# Patient Record
Sex: FEMALE | Race: WHITE | NOT HISPANIC OR LATINO
[De-identification: names, ages, dates, MRNs, and addresses within clinical notes are randomized per-mention and may not be internally consistent; named-entity substitution may affect disease eponyms.]

---

## 2017-01-03 ENCOUNTER — RX RENEWAL (OUTPATIENT)
Age: 82
End: 2017-01-03

## 2017-02-27 ENCOUNTER — MEDICATION RENEWAL (OUTPATIENT)
Age: 82
End: 2017-02-27

## 2017-05-03 ENCOUNTER — LABORATORY RESULT (OUTPATIENT)
Age: 82
End: 2017-05-03

## 2017-05-03 ENCOUNTER — APPOINTMENT (OUTPATIENT)
Dept: INTERNAL MEDICINE | Facility: CLINIC | Age: 82
End: 2017-05-03

## 2017-05-03 VITALS — HEART RATE: 97 BPM | HEIGHT: 63 IN | BODY MASS INDEX: 26.93 KG/M2 | WEIGHT: 152 LBS | OXYGEN SATURATION: 96 %

## 2017-05-03 VITALS — SYSTOLIC BLOOD PRESSURE: 130 MMHG | DIASTOLIC BLOOD PRESSURE: 70 MMHG | HEART RATE: 78 BPM | RESPIRATION RATE: 14 BRPM

## 2017-05-03 DIAGNOSIS — I73.9 PERIPHERAL VASCULAR DISEASE, UNSPECIFIED: ICD-10-CM

## 2017-05-04 LAB
ALBUMIN SERPL ELPH-MCNC: 4.1 G/DL
ALP BLD-CCNC: 96 U/L
ALT SERPL-CCNC: 13 U/L
ANION GAP SERPL CALC-SCNC: 16 MMOL/L
AST SERPL-CCNC: 22 U/L
BASOPHILS # BLD AUTO: 0.02 K/UL
BASOPHILS NFR BLD AUTO: 0.3 %
BILIRUB SERPL-MCNC: 0.4 MG/DL
BUN SERPL-MCNC: 17 MG/DL
CALCIUM SERPL-MCNC: 9.7 MG/DL
CHLORIDE SERPL-SCNC: 100 MMOL/L
CO2 SERPL-SCNC: 24 MMOL/L
CREAT SERPL-MCNC: 0.93 MG/DL
EOSINOPHIL # BLD AUTO: 0.21 K/UL
EOSINOPHIL NFR BLD AUTO: 3.1 %
GLUCOSE SERPL-MCNC: 98 MG/DL
HCT VFR BLD CALC: 44 %
HGB BLD-MCNC: 14 G/DL
IMM GRANULOCYTES NFR BLD AUTO: 0.1 %
LYMPHOCYTES # BLD AUTO: 3.27 K/UL
LYMPHOCYTES NFR BLD AUTO: 48.1 %
MAN DIFF?: NORMAL
MCHC RBC-ENTMCNC: 30.7 PG
MCHC RBC-ENTMCNC: 31.8 GM/DL
MCV RBC AUTO: 96.5 FL
MONOCYTES # BLD AUTO: 0.74 K/UL
MONOCYTES NFR BLD AUTO: 10.9 %
NEUTROPHILS # BLD AUTO: 2.55 K/UL
NEUTROPHILS NFR BLD AUTO: 37.5 %
PLATELET # BLD AUTO: 272 K/UL
POTASSIUM SERPL-SCNC: 5.1 MMOL/L
PROT SERPL-MCNC: 7.1 G/DL
RBC # BLD: 4.56 M/UL
RBC # FLD: 13.8 %
SODIUM SERPL-SCNC: 140 MMOL/L
WBC # FLD AUTO: 6.8 K/UL

## 2017-05-06 ENCOUNTER — MEDICATION RENEWAL (OUTPATIENT)
Age: 82
End: 2017-05-06

## 2017-05-24 ENCOUNTER — MEDICATION RENEWAL (OUTPATIENT)
Age: 82
End: 2017-05-24

## 2017-08-09 ENCOUNTER — MEDICATION RENEWAL (OUTPATIENT)
Age: 82
End: 2017-08-09

## 2017-09-03 ENCOUNTER — MOBILE ON CALL (OUTPATIENT)
Age: 82
End: 2017-09-03

## 2017-09-06 ENCOUNTER — MEDICATION RENEWAL (OUTPATIENT)
Age: 82
End: 2017-09-06

## 2017-09-21 ENCOUNTER — RX RENEWAL (OUTPATIENT)
Age: 82
End: 2017-09-21

## 2017-10-04 ENCOUNTER — APPOINTMENT (OUTPATIENT)
Dept: INTERNAL MEDICINE | Facility: CLINIC | Age: 82
End: 2017-10-04
Payer: MEDICARE

## 2017-10-04 ENCOUNTER — NON-APPOINTMENT (OUTPATIENT)
Age: 82
End: 2017-10-04

## 2017-10-04 VITALS
WEIGHT: 149 LBS | HEART RATE: 78 BPM | BODY MASS INDEX: 26.4 KG/M2 | HEIGHT: 63 IN | RESPIRATION RATE: 12 BRPM | SYSTOLIC BLOOD PRESSURE: 122 MMHG | DIASTOLIC BLOOD PRESSURE: 72 MMHG

## 2017-10-04 DIAGNOSIS — Z01.818 ENCOUNTER FOR OTHER PREPROCEDURAL EXAMINATION: ICD-10-CM

## 2017-10-04 DIAGNOSIS — M48.00 SPINAL STENOSIS, SITE UNSPECIFIED: ICD-10-CM

## 2017-10-04 PROCEDURE — 93000 ELECTROCARDIOGRAM COMPLETE: CPT

## 2017-10-04 PROCEDURE — 99214 OFFICE O/P EST MOD 30 MIN: CPT | Mod: 25

## 2017-10-04 RX ORDER — ALBUTEROL SULFATE 90 UG/1
108 (90 BASE) AEROSOL, METERED RESPIRATORY (INHALATION) EVERY 6 HOURS
Qty: 1 | Refills: 5 | Status: DISCONTINUED | COMMUNITY
Start: 2017-05-03 | End: 2017-10-04

## 2017-11-06 ENCOUNTER — MEDICATION RENEWAL (OUTPATIENT)
Age: 82
End: 2017-11-06

## 2018-01-29 ENCOUNTER — RX RENEWAL (OUTPATIENT)
Age: 83
End: 2018-01-29

## 2018-01-30 ENCOUNTER — MED ADMIN CHARGE (OUTPATIENT)
Age: 83
End: 2018-01-30

## 2018-03-20 ENCOUNTER — MEDICATION RENEWAL (OUTPATIENT)
Age: 83
End: 2018-03-20

## 2018-03-27 ENCOUNTER — APPOINTMENT (OUTPATIENT)
Dept: INTERNAL MEDICINE | Facility: CLINIC | Age: 83
End: 2018-03-27
Payer: MEDICARE

## 2018-03-27 VITALS — BODY MASS INDEX: 25.52 KG/M2 | WEIGHT: 144 LBS | HEIGHT: 63 IN

## 2018-03-27 VITALS — DIASTOLIC BLOOD PRESSURE: 72 MMHG | HEART RATE: 70 BPM | RESPIRATION RATE: 14 BRPM | SYSTOLIC BLOOD PRESSURE: 118 MMHG

## 2018-03-27 DIAGNOSIS — J45.909 UNSPECIFIED ASTHMA, UNCOMPLICATED: ICD-10-CM

## 2018-03-27 DIAGNOSIS — Z87.09 PERSONAL HISTORY OF OTHER DISEASES OF THE RESPIRATORY SYSTEM: ICD-10-CM

## 2018-03-27 DIAGNOSIS — M16.10 UNILATERAL PRIMARY OSTEOARTHRITIS, UNSPECIFIED HIP: ICD-10-CM

## 2018-03-27 PROCEDURE — 99214 OFFICE O/P EST MOD 30 MIN: CPT

## 2018-04-23 ENCOUNTER — APPOINTMENT (OUTPATIENT)
Dept: ORTHOPEDIC SURGERY | Facility: CLINIC | Age: 83
End: 2018-04-23
Payer: MEDICARE

## 2018-04-23 VITALS
SYSTOLIC BLOOD PRESSURE: 147 MMHG | BODY MASS INDEX: 26.4 KG/M2 | HEART RATE: 88 BPM | WEIGHT: 149 LBS | HEIGHT: 63 IN | DIASTOLIC BLOOD PRESSURE: 96 MMHG | TEMPERATURE: 98.3 F

## 2018-04-23 PROCEDURE — 73562 X-RAY EXAM OF KNEE 3: CPT | Mod: RT

## 2018-04-23 PROCEDURE — 73502 X-RAY EXAM HIP UNI 2-3 VIEWS: CPT | Mod: RT

## 2018-04-23 PROCEDURE — 99204 OFFICE O/P NEW MOD 45 MIN: CPT

## 2018-05-01 ENCOUNTER — MEDICATION RENEWAL (OUTPATIENT)
Age: 83
End: 2018-05-01

## 2018-07-04 ENCOUNTER — FORM ENCOUNTER (OUTPATIENT)
Age: 83
End: 2018-07-04

## 2018-07-05 ENCOUNTER — OUTPATIENT (OUTPATIENT)
Dept: OUTPATIENT SERVICES | Facility: HOSPITAL | Age: 83
LOS: 1 days | End: 2018-07-05
Payer: MEDICARE

## 2018-07-05 VITALS
TEMPERATURE: 97 F | DIASTOLIC BLOOD PRESSURE: 87 MMHG | SYSTOLIC BLOOD PRESSURE: 141 MMHG | HEIGHT: 61 IN | HEART RATE: 85 BPM | RESPIRATION RATE: 20 BRPM | WEIGHT: 148.37 LBS

## 2018-07-05 DIAGNOSIS — D64.9 ANEMIA, UNSPECIFIED: ICD-10-CM

## 2018-07-05 DIAGNOSIS — M16.11 UNILATERAL PRIMARY OSTEOARTHRITIS, RIGHT HIP: ICD-10-CM

## 2018-07-05 DIAGNOSIS — Z98.890 OTHER SPECIFIED POSTPROCEDURAL STATES: Chronic | ICD-10-CM

## 2018-07-05 DIAGNOSIS — Z29.9 ENCOUNTER FOR PROPHYLACTIC MEASURES, UNSPECIFIED: ICD-10-CM

## 2018-07-05 DIAGNOSIS — Z86.79 PERSONAL HISTORY OF OTHER DISEASES OF THE CIRCULATORY SYSTEM: Chronic | ICD-10-CM

## 2018-07-05 DIAGNOSIS — H26.9 UNSPECIFIED CATARACT: Chronic | ICD-10-CM

## 2018-07-05 DIAGNOSIS — F32.9 MAJOR DEPRESSIVE DISORDER, SINGLE EPISODE, UNSPECIFIED: ICD-10-CM

## 2018-07-05 DIAGNOSIS — Z90.721 ACQUIRED ABSENCE OF OVARIES, UNILATERAL: Chronic | ICD-10-CM

## 2018-07-05 DIAGNOSIS — G61.0 GUILLAIN-BARRE SYNDROME: ICD-10-CM

## 2018-07-05 DIAGNOSIS — Z01.818 ENCOUNTER FOR OTHER PREPROCEDURAL EXAMINATION: ICD-10-CM

## 2018-07-05 DIAGNOSIS — Z90.49 ACQUIRED ABSENCE OF OTHER SPECIFIED PARTS OF DIGESTIVE TRACT: Chronic | ICD-10-CM

## 2018-07-05 LAB
ANION GAP SERPL CALC-SCNC: 13 MMOL/L — SIGNIFICANT CHANGE UP (ref 5–17)
APTT BLD: 28.7 SEC — SIGNIFICANT CHANGE UP (ref 27.5–37.4)
BASOPHILS # BLD AUTO: 0 K/UL — SIGNIFICANT CHANGE UP (ref 0–0.2)
BASOPHILS NFR BLD AUTO: 0.3 % — SIGNIFICANT CHANGE UP (ref 0–2)
BLD GP AB SCN SERPL QL: SIGNIFICANT CHANGE UP
BUN SERPL-MCNC: 30 MG/DL — HIGH (ref 8–20)
CALCIUM SERPL-MCNC: 9.6 MG/DL — SIGNIFICANT CHANGE UP (ref 8.6–10.2)
CHLORIDE SERPL-SCNC: 101 MMOL/L — SIGNIFICANT CHANGE UP (ref 98–107)
CO2 SERPL-SCNC: 24 MMOL/L — SIGNIFICANT CHANGE UP (ref 22–29)
COMMENT - BLOOD BANK: SIGNIFICANT CHANGE UP
CREAT SERPL-MCNC: 0.79 MG/DL — SIGNIFICANT CHANGE UP (ref 0.5–1.3)
EOSINOPHIL # BLD AUTO: 0.6 K/UL — HIGH (ref 0–0.5)
EOSINOPHIL NFR BLD AUTO: 9.2 % — HIGH (ref 0–6)
GLUCOSE SERPL-MCNC: 108 MG/DL — SIGNIFICANT CHANGE UP (ref 70–115)
HCT VFR BLD CALC: 43.6 % — SIGNIFICANT CHANGE UP (ref 37–47)
HGB BLD-MCNC: 14.3 G/DL — SIGNIFICANT CHANGE UP (ref 12–16)
INR BLD: 0.89 RATIO — SIGNIFICANT CHANGE UP (ref 0.88–1.16)
LYMPHOCYTES # BLD AUTO: 2.2 K/UL — SIGNIFICANT CHANGE UP (ref 1–4.8)
LYMPHOCYTES # BLD AUTO: 35.2 % — SIGNIFICANT CHANGE UP (ref 20–55)
MCHC RBC-ENTMCNC: 31 PG — SIGNIFICANT CHANGE UP (ref 27–31)
MCHC RBC-ENTMCNC: 32.8 G/DL — SIGNIFICANT CHANGE UP (ref 32–36)
MCV RBC AUTO: 94.4 FL — SIGNIFICANT CHANGE UP (ref 81–99)
MONOCYTES # BLD AUTO: 0.7 K/UL — SIGNIFICANT CHANGE UP (ref 0–0.8)
MONOCYTES NFR BLD AUTO: 10.8 % — HIGH (ref 3–10)
MRSA PCR RESULT.: SIGNIFICANT CHANGE UP
NEUTROPHILS # BLD AUTO: 2.7 K/UL — SIGNIFICANT CHANGE UP (ref 1.8–8)
NEUTROPHILS NFR BLD AUTO: 44.3 % — SIGNIFICANT CHANGE UP (ref 37–73)
PLATELET # BLD AUTO: 229 K/UL — SIGNIFICANT CHANGE UP (ref 150–400)
POTASSIUM SERPL-MCNC: 4.7 MMOL/L — SIGNIFICANT CHANGE UP (ref 3.5–5.3)
POTASSIUM SERPL-SCNC: 4.7 MMOL/L — SIGNIFICANT CHANGE UP (ref 3.5–5.3)
PROTHROM AB SERPL-ACNC: 9.8 SEC — SIGNIFICANT CHANGE UP (ref 9.8–12.7)
RBC # BLD: 4.62 M/UL — SIGNIFICANT CHANGE UP (ref 4.4–5.2)
RBC # FLD: 13.7 % — SIGNIFICANT CHANGE UP (ref 11–15.6)
S AUREUS DNA NOSE QL NAA+PROBE: SIGNIFICANT CHANGE UP
SODIUM SERPL-SCNC: 138 MMOL/L — SIGNIFICANT CHANGE UP (ref 135–145)
TYPE + AB SCN PNL BLD: SIGNIFICANT CHANGE UP
WBC # BLD: 6.2 K/UL — SIGNIFICANT CHANGE UP (ref 4.8–10.8)
WBC # FLD AUTO: 6.2 K/UL — SIGNIFICANT CHANGE UP (ref 4.8–10.8)

## 2018-07-05 PROCEDURE — 87640 STAPH A DNA AMP PROBE: CPT

## 2018-07-05 PROCEDURE — 71046 X-RAY EXAM CHEST 2 VIEWS: CPT

## 2018-07-05 PROCEDURE — 93005 ELECTROCARDIOGRAM TRACING: CPT

## 2018-07-05 PROCEDURE — 86850 RBC ANTIBODY SCREEN: CPT

## 2018-07-05 PROCEDURE — 71046 X-RAY EXAM CHEST 2 VIEWS: CPT | Mod: 26

## 2018-07-05 PROCEDURE — 93010 ELECTROCARDIOGRAM REPORT: CPT

## 2018-07-05 PROCEDURE — 85730 THROMBOPLASTIN TIME PARTIAL: CPT

## 2018-07-05 PROCEDURE — G0463: CPT

## 2018-07-05 PROCEDURE — 86900 BLOOD TYPING SEROLOGIC ABO: CPT

## 2018-07-05 PROCEDURE — 36415 COLL VENOUS BLD VENIPUNCTURE: CPT

## 2018-07-05 PROCEDURE — 86901 BLOOD TYPING SEROLOGIC RH(D): CPT

## 2018-07-05 PROCEDURE — 85027 COMPLETE CBC AUTOMATED: CPT

## 2018-07-05 PROCEDURE — 87641 MR-STAPH DNA AMP PROBE: CPT

## 2018-07-05 PROCEDURE — 85610 PROTHROMBIN TIME: CPT

## 2018-07-05 PROCEDURE — 80048 BASIC METABOLIC PNL TOTAL CA: CPT

## 2018-07-05 RX ORDER — DULOXETINE HYDROCHLORIDE 30 MG/1
1 CAPSULE, DELAYED RELEASE ORAL
Qty: 0 | Refills: 0 | COMMUNITY

## 2018-07-05 NOTE — H&P PST ADULT - HISTORY OF PRESENT ILLNESS
Pt is a 89 y.o female with OA of her right hip for the past 2 years with current pain management and physical therapy with no relief now scheduled fo right hip total joint replacement.

## 2018-07-05 NOTE — PATIENT PROFILE ADULT. - PMH
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

## 2018-07-05 NOTE — PATIENT PROFILE ADULT. - LEARNING ASSESSMENT (PATIENT) ADDITIONAL COMMENTS
Pre op teaching Surgical scrub pain management instructions given Pre op teaching Surgical scrub, MRSA/MSSA & pain management instructions given

## 2018-07-05 NOTE — H&P PST ADULT - PMH
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

## 2018-07-05 NOTE — PATIENT PROFILE ADULT. - PSH
Bilateral cataracts  surgery  History of appendectomy    History of right oophorectomy    History of tonsillectomy and adenoidectomy    History of varicose veins  laser removal  S/P wrist surgery  left wrist fracture repair

## 2018-07-05 NOTE — PATIENT PROFILE ADULT. - VISION (WITH CORRECTIVE LENSES IF THE PATIENT USUALLY WEARS THEM):
Partially impaired: cannot see medication labels or newsprint, but can see obstacles in path, and the surrounding layout; can count fingers at arm's length/cataract surgery  IOL

## 2018-07-05 NOTE — H&P PST ADULT - ASSESSMENT
Pt is a 89 y.o female with PMH of Guillian-[place] disease and myelodysplastic syndrome undergoing  right hip total joint replacement. Instructed to hold any medications containing ibuprofen and aspirin 1 week prior to surgery. Hold multivitamin 1 week prior to surgery.  CAPRINI SCORE [CLOT]    AGE RELATED RISK FACTORS                                                       MOBILITY RELATED FACTORS  [ ] Age 41-60 years                                            (1 Point)                  [ ] Bed rest                                                        (1 Point)  [ ] Age: 61-74 years                                           (2 Points)                 [ ] Plaster cast                                                   (2 Points)  [x ] Age= 75 years                                              (3 Points)                 [ ] Bed bound for more than 72 hours                 (2 Points)    DISEASE RELATED RISK FACTORS                                               GENDER SPECIFIC FACTORS  [ ] Edema in the lower extremities                       (1 Point)                  [ ] Pregnancy                                                     (1 Point)  [ ] Varicose veins                                               (1 Point)                  [ ] Post-partum < 6 weeks                                   (1 Point)             [x ] BMI > 25 Kg/m2                                            (1 Point)                  [ ] Hormonal therapy  or oral contraception          (1 Point)                 [ ] Sepsis (in the previous month)                        (1 Point)                  [ ] History of pregnancy complications                 (1 point)  [ ] Pneumonia or serious lung disease                                               [ ] Unexplained or recurrent                     (1 Point)           (in the previous month)                               (1 Point)  [ ] Abnormal pulmonary function test                     (1 Point)                 SURGERY RELATED RISK FACTORS  [ ] Acute myocardial infarction                              (1 Point)                 [ ]  Section                                             (1 Point)  [ ] Congestive heart failure (in the previous month)  (1 Point)               [ ] Minor surgery                                                  (1 Point)   [ ] Inflammatory bowel disease                             (1 Point)                 [ ] Arthroscopic surgery                                        (2 Points)  [ ] Central venous access                                      (2 Points)                [ ] General surgery lasting more than 45 minutes   (2 Points)       [ ] Stroke (in the previous month)                          (5 Points)               [x ] Elective arthroplasty                                         (5 Points)                                                                                                                                               HEMATOLOGY RELATED FACTORS                                                 TRAUMA RELATED RISK FACTORS  [ ] Prior episodes of VTE                                     (3 Points)                 [ ] Fracture of the hip, pelvis, or leg                       (5 Points)  [ ] Positive family history for VTE                         (3 Points)                 [ ] Acute spinal cord injury (in the previous month)  (5 Points)  [ ] Prothrombin 35984 A                                     (3 Points)                 [ ] Paralysis  (less than 1 month)                             (5 Points)  [ ] Factor V Leiden                                             (3 Points)                  [ ] Multiple Trauma within 1 month                        (5 Points)  [ ] Lupus anticoagulants                                     (3 Points)                                                           [ ] Anticardiolipin antibodies                               (3 Points)                                                       [ ] High homocysteine in the blood                      (3 Points)                                             [ ] Other congenital or acquired thrombophilia      (3 Points)                                                [ ] Heparin induced thrombocytopenia                  (3 Points)                                          Total Score [ 7         ]

## 2018-07-18 ENCOUNTER — APPOINTMENT (OUTPATIENT)
Dept: INTERNAL MEDICINE | Facility: CLINIC | Age: 83
End: 2018-07-18
Payer: MEDICARE

## 2018-07-18 VITALS
DIASTOLIC BLOOD PRESSURE: 76 MMHG | RESPIRATION RATE: 12 BRPM | HEIGHT: 63 IN | SYSTOLIC BLOOD PRESSURE: 118 MMHG | HEART RATE: 78 BPM | BODY MASS INDEX: 26.05 KG/M2 | WEIGHT: 147 LBS

## 2018-07-18 DIAGNOSIS — Z01.818 ENCOUNTER FOR OTHER PREPROCEDURAL EXAMINATION: ICD-10-CM

## 2018-07-18 DIAGNOSIS — M16.11 UNILATERAL PRIMARY OSTEOARTHRITIS, RIGHT HIP: ICD-10-CM

## 2018-07-18 PROCEDURE — 99214 OFFICE O/P EST MOD 30 MIN: CPT

## 2018-07-18 RX ORDER — LEVOFLOXACIN 500 MG/1
500 TABLET, FILM COATED ORAL DAILY
Qty: 7 | Refills: 0 | Status: DISCONTINUED | COMMUNITY
Start: 2018-03-20 | End: 2018-07-18

## 2018-07-18 RX ORDER — METHYLPREDNISOLONE 4 MG/1
4 TABLET ORAL
Qty: 1 | Refills: 0 | Status: DISCONTINUED | COMMUNITY
Start: 2018-03-27 | End: 2018-07-18

## 2018-07-18 RX ORDER — BROMPHENIRAMINE MALEATE, PSEUDOEPHEDRINE HYDROCHLORIDE AND DEXTROMETHORPHAN HYDROBROMIDE 2; 30; 10 MG/5ML; MG/5ML; MG/5ML
30-2-10 SYRUP ORAL
Qty: 1 | Refills: 0 | Status: DISCONTINUED | COMMUNITY
Start: 2018-03-20 | End: 2018-07-18

## 2018-07-18 NOTE — HISTORY OF PRESENT ILLNESS
[( Patient denies any chest pain, claudication, dyspnea on exertion, orthopnea, palpitations or syncope )] : Patient denies any chest pain, claudication, dyspnea on exertion, orthopnea, palpitations or syncope. [Coronary Artery Disease] : no coronary artery disease [Diabetes] : no diabetes [Sleep Apnea] : no sleep apnea [COPD] : no COPD [Previous Adverse Anesthesia Reaction] : no previous adverse anesthesia reaction [FreeTextEntry1] : RIght hip Replacement [FreeTextEntry2] : 7/24/18 [FreeTextEntry3] : Dr. Arturo Baum [FreeTextEntry4] : Patient with prior medical history of ITP, osteoporosis, chronic back pain.  Patient will undergo righ hip replacement

## 2018-07-18 NOTE — RESULTS/DATA
[] : results reviewed [de-identified] : normal [de-identified] : normal  [de-identified] : normal  [de-identified] : napd [de-identified] : nsr, low voltage, inferior lead changes all old

## 2018-07-18 NOTE — ASSESSMENT
[Patient Optimized for Surgery] : Patient optimized for surgery [No Further Testing Recommended] : no further testing recommended [As per surgery] : as per surgery [FreeTextEntry4] : Patient is medically stable for planned procedure. Patient ekg changes are old and does not feel a need for cardiology reevaluation.  She is high functioning and has no symptoms suggestive of heart disease. Would monitor cbc post op given history of ITP and likely post use of anticoagulants.   [FreeTextEntry7] : no nsaids,  take usual meds [FreeTextEntry2] : lovenox but monitor plateelts given history of itp.

## 2018-07-20 RX ORDER — GABAPENTIN 400 MG/1
600 CAPSULE ORAL ONCE
Qty: 0 | Refills: 0 | Status: COMPLETED | OUTPATIENT
Start: 2018-07-24 | End: 2018-07-24

## 2018-07-20 RX ORDER — OXYCODONE HYDROCHLORIDE 5 MG/1
10 TABLET ORAL ONCE
Qty: 0 | Refills: 0 | Status: DISCONTINUED | OUTPATIENT
Start: 2018-07-24 | End: 2018-07-24

## 2018-07-20 RX ORDER — CELECOXIB 200 MG/1
400 CAPSULE ORAL ONCE
Qty: 0 | Refills: 0 | Status: COMPLETED | OUTPATIENT
Start: 2018-07-24 | End: 2018-07-24

## 2018-07-22 NOTE — PATIENT PROFILE ADULT. - DOES PATIENT HAVE ADVANCE DIRECTIVE
Respiratory and MD at  Bedside, concerns were expressed about patient needing to intubated, per MD BiPap will be started to see if patient improved, will continue to monitor.   No

## 2018-07-23 ENCOUNTER — TRANSCRIPTION ENCOUNTER (OUTPATIENT)
Age: 83
End: 2018-07-23

## 2018-07-24 ENCOUNTER — INPATIENT (INPATIENT)
Facility: HOSPITAL | Age: 83
LOS: 1 days | Discharge: HOME HEALTH | DRG: 470 | End: 2018-07-26
Attending: ORTHOPAEDIC SURGERY | Admitting: ORTHOPAEDIC SURGERY
Payer: MEDICARE

## 2018-07-24 ENCOUNTER — APPOINTMENT (OUTPATIENT)
Dept: ORTHOPEDIC SURGERY | Facility: HOSPITAL | Age: 83
End: 2018-07-24

## 2018-07-24 ENCOUNTER — RESULT REVIEW (OUTPATIENT)
Age: 83
End: 2018-07-24

## 2018-07-24 ENCOUNTER — TRANSCRIPTION ENCOUNTER (OUTPATIENT)
Age: 83
End: 2018-07-24

## 2018-07-24 VITALS
WEIGHT: 148.37 LBS | HEIGHT: 63 IN | OXYGEN SATURATION: 95 % | HEART RATE: 92 BPM | SYSTOLIC BLOOD PRESSURE: 125 MMHG | DIASTOLIC BLOOD PRESSURE: 93 MMHG | RESPIRATION RATE: 16 BRPM | TEMPERATURE: 97 F

## 2018-07-24 DIAGNOSIS — Z90.49 ACQUIRED ABSENCE OF OTHER SPECIFIED PARTS OF DIGESTIVE TRACT: Chronic | ICD-10-CM

## 2018-07-24 DIAGNOSIS — Z86.79 PERSONAL HISTORY OF OTHER DISEASES OF THE CIRCULATORY SYSTEM: Chronic | ICD-10-CM

## 2018-07-24 DIAGNOSIS — Z98.890 OTHER SPECIFIED POSTPROCEDURAL STATES: Chronic | ICD-10-CM

## 2018-07-24 DIAGNOSIS — Z90.721 ACQUIRED ABSENCE OF OVARIES, UNILATERAL: Chronic | ICD-10-CM

## 2018-07-24 DIAGNOSIS — M16.11 UNILATERAL PRIMARY OSTEOARTHRITIS, RIGHT HIP: ICD-10-CM

## 2018-07-24 DIAGNOSIS — H26.9 UNSPECIFIED CATARACT: Chronic | ICD-10-CM

## 2018-07-24 LAB
BLD GP AB SCN SERPL QL: SIGNIFICANT CHANGE UP
GLUCOSE BLDC GLUCOMTR-MCNC: 103 MG/DL — HIGH (ref 70–99)
GLUCOSE BLDC GLUCOMTR-MCNC: 92 MG/DL — SIGNIFICANT CHANGE UP (ref 70–99)
GLUCOSE BLDC GLUCOMTR-MCNC: 96 MG/DL — SIGNIFICANT CHANGE UP (ref 70–99)
TYPE + AB SCN PNL BLD: SIGNIFICANT CHANGE UP

## 2018-07-24 PROCEDURE — 27130 TOTAL HIP ARTHROPLASTY: CPT | Mod: RT

## 2018-07-24 PROCEDURE — 27130 TOTAL HIP ARTHROPLASTY: CPT | Mod: AS,RT

## 2018-07-24 PROCEDURE — 88305 TISSUE EXAM BY PATHOLOGIST: CPT | Mod: 26

## 2018-07-24 PROCEDURE — 99222 1ST HOSP IP/OBS MODERATE 55: CPT

## 2018-07-24 PROCEDURE — 73501 X-RAY EXAM HIP UNI 1 VIEW: CPT | Mod: 26,RT

## 2018-07-24 PROCEDURE — 88311 DECALCIFY TISSUE: CPT | Mod: 26

## 2018-07-24 RX ORDER — SODIUM CHLORIDE 9 MG/ML
3 INJECTION INTRAMUSCULAR; INTRAVENOUS; SUBCUTANEOUS EVERY 8 HOURS
Qty: 0 | Refills: 0 | Status: DISCONTINUED | OUTPATIENT
Start: 2018-07-24 | End: 2018-07-24

## 2018-07-24 RX ORDER — POLYETHYLENE GLYCOL 3350 17 G/17G
17 POWDER, FOR SOLUTION ORAL DAILY
Qty: 0 | Refills: 0 | Status: DISCONTINUED | OUTPATIENT
Start: 2018-07-24 | End: 2018-07-26

## 2018-07-24 RX ORDER — ACETAMINOPHEN 500 MG
975 TABLET ORAL EVERY 8 HOURS
Qty: 0 | Refills: 0 | Status: DISCONTINUED | OUTPATIENT
Start: 2018-07-24 | End: 2018-07-26

## 2018-07-24 RX ORDER — MAGNESIUM HYDROXIDE 400 MG/1
30 TABLET, CHEWABLE ORAL DAILY
Qty: 0 | Refills: 0 | Status: DISCONTINUED | OUTPATIENT
Start: 2018-07-24 | End: 2018-07-26

## 2018-07-24 RX ORDER — OXYCODONE HYDROCHLORIDE 5 MG/1
5 TABLET ORAL EVERY 4 HOURS
Qty: 0 | Refills: 0 | Status: DISCONTINUED | OUTPATIENT
Start: 2018-07-24 | End: 2018-07-26

## 2018-07-24 RX ORDER — ZOLPIDEM TARTRATE 10 MG/1
5 TABLET ORAL AT BEDTIME
Qty: 0 | Refills: 0 | Status: DISCONTINUED | OUTPATIENT
Start: 2018-07-24 | End: 2018-07-26

## 2018-07-24 RX ORDER — CEFAZOLIN SODIUM 1 G
2000 VIAL (EA) INJECTION
Qty: 0 | Refills: 0 | Status: COMPLETED | OUTPATIENT
Start: 2018-07-24 | End: 2018-07-25

## 2018-07-24 RX ORDER — TRANEXAMIC ACID 100 MG/ML
650 INJECTION, SOLUTION INTRAVENOUS ONCE
Qty: 0 | Refills: 0 | Status: DISCONTINUED | OUTPATIENT
Start: 2018-07-24 | End: 2018-07-24

## 2018-07-24 RX ORDER — FERROUS SULFATE 325(65) MG
325 TABLET ORAL DAILY
Qty: 0 | Refills: 0 | Status: DISCONTINUED | OUTPATIENT
Start: 2018-07-24 | End: 2018-07-26

## 2018-07-24 RX ORDER — ACETAMINOPHEN 500 MG
1000 TABLET ORAL ONCE
Qty: 0 | Refills: 0 | Status: DISCONTINUED | OUTPATIENT
Start: 2018-07-24 | End: 2018-07-24

## 2018-07-24 RX ORDER — DOCUSATE SODIUM 100 MG
100 CAPSULE ORAL THREE TIMES A DAY
Qty: 0 | Refills: 0 | Status: DISCONTINUED | OUTPATIENT
Start: 2018-07-24 | End: 2018-07-26

## 2018-07-24 RX ORDER — OXYCODONE HYDROCHLORIDE 5 MG/1
10 TABLET ORAL EVERY 4 HOURS
Qty: 0 | Refills: 0 | Status: DISCONTINUED | OUTPATIENT
Start: 2018-07-24 | End: 2018-07-26

## 2018-07-24 RX ORDER — CEFAZOLIN SODIUM 1 G
2000 VIAL (EA) INJECTION ONCE
Qty: 0 | Refills: 0 | Status: DISCONTINUED | OUTPATIENT
Start: 2018-07-24 | End: 2018-07-24

## 2018-07-24 RX ORDER — VANCOMYCIN HCL 1 G
1000 VIAL (EA) INTRAVENOUS
Qty: 0 | Refills: 0 | Status: COMPLETED | OUTPATIENT
Start: 2018-07-24 | End: 2018-07-24

## 2018-07-24 RX ORDER — OXYCODONE HYDROCHLORIDE 5 MG/1
10 TABLET ORAL EVERY 12 HOURS
Qty: 0 | Refills: 0 | Status: DISCONTINUED | OUTPATIENT
Start: 2018-07-24 | End: 2018-07-26

## 2018-07-24 RX ORDER — KETOROLAC TROMETHAMINE 30 MG/ML
15 SYRINGE (ML) INJECTION EVERY 6 HOURS
Qty: 0 | Refills: 0 | Status: DISCONTINUED | OUTPATIENT
Start: 2018-07-24 | End: 2018-07-25

## 2018-07-24 RX ORDER — FOLIC ACID 0.8 MG
1 TABLET ORAL DAILY
Qty: 0 | Refills: 0 | Status: DISCONTINUED | OUTPATIENT
Start: 2018-07-24 | End: 2018-07-26

## 2018-07-24 RX ORDER — SODIUM CHLORIDE 9 MG/ML
1000 INJECTION, SOLUTION INTRAVENOUS
Qty: 0 | Refills: 0 | Status: DISCONTINUED | OUTPATIENT
Start: 2018-07-24 | End: 2018-07-25

## 2018-07-24 RX ORDER — ONDANSETRON 8 MG/1
4 TABLET, FILM COATED ORAL EVERY 6 HOURS
Qty: 0 | Refills: 0 | Status: DISCONTINUED | OUTPATIENT
Start: 2018-07-24 | End: 2018-07-26

## 2018-07-24 RX ORDER — VANCOMYCIN HCL 1 G
1000 VIAL (EA) INTRAVENOUS ONCE
Qty: 0 | Refills: 0 | Status: COMPLETED | OUTPATIENT
Start: 2018-07-24 | End: 2018-07-24

## 2018-07-24 RX ORDER — HYDROMORPHONE HYDROCHLORIDE 2 MG/ML
0.5 INJECTION INTRAMUSCULAR; INTRAVENOUS; SUBCUTANEOUS EVERY 4 HOURS
Qty: 0 | Refills: 0 | Status: DISCONTINUED | OUTPATIENT
Start: 2018-07-24 | End: 2018-07-26

## 2018-07-24 RX ORDER — DULOXETINE HYDROCHLORIDE 30 MG/1
60 CAPSULE, DELAYED RELEASE ORAL DAILY
Qty: 0 | Refills: 0 | Status: DISCONTINUED | OUTPATIENT
Start: 2018-07-24 | End: 2018-07-26

## 2018-07-24 RX ORDER — ASPIRIN/CALCIUM CARB/MAGNESIUM 324 MG
325 TABLET ORAL
Qty: 0 | Refills: 0 | Status: DISCONTINUED | OUTPATIENT
Start: 2018-07-25 | End: 2018-07-26

## 2018-07-24 RX ORDER — ACETAMINOPHEN 500 MG
650 TABLET ORAL EVERY 6 HOURS
Qty: 0 | Refills: 0 | Status: DISCONTINUED | OUTPATIENT
Start: 2018-07-24 | End: 2018-07-26

## 2018-07-24 RX ORDER — SENNA PLUS 8.6 MG/1
2 TABLET ORAL AT BEDTIME
Qty: 0 | Refills: 0 | Status: DISCONTINUED | OUTPATIENT
Start: 2018-07-24 | End: 2018-07-26

## 2018-07-24 RX ORDER — GABAPENTIN 400 MG/1
300 CAPSULE ORAL THREE TIMES A DAY
Qty: 0 | Refills: 0 | Status: DISCONTINUED | OUTPATIENT
Start: 2018-07-24 | End: 2018-07-26

## 2018-07-24 RX ADMIN — OXYCODONE HYDROCHLORIDE 10 MILLIGRAM(S): 5 TABLET ORAL at 10:12

## 2018-07-24 RX ADMIN — Medication 975 MILLIGRAM(S): at 21:39

## 2018-07-24 RX ADMIN — SENNA PLUS 2 TABLET(S): 8.6 TABLET ORAL at 21:38

## 2018-07-24 RX ADMIN — GABAPENTIN 300 MILLIGRAM(S): 400 CAPSULE ORAL at 21:38

## 2018-07-24 RX ADMIN — ZOLPIDEM TARTRATE 5 MILLIGRAM(S): 10 TABLET ORAL at 21:38

## 2018-07-24 RX ADMIN — CELECOXIB 400 MILLIGRAM(S): 200 CAPSULE ORAL at 10:12

## 2018-07-24 RX ADMIN — Medication 100 MILLIGRAM(S): at 21:38

## 2018-07-24 RX ADMIN — Medication 250 MILLIGRAM(S): at 11:48

## 2018-07-24 RX ADMIN — CELECOXIB 400 MILLIGRAM(S): 200 CAPSULE ORAL at 10:11

## 2018-07-24 RX ADMIN — GABAPENTIN 600 MILLIGRAM(S): 400 CAPSULE ORAL at 10:11

## 2018-07-24 RX ADMIN — Medication 100 MILLIGRAM(S): at 21:39

## 2018-07-24 NOTE — CONSULT NOTE ADULT - SUBJECTIVE AND OBJECTIVE BOX
PMD : Dr Kim  Cardio :     chief complaint of : Rt hip pain    HPI:  81yr old female with PMH of Depression, Anemia, Myelodysplastic disease, chronic Rt Hip Pain 2/2 OA presents today for an elective Rt hip replacement. She is s/p right total knee arthroplasty, POD#0, Seen in PACU.      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  Anemia  Depression  Osteoarthritis: right hip  Insomnia  S/P wrist surgery: left wrist fracture repair  History of varicose veins: laser removal  Bilateral cataracts: surgery  History of appendectomy  History of tonsillectomy and adenoidectomy  History of right oophorectomy      Social History:  Tabacco - quit since 6mths  ETOH - 1-2 drinks daily   Illicit drug abuse - denies    FAMILY HISTORY:  Family history of diabetes mellitus (DM) (Father)      Allergies    No Known Allergies    Intolerances        HOME MEDICATIONS : reviewed   · 	Ambien 10 mg oral tablet: Last Dose Taken:  , 1 tab(s) orally once a day (at bedtime)  · 	gabapentin 300 mg oral tablet: Last Dose Taken:  , 1 tab(s) orally 3 times a day  · 	Percocet 10/325 oral tablet: Last Dose Taken:  , 1 tab(s) orally once a day  · 	Multiple Vitamins oral tablet: Last Dose Taken:  , 1 tab(s) orally once a day  · 	Cymbalta 60 mg oral delayed release capsule: Last Dose Taken:  , 1 cap(s) orally once a day (at bedtime)  · 	MiraLax oral powder for reconstitution: Last Dose Taken:  , 1 cap(s) orally once a day      REVIEW OF SYSTEMS:    CONSTITUTIONAL: No fever, weight loss, or fatigue  EYES: No eye pain, visual disturbances, or discharge  NECK: No pain or stiffness  RESPIRATORY: No cough, wheezing, chills or hemoptysis; No shortness of breath  CARDIOVASCULAR: No chest pain, palpitations, dizziness, or leg swelling  GASTROINTESTINAL: No abdominal or epigastric pain. No nausea, vomiting, or hematemesis; No diarrhea or constipation. No melena or hematochezia.  GENITOURINARY: No dysuria, frequency, hematuria, or incontinence  NEUROLOGICAL: No headaches, memory loss, loss of strength, numbness, or tremors  SKIN: No itching, burning, rashes, or lesions   LYMPH NODES: No enlarged glands  ENDOCRINE: No heat or cold intolerance; No hair loss  MUSCULOSKELETAL:   PSYCHIATRIC: No depression, anxiety, mood swings, or difficulty sleeping  HEME/LYMPH: No easy bruising, or bleeding gums  ALLERGY AND IMMUNOLOGIC: No hives or eczema    MEDICATIONS  (STANDING):    MEDICATIONS  (PRN):      Vital Signs Last 24 Hrs  T(C): 36.1 (24 Jul 2018 09:42), Max: 36.1 (24 Jul 2018 09:42)  T(F): 97 (24 Jul 2018 09:42), Max: 97 (24 Jul 2018 09:42)  HR: 92 (24 Jul 2018 09:42) (92 - 92)  BP: 125/93 (24 Jul 2018 09:42) (125/93 - 125/93)  BP(mean): --  RR: 16 (24 Jul 2018 09:42) (16 - 16)  SpO2: 95% (24 Jul 2018 09:42) (95% - 95%)    PHYSICAL EXAM:    GENERAL: NAD, well-groomed, well-developed  HEAD:  Atraumatic, Normocephalic  EYES: EOMI, PERRLA, conjunctiva and sclera clear  NECK: Supple, No JVD, Normal thyroid  NERVOUS SYSTEM:  Alert & Oriented X3, Good concentration; Motor Strength 5/5 B/L upper and lower extremities; DTRs 2+ intact and symmetric  CHEST/LUNG: CTA  b/l,  no rales, rhonchi, wheezing, or rubs  HEART: Regular rate and rhythm; No murmurs, rubs, or gallops  ABDOMEN: Soft, Nontender, Nondistended; Bowel sounds present  EXTREMITIES:  2+ Peripheral Pulses, No clubbing, cyanosis, or edema ,   LYMPH: No lymphadenopathy noted  SKIN: No rashes or lesions    LABS:        Reviewed from Outpt          RADIOLOGY & ADDITIONAL STUDIES:    EKG - tracing reviewed = NSR  CXR - film reviewed - NAD     Office notes from PMD reviewed PMD : Dr Kim  Cardio :     chief complaint of : Rt hip pain    HPI:  81yr old female with PMH of Depression, Anemia, Myelodysplastic disease with h/o Chemotherapy 5yrs ago, in remission, h/o ITP, chronic Rt Hip Pain 2/2 OA presents today for an elective Rt hip replacement. She is s/p right total knee arthroplasty, POD#0, Seen in PACU.      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  · 	Ambien 10 mg oral tablet: Last Dose Taken:  , 1 tab(s) orally once a day (at bedtime)  · 	gabapentin 300 mg oral tablet: Last Dose Taken:  , 1 tab(s) orally 3 times a day  · 	Percocet 10/325 oral tablet: Last Dose Taken:  , 1 tab(s) orally once a day  · 	Multiple Vitamins oral tablet: Last Dose Taken:  , 1 tab(s) orally once a day  · 	Cymbalta 60 mg oral delayed release capsule: Last Dose Taken:  , 1 cap(s) orally once a day (at bedtime)  · 	MiraLax oral powder for reconstitution: Last Dose Taken:  , 1 cap(s) orally once a day      REVIEW OF SYSTEMS:    CONSTITUTIONAL: No fever, weight loss, or fatigue  EYES: No eye pain, visual disturbances, or discharge  NECK: No pain or stiffness  RESPIRATORY: No cough, wheezing No shortness of breath  CARDIOVASCULAR: No chest pain, palpitations, dizziness, or leg swelling  GASTROINTESTINAL: No abdominal or epigastric pain. No nausea, vomiting,  GENITOURINARY: No dysuria, frequency, hematuria, or incontinence  NEUROLOGICAL: No headaches, memory loss, loss of strength, numbness, or tremors  SKIN: No itching, burning, rashes, or lesions       MEDICATIONS  (STANDING):    MEDICATIONS  (PRN):      Vital Signs Last 24 Hrs  T(C): 36.1 (24 Jul 2018 09:42), Max: 36.1 (24 Jul 2018 09:42)  T(F): 97 (24 Jul 2018 09:42), Max: 97 (24 Jul 2018 09:42)  HR: 92 (24 Jul 2018 09:42) (92 - 92)  BP: 125/93 (24 Jul 2018 09:42) (125/93 - 125/93)  BP(mean): --  RR: 16 (24 Jul 2018 09:42) (16 - 16)  SpO2: 95% (24 Jul 2018 09:42) (95% - 95%)    PHYSICAL EXAM:    GENERAL: NAD, well-groomed, well-developed  HEAD:  Atraumatic, Normocephalic  EYES: EOMI, PERRLA, conjunctiva and sclera clear  NECK: Supple, No JVD  NERVOUS SYSTEM:  Alert & Oriented X3, Good concentration  CHEST/LUNG: CTA  b/l,  no rales, rhonchi  HEART: Regular rate and rhythm; No murmurs  EXTREMITIES:  , No clubbing, cyanosis, or edema ,       LABS:        Reviewed from Outpt          RADIOLOGY & ADDITIONAL STUDIES:    EKG - tracing reviewed = NSR  CXR - film reviewed - NAD     Office notes from PMD reviewed

## 2018-07-24 NOTE — DISCHARGE NOTE ADULT - CARE PLAN
Principal Discharge DX:	Osteoarthritis  Goal:	Improved function and pain control  Assessment and plan of treatment:	The patient will be seen in the office between 2-3 weeks for wound check. PLEASE CONTACT OFFICE TO ARRANGE FOLLOW-UP APPOINTMENT DATE. Sutures/Staples/Tape will be removed at that time. Patient may shower after post-op day #5 (7/29/18). The dressing is to be removed on post op day #9 (8/2/18). IF THE DRESSING BECOMES SOILED BEFORE THE REMOVAL DATE, CHANGE WITH A SIMILAR DRESSING. IF THE DRESSING BECOMES STAINED WITH DISCHARGE, CONTACT THE OFFICE FOR FURTHER DIRECTIONS.  The patient will contact the office if the wound becomes red, has increasing pain, develops bleeding or discharge, an injury occurs, or has other concerns. The patient will continue PT consistent with posterior total hip replacement protocol. The patient will continue to practice posterior total hip precautions for a minimum of 6 week. The patient will continue ASPIRIN 325mg twice daily for 6 weeks for blood clot prevention. The patient will take OXYCODONE AND TYLENOL for pain control and titrate according to prescription and patient needs. The patient will take Senna-S while taking oxycodone to prevent narcotic associated constipation.  Additionally, increase water intake (drink at least 8 glasses of water daily) and try adding fiber to the diet by eating fruits, vegetables and foods that are rich in grains. If constipation is experienced, contact the medical/primary care provider to discuss further treatment options. The patient is FULL weight bearing.

## 2018-07-24 NOTE — DISCHARGE NOTE ADULT - MEDICATION SUMMARY - MEDICATIONS TO TAKE
I will START or STAY ON the medications listed below when I get home from the hospital:    Aspirin Enteric Coated 325 mg oral delayed release tablet  -- 1 tab(s) by mouth 2 times a day MDD:2  -- Swallow whole.  Do not crush.  Take with food or milk.    -- Indication: For DVT ppx    oxyCODONE 5 mg oral tablet  -- 1-2  tab(s) by mouth every 4 to 6 hours MDD:8-  -- Caution federal law prohibits the transfer of this drug to any person other  than the person for whom it was prescribed.  It is very important that you take or use this exactly as directed.  Do not skip doses or discontinue unless directed by your doctor.  May cause drowsiness.  Alcohol may intensify this effect.  Use care when operating dangerous machinery.  This prescription cannot be refilled.  Using more of this medication than prescribed may cause serious breathing problems.    -- Indication: For Pain    gabapentin 300 mg oral tablet  -- 1 tab(s) by mouth 3 times a day  -- Indication: For home med    Cymbalta 60 mg oral delayed release capsule  -- 1 cap(s) by mouth once a day (at bedtime)  -- Indication: For home med    Ambien 10 mg oral tablet  -- 1 tab(s) by mouth once a day (at bedtime)  -- Indication: For home med    Senna S 50 mg-8.6 mg oral tablet  -- 2 tab(s) by mouth once a day (at bedtime) MDD:2  -- Medication should be taken with plenty of water.    -- Indication: For constipation

## 2018-07-24 NOTE — DISCHARGE NOTE ADULT - PATIENT PORTAL LINK FT
You can access the [facility] Patient Portal, offered by [facility], by registering with the following website: [email or web address]

## 2018-07-24 NOTE — CONSULT NOTE ADULT - ASSESSMENT
81yr old female with PMH of Depression, Anemia, Myelodysplastic disease, chronic Rt Hip Pain 2/2 OA presents today for an elective Rt hip replacement. She is s/p right total knee arthroplasty.    #Rt hip OA - S/p Rt hip replacement  Pain control   Bowel regimen   Incentive spirometry  DVT px - per ortho   PT    # Depression - ct Home meds  # Myelodysplastic disease with Anemia - monitor H&H , recent CBC stable.   not on any meds    # DVT px 81yr old female with PMH of Depression, Anemia, Myelodysplastic disease with h/o Chemotherapy 5yrs ago, in remission, h/o ITP, chronic Rt Hip Pain 2/2 OA presents today for an elective Rt hip replacement. She is s/p right total knee arthroplasty,     #Rt hip OA - S/p Rt hip replacement  Pain control   Bowel regimen   Incentive spirometry  DVT px - per ortho   PT    # Depression - ct Home meds  # Myelodysplastic disease with Anemia - monitor H&H , recent CBC stable.   not on any meds    # h/o ITP - monitor CBC    # DVT px

## 2018-07-24 NOTE — DISCHARGE NOTE ADULT - CARE PROVIDER_API CALL
Arturo Baum), Orthopaedic Surgery  [address] [place] [address]  Phone: [phone]  Fax: [phone]

## 2018-07-24 NOTE — DISCHARGE NOTE ADULT - PLAN OF CARE
Improved function and pain control The patient will be seen in the office between 2-3 weeks for wound check. PLEASE CONTACT OFFICE TO ARRANGE FOLLOW-UP APPOINTMENT DATE. Sutures/Staples/Tape will be removed at that time. Patient may shower after post-op day #5 (7/29/18). The dressing is to be removed on post op day #9 (8/2/18). IF THE DRESSING BECOMES SOILED BEFORE THE REMOVAL DATE, CHANGE WITH A SIMILAR DRESSING. IF THE DRESSING BECOMES STAINED WITH DISCHARGE, CONTACT THE OFFICE FOR FURTHER DIRECTIONS.  The patient will contact the office if the wound becomes red, has increasing pain, develops bleeding or discharge, an injury occurs, or has other concerns. The patient will continue PT consistent with posterior total hip replacement protocol. The patient will continue to practice posterior total hip precautions for a minimum of 6 week. The patient will continue ASPIRIN 325mg twice daily for 6 weeks for blood clot prevention. The patient will take OXYCODONE AND TYLENOL for pain control and titrate according to prescription and patient needs. The patient will take Senna-S while taking oxycodone to prevent narcotic associated constipation.  Additionally, increase water intake (drink at least 8 glasses of water daily) and try adding fiber to the diet by eating fruits, vegetables and foods that are rich in grains. If constipation is experienced, contact the medical/primary care provider to discuss further treatment options. The patient is FULL weight bearing.

## 2018-07-24 NOTE — DISCHARGE NOTE ADULT - HOSPITAL COURSE
The patient underwent a RIGHT POSTERIOR TOTAL HIP REPLACEMENT on 7/24/18. The patient received antibiotics consistent with SCIP guidelines. The patient underwent the procedure and had no intra-operative complications. Post-operatively, the patient was seen by medicine and PT. The patient received ASPIRIN for DVTP. The patient received pain medications per orthopedic pain management protocol and the pain was appropriately controlled. Patient was instructed on posterior total hip precautions by PT. The patient did not have any post-operative medical complications. The patient was discharged in stable condition.

## 2018-07-24 NOTE — DISCHARGE NOTE ADULT - MEDICATION SUMMARY - MEDICATIONS TO STOP TAKING
I will STOP taking the medications listed below when I get home from the hospital:    Percocet 10/325 oral tablet  -- 1 tab(s) by mouth once a day

## 2018-07-25 LAB
ANION GAP SERPL CALC-SCNC: 11 MMOL/L — SIGNIFICANT CHANGE UP (ref 5–17)
BUN SERPL-MCNC: 20 MG/DL — SIGNIFICANT CHANGE UP (ref 8–20)
CALCIUM SERPL-MCNC: 9 MG/DL — SIGNIFICANT CHANGE UP (ref 8.6–10.2)
CHLORIDE SERPL-SCNC: 103 MMOL/L — SIGNIFICANT CHANGE UP (ref 98–107)
CO2 SERPL-SCNC: 24 MMOL/L — SIGNIFICANT CHANGE UP (ref 22–29)
CREAT SERPL-MCNC: 0.84 MG/DL — SIGNIFICANT CHANGE UP (ref 0.5–1.3)
GLUCOSE SERPL-MCNC: 136 MG/DL — HIGH (ref 70–115)
HCT VFR BLD CALC: 37.9 % — SIGNIFICANT CHANGE UP (ref 37–47)
HGB BLD-MCNC: 12.3 G/DL — SIGNIFICANT CHANGE UP (ref 12–16)
MCHC RBC-ENTMCNC: 31 PG — SIGNIFICANT CHANGE UP (ref 27–31)
MCHC RBC-ENTMCNC: 32.5 G/DL — SIGNIFICANT CHANGE UP (ref 32–36)
MCV RBC AUTO: 95.5 FL — SIGNIFICANT CHANGE UP (ref 81–99)
PLATELET # BLD AUTO: 207 K/UL — SIGNIFICANT CHANGE UP (ref 150–400)
POTASSIUM SERPL-MCNC: 4.7 MMOL/L — SIGNIFICANT CHANGE UP (ref 3.5–5.3)
POTASSIUM SERPL-SCNC: 4.7 MMOL/L — SIGNIFICANT CHANGE UP (ref 3.5–5.3)
RBC # BLD: 3.97 M/UL — LOW (ref 4.4–5.2)
RBC # FLD: 13.1 % — SIGNIFICANT CHANGE UP (ref 11–15.6)
SODIUM SERPL-SCNC: 138 MMOL/L — SIGNIFICANT CHANGE UP (ref 135–145)
WBC # BLD: 14.3 K/UL — HIGH (ref 4.8–10.8)
WBC # FLD AUTO: 14.3 K/UL — HIGH (ref 4.8–10.8)

## 2018-07-25 PROCEDURE — 99232 SBSQ HOSP IP/OBS MODERATE 35: CPT

## 2018-07-25 RX ORDER — OXYCODONE HYDROCHLORIDE 5 MG/1
1 TABLET ORAL
Qty: 42 | Refills: 0
Start: 2018-07-25 | End: 2018-07-31

## 2018-07-25 RX ORDER — ASPIRIN/CALCIUM CARB/MAGNESIUM 324 MG
1 TABLET ORAL
Qty: 84 | Refills: 0
Start: 2018-07-25 | End: 2018-09-04

## 2018-07-25 RX ORDER — POLYETHYLENE GLYCOL 3350 17 G/17G
1 POWDER, FOR SOLUTION ORAL
Qty: 0 | Refills: 0 | COMMUNITY

## 2018-07-25 RX ORDER — SENNOSIDES/DOCUSATE SODIUM 8.6MG-50MG
2 TABLET ORAL
Qty: 14 | Refills: 0
Start: 2018-07-25 | End: 2018-07-31

## 2018-07-25 RX ADMIN — Medication 100 MILLIGRAM(S): at 13:57

## 2018-07-25 RX ADMIN — Medication 100 MILLIGRAM(S): at 21:34

## 2018-07-25 RX ADMIN — OXYCODONE HYDROCHLORIDE 10 MILLIGRAM(S): 5 TABLET ORAL at 06:32

## 2018-07-25 RX ADMIN — Medication 15 MILLIGRAM(S): at 06:32

## 2018-07-25 RX ADMIN — Medication 1 MILLIGRAM(S): at 11:04

## 2018-07-25 RX ADMIN — DULOXETINE HYDROCHLORIDE 60 MILLIGRAM(S): 30 CAPSULE, DELAYED RELEASE ORAL at 11:04

## 2018-07-25 RX ADMIN — Medication 325 MILLIGRAM(S): at 05:34

## 2018-07-25 RX ADMIN — SENNA PLUS 2 TABLET(S): 8.6 TABLET ORAL at 21:34

## 2018-07-25 RX ADMIN — SODIUM CHLORIDE 100 MILLILITER(S): 9 INJECTION, SOLUTION INTRAVENOUS at 04:48

## 2018-07-25 RX ADMIN — Medication 250 MILLIGRAM(S): at 00:26

## 2018-07-25 RX ADMIN — GABAPENTIN 300 MILLIGRAM(S): 400 CAPSULE ORAL at 21:37

## 2018-07-25 RX ADMIN — Medication 15 MILLIGRAM(S): at 01:00

## 2018-07-25 RX ADMIN — GABAPENTIN 300 MILLIGRAM(S): 400 CAPSULE ORAL at 13:57

## 2018-07-25 RX ADMIN — OXYCODONE HYDROCHLORIDE 5 MILLIGRAM(S): 5 TABLET ORAL at 16:44

## 2018-07-25 RX ADMIN — ZOLPIDEM TARTRATE 5 MILLIGRAM(S): 10 TABLET ORAL at 21:34

## 2018-07-25 RX ADMIN — OXYCODONE HYDROCHLORIDE 10 MILLIGRAM(S): 5 TABLET ORAL at 18:30

## 2018-07-25 RX ADMIN — OXYCODONE HYDROCHLORIDE 5 MILLIGRAM(S): 5 TABLET ORAL at 17:30

## 2018-07-25 RX ADMIN — GABAPENTIN 300 MILLIGRAM(S): 400 CAPSULE ORAL at 05:34

## 2018-07-25 RX ADMIN — OXYCODONE HYDROCHLORIDE 10 MILLIGRAM(S): 5 TABLET ORAL at 17:34

## 2018-07-25 RX ADMIN — Medication 325 MILLIGRAM(S): at 11:04

## 2018-07-25 RX ADMIN — OXYCODONE HYDROCHLORIDE 5 MILLIGRAM(S): 5 TABLET ORAL at 12:00

## 2018-07-25 RX ADMIN — Medication 15 MILLIGRAM(S): at 05:36

## 2018-07-25 RX ADMIN — OXYCODONE HYDROCHLORIDE 10 MILLIGRAM(S): 5 TABLET ORAL at 05:35

## 2018-07-25 RX ADMIN — Medication 1 TABLET(S): at 11:04

## 2018-07-25 RX ADMIN — Medication 975 MILLIGRAM(S): at 05:34

## 2018-07-25 RX ADMIN — Medication 975 MILLIGRAM(S): at 13:57

## 2018-07-25 RX ADMIN — Medication 15 MILLIGRAM(S): at 00:27

## 2018-07-25 RX ADMIN — Medication 975 MILLIGRAM(S): at 21:34

## 2018-07-25 RX ADMIN — OXYCODONE HYDROCHLORIDE 5 MILLIGRAM(S): 5 TABLET ORAL at 05:36

## 2018-07-25 RX ADMIN — Medication 100 MILLIGRAM(S): at 05:33

## 2018-07-25 RX ADMIN — OXYCODONE HYDROCHLORIDE 5 MILLIGRAM(S): 5 TABLET ORAL at 11:05

## 2018-07-25 RX ADMIN — Medication 100 MILLIGRAM(S): at 05:35

## 2018-07-25 RX ADMIN — OXYCODONE HYDROCHLORIDE 5 MILLIGRAM(S): 5 TABLET ORAL at 04:47

## 2018-07-25 RX ADMIN — Medication 325 MILLIGRAM(S): at 17:33

## 2018-07-25 NOTE — OCCUPATIONAL THERAPY INITIAL EVALUATION ADULT - ADDITIONAL COMMENTS
Pt lives in private house with 2 JACQUI and no steps inside; bedroom and bathroom are on main level of home. Bathroom has bathtub with curtains. Pt owns RW, cane, commode, shower chair. Pt is right handed. Pt drives.

## 2018-07-25 NOTE — PROGRESS NOTE ADULT - SUBJECTIVE AND OBJECTIVE BOX
NILSA VILLEDA    [id number]    89y      Female    CC: s/p right total knee arthroplasty, POD#1      INTERVAL HPI/OVERNIGHT EVENTS: no acute events    REVIEW OF SYSTEMS:    CONSTITUTIONAL: No fever, weight loss, or fatigue  RESPIRATORY: No cough, wheezing, hemoptysis; No shortness of breath  CARDIOVASCULAR: No chest pain, palpitations  GASTROINTESTINAL: No abdominal or epigastric pain. No nausea, vomiting        Vital Signs Last 24 Hrs  T(C): 36.6 (25 Jul 2018 09:17), Max: 36.6 (25 Jul 2018 09:17)  T(F): 97.8 (25 Jul 2018 09:17), Max: 97.8 (25 Jul 2018 09:17)  HR: 75 (25 Jul 2018 09:17) (59 - 92)  BP: 110/68 (25 Jul 2018 09:17) (87/55 - 140/80)  BP(mean): --  RR: 18 (25 Jul 2018 09:17) (12 - 21)  SpO2: 91% (25 Jul 2018 09:17) (91% - 100%)    PHYSICAL EXAM:    GENERAL: NAD, well-groomed  HEENT: PERRL, +EOMI  NECK: soft, Supple, No JVD,   CHEST/LUNG: Clear to percussion bilaterally; No wheezing  HEART: S1S2+, Regular rate and rhythm; No murmurs  EXTREMITIES:  No clubbing, cyanosis, or edema  NEURO: AAOX3      07-24 @ 07:01  -  07-25 @ 07:00  --------------------------------------------------------  IN: 1650 mL / OUT: 900 mL / NET: 750 mL        LABS:                        12.3   14.3  )-----------( 207      ( 25 Jul 2018 06:21 )             37.9     07-25    138  |  103  |  20.0  ----------------------------<  136<H>  4.7   |  24.0  |  0.84    Ca    9.0      25 Jul 2018 06:21              MEDICATIONS  (STANDING):  acetaminophen   Tablet 975 milliGRAM(s) Oral every 8 hours  aspirin enteric coated 325 milliGRAM(s) Oral two times a day  docusate sodium 100 milliGRAM(s) Oral three times a day  DULoxetine 60 milliGRAM(s) Oral daily  ferrous    sulfate 325 milliGRAM(s) Oral daily  folic acid 1 milliGRAM(s) Oral daily  gabapentin 300 milliGRAM(s) Oral three times a day  lactated ringers. 1000 milliLiter(s) (100 mL/Hr) IV Continuous <Continuous>  multivitamin 1 Tablet(s) Oral daily  oxyCODONE  ER Tablet 10 milliGRAM(s) Oral every 12 hours  polyethylene glycol 3350 17 Gram(s) Oral daily  senna 2 Tablet(s) Oral at bedtime    MEDICATIONS  (PRN):  acetaminophen   Tablet 650 milliGRAM(s) Oral every 6 hours PRN For Temp over 38.3 C (100.94 F)  aluminum hydroxide/magnesium hydroxide/simethicone Suspension 30 milliLiter(s) Oral four times a day PRN Indigestion  HYDROmorphone  Injectable 0.5 milliGRAM(s) IV Push every 4 hours PRN Severe Pain/breakthrough pain  magnesium hydroxide Suspension 30 milliLiter(s) Oral daily PRN Constipation  ondansetron Injectable 4 milliGRAM(s) IV Push every 6 hours PRN Nausea and/or Vomiting  oxyCODONE    IR 5 milliGRAM(s) Oral every 4 hours PRN Mild Pain  oxyCODONE    IR 10 milliGRAM(s) Oral every 4 hours PRN Moderate Pain  zolpidem 5 milliGRAM(s) Oral at bedtime PRN Insomnia      RADIOLOGY & ADDITIONAL TESTS: NILSA VILLEDA    [id number]    89y      Female    CC: s/p right total knee arthroplasty, POD#1  Doing well, pain is well controlled, ambulating with PT      INTERVAL HPI/OVERNIGHT EVENTS: no acute events    REVIEW OF SYSTEMS:    CONSTITUTIONAL: No fever  RESPIRATORY: No shortness of breath  GASTROINTESTINAL: No nausea, vomiting        Vital Signs Last 24 Hrs  T(C): 36.6 (25 Jul 2018 09:17), Max: 36.6 (25 Jul 2018 09:17)  T(F): 97.8 (25 Jul 2018 09:17), Max: 97.8 (25 Jul 2018 09:17)  HR: 75 (25 Jul 2018 09:17) (59 - 92)  BP: 110/68 (25 Jul 2018 09:17) (87/55 - 140/80)  BP(mean): --  RR: 18 (25 Jul 2018 09:17) (12 - 21)  SpO2: 91% (25 Jul 2018 09:17) (91% - 100%)    PHYSICAL EXAM:    GENERAL: NAD, well-groomed  HEENT: PERRL, +EOMI  NECK: soft, Supple, No JVD,   CHEST/LUNG: Clear to percussion bilaterally; No wheezing  HEART: S1S2+, Regular rate and rhythm; No murmurs  EXTREMITIES:  No clubbing, cyanosis, or edema  NEURO: AAOX3      07-24 @ 07:01  -  07-25 @ 07:00  --------------------------------------------------------  IN: 1650 mL / OUT: 900 mL / NET: 750 mL        LABS:                        12.3   14.3  )-----------( 207      ( 25 Jul 2018 06:21 )             37.9     07-25    138  |  103  |  20.0  ----------------------------<  136<H>  4.7   |  24.0  |  0.84    Ca    9.0      25 Jul 2018 06:21              MEDICATIONS  (STANDING):  acetaminophen   Tablet 975 milliGRAM(s) Oral every 8 hours  aspirin enteric coated 325 milliGRAM(s) Oral two times a day  docusate sodium 100 milliGRAM(s) Oral three times a day  DULoxetine 60 milliGRAM(s) Oral daily  ferrous    sulfate 325 milliGRAM(s) Oral daily  folic acid 1 milliGRAM(s) Oral daily  gabapentin 300 milliGRAM(s) Oral three times a day  lactated ringers. 1000 milliLiter(s) (100 mL/Hr) IV Continuous <Continuous>  multivitamin 1 Tablet(s) Oral daily  oxyCODONE  ER Tablet 10 milliGRAM(s) Oral every 12 hours  polyethylene glycol 3350 17 Gram(s) Oral daily  senna 2 Tablet(s) Oral at bedtime    MEDICATIONS  (PRN):  acetaminophen   Tablet 650 milliGRAM(s) Oral every 6 hours PRN For Temp over 38.3 C (100.94 F)  aluminum hydroxide/magnesium hydroxide/simethicone Suspension 30 milliLiter(s) Oral four times a day PRN Indigestion  HYDROmorphone  Injectable 0.5 milliGRAM(s) IV Push every 4 hours PRN Severe Pain/breakthrough pain  magnesium hydroxide Suspension 30 milliLiter(s) Oral daily PRN Constipation  ondansetron Injectable 4 milliGRAM(s) IV Push every 6 hours PRN Nausea and/or Vomiting  oxyCODONE    IR 5 milliGRAM(s) Oral every 4 hours PRN Mild Pain  oxyCODONE    IR 10 milliGRAM(s) Oral every 4 hours PRN Moderate Pain  zolpidem 5 milliGRAM(s) Oral at bedtime PRN Insomnia      RADIOLOGY & ADDITIONAL TESTS:

## 2018-07-25 NOTE — OCCUPATIONAL THERAPY INITIAL EVALUATION ADULT - BATHING TRAINING, ASSISTIVE DEVICE, OT EVAL
pt educated on long handled sponge use to wash LEs with hip precautions; pt in full agreement and verbalizes understanding to bath with adherence to precautions

## 2018-07-25 NOTE — PHYSICAL THERAPY INITIAL EVALUATION ADULT - ADDITIONAL COMMENTS
pt. resides in the private house 2 steps to enter with rail, owns SAC, RW, (+) driving, family not available to stay with the pt. at home upon D/C, Son will be there on the first day coming home

## 2018-07-25 NOTE — PHYSICAL THERAPY INITIAL EVALUATION ADULT - CRITERIA FOR SKILLED THERAPEUTIC INTERVENTIONS
predicted duration of therapy intervention/anticipated equipment needs at discharge/risk reduction/prevention/anticipated discharge recommendation/impairments found/functional limitations in following categories/rehab potential/therapy frequency

## 2018-07-25 NOTE — OCCUPATIONAL THERAPY INITIAL EVALUATION ADULT - GENERAL OBSERVATIONS, REHAB EVAL
Received pt in semi-grover position in bed, +IV lock, +VCBs. Pt agrees to OT, tolerated well. OT eval completed; see documents for details. Reinforced safety, posterior hip precautions, proper breathing, energy conservation, call bell use for assist with needs. Pt left in bedside chair, +IV lock, NAD, CBWR. RN made aware pt left out of bed. Received pt in semi-grover position in bed, +IV lock, +VCBs; pt agrees to OT.

## 2018-07-25 NOTE — PROGRESS NOTE ADULT - SUBJECTIVE AND OBJECTIVE BOX
NILSA VILLEDA    [id number]    History: Patient is status post right posterior total hip arthroplasty onPOD #1. Patient is doing well. The patient's pain is controlled using the prescribed pain medications. The patient is participating in physical therapy. Denies nausea, vomiting, chest pain, shortness of breath, abdominal pain or fever. No new complaints.      MEDICATIONS  (STANDING):  acetaminophen   Tablet 975 milliGRAM(s) Oral every 8 hours  aspirin enteric coated 325 milliGRAM(s) Oral two times a day  docusate sodium 100 milliGRAM(s) Oral three times a day  DULoxetine 60 milliGRAM(s) Oral daily  ferrous    sulfate 325 milliGRAM(s) Oral daily  folic acid 1 milliGRAM(s) Oral daily  gabapentin 300 milliGRAM(s) Oral three times a day  lactated ringers. 1000 milliLiter(s) (100 mL/Hr) IV Continuous <Continuous>  multivitamin 1 Tablet(s) Oral daily  oxyCODONE  ER Tablet 10 milliGRAM(s) Oral every 12 hours  polyethylene glycol 3350 17 Gram(s) Oral daily  senna 2 Tablet(s) Oral at bedtime    MEDICATIONS  (PRN):  acetaminophen   Tablet 650 milliGRAM(s) Oral every 6 hours PRN For Temp over 38.3 C (100.94 F)  aluminum hydroxide/magnesium hydroxide/simethicone Suspension 30 milliLiter(s) Oral four times a day PRN Indigestion  HYDROmorphone  Injectable 0.5 milliGRAM(s) IV Push every 4 hours PRN Severe Pain/breakthrough pain  magnesium hydroxide Suspension 30 milliLiter(s) Oral daily PRN Constipation  ondansetron Injectable 4 milliGRAM(s) IV Push every 6 hours PRN Nausea and/or Vomiting  oxyCODONE    IR 5 milliGRAM(s) Oral every 4 hours PRN Mild Pain  oxyCODONE    IR 10 milliGRAM(s) Oral every 4 hours PRN Moderate Pain  zolpidem 5 milliGRAM(s) Oral at bedtime PRN Insomnia      Physical exam: The right hip dressing is clean, dry and intact. No drainage or discharge. No erythema is noted. No blistering. No ecchymosis. The calf is supple nontender. Passive range of motion is acceptable to due postoperative pain. Sensation to light touch is grossly intact distally. Motor function distally is 5/5. No foot drop. 2+ dorsalis pedis pulse. Capillary refill is less than 2 seconds. No cyanosis.    Primary Orthopedic Assessment:  • s/p RIGHT POSTERIOR total hip replacement    Secondary  Orthopedic Assessment(s):   •     Secondary  Medical Assessment(s):   •     Plan:   • DVT prophylaxis as prescribed, including use of compression devices and ankle pumps  • Continue physical therapy  • Weightbearing as tolerated of the right lower extremity with assistance of a walker  • Incentive spirometry encouraged  • Pain control as clinically indicated  • Posterior hip precautions reviewed with patient  • Discharge planning – anticipated discharge is Home

## 2018-07-25 NOTE — OCCUPATIONAL THERAPY INITIAL EVALUATION ADULT - SENSORY TESTS
pt denies changes with sensation; pt with + capillary refill in bilateral toes; pt with + bilateral pedal pulses

## 2018-07-25 NOTE — PROGRESS NOTE ADULT - SUBJECTIVE AND OBJECTIVE BOX
Orthopedic PA Postop Note  Patient S/P RIGHT INÉS  Patient in bed comfortable   RIGHT Leg  Dressing C/D/I  DP Pulse intact  Calf Soft NT  Dorsi/Plantar Flexion/EHL/FHL Intact  Abduction Pillow in place     Vital Signs Last 24 Hrs  T(C): 36.4 (24 Jul 2018 23:55), Max: 36.4 (24 Jul 2018 21:00)  T(F): 97.6 (24 Jul 2018 23:55), Max: 97.6 (24 Jul 2018 21:00)  HR: 80 (24 Jul 2018 23:55) (59 - 92)  BP: 109/71 (24 Jul 2018 23:55) (87/55 - 140/80)  BP(mean): --  RR: 20 (24 Jul 2018 23:55) (12 - 21)  SpO2: 94% (24 Jul 2018 23:55) (93% - 100%)      A/P: 89F S/P RIGHT INÉS  1. DVTP - ASA  2. Physical Therapy - Posterior Precautions  3. Pain Control as clinically indicated

## 2018-07-25 NOTE — PHYSICAL THERAPY INITIAL EVALUATION ADULT - ACTIVE RANGE OF MOTION EXAMINATION, REHAB EVAL
Right LE within precautions and WFL's/bilateral upper extremity Active ROM was WFL (within functional limits)/Left LE Active ROM was WFL (within functional limits)

## 2018-07-25 NOTE — PROGRESS NOTE ADULT - ASSESSMENT
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 She is s/p right total knee arthroplasty,     #Rt hip OA - S/p Rt hip replacement  Pain control   Bowel regimen   Incentive spirometry  DVT px - per ortho   PT    # leucocytosis - likely reactive, monitor  # Depression - ct Home meds  # Myelodysplastic disease with Anemia - monitor H&H , recent CBC stable.   not on any meds    # h/o ITP - monitor CBC    # DVT px 81yr old female with PMH of Depression, Anemia, Myelodysplastic disease with h/o Chemotherapy 5yrs ago, in remission, h/o ITP, chronic Rt Hip Pain 2/2 OA presents for an elective Rt hip replacement. She is s/p right total knee arthroplasty,     #Rt hip OA - S/p Rt hip replacement  Pain control   Bowel regimen   Incentive spirometry  DVT px - per ortho   PT  DC ivfluids    # leucocytosis - likely reactive, monitor  # Depression - ct Home meds  # Myelodysplastic disease with Anemia - monitor H&H , recent CBC stable.   not on any meds    # h/o ITP - monitor CBC    # DVT px

## 2018-07-26 VITALS
DIASTOLIC BLOOD PRESSURE: 69 MMHG | OXYGEN SATURATION: 95 % | RESPIRATION RATE: 18 BRPM | TEMPERATURE: 98 F | HEART RATE: 74 BPM | SYSTOLIC BLOOD PRESSURE: 119 MMHG

## 2018-07-26 LAB
ANION GAP SERPL CALC-SCNC: 12 MMOL/L — SIGNIFICANT CHANGE UP (ref 5–17)
BUN SERPL-MCNC: 29 MG/DL — HIGH (ref 8–20)
CALCIUM SERPL-MCNC: 9 MG/DL — SIGNIFICANT CHANGE UP (ref 8.6–10.2)
CHLORIDE SERPL-SCNC: 100 MMOL/L — SIGNIFICANT CHANGE UP (ref 98–107)
CO2 SERPL-SCNC: 26 MMOL/L — SIGNIFICANT CHANGE UP (ref 22–29)
CREAT SERPL-MCNC: 1.06 MG/DL — SIGNIFICANT CHANGE UP (ref 0.5–1.3)
GLUCOSE SERPL-MCNC: 107 MG/DL — SIGNIFICANT CHANGE UP (ref 70–115)
HCT VFR BLD CALC: 35.5 % — LOW (ref 37–47)
HGB BLD-MCNC: 11.3 G/DL — LOW (ref 12–16)
MCHC RBC-ENTMCNC: 30.1 PG — SIGNIFICANT CHANGE UP (ref 27–31)
MCHC RBC-ENTMCNC: 31.8 G/DL — LOW (ref 32–36)
MCV RBC AUTO: 94.7 FL — SIGNIFICANT CHANGE UP (ref 81–99)
PLATELET # BLD AUTO: 188 K/UL — SIGNIFICANT CHANGE UP (ref 150–400)
POTASSIUM SERPL-MCNC: 4.6 MMOL/L — SIGNIFICANT CHANGE UP (ref 3.5–5.3)
POTASSIUM SERPL-SCNC: 4.6 MMOL/L — SIGNIFICANT CHANGE UP (ref 3.5–5.3)
RBC # BLD: 3.75 M/UL — LOW (ref 4.4–5.2)
RBC # FLD: 13.6 % — SIGNIFICANT CHANGE UP (ref 11–15.6)
SODIUM SERPL-SCNC: 138 MMOL/L — SIGNIFICANT CHANGE UP (ref 135–145)
WBC # BLD: 7.9 K/UL — SIGNIFICANT CHANGE UP (ref 4.8–10.8)
WBC # FLD AUTO: 7.9 K/UL — SIGNIFICANT CHANGE UP (ref 4.8–10.8)

## 2018-07-26 PROCEDURE — 97110 THERAPEUTIC EXERCISES: CPT

## 2018-07-26 PROCEDURE — 85027 COMPLETE CBC AUTOMATED: CPT

## 2018-07-26 PROCEDURE — 97163 PT EVAL HIGH COMPLEX 45 MIN: CPT

## 2018-07-26 PROCEDURE — 97116 GAIT TRAINING THERAPY: CPT

## 2018-07-26 PROCEDURE — 86850 RBC ANTIBODY SCREEN: CPT

## 2018-07-26 PROCEDURE — 97167 OT EVAL HIGH COMPLEX 60 MIN: CPT

## 2018-07-26 PROCEDURE — 82962 GLUCOSE BLOOD TEST: CPT

## 2018-07-26 PROCEDURE — 97530 THERAPEUTIC ACTIVITIES: CPT

## 2018-07-26 PROCEDURE — C1713: CPT

## 2018-07-26 PROCEDURE — 88305 TISSUE EXAM BY PATHOLOGIST: CPT

## 2018-07-26 PROCEDURE — 99232 SBSQ HOSP IP/OBS MODERATE 35: CPT

## 2018-07-26 PROCEDURE — 86900 BLOOD TYPING SEROLOGIC ABO: CPT

## 2018-07-26 PROCEDURE — C1776: CPT

## 2018-07-26 PROCEDURE — 86901 BLOOD TYPING SEROLOGIC RH(D): CPT

## 2018-07-26 PROCEDURE — 80048 BASIC METABOLIC PNL TOTAL CA: CPT

## 2018-07-26 PROCEDURE — 36415 COLL VENOUS BLD VENIPUNCTURE: CPT

## 2018-07-26 PROCEDURE — 73501 X-RAY EXAM HIP UNI 1 VIEW: CPT

## 2018-07-26 PROCEDURE — 88311 DECALCIFY TISSUE: CPT

## 2018-07-26 RX ADMIN — Medication 100 MILLIGRAM(S): at 05:38

## 2018-07-26 RX ADMIN — POLYETHYLENE GLYCOL 3350 17 GRAM(S): 17 POWDER, FOR SOLUTION ORAL at 11:06

## 2018-07-26 RX ADMIN — OXYCODONE HYDROCHLORIDE 10 MILLIGRAM(S): 5 TABLET ORAL at 06:21

## 2018-07-26 RX ADMIN — Medication 1 TABLET(S): at 11:06

## 2018-07-26 RX ADMIN — DULOXETINE HYDROCHLORIDE 60 MILLIGRAM(S): 30 CAPSULE, DELAYED RELEASE ORAL at 11:06

## 2018-07-26 RX ADMIN — Medication 325 MILLIGRAM(S): at 05:38

## 2018-07-26 RX ADMIN — GABAPENTIN 300 MILLIGRAM(S): 400 CAPSULE ORAL at 05:38

## 2018-07-26 RX ADMIN — Medication 975 MILLIGRAM(S): at 05:39

## 2018-07-26 RX ADMIN — Medication 325 MILLIGRAM(S): at 11:06

## 2018-07-26 RX ADMIN — Medication 1 MILLIGRAM(S): at 11:06

## 2018-07-26 RX ADMIN — OXYCODONE HYDROCHLORIDE 10 MILLIGRAM(S): 5 TABLET ORAL at 05:39

## 2018-07-26 NOTE — PROGRESS NOTE ADULT - ASSESSMENT
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 She is s/p right total knee arthroplasty,     #Rt hip OA - S/p Rt hip replacement  Pain control   Bowel regimen   Incentive spirometry  DVT px - per ortho   PT      # leucocytosis - likely reactive, resolved  # Depression - ct Home meds  # Myelodysplastic disease with Anemia - CBC stable.   not on any meds  # elevated Creatinine - noted on labs, from baseline. discussed with pt - advised to stay hydrated. and f.u with PMD in 1week. pt understands.   # h/o ITP - stable counts    # DVT px    Medically stable for discharge. No new changes to home medications for chronic medical illnesses recommended.

## 2018-07-26 NOTE — PROGRESS NOTE ADULT - SUBJECTIVE AND OBJECTIVE BOX
NILSA VILLEDA    [id number]    History: Patient is status post right posterior total hip arthroplasty, POD#2. Patient is doing well. The patient's pain is controlled using the prescribed pain medications. The patient is participating in physical therapy. Pt ambulated down haney and did stairs. Denies nausea, vomiting, chest pain, shortness of breath, abdominal pain or fever. No new complaints.                              11.3   7.9   )-----------( 188      ( 26 Jul 2018 05:50 )             35.5     07-26    138  |  100  |  29.0<H>  ----------------------------<  107  4.6   |  26.0  |  1.06    Ca    9.0      26 Jul 2018 05:50        MEDICATIONS  (STANDING):  acetaminophen   Tablet 975 milliGRAM(s) Oral every 8 hours  aspirin enteric coated 325 milliGRAM(s) Oral two times a day  docusate sodium 100 milliGRAM(s) Oral three times a day  DULoxetine 60 milliGRAM(s) Oral daily  ferrous    sulfate 325 milliGRAM(s) Oral daily  folic acid 1 milliGRAM(s) Oral daily  gabapentin 300 milliGRAM(s) Oral three times a day  multivitamin 1 Tablet(s) Oral daily  oxyCODONE  ER Tablet 10 milliGRAM(s) Oral every 12 hours  polyethylene glycol 3350 17 Gram(s) Oral daily  senna 2 Tablet(s) Oral at bedtime    MEDICATIONS  (PRN):  acetaminophen   Tablet 650 milliGRAM(s) Oral every 6 hours PRN For Temp over 38.3 C (100.94 F)  aluminum hydroxide/magnesium hydroxide/simethicone Suspension 30 milliLiter(s) Oral four times a day PRN Indigestion  HYDROmorphone  Injectable 0.5 milliGRAM(s) IV Push every 4 hours PRN Severe Pain/breakthrough pain  magnesium hydroxide Suspension 30 milliLiter(s) Oral daily PRN Constipation  ondansetron Injectable 4 milliGRAM(s) IV Push every 6 hours PRN Nausea and/or Vomiting  oxyCODONE    IR 5 milliGRAM(s) Oral every 4 hours PRN Mild Pain  oxyCODONE    IR 10 milliGRAM(s) Oral every 4 hours PRN Moderate Pain  zolpidem 5 milliGRAM(s) Oral at bedtime PRN Insomnia    Vital Signs Last 24 Hrs  T(C): 36.9 (26 Jul 2018 04:45), Max: 36.9 (26 Jul 2018 04:45)  T(F): 98.5 (26 Jul 2018 04:45), Max: 98.5 (26 Jul 2018 04:45)  HR: 76 (26 Jul 2018 04:45) (68 - 76)  BP: 105/66 (26 Jul 2018 04:45) (105/66 - 120/66)  BP(mean): --  RR: 18 (26 Jul 2018 04:45) (18 - 18)  SpO2: 95% (26 Jul 2018 04:45) (91% - 98%)  NAD  Physical exam: Right lower extremity- The right hip dressing is clean, dry and intact. Prineo dry and intact. No drainage or discharge. No erythema is noted. No blistering. No ecchymosis. The calf is supple. No calf tenderness. Sensation to light touch is grossly intact distally. Motor function distally is 5/5. +ehl/fhl. No foot drop. 2+ dorsalis pedis pulse. Capillary refill is less than 2 seconds. No cyanosis.    Primary Orthopedic Assessment:  • s/p RIGHT POSTERIOR total hip replacement, POD#2      Plan:   -Dsg changed  • DVT prophylaxis as prescribed- asa 325mg bid, including use of compression devices and ankle pumps  • Continue physical therapy  • Weightbearing as tolerated of the right lower extremity with assistance of a walker  • Incentive spirometry encouraged  • Pain control as clinically indicated  • Posterior hip precautions reviewed with patient  • Discharge planning – anticipated discharge is Home after cleared by PT and medicine

## 2018-07-26 NOTE — PROGRESS NOTE ADULT - SUBJECTIVE AND OBJECTIVE BOX
NILSA VILLEDA    [id number]    89y      Female    CC: s/p right total knee arthroplasty, POD#2  Doing well, pain is well controlled, ambulating with PT  Looking forward to going home.       INTERVAL HPI/OVERNIGHT EVENTS: no acute events    REVIEW OF SYSTEMS:    CONSTITUTIONAL: No fever  RESPIRATORY: No shortness of breath  GASTROINTESTINAL: No nausea, vomiting      Vital Signs Last 24 Hrs  T(C): 36.5 (26 Jul 2018 08:45), Max: 36.9 (26 Jul 2018 04:45)  T(F): 97.7 (26 Jul 2018 08:45), Max: 98.5 (26 Jul 2018 04:45)  HR: 72 (26 Jul 2018 08:45) (68 - 76)  BP: 116/69 (26 Jul 2018 08:45) (105/66 - 120/66)  BP(mean): --  RR: 18 (26 Jul 2018 08:45) (18 - 18)  SpO2: 94% (26 Jul 2018 08:45) (91% - 98%)      PHYSICAL EXAM:    GENERAL: NAD, well-groomed  HEENT: PERRL, +EOMI  NECK: soft, Supple, No JVD,   CHEST/LUNG: Clear to percussion bilaterally; No wheezing  HEART: S1S2+, Regular rate and rhythm; No murmurs  EXTREMITIES:  No clubbing, cyanosis, or edema  NEURO: AAOX3          07-25 @ 07:01  -  07-26 @ 07:00  --------------------------------------------------------  IN: 500 mL / OUT: 0 mL / NET: 500 mL        LABS:                        11.3   7.9   )-----------( 188      ( 26 Jul 2018 05:50 )             35.5     07-26    138  |  100  |  29.0<H>  ----------------------------<  107  4.6   |  26.0  |  1.06    Ca    9.0      26 Jul 2018 05:50              MEDICATIONS  (STANDING):  acetaminophen   Tablet 975 milliGRAM(s) Oral every 8 hours  aspirin enteric coated 325 milliGRAM(s) Oral two times a day  docusate sodium 100 milliGRAM(s) Oral three times a day  DULoxetine 60 milliGRAM(s) Oral daily  ferrous    sulfate 325 milliGRAM(s) Oral daily  folic acid 1 milliGRAM(s) Oral daily  gabapentin 300 milliGRAM(s) Oral three times a day  multivitamin 1 Tablet(s) Oral daily  oxyCODONE  ER Tablet 10 milliGRAM(s) Oral every 12 hours  polyethylene glycol 3350 17 Gram(s) Oral daily  senna 2 Tablet(s) Oral at bedtime    MEDICATIONS  (PRN):  acetaminophen   Tablet 650 milliGRAM(s) Oral every 6 hours PRN For Temp over 38.3 C (100.94 F)  aluminum hydroxide/magnesium hydroxide/simethicone Suspension 30 milliLiter(s) Oral four times a day PRN Indigestion  HYDROmorphone  Injectable 0.5 milliGRAM(s) IV Push every 4 hours PRN Severe Pain/breakthrough pain  magnesium hydroxide Suspension 30 milliLiter(s) Oral daily PRN Constipation  ondansetron Injectable 4 milliGRAM(s) IV Push every 6 hours PRN Nausea and/or Vomiting  oxyCODONE    IR 5 milliGRAM(s) Oral every 4 hours PRN Mild Pain  oxyCODONE    IR 10 milliGRAM(s) Oral every 4 hours PRN Moderate Pain  zolpidem 5 milliGRAM(s) Oral at bedtime PRN Insomnia      RADIOLOGY & ADDITIONAL TESTS:

## 2018-07-27 ENCOUNTER — MEDICATION RENEWAL (OUTPATIENT)
Age: 83
End: 2018-07-27

## 2018-07-27 RX ORDER — FLUTICASONE PROPIONATE AND SALMETEROL 50; 250 UG/1; UG/1
250-50 POWDER RESPIRATORY (INHALATION)
Qty: 1 | Refills: 2 | Status: DISCONTINUED | COMMUNITY
Start: 2018-03-27 | End: 2018-07-27

## 2018-07-27 RX ORDER — ASPIRIN 325 MG/1
325 TABLET ORAL TWICE DAILY
Refills: 0 | Status: COMPLETED | COMMUNITY
Start: 1900-01-01 | End: 2018-09-07

## 2018-08-01 ENCOUNTER — OTHER (OUTPATIENT)
Age: 83
End: 2018-08-01

## 2018-08-02 LAB — SURGICAL PATHOLOGY FINAL REPORT - CH: SIGNIFICANT CHANGE UP

## 2018-08-07 ENCOUNTER — OTHER (OUTPATIENT)
Age: 83
End: 2018-08-07

## 2018-08-15 ENCOUNTER — APPOINTMENT (OUTPATIENT)
Dept: ORTHOPEDIC SURGERY | Facility: CLINIC | Age: 83
End: 2018-08-15
Payer: MEDICARE

## 2018-08-15 VITALS
HEIGHT: 63 IN | HEART RATE: 92 BPM | SYSTOLIC BLOOD PRESSURE: 101 MMHG | WEIGHT: 147 LBS | BODY MASS INDEX: 26.05 KG/M2 | DIASTOLIC BLOOD PRESSURE: 62 MMHG

## 2018-08-15 PROCEDURE — 73502 X-RAY EXAM HIP UNI 2-3 VIEWS: CPT | Mod: RT

## 2018-08-15 PROCEDURE — 99024 POSTOP FOLLOW-UP VISIT: CPT

## 2018-08-27 ENCOUNTER — MEDICATION RENEWAL (OUTPATIENT)
Age: 83
End: 2018-08-27

## 2018-09-18 ENCOUNTER — APPOINTMENT (OUTPATIENT)
Dept: ORTHOPEDIC SURGERY | Facility: CLINIC | Age: 83
End: 2018-09-18
Payer: MEDICARE

## 2018-09-18 VITALS
DIASTOLIC BLOOD PRESSURE: 82 MMHG | WEIGHT: 147 LBS | SYSTOLIC BLOOD PRESSURE: 161 MMHG | HEART RATE: 94 BPM | HEIGHT: 63 IN | BODY MASS INDEX: 26.05 KG/M2

## 2018-09-18 DIAGNOSIS — M17.11 UNILATERAL PRIMARY OSTEOARTHRITIS, RIGHT KNEE: ICD-10-CM

## 2018-09-18 PROCEDURE — 73502 X-RAY EXAM HIP UNI 2-3 VIEWS: CPT | Mod: RT

## 2018-09-18 PROCEDURE — 99024 POSTOP FOLLOW-UP VISIT: CPT

## 2018-09-23 ENCOUNTER — RESULT CHARGE (OUTPATIENT)
Age: 83
End: 2018-09-23

## 2018-09-24 ENCOUNTER — RECORD ABSTRACTING (OUTPATIENT)
Age: 83
End: 2018-09-24

## 2018-09-25 ENCOUNTER — MEDICATION RENEWAL (OUTPATIENT)
Age: 83
End: 2018-09-25

## 2018-09-27 LAB — BACTERIA UR CULT: ABNORMAL

## 2018-10-12 ENCOUNTER — APPOINTMENT (OUTPATIENT)
Dept: ORTHOPEDIC SURGERY | Facility: CLINIC | Age: 83
End: 2018-10-12
Payer: MEDICARE

## 2018-10-12 VITALS
WEIGHT: 147 LBS | BODY MASS INDEX: 26.05 KG/M2 | DIASTOLIC BLOOD PRESSURE: 78 MMHG | SYSTOLIC BLOOD PRESSURE: 132 MMHG | HEART RATE: 101 BPM | HEIGHT: 63 IN

## 2018-10-12 PROCEDURE — 73502 X-RAY EXAM HIP UNI 2-3 VIEWS: CPT | Mod: RT

## 2018-10-12 PROCEDURE — 99024 POSTOP FOLLOW-UP VISIT: CPT

## 2018-10-24 ENCOUNTER — MEDICATION RENEWAL (OUTPATIENT)
Age: 83
End: 2018-10-24

## 2018-11-02 ENCOUNTER — MEDICATION RENEWAL (OUTPATIENT)
Age: 83
End: 2018-11-02

## 2018-11-02 DIAGNOSIS — K52.9 NONINFECTIVE GASTROENTERITIS AND COLITIS, UNSPECIFIED: ICD-10-CM

## 2018-11-05 ENCOUNTER — TRANSCRIPTION ENCOUNTER (OUTPATIENT)
Age: 83
End: 2018-11-05

## 2018-11-05 ENCOUNTER — EMERGENCY (EMERGENCY)
Facility: HOSPITAL | Age: 83
LOS: 1 days | Discharge: HOME | End: 2018-11-05
Attending: EMERGENCY MEDICINE
Payer: MEDICARE

## 2018-11-05 VITALS — WEIGHT: 147.05 LBS | HEIGHT: 63 IN

## 2018-11-05 VITALS
RESPIRATION RATE: 20 BRPM | OXYGEN SATURATION: 96 % | SYSTOLIC BLOOD PRESSURE: 154 MMHG | HEART RATE: 88 BPM | TEMPERATURE: 98 F | DIASTOLIC BLOOD PRESSURE: 66 MMHG

## 2018-11-05 DIAGNOSIS — Z90.721 ACQUIRED ABSENCE OF OVARIES, UNILATERAL: Chronic | ICD-10-CM

## 2018-11-05 DIAGNOSIS — H26.9 UNSPECIFIED CATARACT: Chronic | ICD-10-CM

## 2018-11-05 DIAGNOSIS — Z98.890 OTHER SPECIFIED POSTPROCEDURAL STATES: Chronic | ICD-10-CM

## 2018-11-05 DIAGNOSIS — Z86.79 PERSONAL HISTORY OF OTHER DISEASES OF THE CIRCULATORY SYSTEM: Chronic | ICD-10-CM

## 2018-11-05 DIAGNOSIS — Z90.49 ACQUIRED ABSENCE OF OTHER SPECIFIED PARTS OF DIGESTIVE TRACT: Chronic | ICD-10-CM

## 2018-11-05 PROBLEM — F32.9 MAJOR DEPRESSIVE DISORDER, SINGLE EPISODE, UNSPECIFIED: Chronic | Status: ACTIVE | Noted: 2018-07-05

## 2018-11-05 PROBLEM — G47.00 INSOMNIA, UNSPECIFIED: Chronic | Status: ACTIVE | Noted: 2018-07-05

## 2018-11-05 PROBLEM — G57.93 UNSPECIFIED MONONEUROPATHY OF BILATERAL LOWER LIMBS: Chronic | Status: ACTIVE | Noted: 2018-07-05

## 2018-11-05 PROBLEM — D64.9 ANEMIA, UNSPECIFIED: Chronic | Status: ACTIVE | Noted: 2018-07-05

## 2018-11-05 PROBLEM — K59.00 CONSTIPATION, UNSPECIFIED: Chronic | Status: ACTIVE | Noted: 2018-07-05

## 2018-11-05 PROBLEM — M19.90 UNSPECIFIED OSTEOARTHRITIS, UNSPECIFIED SITE: Chronic | Status: ACTIVE | Noted: 2018-07-05

## 2018-11-05 PROBLEM — S62.102A FRACTURE OF UNSPECIFIED CARPAL BONE, LEFT WRIST, INITIAL ENCOUNTER FOR CLOSED FRACTURE: Chronic | Status: ACTIVE | Noted: 2018-07-05

## 2018-11-05 PROBLEM — G61.0 GUILLAIN-BARRE SYNDROME: Chronic | Status: ACTIVE | Noted: 2018-07-05

## 2018-11-05 PROBLEM — C94.6 MYELODYSPLASTIC DISEASE, NOT ELSEWHERE CLASSIFIED: Chronic | Status: ACTIVE | Noted: 2018-07-05

## 2018-11-05 LAB
ALBUMIN SERPL ELPH-MCNC: 3.8 G/DL — SIGNIFICANT CHANGE UP (ref 3.3–5.2)
ALP SERPL-CCNC: 75 U/L — SIGNIFICANT CHANGE UP (ref 40–120)
ALT FLD-CCNC: 12 U/L — SIGNIFICANT CHANGE UP
ANION GAP SERPL CALC-SCNC: 10 MMOL/L — SIGNIFICANT CHANGE UP (ref 5–17)
ANISOCYTOSIS BLD QL: SLIGHT — SIGNIFICANT CHANGE UP
APPEARANCE UR: CLEAR — SIGNIFICANT CHANGE UP
APTT BLD: 26.6 SEC — LOW (ref 27.5–36.3)
AST SERPL-CCNC: 18 U/L — SIGNIFICANT CHANGE UP
BILIRUB SERPL-MCNC: 1 MG/DL — SIGNIFICANT CHANGE UP (ref 0.4–2)
BILIRUB UR-MCNC: NEGATIVE — SIGNIFICANT CHANGE UP
BUN SERPL-MCNC: 19 MG/DL — SIGNIFICANT CHANGE UP (ref 8–20)
CALCIUM SERPL-MCNC: 9.4 MG/DL — SIGNIFICANT CHANGE UP (ref 8.6–10.2)
CHLORIDE SERPL-SCNC: 100 MMOL/L — SIGNIFICANT CHANGE UP (ref 98–107)
CO2 SERPL-SCNC: 24 MMOL/L — SIGNIFICANT CHANGE UP (ref 22–29)
COLOR SPEC: YELLOW — SIGNIFICANT CHANGE UP
CREAT SERPL-MCNC: 0.86 MG/DL — SIGNIFICANT CHANGE UP (ref 0.5–1.3)
DIFF PNL FLD: NEGATIVE — SIGNIFICANT CHANGE UP
EOSINOPHIL NFR BLD AUTO: 10 % — HIGH (ref 0–5)
GLUCOSE SERPL-MCNC: 104 MG/DL — SIGNIFICANT CHANGE UP (ref 70–115)
GLUCOSE UR QL: NEGATIVE MG/DL — SIGNIFICANT CHANGE UP
HCT VFR BLD CALC: 29.2 % — LOW (ref 37–47)
HGB BLD-MCNC: 10.4 G/DL — LOW (ref 12–16)
HYPOCHROMIA BLD QL: SLIGHT — SIGNIFICANT CHANGE UP
INR BLD: 0.94 RATIO — SIGNIFICANT CHANGE UP (ref 0.88–1.16)
KETONES UR-MCNC: NEGATIVE — SIGNIFICANT CHANGE UP
LEUKOCYTE ESTERASE UR-ACNC: NEGATIVE — SIGNIFICANT CHANGE UP
LYMPHOCYTES # BLD AUTO: 38 % — SIGNIFICANT CHANGE UP (ref 20–55)
MACROCYTES BLD QL: SLIGHT — SIGNIFICANT CHANGE UP
MCHC RBC-ENTMCNC: 30.7 PG — SIGNIFICANT CHANGE UP (ref 27–31)
MCHC RBC-ENTMCNC: 35.6 G/DL — SIGNIFICANT CHANGE UP (ref 32–36)
MCV RBC AUTO: 86.1 FL — SIGNIFICANT CHANGE UP (ref 81–99)
MICROCYTES BLD QL: SLIGHT — SIGNIFICANT CHANGE UP
MONOCYTES NFR BLD AUTO: 11 % — HIGH (ref 3–10)
NEUTROPHILS NFR BLD AUTO: 40 % — SIGNIFICANT CHANGE UP (ref 37–73)
NITRITE UR-MCNC: NEGATIVE — SIGNIFICANT CHANGE UP
PH UR: 7 — SIGNIFICANT CHANGE UP (ref 5–8)
PLAT MORPH BLD: NORMAL — SIGNIFICANT CHANGE UP
PLATELET # BLD AUTO: 223 K/UL — SIGNIFICANT CHANGE UP (ref 150–400)
POIKILOCYTOSIS BLD QL AUTO: SLIGHT — SIGNIFICANT CHANGE UP
POTASSIUM SERPL-MCNC: 4.4 MMOL/L — SIGNIFICANT CHANGE UP (ref 3.5–5.3)
POTASSIUM SERPL-SCNC: 4.4 MMOL/L — SIGNIFICANT CHANGE UP (ref 3.5–5.3)
PROT SERPL-MCNC: 6.8 G/DL — SIGNIFICANT CHANGE UP (ref 6.6–8.7)
PROT UR-MCNC: NEGATIVE MG/DL — SIGNIFICANT CHANGE UP
PROTHROM AB SERPL-ACNC: 10.8 SEC — SIGNIFICANT CHANGE UP (ref 10–12.9)
RBC # BLD: 3.39 M/UL — LOW (ref 4.4–5.2)
RBC # FLD: 15.4 % — SIGNIFICANT CHANGE UP (ref 11–15.6)
RBC BLD AUTO: ABNORMAL
SODIUM SERPL-SCNC: 134 MMOL/L — LOW (ref 135–145)
SP GR SPEC: 1 — LOW (ref 1.01–1.02)
TROPONIN T SERPL-MCNC: <0.01 NG/ML — SIGNIFICANT CHANGE UP (ref 0–0.06)
TROPONIN T SERPL-MCNC: <0.01 NG/ML — SIGNIFICANT CHANGE UP (ref 0–0.06)
UROBILINOGEN FLD QL: NEGATIVE MG/DL — SIGNIFICANT CHANGE UP
VARIANT LYMPHS # BLD: 1 % — SIGNIFICANT CHANGE UP (ref 0–6)
WBC # BLD: 10 K/UL — SIGNIFICANT CHANGE UP (ref 4.8–10.8)
WBC # FLD AUTO: 10 K/UL — SIGNIFICANT CHANGE UP (ref 4.8–10.8)

## 2018-11-05 PROCEDURE — 71046 X-RAY EXAM CHEST 2 VIEWS: CPT | Mod: 26

## 2018-11-05 PROCEDURE — 93010 ELECTROCARDIOGRAM REPORT: CPT

## 2018-11-05 PROCEDURE — 71046 X-RAY EXAM CHEST 2 VIEWS: CPT

## 2018-11-05 PROCEDURE — 70450 CT HEAD/BRAIN W/O DYE: CPT | Mod: 26

## 2018-11-05 PROCEDURE — 99218: CPT

## 2018-11-05 PROCEDURE — 70450 CT HEAD/BRAIN W/O DYE: CPT

## 2018-11-05 PROCEDURE — 85027 COMPLETE CBC AUTOMATED: CPT

## 2018-11-05 PROCEDURE — 81003 URINALYSIS AUTO W/O SCOPE: CPT

## 2018-11-05 PROCEDURE — 93306 TTE W/DOPPLER COMPLETE: CPT

## 2018-11-05 PROCEDURE — 36415 COLL VENOUS BLD VENIPUNCTURE: CPT

## 2018-11-05 PROCEDURE — 99284 EMERGENCY DEPT VISIT MOD MDM: CPT | Mod: 25

## 2018-11-05 PROCEDURE — 80053 COMPREHEN METABOLIC PANEL: CPT

## 2018-11-05 PROCEDURE — 85610 PROTHROMBIN TIME: CPT

## 2018-11-05 PROCEDURE — 96361 HYDRATE IV INFUSION ADD-ON: CPT

## 2018-11-05 PROCEDURE — 93005 ELECTROCARDIOGRAM TRACING: CPT

## 2018-11-05 PROCEDURE — G0378: CPT

## 2018-11-05 PROCEDURE — 87086 URINE CULTURE/COLONY COUNT: CPT

## 2018-11-05 PROCEDURE — 99285 EMERGENCY DEPT VISIT HI MDM: CPT

## 2018-11-05 PROCEDURE — 84484 ASSAY OF TROPONIN QUANT: CPT

## 2018-11-05 PROCEDURE — 96374 THER/PROPH/DIAG INJ IV PUSH: CPT

## 2018-11-05 PROCEDURE — 93306 TTE W/DOPPLER COMPLETE: CPT | Mod: 26

## 2018-11-05 PROCEDURE — 85730 THROMBOPLASTIN TIME PARTIAL: CPT

## 2018-11-05 RX ORDER — ONDANSETRON 8 MG/1
4 TABLET, FILM COATED ORAL ONCE
Qty: 0 | Refills: 0 | Status: COMPLETED | OUTPATIENT
Start: 2018-11-05 | End: 2018-11-05

## 2018-11-05 RX ORDER — ONDANSETRON 8 MG/1
1 TABLET, FILM COATED ORAL
Qty: 14 | Refills: 0
Start: 2018-11-05 | End: 2018-11-11

## 2018-11-05 RX ORDER — SODIUM CHLORIDE 9 MG/ML
1000 INJECTION INTRAMUSCULAR; INTRAVENOUS; SUBCUTANEOUS ONCE
Qty: 0 | Refills: 0 | Status: COMPLETED | OUTPATIENT
Start: 2018-11-05 | End: 2018-11-05

## 2018-11-05 RX ORDER — SODIUM CHLORIDE 9 MG/ML
1000 INJECTION INTRAMUSCULAR; INTRAVENOUS; SUBCUTANEOUS
Qty: 0 | Refills: 0 | Status: DISCONTINUED | OUTPATIENT
Start: 2018-11-05 | End: 2018-11-10

## 2018-11-05 RX ADMIN — ONDANSETRON 4 MILLIGRAM(S): 8 TABLET, FILM COATED ORAL at 12:01

## 2018-11-05 RX ADMIN — SODIUM CHLORIDE 1000 MILLILITER(S): 9 INJECTION INTRAMUSCULAR; INTRAVENOUS; SUBCUTANEOUS at 11:56

## 2018-11-05 RX ADMIN — SODIUM CHLORIDE 1000 MILLILITER(S): 9 INJECTION INTRAMUSCULAR; INTRAVENOUS; SUBCUTANEOUS at 12:53

## 2018-11-05 NOTE — ED ADULT TRIAGE NOTE - CHIEF COMPLAINT QUOTE
Patient arrived to ED today with c/o fall two days ago.  Patient states she passed out and fell.  Patient states she struck her head.  Patient has bruising to her right ear.  Patient denies blood thinner use. Patient arrived to ED today with c/o fall two days ago.  Patient states she passed out and fell onto a wood floor.  Patient states she struck the side of her head.  Patient has bruising to her right ear.  Patient denies blood thinner use.

## 2018-11-05 NOTE — ED PROVIDER NOTE - ATTENDING CONTRIBUTION TO CARE
I, Jaquan Morataya, performed the initial face to face bedside interview with this patient regarding history of present illness, review of symptoms and relevant past medical, social and family history.  I completed an independent physical examination.  I was the initial provider who evaluated this patient. I have signed out the follow up of any pending tests (i.e. labs, radiological studies) to the ACP.  I have communicated the patient’s plan of care and disposition with the ACP.     seen with acp: well appearing, pleasant, NAD; II/VI systolic murmur; no pedal edema; abd soft NT ND; +aged abrasion to right temporal region; obs for serial enzymes, echo, cards consult    patient declining obs stay for stress test; Pt understands and recalls risks of leaving against medical advice, including death from heart attack, cardiac arrest, fall, head injury. Pt states that pt will see PMD. Pt advised to return to the ED if pt feels worse.

## 2018-11-05 NOTE — ED CDU PROVIDER INITIAL DAY NOTE - PMH
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

## 2018-11-05 NOTE — ED PROVIDER NOTE - OBJECTIVE STATEMENT
89 yo female with a pmh of insomnia and depression presents for a fall on Saturday morning. Pt states that she does not remember the fall but does remember waking up with pain to her right ear and knee and her kitchen disheveled. She states her symptom 88 yo female with a pmh of insomnia and depression presents for a fall on Saturday morning. Pt states that she does not remember the fall but does not remember waking up with pain to her right ear and knee and her kitchen disheveled. She states she has not been feeling well since Wednesday when she had consistent nausea after food intake and she was getting up to clean vomit the night she believed she fell. Pt states she has had mild pain to her right ear and knee and intermittent headaches since the fall. Her n/v has been present after the fall and she also complains of lightheadedness upon getting up. She denies any other symptoms including fevers, chill, abdominal pain, chest pain, ot SOB.

## 2018-11-05 NOTE — ED PROVIDER NOTE - MEDICAL DECISION MAKING DETAILS
89 yo female presents after a fall. will order labs, ecg, UA, chest xray, and CT head to eval for brain hemorrhage/mass, uti, and cardiac abnormality.

## 2018-11-05 NOTE — ED CDU PROVIDER INITIAL DAY NOTE - ATTENDING CONTRIBUTION TO CARE
I, Jaquan Morataya, performed the initial face to face bedside interview with this patient regarding history of present illness, review of symptoms and relevant past medical, social and family history.  I completed an independent physical examination.  I was the initial provider who evaluated this patient. I have signed out the follow up of any pending tests (i.e. labs, radiological studies) to the ACP.  I have communicated the patient’s plan of care and disposition with the ACP.

## 2018-11-05 NOTE — CONSULT NOTE ADULT - ATTENDING COMMENTS
syncope. Unclear cause.   24 hr telemetry. outpatient 4 weeks MCOT monitor. NPO after midnight. Nuclear stress test

## 2018-11-05 NOTE — ED CDU PROVIDER INITIAL DAY NOTE - MEDICAL DECISION MAKING DETAILS
seen with acp: serial enzymes, echo; originally planned for stress test, however patient does not want to stay. she understands all risks and benefits, and adamantly states she wants to go home, "I'm almost 89 yrs old, whatever happens will happen." "Even if you found something wrong, I wouldn't want to do anything about it." She understands risks of MI, cardiac arrythmia, cardiac arrest, fall, or head injury. Labs and echo reviewed, OK for d/c

## 2018-11-05 NOTE — ED PROVIDER NOTE - PMH
Anemia    Constipation    Depression    Guillain-Bristow syndrome following vaccination    Insomnia    Myelodysplastic disease    Neuropathy of both feet    Osteoarthritis  right hip  Wrist fracture, left

## 2018-11-05 NOTE — ED CDU PROVIDER DISPOSITION NOTE - CLINICAL COURSE
· Medical Decision Making Details: seen with acp: serial enzymes, echo; originally planned for stress test, however patient does not want to stay. she understands all risks and benefits, and adamantly states she wants to go home, "I'm almost 89 yrs old, whatever happens will happen." "Even if you found something wrong, I wouldn't want to do anything about it." She understands risks of MI, cardiac arrythmia, cardiac arrest, fall, or head injury. Labs and echo reviewed, OK for d/c  Pt understands and recalls risks of leaving against medical advice, including death from MI, cardiac arrythmia, cardiac arrest, fall, or head injury. Pt states that pt will see PMD. Pt advised to return to the ED if pt feels worse.

## 2018-11-05 NOTE — ED ADULT NURSE NOTE - CHIEF COMPLAINT QUOTE
Patient arrived to ED today with c/o fall two days ago.  Patient states she passed out and fell onto a wood floor.  Patient states she struck the side of her head.  Patient has bruising to her right ear.  Patient denies blood thinner use.

## 2018-11-05 NOTE — ED STATDOCS - PROGRESS NOTE DETAILS
89 y/o F pt presents to ED c/o headache s/p syncope 3 days ago. Pt hit her head and face. Also had numbness in right arm and lip. Pt states she was walking to kitchen when she passed out. No cardiac hx. Former smoker.   Dr. Kim  Protocol orders have been entered following a focused evaluation in intake. Pt to be placed in the main ED for further evaluation by another provider. 89 y/o F pt presents to ED c/o headache s/p syncope 3 days ago. Pt states she was walking to kitchen when she suddenly passed out: no prodromal symptoms.  Denies preceding chest pain or palpitations.  Denies prior fainting.  Pt hit her head and face. Also had numbness in right arm and lip. No cardiac hx. Former smoker.   Dr. Kim  Protocol orders have been entered following a focused evaluation in intake. Pt to be placed in the main ED for further evaluation by another provider.

## 2018-11-05 NOTE — ED PROVIDER NOTE - PROGRESS NOTE DETAILS
consulted cardiology and will come see pt. will add echo and second troponin to be done in ED pt seen by cardiology who recommend echo and nuclear stress test to be done inpatient. pt reevaluated and now states she does not want to stay in the hospital overnight and will only like the echo done. Pt is informed of the risk of not having a proper workup recommended by cardiology done and fully understands the risk. will reevaluate.

## 2018-11-05 NOTE — CONSULT NOTE ADULT - SUBJECTIVE AND OBJECTIVE BOX
[facility]                                                        Office: [address] [address]                                                       Telephone: [phone]. Fax:[phone]                                                              CARDIOLOGY CONSULTATION NOTE                                                                                             Consult requested by:  Dr. Morataya  Reason for Consultation: syncope  History obtained by: Patient and medical record daughters   obtained: No    Chief complaint:    Patient is a 89y old  Female who presents with a chief complaint of syncope    HPI: 89 year old female PMH anemia, depression, myelodysplastic disorder, OA, neuropathy, hip replacement (2018) presents to ED s/p syncopal episode Friday night. Pt states beginning Wed night has had episodes of nausea/vomiting/diarrhea, on Friday night vomited into trash bin, got up to throw out garbage and "passed out" hit head and knee, ecchymosis to R ear and knee. Also admits to feeling light headed past few days. Denies fever, chills, cough, phlegm production, shortness of breath, dyspnea on exertion, orthopnea, PND, edema, chest pain, pressure, palpitations, irregular, fast heart beat, melena, rectal bleed, hematuria. Pt lives alone, appears younger then stated age, ambulates without assistance, no known cardiac history.     REVIEW OF SYMPTOMS: Cardiovascular:  See HPI. No chest pain,  No dyspnea, No palpitations, No Orthopnea, No Paroxsymal nocturnal dyspnea;  Respiratory:  No Dyspnea, No cough,   Genitourinary:  No dysuria, no hematuria; No abdominal pain. No dark color stool, no melena ; Neurological: No headache, no slurred speech;  Psychiatric: No agitation, no anxiety.  ALL OTHER REVIEW OF SYSTEMS ARE NEGATIVE.    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  Anemia  Depression  Osteoarthritis  Insomnia    PAST SURGICAL HISTORY  S/P wrist surgery  History of varicose veins  Bilateral cataracts  History of appendectomy  History of tonsillectomy and adenoidectomy  History of right oophorectomy    FAMILY HISTORY:  Family history of diabetes mellitus (DM) (Father)    SOCIAL HISTORY:   CIGARETTES: former smoker 1-2 cig/day   ALCOHOL: scotch rocks/night  DRUGS: denies     Vital Signs Last 24 Hrs  T(C): 37 (2018 10:54), Max: 37 (2018 10:54)  T(F): 98.6 (2018 10:54), Max: 98.6 (2018 10:54)  HR: 89 (2018 10:54) (89 - 89)  BP: 157/60 (2018 12:47) (93/62 - 157/60)  RR: 20 (2018 10:54) (20 - 20)  SpO2: 96% (2018 10:54) (96% - 96%)    PHYSICAL EXAM:  Constitutional: Comfortable. No acute distress.   HEENT: Atraumatic and normocephalic , neck is supple . no JVD. No carotid bruit. PEERL   CNS: A&Ox3. No focal deficits. EOMI. Cranial nerves II-IX are intact.   Lymph Nodes: Cervical : Not palpable.  Respiratory: CTAB  Cardiovascular: S1S2 RRR. no murmur rubs or gallop.  Gastrointestinal: Soft non-tender and non distended . +Bowel sounds. negative Cunningham's sign.  Extremities: No edema. R knee minor ecchymosis and swelling  Psychiatric: Calm. no agitation.  Skin: ecchymosis to R ear, No skin rash/ulcers visualized to hands or feet.      LABS:                     10.4   10.0  )-----------( 223      ( 2018 11:51 )             29.2     11-05  134<L>  |  100  |  19.0  ----------------------------<  104  4.4   |  24.0  |  0.86    Ca    9.4      2018 11:51    TPro  6.8  /  Alb  3.8  /  TBili  1.0  /  DBili  x   /  AST  18  /  ALT  12  /  AlkPhos  75  11-05  CARDIAC MARKERS ( 2018 11:51 )  x     / <0.01 ng/mL / x     / x     / x        PT/INR - ( 2018 11:51 )   PT: 10.8 sec;   INR: 0.94 ratio    PTT - ( 2018 11:51 )  PTT:26.6 sec    Urinalysis Basic - ( 2018 12:54 )    Color: Yellow / Appearance: Clear / S.005 / pH: x  Gluc: x / Ketone: Negative  / Bili: Negative / Urobili: Negative mg/dL   Blood: x / Protein: Negative mg/dL / Nitrite: Negative   Leuk Esterase: Negative / RBC: x / WBC x   Sq Epi: x / Non Sq Epi: x / Bacteria: x    INTERPRETATION OF TELEMETRY: SR   ECG: SR- low voltage precordial leads, LAD    RADIOLOGY & ADDITIONAL STUDIES:    X-ray:  reviewed by me.   CT scan:   < from: CT Head No Cont (18 @ 11:46) >    Impression: Diffuse atrophy and microvascular disease consistent with   age. No acute density changes in the brain or intracranial hemorrhage      < end of copied text >

## 2018-11-05 NOTE — CONSULT NOTE ADULT - ASSESSMENT
89 year old female PMH anemia, depression, myelodysplastic disorder, OA, neuropathy, hip replacement (july 2018) presents to ED s/p syncopal episode Friday night following 2 days N/V/D.     Syncope  - CT head- no acute density changes or intracranial hemorrhage.   - ECG: SR- low voltage precordial leads, LAD  - Troponin neg x 1  - admit- telemetry monitoring x 24 hours  - Nuclear stress tomorrow  - NPO after midnight  - Monitor BP as per routine 89 year old female PMH anemia, depression, myelodysplastic disorder, OA, neuropathy, hip replacement (july 2018) presents to ED s/p syncopal episode Friday night following 2 days N/V/D.     Syncope  - CT head- no acute density changes or intracranial hemorrhage.   - ECG: SR- low voltage precordial leads, LAD  - Troponin neg x 1  - admit- telemetry monitoring x 24 hours  - TTE ordered- pending  - Nuclear stress tomorrow  - NPO after midnight  - Monitor BP as per routine

## 2018-11-05 NOTE — ED ADULT NURSE NOTE - NSIMPLEMENTINTERV_GEN_ALL_ED
Implemented All Fall Risk Interventions:  [place] to call system. Call bell, personal items and telephone within reach. Instruct patient to call for assistance. Room bathroom lighting operational. Non-slip footwear when patient is off stretcher. Physically safe environment: no spills, clutter or unnecessary equipment. Stretcher in lowest position, wheels locked, appropriate side rails in place. Provide visual cue, wrist band, yellow gown, etc. Monitor gait and stability. Monitor for mental status changes and reorient to person, place, and time. Review medications for side effects contributing to fall risk. Reinforce activity limits and safety measures with patient and family.

## 2018-11-05 NOTE — ED CDU PROVIDER INITIAL DAY NOTE - OBJECTIVE STATEMENT
88 yo female with a pmh of insomnia and depression presents for a fall on Saturday morning. Pt states that she does not remember the fall but does not remember waking up with pain to her right ear and knee and her kitchen disheveled. She states she has not been feeling well since Wednesday when she had consistent nausea after food intake and she was getting up to clean vomit the night she believed she fell. Pt states she has had mild pain to her right ear and knee and intermittent headaches since the fall. Her n/v has been present after the fall and she also complains of lightheadedness upon getting up. She denies any other symptoms including fevers, chill, abdominal pain, chest pain, ot SOB.

## 2018-11-05 NOTE — ED PROVIDER NOTE - SKIN, MLM
Skin normal color for race, warm, dry and intact. No evidence of rash. bruising to the right ear and right lateral knee with skin intact.

## 2018-11-05 NOTE — ED ADULT NURSE NOTE - PMH
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

## 2018-11-05 NOTE — ED PROVIDER NOTE - CARE PLAN
Principal Discharge DX:	Fall, initial encounter Principal Discharge DX:	Syncope and collapse  Secondary Diagnosis:	Minor head injury

## 2018-11-06 ENCOUNTER — CHART COPY (OUTPATIENT)
Age: 83
End: 2018-11-06

## 2018-11-06 LAB
CULTURE RESULTS: SIGNIFICANT CHANGE UP
SPECIMEN SOURCE: SIGNIFICANT CHANGE UP

## 2018-11-10 ENCOUNTER — CLINICAL ADVICE (OUTPATIENT)
Age: 83
End: 2018-11-10

## 2018-11-16 ENCOUNTER — MEDICATION RENEWAL (OUTPATIENT)
Age: 83
End: 2018-11-16

## 2018-11-19 ENCOUNTER — APPOINTMENT (OUTPATIENT)
Dept: INTERNAL MEDICINE | Facility: CLINIC | Age: 83
End: 2018-11-19
Payer: MEDICARE

## 2018-11-19 VITALS — DIASTOLIC BLOOD PRESSURE: 78 MMHG | RESPIRATION RATE: 16 BRPM | SYSTOLIC BLOOD PRESSURE: 118 MMHG | HEART RATE: 80 BPM

## 2018-11-19 VITALS — HEIGHT: 63 IN | BODY MASS INDEX: 26.05 KG/M2 | WEIGHT: 147 LBS

## 2018-11-19 PROCEDURE — 99214 OFFICE O/P EST MOD 30 MIN: CPT

## 2018-11-19 NOTE — HISTORY OF PRESENT ILLNESS
[FreeTextEntry1] : vertigo [de-identified] : follow up vertigo\par fell at home\par had some dizziness after some nausea\par was in er work up negative but found to be anemic\par feels better now\par no chest pain sob nvd or palpitations\par here with daughter

## 2018-11-19 NOTE — ASSESSMENT
[FreeTextEntry1] : anemia refuses gi work up does not have any symptoms\par iron supplements repeat a level in 6 weeks\par bp ok\par vertgio imprving\par if no improvement with iron may need heme evaluation

## 2018-12-10 ENCOUNTER — APPOINTMENT (OUTPATIENT)
Dept: CARDIOLOGY | Facility: CLINIC | Age: 83
End: 2018-12-10

## 2018-12-21 ENCOUNTER — MEDICATION RENEWAL (OUTPATIENT)
Age: 83
End: 2018-12-21

## 2019-01-02 ENCOUNTER — APPOINTMENT (OUTPATIENT)
Dept: INTERNAL MEDICINE | Facility: CLINIC | Age: 84
End: 2019-01-02
Payer: MEDICARE

## 2019-01-02 VITALS — SYSTOLIC BLOOD PRESSURE: 110 MMHG | RESPIRATION RATE: 12 BRPM | DIASTOLIC BLOOD PRESSURE: 78 MMHG | HEART RATE: 90 BPM

## 2019-01-02 DIAGNOSIS — I73.9 PERIPHERAL VASCULAR DISEASE, UNSPECIFIED: ICD-10-CM

## 2019-01-02 PROCEDURE — 99214 OFFICE O/P EST MOD 30 MIN: CPT | Mod: 25

## 2019-01-02 PROCEDURE — 36415 COLL VENOUS BLD VENIPUNCTURE: CPT

## 2019-01-02 NOTE — ASSESSMENT
[FreeTextEntry1] : flonase\par tessalon\par allegra \par should be fine\par check labs\par appears ok

## 2019-01-02 NOTE — HISTORY OF PRESENT ILLNESS
[FreeTextEntry1] : patient here for cough\par and needs lab test for her vitamin d and plaeteles [de-identified] : dry cough for more than a week\par associated with hoarseness\par no fever no sob no nvd or palpitations

## 2019-01-03 LAB
25(OH)D3 SERPL-MCNC: 20.9 NG/ML
ALBUMIN SERPL ELPH-MCNC: 4.2 G/DL
ALP BLD-CCNC: 120 U/L
ALT SERPL-CCNC: 10 U/L
ANION GAP SERPL CALC-SCNC: 15 MMOL/L
AST SERPL-CCNC: 17 U/L
BASOPHILS # BLD AUTO: 0.03 K/UL
BASOPHILS NFR BLD AUTO: 0.3 %
BILIRUB SERPL-MCNC: 0.2 MG/DL
BUN SERPL-MCNC: 22 MG/DL
CALCIUM SERPL-MCNC: 9.2 MG/DL
CHLORIDE SERPL-SCNC: 100 MMOL/L
CHOLEST SERPL-MCNC: 174 MG/DL
CHOLEST/HDLC SERPL: 2.6 RATIO
CO2 SERPL-SCNC: 27 MMOL/L
CREAT SERPL-MCNC: 0.8 MG/DL
EOSINOPHIL # BLD AUTO: 0.55 K/UL
EOSINOPHIL NFR BLD AUTO: 6 %
GLUCOSE SERPL-MCNC: 98 MG/DL
HBA1C MFR BLD HPLC: 4.6 %
HCT VFR BLD CALC: 42.5 %
HDLC SERPL-MCNC: 68 MG/DL
HGB BLD-MCNC: 13.5 G/DL
IMM GRANULOCYTES NFR BLD AUTO: 0.1 %
LDLC SERPL CALC-MCNC: 42 MG/DL
LYMPHOCYTES # BLD AUTO: 3.13 K/UL
LYMPHOCYTES NFR BLD AUTO: 34.2 %
MAN DIFF?: NORMAL
MCHC RBC-ENTMCNC: 31.1 PG
MCHC RBC-ENTMCNC: 31.8 GM/DL
MCV RBC AUTO: 97.9 FL
MONOCYTES # BLD AUTO: 0.9 K/UL
MONOCYTES NFR BLD AUTO: 9.8 %
NEUTROPHILS # BLD AUTO: 4.52 K/UL
NEUTROPHILS NFR BLD AUTO: 49.6 %
PLATELET # BLD AUTO: 258 K/UL
POTASSIUM SERPL-SCNC: 4.1 MMOL/L
PROT SERPL-MCNC: 7.1 G/DL
RBC # BLD: 4.34 M/UL
RBC # FLD: 14.4 %
SODIUM SERPL-SCNC: 142 MMOL/L
T4 FREE SERPL-MCNC: 1.1 NG/DL
TRIGL SERPL-MCNC: 322 MG/DL
TSH SERPL-ACNC: 2.92 UIU/ML
WBC # FLD AUTO: 9.14 K/UL

## 2019-01-08 ENCOUNTER — OTHER (OUTPATIENT)
Age: 84
End: 2019-01-08

## 2019-01-23 ENCOUNTER — MEDICATION RENEWAL (OUTPATIENT)
Age: 84
End: 2019-01-23

## 2019-01-24 ENCOUNTER — MED ADMIN CHARGE (OUTPATIENT)
Age: 84
End: 2019-01-24

## 2019-02-06 ENCOUNTER — RX RENEWAL (OUTPATIENT)
Age: 84
End: 2019-02-06

## 2019-02-25 ENCOUNTER — APPOINTMENT (OUTPATIENT)
Dept: INTERNAL MEDICINE | Facility: CLINIC | Age: 84
End: 2019-02-25
Payer: MEDICARE

## 2019-02-25 VITALS
SYSTOLIC BLOOD PRESSURE: 116 MMHG | DIASTOLIC BLOOD PRESSURE: 78 MMHG | HEIGHT: 63 IN | HEART RATE: 78 BPM | WEIGHT: 147 LBS | BODY MASS INDEX: 26.05 KG/M2 | RESPIRATION RATE: 12 BRPM

## 2019-02-25 DIAGNOSIS — R06.02 SHORTNESS OF BREATH: ICD-10-CM

## 2019-02-25 DIAGNOSIS — R05 COUGH: ICD-10-CM

## 2019-02-25 PROCEDURE — 99214 OFFICE O/P EST MOD 30 MIN: CPT

## 2019-02-25 NOTE — HISTORY OF PRESENT ILLNESS
[FreeTextEntry1] : cough and sob [de-identified] : cough and sob\par patient coughing \par feels tired\par dried cough\par no fever no sob no nvd or palpitations

## 2019-03-04 ENCOUNTER — MEDICATION RENEWAL (OUTPATIENT)
Age: 84
End: 2019-03-04

## 2019-03-07 ENCOUNTER — CHART COPY (OUTPATIENT)
Age: 84
End: 2019-03-07

## 2019-03-07 ENCOUNTER — MEDICATION RENEWAL (OUTPATIENT)
Age: 84
End: 2019-03-07

## 2019-03-14 ENCOUNTER — NON-APPOINTMENT (OUTPATIENT)
Age: 84
End: 2019-03-14

## 2019-03-14 ENCOUNTER — APPOINTMENT (OUTPATIENT)
Dept: INTERNAL MEDICINE | Facility: CLINIC | Age: 84
End: 2019-03-14
Payer: MEDICARE

## 2019-03-14 VITALS
OXYGEN SATURATION: 96 % | SYSTOLIC BLOOD PRESSURE: 130 MMHG | HEART RATE: 99 BPM | DIASTOLIC BLOOD PRESSURE: 78 MMHG | RESPIRATION RATE: 16 BRPM

## 2019-03-14 VITALS — HEIGHT: 63 IN | WEIGHT: 140.99 LBS | BODY MASS INDEX: 24.98 KG/M2

## 2019-03-14 DIAGNOSIS — R53.83 OTHER FATIGUE: ICD-10-CM

## 2019-03-14 DIAGNOSIS — R11.0 NAUSEA: ICD-10-CM

## 2019-03-14 PROCEDURE — 94010 BREATHING CAPACITY TEST: CPT

## 2019-03-14 PROCEDURE — 99214 OFFICE O/P EST MOD 30 MIN: CPT | Mod: 25

## 2019-03-14 NOTE — ASSESSMENT
[FreeTextEntry1] : exertional sob \par no clear etiology\par will send for ct angio rule out pulm embolism\par no evidence of cardiac disease\par pulmonary evaluation\par symbicort bid

## 2019-03-14 NOTE — HISTORY OF PRESENT ILLNESS
[FreeTextEntry1] : for acute visit\par patient has been feeling sob [de-identified] : feels sob on exertion\par had recent URI took steroids and abx\par cough improved but still feels exertional sob on ambulation\par spirometry looks ok not hypoxic on room air\par was a former smoker\par last labs when sick were normal\par patent here with daughter who states Mother cannot walk without feeling sob\par no chest pain no palpitations

## 2019-03-25 ENCOUNTER — MEDICATION RENEWAL (OUTPATIENT)
Age: 84
End: 2019-03-25

## 2019-03-26 ENCOUNTER — APPOINTMENT (OUTPATIENT)
Dept: PULMONOLOGY | Facility: CLINIC | Age: 84
End: 2019-03-26
Payer: MEDICARE

## 2019-03-26 VITALS
SYSTOLIC BLOOD PRESSURE: 122 MMHG | DIASTOLIC BLOOD PRESSURE: 62 MMHG | BODY MASS INDEX: 28.32 KG/M2 | OXYGEN SATURATION: 97 % | HEART RATE: 96 BPM | HEIGHT: 61 IN | WEIGHT: 150 LBS

## 2019-03-26 DIAGNOSIS — R91.1 SOLITARY PULMONARY NODULE: ICD-10-CM

## 2019-03-26 PROCEDURE — 94010 BREATHING CAPACITY TEST: CPT

## 2019-03-26 PROCEDURE — 99204 OFFICE O/P NEW MOD 45 MIN: CPT | Mod: 25

## 2019-03-26 RX ORDER — OXYCODONE 5 MG/1
5 TABLET ORAL
Qty: 80 | Refills: 0 | Status: DISCONTINUED | COMMUNITY
End: 2019-03-26

## 2019-03-26 RX ORDER — BENZONATATE 200 MG/1
200 CAPSULE ORAL 3 TIMES DAILY
Qty: 28 | Refills: 0 | Status: DISCONTINUED | COMMUNITY
Start: 2019-01-02 | End: 2019-03-26

## 2019-03-26 RX ORDER — FLUTICASONE PROPIONATE 50 UG/1
50 SPRAY, METERED NASAL DAILY
Qty: 1 | Refills: 4 | Status: DISCONTINUED | COMMUNITY
Start: 2019-01-02 | End: 2019-03-26

## 2019-03-26 RX ORDER — ONDANSETRON 4 MG/1
4 TABLET ORAL EVERY 8 HOURS
Qty: 21 | Refills: 1 | Status: DISCONTINUED | COMMUNITY
Start: 2019-03-14 | End: 2019-03-26

## 2019-03-26 RX ORDER — AZITHROMYCIN 250 MG/1
250 TABLET, FILM COATED ORAL
Qty: 6 | Refills: 0 | Status: DISCONTINUED | COMMUNITY
Start: 2019-03-04 | End: 2019-03-26

## 2019-03-26 RX ORDER — METHYLPREDNISOLONE 4 MG/1
4 TABLET ORAL
Qty: 1 | Refills: 1 | Status: DISCONTINUED | COMMUNITY
Start: 2019-03-07 | End: 2019-03-26

## 2019-03-26 NOTE — HISTORY OF PRESENT ILLNESS
[FreeTextEntry1] : Shortness of breath x months\par fatigue\par had cough which slowly resolved\par had Hip replacement in july\par saw PMD, given symbicort\par became nauseas, \par never saw cardiologist\par no fever, chill, chest pain\par no sig sputum

## 2019-03-26 NOTE — PHYSICAL EXAM
[General Appearance - Well Developed] : well developed [General Appearance - Well Nourished] : well nourished [Normal Conjunctiva] : the conjunctiva exhibited no abnormalities [Normal Oropharynx] : normal oropharynx [II] : II [Neck Appearance] : the appearance of the neck was normal [Heart Rate And Rhythm] : heart rate and rhythm were normal [Heart Sounds] : normal S1 and S2 [Murmurs] : no murmurs present [Edema] : no peripheral edema present [Respiration, Rhythm And Depth] : normal respiratory rhythm and effort [Exaggerated Use Of Accessory Muscles For Inspiration] : no accessory muscle use [Auscultation Breath Sounds / Voice Sounds] : lungs were clear to auscultation bilaterally [Lungs Percussion] : the lungs were normal to percussion [Abnormal Walk] : normal gait [Nail Clubbing] : no clubbing of the fingernails [Cyanosis, Localized] : no localized cyanosis [] : no rash [No Focal Deficits] : no focal deficits [Oriented To Time, Place, And Person] : oriented to person, place, and time [Impaired Insight] : insight and judgment were intact [Affect] : the affect was normal [Memory Recent] : recent memory was not impaired [FreeTextEntry1] : no chest wall abn

## 2019-03-26 NOTE — CONSULT LETTER
[Dear  ___] : Dear  [unfilled], [Consult Letter:] : I had the pleasure of evaluating your patient, [unfilled]. [Please see my note below.] : Please see my note below. [Consult Closing:] : Thank you very much for allowing me to participate in the care of this patient.  If you have any questions, please do not hesitate to contact me. [Sincerely,] : Sincerely, [FreeTextEntry3] : Teofilo Bell DO [facility]\par Pulmonary Critical Care\par Director Pulmonary Division\par Medical Director Respiratory Therapy\par [facility]\par \par

## 2019-03-26 NOTE — DISCUSSION/SUMMARY
[FreeTextEntry1] : Dyspnea on exertion\par suspect multifactorial, including fatigue \par no evidence of PE, has very mild asthma which is not rate limiting with normal flows, emphysema not significant, bronchiectasis bases, denies any swallowing issues, discussed nocturnal aspiration precautions\par did not tolerate Symbicort, change to prn Ventolin and technique reviewed\par discussed asthma profile fro allergies, not interested currently\par needs baseline Cardiology evaluation, will set up\par Also discussed CT scan findings, including PET scan and bx, pt not interested, will allow repeat CT scan in 3 months\par will re evaluate post CT scan with spirometry\par overall doing remarkably for age\par she will contact sooner if needed

## 2019-03-26 NOTE — PROCEDURE
[FreeTextEntry1] : CT scan 3/19: no PE, bronchiectasis, R hilar node, LLL nodule\par spirometry: normal flows, very mild obstruction

## 2019-04-01 ENCOUNTER — APPOINTMENT (OUTPATIENT)
Dept: CARDIOLOGY | Facility: CLINIC | Age: 84
End: 2019-04-01
Payer: MEDICARE

## 2019-04-01 ENCOUNTER — NON-APPOINTMENT (OUTPATIENT)
Age: 84
End: 2019-04-01

## 2019-04-01 VITALS
SYSTOLIC BLOOD PRESSURE: 130 MMHG | RESPIRATION RATE: 16 BRPM | DIASTOLIC BLOOD PRESSURE: 87 MMHG | BODY MASS INDEX: 27.94 KG/M2 | HEART RATE: 75 BPM | WEIGHT: 148 LBS | HEIGHT: 61 IN | OXYGEN SATURATION: 100 %

## 2019-04-01 VITALS — SYSTOLIC BLOOD PRESSURE: 144 MMHG | DIASTOLIC BLOOD PRESSURE: 85 MMHG

## 2019-04-01 DIAGNOSIS — Z78.9 OTHER SPECIFIED HEALTH STATUS: ICD-10-CM

## 2019-04-01 DIAGNOSIS — K59.00 CONSTIPATION, UNSPECIFIED: ICD-10-CM

## 2019-04-01 DIAGNOSIS — H04.129 DRY EYE SYNDROME OF UNSPECIFIED LACRIMAL GLAND: ICD-10-CM

## 2019-04-01 DIAGNOSIS — Z63.4 DISAPPEARANCE AND DEATH OF FAMILY MEMBER: ICD-10-CM

## 2019-04-01 PROCEDURE — 99214 OFFICE O/P EST MOD 30 MIN: CPT

## 2019-04-01 PROCEDURE — 93000 ELECTROCARDIOGRAM COMPLETE: CPT

## 2019-04-01 RX ORDER — DOCUSATE SODIUM AND SENNOSIDES 50; 8.6 MG/1; MG/1
8.6-5 TABLET, FILM COATED ORAL
Refills: 0 | Status: DISCONTINUED | COMMUNITY
End: 2019-04-01

## 2019-04-01 RX ORDER — SOFT LENS ADJUNCTIVE SOLUTIONS
DROPS MISCELLANEOUS 4 TIMES DAILY
Refills: 0 | Status: ACTIVE | COMMUNITY
Start: 2019-04-01

## 2019-04-01 RX ORDER — LORATADINE 5 MG
17 TABLET,CHEWABLE ORAL
Refills: 0 | Status: ACTIVE | COMMUNITY
Start: 2019-04-01

## 2019-04-01 RX ORDER — ACETAMINOPHEN 325 MG/1
325 TABLET ORAL EVERY 6 HOURS
Refills: 0 | Status: DISCONTINUED | COMMUNITY
End: 2019-04-01

## 2019-04-01 RX ORDER — MECLIZINE HYDROCHLORIDE 25 MG/1
25 TABLET ORAL 3 TIMES DAILY
Qty: 30 | Refills: 5 | Status: DISCONTINUED | COMMUNITY
Start: 2018-11-10 | End: 2019-04-01

## 2019-04-01 SDOH — SOCIAL STABILITY - SOCIAL INSECURITY: DISSAPEARANCE AND DEATH OF FAMILY MEMBER: Z63.4

## 2019-04-04 ENCOUNTER — APPOINTMENT (OUTPATIENT)
Age: 84
End: 2019-04-04
Payer: MEDICARE

## 2019-04-04 PROCEDURE — 93015 CV STRESS TEST SUPVJ I&R: CPT

## 2019-04-04 PROCEDURE — A9500: CPT

## 2019-04-04 PROCEDURE — 78452 HT MUSCLE IMAGE SPECT MULT: CPT

## 2019-04-10 ENCOUNTER — APPOINTMENT (OUTPATIENT)
Dept: PULMONOLOGY | Facility: CLINIC | Age: 84
End: 2019-04-10

## 2019-04-10 ENCOUNTER — MEDICATION RENEWAL (OUTPATIENT)
Age: 84
End: 2019-04-10

## 2019-04-25 ENCOUNTER — MEDICATION RENEWAL (OUTPATIENT)
Age: 84
End: 2019-04-25

## 2019-04-26 NOTE — PHYSICAL EXAM
[General Appearance - Well Developed] : well developed [Normal Appearance] : normal appearance [Well Groomed] : well groomed [General Appearance - Well Nourished] : well nourished [No Deformities] : no deformities [General Appearance - In No Acute Distress] : no acute distress [Normal Conjunctiva] : the conjunctiva exhibited no abnormalities [Eyelids - No Xanthelasma] : the eyelids demonstrated no xanthelasmas [Normal Oral Mucosa] : normal oral mucosa [No Oral Pallor] : no oral pallor [No Oral Cyanosis] : no oral cyanosis [Normal Jugular Venous V Waves Present] : normal jugular venous V waves present [Heart Rate And Rhythm] : heart rate and rhythm were normal [Heart Sounds] : normal S1 and S2 [Murmurs] : no murmurs present [Arterial Pulses Normal] : the arterial pulses were normal [Respiration, Rhythm And Depth] : normal respiratory rhythm and effort [Exaggerated Use Of Accessory Muscles For Inspiration] : no accessory muscle use [Auscultation Breath Sounds / Voice Sounds] : lungs were clear to auscultation bilaterally [Bowel Sounds] : normal bowel sounds [Abdomen Soft] : soft [Abdomen Tenderness] : non-tender [Nail Clubbing] : no clubbing of the fingernails [Cyanosis, Localized] : no localized cyanosis [Petechial Hemorrhages (___cm)] : no petechial hemorrhages [Skin Color & Pigmentation] : normal skin color and pigmentation [] : no rash [No Venous Stasis] : no venous stasis [Skin Lesions] : no skin lesions [No Skin Ulcers] : no skin ulcer [No Xanthoma] : no  xanthoma was observed [Oriented To Time, Place, And Person] : oriented to person, place, and time [Affect] : the affect was normal [Mood] : the mood was normal [No Anxiety] : not feeling anxious [FreeTextEntry1] : slow gait, walks with cane

## 2019-04-26 NOTE — ASSESSMENT
[FreeTextEntry1] : dyspnea  - of unclear etiology, given advanced age and other risk factors, agree with ischemia workup. Not interested in invasive procedures which is fair given her age, ischemia evaluation for risk stratification, candidacy for medical management if ischemic. \par hypertension - well controlled, no changes in meds\par \par Thank you for allowing me to participate in your patient's care.  Please contact me if there are any questions or concerns.\par

## 2019-04-26 NOTE — HISTORY OF PRESENT ILLNESS
[FreeTextEntry1] : 89 y/o F former smoker, with hyperlipidemia, hypertension, mild to moderate PVD, spinal stenosis, ostearthritis presents with shortness of breath.  Evaluated by pulmonary - no severe pulmonary pathology, being followed for a pulmonary nodule. Referred to cardiology for persistent shortness of breath. Ongoing for months. She had a CTA chest 3/2019 was negative for PE, noted with Mildly dilated ascending aorta (3.8 cm), 13 mm pulm nodule, degenerative changes of the spine, compression fractures T4, T6.   \par Syncopal episode in November 2018 - echo normal biventricular systolic function, mild AI\par Overall now starting to feel better; able to walk more, not dizzy\par ? Bronchitis over the winter\par Not interested in invasive procedures\par \par

## 2019-05-03 ENCOUNTER — RECORD ABSTRACTING (OUTPATIENT)
Age: 84
End: 2019-05-03

## 2019-05-05 ENCOUNTER — FORM ENCOUNTER (OUTPATIENT)
Age: 84
End: 2019-05-05

## 2019-05-06 ENCOUNTER — CHART COPY (OUTPATIENT)
Age: 84
End: 2019-05-06

## 2019-05-06 ENCOUNTER — OUTPATIENT (OUTPATIENT)
Dept: OUTPATIENT SERVICES | Facility: HOSPITAL | Age: 84
LOS: 1 days | End: 2019-05-06
Payer: MEDICARE

## 2019-05-06 ENCOUNTER — APPOINTMENT (OUTPATIENT)
Dept: RADIOLOGY | Facility: CLINIC | Age: 84
End: 2019-05-06

## 2019-05-06 DIAGNOSIS — Z90.721 ACQUIRED ABSENCE OF OVARIES, UNILATERAL: Chronic | ICD-10-CM

## 2019-05-06 DIAGNOSIS — H26.9 UNSPECIFIED CATARACT: Chronic | ICD-10-CM

## 2019-05-06 DIAGNOSIS — Z86.79 PERSONAL HISTORY OF OTHER DISEASES OF THE CIRCULATORY SYSTEM: Chronic | ICD-10-CM

## 2019-05-06 DIAGNOSIS — R06.09 OTHER FORMS OF DYSPNEA: ICD-10-CM

## 2019-05-06 DIAGNOSIS — Z90.49 ACQUIRED ABSENCE OF OTHER SPECIFIED PARTS OF DIGESTIVE TRACT: Chronic | ICD-10-CM

## 2019-05-06 DIAGNOSIS — Z98.890 OTHER SPECIFIED POSTPROCEDURAL STATES: Chronic | ICD-10-CM

## 2019-05-06 PROCEDURE — 71046 X-RAY EXAM CHEST 2 VIEWS: CPT

## 2019-05-06 PROCEDURE — 71046 X-RAY EXAM CHEST 2 VIEWS: CPT | Mod: 26

## 2019-05-16 ENCOUNTER — APPOINTMENT (OUTPATIENT)
Age: 84
End: 2019-05-16
Payer: MEDICARE

## 2019-05-16 VITALS
DIASTOLIC BLOOD PRESSURE: 70 MMHG | HEART RATE: 98 BPM | RESPIRATION RATE: 16 BRPM | SYSTOLIC BLOOD PRESSURE: 136 MMHG | OXYGEN SATURATION: 94 % | WEIGHT: 150 LBS | BODY MASS INDEX: 28.32 KG/M2 | HEIGHT: 61 IN

## 2019-05-16 DIAGNOSIS — Z86.39 PERSONAL HISTORY OF OTHER ENDOCRINE, NUTRITIONAL AND METABOLIC DISEASE: ICD-10-CM

## 2019-05-16 DIAGNOSIS — R06.09 OTHER FORMS OF DYSPNEA: ICD-10-CM

## 2019-05-16 DIAGNOSIS — Z86.79 PERSONAL HISTORY OF OTHER DISEASES OF THE CIRCULATORY SYSTEM: ICD-10-CM

## 2019-05-16 PROCEDURE — 99214 OFFICE O/P EST MOD 30 MIN: CPT

## 2019-05-16 NOTE — HISTORY OF PRESENT ILLNESS
[FreeTextEntry1] : 89 y/o F former smoker, with hyperlipidemia, hypertension, mild to moderate PVD, spinal stenosis, ostearthritis presents with shortness of breath.  Evaluated by pulmonary - no severe pulmonary pathology, being followed for a pulmonary nodule. Referred to cardiology for persistent shortness of breath. Ongoing for months. She had a CTA chest 3/2019 was negative for PE, noted with Mildly dilated ascending aorta (3.8 cm), 13 mm pulm nodule, degenerative changes of the spine, compression fractures T4, T6.   \par Syncopal episode in November 2018 - echo normal biventricular systolic function, mild AI\par Overall now starting to feel better; able to walk more, not dizzy\par ? Bronchitis over the winter\par Not interested in invasive procedures\par \par 5/16/19 Returns for follow up.  In the interim, nuclear stress test was normal. Was found with pneumonia, currently on Abx.\par \par \par

## 2019-05-16 NOTE — ASSESSMENT
[FreeTextEntry1] : dyspnea  - no inducible ischemia on stress testing.  Now with pneumonia.  If persistent despite resolution of pneumonia, will send for echocardiogram.  No evidence for CHF on exam. She will call us in 1 month to let us know how she is doing. \par hypertension - well controlled, no changes in meds\par \par Thank you for allowing me to participate in your patient's care.  Please contact me if there are any questions or concerns.\par \par RTC as needed

## 2019-05-16 NOTE — PHYSICAL EXAM
[General Appearance - Well Developed] : well developed [Normal Appearance] : normal appearance [Well Groomed] : well groomed [General Appearance - In No Acute Distress] : no acute distress [General Appearance - Well Nourished] : well nourished [No Deformities] : no deformities [Eyelids - No Xanthelasma] : the eyelids demonstrated no xanthelasmas [Normal Conjunctiva] : the conjunctiva exhibited no abnormalities [Normal Oral Mucosa] : normal oral mucosa [No Oral Cyanosis] : no oral cyanosis [No Oral Pallor] : no oral pallor [Normal Jugular Venous V Waves Present] : normal jugular venous V waves present [Exaggerated Use Of Accessory Muscles For Inspiration] : no accessory muscle use [Respiration, Rhythm And Depth] : normal respiratory rhythm and effort [Heart Rate And Rhythm] : heart rate and rhythm were normal [Murmurs] : no murmurs present [Heart Sounds] : normal S1 and S2 [Arterial Pulses Normal] : the arterial pulses were normal [Abdomen Soft] : soft [Bowel Sounds] : normal bowel sounds [Abdomen Tenderness] : non-tender [Cyanosis, Localized] : no localized cyanosis [Nail Clubbing] : no clubbing of the fingernails [Petechial Hemorrhages (___cm)] : no petechial hemorrhages [] : no rash [Skin Color & Pigmentation] : normal skin color and pigmentation [No Venous Stasis] : no venous stasis [Skin Lesions] : no skin lesions [No Skin Ulcers] : no skin ulcer [Oriented To Time, Place, And Person] : oriented to person, place, and time [No Xanthoma] : no  xanthoma was observed [Affect] : the affect was normal [No Anxiety] : not feeling anxious [Mood] : the mood was normal [Edema] : no peripheral edema present [FreeTextEntry1] : slow gait, walks with cane

## 2019-05-21 ENCOUNTER — MEDICATION RENEWAL (OUTPATIENT)
Age: 84
End: 2019-05-21

## 2019-05-21 ENCOUNTER — FORM ENCOUNTER (OUTPATIENT)
Age: 84
End: 2019-05-21

## 2019-05-22 ENCOUNTER — APPOINTMENT (OUTPATIENT)
Dept: RADIOLOGY | Facility: CLINIC | Age: 84
End: 2019-05-22
Payer: MEDICARE

## 2019-05-22 ENCOUNTER — OUTPATIENT (OUTPATIENT)
Dept: OUTPATIENT SERVICES | Facility: HOSPITAL | Age: 84
LOS: 1 days | End: 2019-05-22
Payer: MEDICARE

## 2019-05-22 DIAGNOSIS — Z98.890 OTHER SPECIFIED POSTPROCEDURAL STATES: Chronic | ICD-10-CM

## 2019-05-22 DIAGNOSIS — J18.9 PNEUMONIA, UNSPECIFIED ORGANISM: ICD-10-CM

## 2019-05-22 DIAGNOSIS — Z90.721 ACQUIRED ABSENCE OF OVARIES, UNILATERAL: Chronic | ICD-10-CM

## 2019-05-22 DIAGNOSIS — H26.9 UNSPECIFIED CATARACT: Chronic | ICD-10-CM

## 2019-05-22 DIAGNOSIS — Z86.79 PERSONAL HISTORY OF OTHER DISEASES OF THE CIRCULATORY SYSTEM: Chronic | ICD-10-CM

## 2019-05-22 DIAGNOSIS — Z90.49 ACQUIRED ABSENCE OF OTHER SPECIFIED PARTS OF DIGESTIVE TRACT: Chronic | ICD-10-CM

## 2019-05-22 PROCEDURE — 71046 X-RAY EXAM CHEST 2 VIEWS: CPT | Mod: 26

## 2019-05-22 PROCEDURE — 71046 X-RAY EXAM CHEST 2 VIEWS: CPT

## 2019-05-24 ENCOUNTER — MED ADMIN CHARGE (OUTPATIENT)
Age: 84
End: 2019-05-24

## 2019-05-28 ENCOUNTER — CHART COPY (OUTPATIENT)
Age: 84
End: 2019-05-28

## 2019-05-29 ENCOUNTER — CHART COPY (OUTPATIENT)
Age: 84
End: 2019-05-29

## 2019-06-26 ENCOUNTER — MEDICATION RENEWAL (OUTPATIENT)
Age: 84
End: 2019-06-26

## 2019-07-09 ENCOUNTER — FORM ENCOUNTER (OUTPATIENT)
Age: 84
End: 2019-07-09

## 2019-07-10 ENCOUNTER — OUTPATIENT (OUTPATIENT)
Dept: OUTPATIENT SERVICES | Facility: HOSPITAL | Age: 84
LOS: 1 days | End: 2019-07-10
Payer: MEDICARE

## 2019-07-10 ENCOUNTER — APPOINTMENT (OUTPATIENT)
Dept: RADIOLOGY | Facility: CLINIC | Age: 84
End: 2019-07-10
Payer: MEDICARE

## 2019-07-10 DIAGNOSIS — Z86.79 PERSONAL HISTORY OF OTHER DISEASES OF THE CIRCULATORY SYSTEM: Chronic | ICD-10-CM

## 2019-07-10 DIAGNOSIS — Z90.721 ACQUIRED ABSENCE OF OVARIES, UNILATERAL: Chronic | ICD-10-CM

## 2019-07-10 DIAGNOSIS — Z98.890 OTHER SPECIFIED POSTPROCEDURAL STATES: Chronic | ICD-10-CM

## 2019-07-10 DIAGNOSIS — Z90.49 ACQUIRED ABSENCE OF OTHER SPECIFIED PARTS OF DIGESTIVE TRACT: Chronic | ICD-10-CM

## 2019-07-10 DIAGNOSIS — J18.9 PNEUMONIA, UNSPECIFIED ORGANISM: ICD-10-CM

## 2019-07-10 DIAGNOSIS — H26.9 UNSPECIFIED CATARACT: Chronic | ICD-10-CM

## 2019-07-10 PROCEDURE — 71046 X-RAY EXAM CHEST 2 VIEWS: CPT | Mod: 26

## 2019-07-10 PROCEDURE — 71046 X-RAY EXAM CHEST 2 VIEWS: CPT

## 2019-07-24 ENCOUNTER — APPOINTMENT (OUTPATIENT)
Dept: PULMONOLOGY | Facility: CLINIC | Age: 84
End: 2019-07-24

## 2019-08-23 ENCOUNTER — MEDICATION RENEWAL (OUTPATIENT)
Age: 84
End: 2019-08-23

## 2019-08-23 DIAGNOSIS — R21 RASH AND OTHER NONSPECIFIC SKIN ERUPTION: ICD-10-CM

## 2019-09-18 ENCOUNTER — APPOINTMENT (OUTPATIENT)
Dept: ORTHOPEDIC SURGERY | Facility: CLINIC | Age: 84
End: 2019-09-18
Payer: MEDICARE

## 2019-09-18 VITALS
BODY MASS INDEX: 28.32 KG/M2 | HEIGHT: 61 IN | WEIGHT: 150 LBS | DIASTOLIC BLOOD PRESSURE: 80 MMHG | HEART RATE: 80 BPM | SYSTOLIC BLOOD PRESSURE: 147 MMHG

## 2019-09-18 DIAGNOSIS — Z96.641 PRESENCE OF RIGHT ARTIFICIAL HIP JOINT: ICD-10-CM

## 2019-09-18 PROCEDURE — 73502 X-RAY EXAM HIP UNI 2-3 VIEWS: CPT | Mod: RT

## 2019-09-18 PROCEDURE — 99213 OFFICE O/P EST LOW 20 MIN: CPT

## 2019-09-18 NOTE — PHYSICAL EXAM
[ALL] : dorsalis pedis, posterior tibial, femoral, popliteal, and radial 2+ and symmetric bilaterally [LE] : Sensory: Intact in bilateral lower extremities [Normal] : Alert and in no acute distress [Poor Appearance] : well-appearing [Antalgic] : not antalgic [Acute Distress] : not in acute distress [Obese] : not obese [de-identified] : GENERAL APPEARANCE: Well nourished and hydrated, pleasant, alert, and oriented x 3. Appears their stated age. \par HEENT: Normocephalic, extraocular eye motion intact. Nasal septum midline. Oral cavity clear. External auditory canal clear. \par RESPIRATORY: Breath sounds clear and audible in all lobes. No wheezing, No accessory muscle use.\par CARDIOVASCULAR: No apparent abnormalities. No lower leg edema. No varicosities. Pedal pulses are palpable.\par NEUROLOGIC: Sensation is normal, no muscle weakness in the upper or lower extremities.\par DERMATOLOGIC: No apparent skin lesions, moist, warm, no rash.\par SPINE: Cervical spine appears normal and moves freely; thoracic spine appears normal and moves freely; lumbosacral spine appears normal and moves freely, normal, nontender.\par MUSCULOSKELETAL: Hands, wrists, and elbows are normal and move freely, shoulders are normal and move freely.  [de-identified] : Exam of the right hip shows a well healed incision with no signs of infection, FROM, mildly antalgic gait without a cane, equal leg lengths [de-identified] : 3V xray of the right hip and pelvis done in office today and reviewed by Dr. Arturo Baum demonstrates s/p IÉNS with implants in good positioning, no sign of wear, loosening or subsidence.

## 2019-09-18 NOTE — HISTORY OF PRESENT ILLNESS
[Stable] : stable [0] : a current pain level of 0/10 [Recumbency] : relieved by recumbency [Rest] : relieved by rest [Intermit.] : ~He/She~ states the symptoms seem to be intermittent [Direct Pressure] : worsened by direct pressure [de-identified] : 89 year old female here for evaluation s/p right INÉS DOS 7/24/2018.  She states no pain or discomfort in the right hip. Does does c/o an indentation surrounding the incision. Overall doing well.

## 2019-09-18 NOTE — DISCUSSION/SUMMARY
[de-identified] : 89 year old female s/p right INÉS DOS 7/24/2018. She has progressed well about 1 year from surgery. She will continue to remain active. I discussed the importance of antibiotic use with any dental work. F/U annually for radiographic surveillance of right INÉS components. \par \par Total hip arthroplasty: A hip replacement means a resurfacing of both surfaces of the hip, with metal, ceramic and/or plastic parts. The prosthetic parts are usually press fit into bone, and only rarely cemented into position. Patients are allowed to weight bear as tolerated in most cases. The postoperative motion is determined by multiple factors the most important of which is preoperative motion. In general, the better the motion pre operatively, the better the motion post operatively. The operation, pending medical clearance, generally requires a hospitalization of 3-4 days. In general, we prefer to perform the procedure under epidural or general anesthesia. Preoperatively we institute a nomogram for blood management which may include the administration of procrit. The operative procedure takes approximately 1-2 hours. The operation requires an oblique incision anywhere from 4-6 inches over the posterolateral hip region. Post operatively the patient is walking the day of or the day after surgery. The first couple of days are very painful and the pain medication will alleviate but not eliminate the pain. Thus the patient must really push hard to get their mobility back. Our goal for having the person go home is that they can walk with a walker or a cane. The walking aide is to be dispensed with once the patient is secure enough. In general, there is no cast or braces required with a routine hip replacement. In the long term, we do not encourage our patients to run for the sake of running; although, pending their pre operative status, we often allow patients to play doubles tennis or comparable activities. We also allow them to do gentle intermediate downhill skiing if they are truly an expert skier. Biking is encouraged as well as swimming. The follow up periods are usually at 3 weeks, 6 weeks, 3 months, and yearly intervals. Potential complications with total hip replacements include anaesthetic complications and death, infection (less than 1%), nerve damage, and in the case of a sciatic nerve injury, a permanent foot drop, (a flapping foot with ambulation that requires bracing). There are always areas of skin numbness but this is not an untoward effect nor do we consider it a complication. Other potential complications include dislocation of the hip component (less than 5%). In cases of recurrent dislocation revision surgery may be required. The loosening of either the acetabular or femoral component is much more infrequent. The components may wear, creating particulate debris, loosening and systemic complications. Specific concerns exist with all bearing surfaces such as metal sensitivity with metal on metal components, and the risk of fracture and squeaking with ceramic components. Major blood vessel damage is also extremely rare. Theoretically because of the anatomic proximity of the femoral artery, it could be lacerated with subsequent repairs required. Albeit unlikely, a disruption of the femoral artery could theoretically result in an amputation. Similarly, infection could theoretically result in an amputation if one were to grow out an organism that cannot be controlled with antibiotics. Most infections require removal of the prosthesis, placement of an antibiotic spacer, IV antibiotics for eradication, prior to reimplantation. General medical complications include phlebitis for which we prophylactically anticoagulate but it could still occur and fatal pulmonary embolus has been reported. Cardiovascular problems, such as a heart attack or ischemia are always a concern with such hemodynamic changes in the blood vascular system. There is a risk of leg length discrepancy and the need for a shoe lift. Other general complications are very rare but anything in medicine could theoretically happen. I think the patient understands the risk benefit ratio of total hip replacement and will think about whether they would like to pursue an operative or non-operative option.  I reviewed the plan of care as well as a model of a total hip implant equivalent to the one that will be used for their total hip joint replacement. The patient agreed to the plan of care as well as the use of implants for their hip total joint replacement.\par

## 2019-09-18 NOTE — ADDENDUM
[FreeTextEntry1] : I, Bernardo Martinez, acted solely as a scribe for Dr. Arturo Baum on this date 09/18/2019.

## 2019-09-20 ENCOUNTER — MEDICATION RENEWAL (OUTPATIENT)
Age: 84
End: 2019-09-20

## 2019-10-03 ENCOUNTER — OTHER (OUTPATIENT)
Age: 84
End: 2019-10-03

## 2019-11-20 ENCOUNTER — TRANSCRIPTION ENCOUNTER (OUTPATIENT)
Age: 84
End: 2019-11-20

## 2019-11-20 ENCOUNTER — INPATIENT (INPATIENT)
Facility: HOSPITAL | Age: 84
LOS: 5 days | Discharge: NURSING, REHAB OR LONG-TERM CARE | DRG: 480 | End: 2019-11-26
Attending: INTERNAL MEDICINE | Admitting: HOSPITALIST
Payer: MEDICARE

## 2019-11-20 VITALS
HEART RATE: 78 BPM | RESPIRATION RATE: 18 BRPM | TEMPERATURE: 98 F | OXYGEN SATURATION: 94 % | SYSTOLIC BLOOD PRESSURE: 142 MMHG | DIASTOLIC BLOOD PRESSURE: 100 MMHG

## 2019-11-20 DIAGNOSIS — Z98.890 OTHER SPECIFIED POSTPROCEDURAL STATES: Chronic | ICD-10-CM

## 2019-11-20 DIAGNOSIS — H26.9 UNSPECIFIED CATARACT: Chronic | ICD-10-CM

## 2019-11-20 DIAGNOSIS — Z86.79 PERSONAL HISTORY OF OTHER DISEASES OF THE CIRCULATORY SYSTEM: Chronic | ICD-10-CM

## 2019-11-20 DIAGNOSIS — Z90.721 ACQUIRED ABSENCE OF OVARIES, UNILATERAL: Chronic | ICD-10-CM

## 2019-11-20 DIAGNOSIS — S72.002A FRACTURE OF UNSPECIFIED PART OF NECK OF LEFT FEMUR, INITIAL ENCOUNTER FOR CLOSED FRACTURE: ICD-10-CM

## 2019-11-20 DIAGNOSIS — Z90.49 ACQUIRED ABSENCE OF OTHER SPECIFIED PARTS OF DIGESTIVE TRACT: Chronic | ICD-10-CM

## 2019-11-20 LAB
ALBUMIN SERPL ELPH-MCNC: 4 G/DL — SIGNIFICANT CHANGE UP (ref 3.3–5.2)
ALP SERPL-CCNC: 130 U/L — HIGH (ref 40–120)
ALT FLD-CCNC: 11 U/L — SIGNIFICANT CHANGE UP
ANION GAP SERPL CALC-SCNC: 17 MMOL/L — SIGNIFICANT CHANGE UP (ref 5–17)
APTT BLD: 29.4 SEC — SIGNIFICANT CHANGE UP (ref 27.5–36.3)
AST SERPL-CCNC: 21 U/L — SIGNIFICANT CHANGE UP
BILIRUB SERPL-MCNC: 0.2 MG/DL — LOW (ref 0.4–2)
BLD GP AB SCN SERPL QL: SIGNIFICANT CHANGE UP
BUN SERPL-MCNC: 21 MG/DL — HIGH (ref 8–20)
CALCIUM SERPL-MCNC: 9.4 MG/DL — SIGNIFICANT CHANGE UP (ref 8.6–10.2)
CHLORIDE SERPL-SCNC: 97 MMOL/L — LOW (ref 98–107)
CO2 SERPL-SCNC: 24 MMOL/L — SIGNIFICANT CHANGE UP (ref 22–29)
CREAT SERPL-MCNC: 0.91 MG/DL — SIGNIFICANT CHANGE UP (ref 0.5–1.3)
GLUCOSE SERPL-MCNC: 105 MG/DL — SIGNIFICANT CHANGE UP (ref 70–115)
HCT VFR BLD CALC: 42.4 % — SIGNIFICANT CHANGE UP (ref 34.5–45)
HGB BLD-MCNC: 13.7 G/DL — SIGNIFICANT CHANGE UP (ref 11.5–15.5)
INR BLD: 0.93 RATIO — SIGNIFICANT CHANGE UP (ref 0.88–1.16)
MCHC RBC-ENTMCNC: 30.4 PG — SIGNIFICANT CHANGE UP (ref 27–34)
MCHC RBC-ENTMCNC: 32.3 GM/DL — SIGNIFICANT CHANGE UP (ref 32–36)
MCV RBC AUTO: 94 FL — SIGNIFICANT CHANGE UP (ref 80–100)
PLATELET # BLD AUTO: 272 K/UL — SIGNIFICANT CHANGE UP (ref 150–400)
POTASSIUM SERPL-MCNC: 3.4 MMOL/L — LOW (ref 3.5–5.3)
POTASSIUM SERPL-SCNC: 3.4 MMOL/L — LOW (ref 3.5–5.3)
PROT SERPL-MCNC: 7.8 G/DL — SIGNIFICANT CHANGE UP (ref 6.6–8.7)
PROTHROM AB SERPL-ACNC: 10.7 SEC — SIGNIFICANT CHANGE UP (ref 10–12.9)
RBC # BLD: 4.51 M/UL — SIGNIFICANT CHANGE UP (ref 3.8–5.2)
RBC # FLD: 13.2 % — SIGNIFICANT CHANGE UP (ref 10.3–14.5)
SODIUM SERPL-SCNC: 138 MMOL/L — SIGNIFICANT CHANGE UP (ref 135–145)
WBC # BLD: 11.11 K/UL — HIGH (ref 3.8–10.5)
WBC # FLD AUTO: 11.11 K/UL — HIGH (ref 3.8–10.5)

## 2019-11-20 PROCEDURE — 72192 CT PELVIS W/O DYE: CPT | Mod: 26

## 2019-11-20 PROCEDURE — 93010 ELECTROCARDIOGRAM REPORT: CPT | Mod: 76

## 2019-11-20 PROCEDURE — 99285 EMERGENCY DEPT VISIT HI MDM: CPT

## 2019-11-20 PROCEDURE — 72125 CT NECK SPINE W/O DYE: CPT | Mod: 26

## 2019-11-20 PROCEDURE — 73502 X-RAY EXAM HIP UNI 2-3 VIEWS: CPT | Mod: 26,LT

## 2019-11-20 PROCEDURE — 70450 CT HEAD/BRAIN W/O DYE: CPT | Mod: 26

## 2019-11-20 PROCEDURE — 99221 1ST HOSP IP/OBS SF/LOW 40: CPT | Mod: 57

## 2019-11-20 PROCEDURE — 72170 X-RAY EXAM OF PELVIS: CPT | Mod: 26,59

## 2019-11-20 PROCEDURE — 71045 X-RAY EXAM CHEST 1 VIEW: CPT | Mod: 26

## 2019-11-20 RX ORDER — TRANEXAMIC ACID 100 MG/ML
1000 INJECTION, SOLUTION INTRAVENOUS ONCE
Refills: 0 | Status: COMPLETED | OUTPATIENT
Start: 2019-11-20 | End: 2019-11-20

## 2019-11-20 RX ORDER — ENOXAPARIN SODIUM 100 MG/ML
40 INJECTION SUBCUTANEOUS ONCE
Refills: 0 | Status: COMPLETED | OUTPATIENT
Start: 2019-11-20 | End: 2019-11-20

## 2019-11-20 RX ORDER — ZOLPIDEM TARTRATE 10 MG/1
5 TABLET ORAL AT BEDTIME
Refills: 0 | Status: DISCONTINUED | OUTPATIENT
Start: 2019-11-20 | End: 2019-11-21

## 2019-11-20 RX ORDER — HYDROMORPHONE HYDROCHLORIDE 2 MG/ML
0.5 INJECTION INTRAMUSCULAR; INTRAVENOUS; SUBCUTANEOUS EVERY 4 HOURS
Refills: 0 | Status: DISCONTINUED | OUTPATIENT
Start: 2019-11-20 | End: 2019-11-21

## 2019-11-20 RX ORDER — MORPHINE SULFATE 50 MG/1
2 CAPSULE, EXTENDED RELEASE ORAL ONCE
Refills: 0 | Status: DISCONTINUED | OUTPATIENT
Start: 2019-11-20 | End: 2019-11-20

## 2019-11-20 RX ORDER — CEFAZOLIN SODIUM 1 G
2000 VIAL (EA) INJECTION ONCE
Refills: 0 | Status: DISCONTINUED | OUTPATIENT
Start: 2019-11-21 | End: 2019-11-26

## 2019-11-20 RX ORDER — ONDANSETRON 8 MG/1
4 TABLET, FILM COATED ORAL ONCE
Refills: 0 | Status: COMPLETED | OUTPATIENT
Start: 2019-11-20 | End: 2019-11-20

## 2019-11-20 RX ORDER — POTASSIUM CHLORIDE 20 MEQ
40 PACKET (EA) ORAL ONCE
Refills: 0 | Status: COMPLETED | OUTPATIENT
Start: 2019-11-20 | End: 2019-11-20

## 2019-11-20 RX ORDER — MORPHINE SULFATE 50 MG/1
4 CAPSULE, EXTENDED RELEASE ORAL ONCE
Refills: 0 | Status: DISCONTINUED | OUTPATIENT
Start: 2019-11-20 | End: 2019-11-20

## 2019-11-20 RX ADMIN — HYDROMORPHONE HYDROCHLORIDE 0.5 MILLIGRAM(S): 2 INJECTION INTRAMUSCULAR; INTRAVENOUS; SUBCUTANEOUS at 22:49

## 2019-11-20 RX ADMIN — MORPHINE SULFATE 4 MILLIGRAM(S): 50 CAPSULE, EXTENDED RELEASE ORAL at 18:56

## 2019-11-20 RX ADMIN — TRANEXAMIC ACID 220 MILLIGRAM(S): 100 INJECTION, SOLUTION INTRAVENOUS at 22:28

## 2019-11-20 RX ADMIN — MORPHINE SULFATE 2 MILLIGRAM(S): 50 CAPSULE, EXTENDED RELEASE ORAL at 20:15

## 2019-11-20 RX ADMIN — ENOXAPARIN SODIUM 40 MILLIGRAM(S): 100 INJECTION SUBCUTANEOUS at 22:29

## 2019-11-20 RX ADMIN — ONDANSETRON 4 MILLIGRAM(S): 8 TABLET, FILM COATED ORAL at 18:55

## 2019-11-20 RX ADMIN — MORPHINE SULFATE 4 MILLIGRAM(S): 50 CAPSULE, EXTENDED RELEASE ORAL at 19:56

## 2019-11-20 RX ADMIN — MORPHINE SULFATE 2 MILLIGRAM(S): 50 CAPSULE, EXTENDED RELEASE ORAL at 21:15

## 2019-11-20 RX ADMIN — HYDROMORPHONE HYDROCHLORIDE 0.5 MILLIGRAM(S): 2 INJECTION INTRAMUSCULAR; INTRAVENOUS; SUBCUTANEOUS at 21:49

## 2019-11-20 RX ADMIN — Medication 40 MILLIEQUIVALENT(S): at 22:59

## 2019-11-20 NOTE — CONSULT NOTE ADULT - ATTENDING COMMENTS
Ortho Trauma Attending:  Agree with above PA note.  Note edited where necessary.  Orthopedic Surgery is ready to proceed with surgery pending medical optimization and adequate Operating Room availability. Risks of surgical delay discussed with other providers and staff. Please call with any questions or concerns.     Arturo Deleon MD  Orthopaedic Trauma Surgery

## 2019-11-20 NOTE — ED ADULT NURSE REASSESSMENT NOTE - ANCILLARY STATUS
Admitting MD Wang at bedside for admission evaluation and assessment at this time./physician at bedside

## 2019-11-20 NOTE — ED ADULT NURSE NOTE - NSIMPLEMENTINTERV_GEN_ALL_ED
Implemented All Fall with Harm Risk Interventions:  [place] to call system. Call bell, personal items and telephone within reach. Instruct patient to call for assistance. Room bathroom lighting operational. Non-slip footwear when patient is off stretcher. Physically safe environment: no spills, clutter or unnecessary equipment. Stretcher in lowest position, wheels locked, appropriate side rails in place. Provide visual cue, wrist band, yellow gown, etc. Monitor gait and stability. Monitor for mental status changes and reorient to person, place, and time. Review medications for side effects contributing to fall risk. Reinforce activity limits and safety measures with patient and family. Provide visual clues: red socks.

## 2019-11-20 NOTE — CONSULT NOTE ADULT - ASSESSMENT
89 year old female PMH anemia, depression, myelodysplastic disorder, OA, neuropathy, hip replacement (july 2018) presents to ED with mechanical fall. Found to have left hip fracture, cardiology consulted for pre-op evaluation.     Problem List:  1) L Hip fracture  2) pre-op evaluation   3) hypokalemia     -Plan for OR in AM for Left Hip fracture fixation  - Patient is an intermediate risk for intermediate risk procedure, the procedure is necessary  - The patient does not need further cardiac evaluation prior to procedure  - ECHO 11/2018 - normal biventricular function, mild AI  - Nuc stress test 4/2019 - normal perfusion  - EKG done today NSR, left axis deviation, similar to previous Office EKG  - Replace hypokalemia with 40mEq PO  - Pain control   - Restart home medications    Plan discussed with Attending Dr. Schultz

## 2019-11-20 NOTE — ED STATDOCS - NS_ ATTENDINGSCRIBEDETAILS _ED_A_ED_FT
I, Renzo Boswell, performed the initial face to face bedside interview with this patient regarding history of present illness, review of symptoms and relevant past medical, social and family history.  I completed an independent physical examination.  I was the initial provider who evaluated this patient. I have signed out the follow up of any pending tests (i.e. labs, radiological studies) to the ACP.  I have communicated the patient’s plan of care and disposition with the ACP.  The history, relevant review of systems, past medical and surgical history, medical decision making, and physical examination was documented by the scribe in my presence and I attest to the accuracy of the documentation. I, Renzo Boswell, performed the initial face to face bedside interview with this patient regarding history of present illness, review of symptoms and relevant past medical, social and family history.  I completed an independent physical examination.   The history, relevant review of systems, past medical and surgical history, medical decision making, and physical examination was documented by the scribe in my presence and I attest to the accuracy of the documentation.

## 2019-11-20 NOTE — ED ADULT NURSE REASSESSMENT NOTE - NS ED NURSE REASSESS COMMENT FT1
Two attempts made to call 5 tower to give report in order for pt to transition out of ED to assigned clean bed. No answer on unit both times. Escalated to charge nurse at this time.

## 2019-11-20 NOTE — ED STATDOCS - NS ED MD DISPO ISOLATION TYPES
1030:  To PACU from OR via [facility]. Respirations spontaneous and non-labored.  Icepack applied over c/d/i left wrist surgical dressings.  Patient has no c/o of pain or nausea.  Patient able to wiggle left hand fingers and has no c/o of numbness or tingling.  Bilateral extremities cool with cap refill <3 seconds.    1045: No change.    1055: No change, patient meets criteria to transfer to stage 2.  Report to LYDIA Mandel.   None

## 2019-11-20 NOTE — ED ADULT NURSE NOTE - NSFALLRSKPASTHIST_ED_ALL_ED
Afib    CKD (chronic kidney disease)    HLD (hyperlipidemia)    HTN (hypertension)    Hypothyroid    Osteoarthritis    Pacemaker    PUD (peptic ulcer disease)    Thoracic aortic aneurysm without rupture    Urinary incontinence yes

## 2019-11-20 NOTE — ED ADULT NURSE REASSESSMENT NOTE - NSIMPLEMENTINTERV_GEN_ALL_ED
Implemented All Fall with Harm Risk Interventions:  [place] to call system. Call bell, personal items and telephone within reach. Instruct patient to call for assistance. Room bathroom lighting operational. Non-slip footwear when patient is off stretcher. Physically safe environment: no spills, clutter or unnecessary equipment. Stretcher in lowest position, wheels locked, appropriate side rails in place. Provide visual cue, wrist band, yellow gown, etc. Monitor gait and stability. Monitor for mental status changes and reorient to person, place, and time. Review medications for side effects contributing to fall risk. Reinforce activity limits and safety measures with patient and family. Provide visual clues: red socks.

## 2019-11-20 NOTE — ED STATDOCS - PMH
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

## 2019-11-20 NOTE — ED ADULT NURSE REASSESSMENT NOTE - NS ED NURSE REASSESS COMMENT FT1
Pt repositioned in stretcher, pillows and additional sheet blanket provided for pt comfort. Pt verbalizes comfort at this time, improvement in pain/comfort s/p medication and repositioning. Pt safety maintained, side rails up, stretcher in lowest position and wheels locked, call bell within reach and use reinforced by RN. Pt aware of admission to hospital awaiting hospitalist to see pt at this time.

## 2019-11-20 NOTE — CONSULT NOTE ADULT - ATTENDING COMMENTS
Patient seen and examined earlier this morning. Patient seen and examined earlier this morning. Reviewed prior records. Awaiting ORIF of left hip, s/p mechanical fall.  No active cardiac conditions which would preclude surgery.  May proceed with surgery without any additional cardiac diagnostic interventions.    Thank you for allowing me to participate in care of your patient.   Please call as needed.

## 2019-11-20 NOTE — ED ADULT NURSE NOTE - OBJECTIVE STATEMENT
Patient found laying in stretcher, awake alert, and oriented times 3, breathing unlabored.  Patient states slipped and fell in bathroom.  No LOC.  patient denies hitting head.  Patient complaining of left hip pain.  Leg shortened and externally rotated.

## 2019-11-20 NOTE — ED STATDOCS - CLINICAL SUMMARY MEDICAL DECISION MAKING FREE TEXT BOX
Patient with left hip pain s/p fall. Likely fracture vs. dislocation. Will give pain meds, labs, and obtain X-Ray of pelvis and L hip.

## 2019-11-20 NOTE — CONSULT NOTE ADULT - SUBJECTIVE AND OBJECTIVE BOX
Patient is a 89y old  Female who presents with a chief complaint of fall, hip fracture      HPI: 89 year old female PMH anemia, depression, myelodysplastic disorder, OA, neuropathy, hip replacement (july 2018) presents to ED with mechanical fall. Found to have left Hip fracture. Patient states she was trying to get to the bathroom (in a hurry), when she tripped and fell. She did not lose coconsciousness. She denies current chest pain, chest pain prior to the fall, and no recent episodes of chest pain/SOB. No palpitations. Denies fever, chills, nausea, vomiting. Former smoker 30 pack year history, drinks one glass of scotch per night, no drug use. Complains of hip pain.     Recent Nuc stress test 4/2019 in office that showed normal perfusion. Echo 11/2018 with normal biventricular function and mild AI.     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  Anemia  Depression  Osteoarthritis: right hip  Insomnia  S/P wrist surgery: left wrist fracture repair  History of varicose veins: laser removal  Bilateral cataracts: surgery  History of appendectomy  History of tonsillectomy and adenoidectomy  History of right oophorectomy      Review of Systems:  CONSTITUTIONAL: No fever, chills, or fatigue  EYES: No eye pain, visual disturbances, or discharge  ENMT:  No difficulty hearing, tinnitus, vertigo; No sinus or throat pain  NECK: No pain or stiffness  RESPIRATORY: No cough, wheezing, chills or hemoptysis; No shortness of breath  CARDIOVASCULAR: No chest pain, palpitations, dizziness, or leg swelling  GASTROINTESTINAL: No abdominal or epigastric pain. No nausea, vomiting, or hematemesis; No diarrhea or constipation. No melena or hematochezia.  GENITOURINARY: No dysuria, frequency, hematuria, or incontinence  NEUROLOGICAL: No headaches, memory loss, loss of strength, numbness, or tremors  SKIN: No itching, burning, rashes, or lesions   MUSCULOSKELETAL: No joint pain or swelling; No muscle, back, or extremity pain  PSYCHIATRIC: No depression, anxiety, mood swings, or difficulty sleeping      Medications:    HYDROmorphone  Injectable 0.5 milliGRAM(s) IV Push every 4 hours PRN  zolpidem 5 milliGRAM(s) Oral at bedtime PRN  zolpidem 5 milliGRAM(s) Oral at bedtime PRN      ICU Vital Signs   T(C): 37   T(F): 98.6   HR: 86  BP: 118/76  RR: 18   SpO2: 95%      I&O's Detail        LABS:                        13.7   11.11 )-----------( 272      ( 20 Nov 2019 18:58 )             42.4     11-20    138  |  97<L>  |  21.0<H>  ----------------------------<  105  3.4<L>   |  24.0  |  0.91    Ca    9.4      20 Nov 2019 18:58    TPro  7.8  /  Alb  4.0  /  TBili  0.2<L>  /  DBili  x   /  AST  21  /  ALT  11  /  AlkPhos  130<H>  11-20        CAPILLARY BLOOD GLUCOSE        PT/INR - ( 20 Nov 2019 18:58 )   PT: 10.7 sec;   INR: 0.93 ratio         PTT - ( 20 Nov 2019 18:58 )  PTT:29.4 sec    CULTURES:      Physical Examination:    General: No acute distress.  Alert, oriented, interactive, nonfocal    HEENT: Pupils equal, reactive to light.  Symmetric.    PULM: Clear to auscultation bilaterally, no significant sputum production    CVS: Regular rate and rhythm, no murmurs, rubs, or gallops    ABD: Soft, nondistended, nontender, normoactive bowel sounds, no masses    EXT: No edema, nontender. Left hip externally rotated, painful to palpation     SKIN: Warm and well perfused, no rashes noted.    NEURO: A&Ox3, strength 5/5 all extremities, cranial nerves grossly intact, no focal deficits        EKG: NSR, left axis deviation       RADIOLOGY: EXAM:  CT BRAIN                          PROCEDURE DATE:  11/20/2019          INTERPRETATION:  Head CT without contrast   COMPARISON: 11/5/2018 brain CT.  CLINICAL INFORMATION: Head injury following fall. Pain. Assess for   intracranial hemorrhage..  TECHNIQUE: Contiguous axial 2.5 mm slice thickness images of the head   were obtained without the use of intravenous contrast media.  This scan was performed using automatic exposure control (radiation dose   reduction software) to obtain a diagnostic image quality scan with   patient dose as low as reasonably achievable.     FINDINGS:  Vascular calcification seen within carotid siphon vessels.     Mild cerebral volume loss is present with secondary proportional   prominence of the sulci and ventricles.      The posterior fossa structures and basal cisterns appear normal.    There is no intracranial hemorrhage.     There is no intracranial mass or midline shift.    There is no sulcal effacement to suggest acute stroke.        The basal ganglia and thalami appear normal in morphology and attenuation.    The visualized portions of the optic globes and paranasal sinuses are   normal.    There are no skull fractures.    IMPRESSION:    No acute intracranial findings.      If acute stroke isof clinical concern, MRI with diffusion-weighted   images would be helpful for further characterization.      ANATOLY CASTILLO M.D., ATTENDING RADIOLOGIST  This document has been electronically signed. Nov 20 2019  8:59PM    EXAM:  CT PELVIS ONLY                          PROCEDURE DATE:  11/20/2019          INTERPRETATION:      CT    CLINICAL INFORMATION:  Pain.    TECHNIQUE:  Contiguous axial 0.63 mm sections were obtained using a   single helical acquisition.  This data set was reconstructed  as axial   2.5 mm sections.  Imaging post processing software was employed to   generate sagittal and coronal and 3D reformatted images. These additional   reformatted images were used to evaluate osseous alignment and the   spatial relationships of the osseous structure, soft tissues and   vasculature.   This scan was performed using automatic exposure control   (radiation dose reduction software) to obtain a diagnostic image quality   scan with patient dose as low as reasonably achievable.    FINDINGS:   X-rays performed earlier available for review.    There is an impacted comminuted LEFT intertrochanteric fracture. The   humeral head is located. The LEFT acetabulum is intact. The RIGHT hip   prosthesis is intact. The pelvic girdle is intact. The sacrum is intact.   Degenerative changes involve the lower lumbar spine.    Visualized bowel. The bladder is obscured from streak artifact.    IMPRESSION:  impacted comminuted LEFT intertrochanteric fracture.       CHANCE WEAVER M.D., ATTENDING RADIOLOGIST  This document has been electronically signed. Nov 20 2019  9:11PM          TIME SPENT: 45 min   Evaluating/treating patient, reviewing data/labs/imaging, discussing case with multidisciplinary team, discussing plan/goals of care with patient/family. Non-inclusive of procedure time.

## 2019-11-20 NOTE — CONSULT NOTE ADULT - SUBJECTIVE AND OBJECTIVE BOX
Pt Name: NILSA VILLEDA    MRN: [id number]      Patient is a 89y Female presenting to the emergency department with a chief complaint of [facility]. Pt was going to bathroom, slipped and fell landing on her left side, reports hitting her head, denies LOC - BIBA to [facility] ED. Family at bedside. c/o pain in left groin. Denies right hip pain. Denies loss of motor function distally. Denies numbness or tingling distally. No other orthopedic complaints at this time.       REVIEW OF SYSTEMS    General: Alert, responsive, in NAD    Skin/Breast: No rashes, no pruritis   	  Ophthalmologic: No visual changes. No redness.   	  ENMT:	No discharge. No swelling.    Respiratory and Thorax: No difficulty breathing. No cough.  	   Cardiovascular:	No chest pain. No palpitations.    Gastrointestinal:	 No abdominal pain. No diarrhea.     Genitourinary: No dysuria. No bleeding.    Musculoskeletal: SEE HPI.    Neurological: No sensory or motor changes.     Psychiatric: No anxiety or depression.    Hematology/Lymphatics: No swelling.    Endocrine: No Hx of diabetes.    ROS is otherwise negative.    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  Anemia  Depression  Osteoarthritis: right hip  Insomnia  S/P wrist surgery: left wrist fracture repair  History of varicose veins: laser removal  Bilateral cataracts: surgery  History of appendectomy  History of tonsillectomy and adenoidectomy  History of right oophorectomy      Allergies: No Known Allergies      Medications:     FAMILY HISTORY:  Family history of diabetes mellitus (DM) (Father)  : non-contributory    Social History:                13.7   11.11 )-----------( 272      ( 20 Nov 2019 18:58 )             42.4       11-20    138  |  97<L>  |  21.0<H>  ----------------------------<  105  3.4<L>   |  24.0  |  0.91    Ca    9.4      20 Nov 2019 18:58    TPro  7.8  /  Alb  4.0  /  TBili  0.2<L>  /  DBili  x   /  AST  21  /  ALT  11  /  AlkPhos  130<H>  11-20      Vital Signs Last 24 Hrs  T(C): 36.7 (20 Nov 2019 18:10), Max: 36.7 (20 Nov 2019 18:10)  T(F): 98 (20 Nov 2019 18:10), Max: 98 (20 Nov 2019 18:10)  HR: 92 (20 Nov 2019 20:15) (78 - 92)  BP: 138/82 (20 Nov 2019 18:53) (138/82 - 142/100)  BP(mean): --  RR: 18 (20 Nov 2019 20:15) (18 - 18)  SpO2: 94% (20 Nov 2019 20:15) (92% - 94%)    Daily     Daily       PHYSICAL EXAM:      Appearance: Alert, responsive, in no acute distress.  Neurological: Sensation is grossly intact to light touch  Skin: no rash on visible skin. Skin is clean, dry and intact. No bleeding. No abrasions. No ulcerations.  Vascular: 2+ distal pulses. Cap refill < 2 sec. No signs of venous insufficiency or stasis. No extremity ulcerations. No cyanosis.  Left Lower Extremity: short and ext rotated, tender to palpation in groin, EHL/FHL/AT/GC+ SILT DP2+ compartments soft calf NT  Right Lower Extremity: no obvious deformity, no bony tenderness, EHL/FHL/AT/GC+ SLR+ SILT DP2+ compartments soft calf NT    Imaging Studies:    XR left hip - left IT fx noted    A/P:  Pt is a  89y Female with found to have left hip IT fx    PLAN:   * Case discussed w Dr. Lara  * Admit to ACS/Medicine  * Pain Control  * Plan for operative fixation of left hip 11/21/19

## 2019-11-20 NOTE — ED ADULT NURSE NOTE - PMH
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

## 2019-11-20 NOTE — ED STATDOCS - ATTENDING CONTRIBUTION TO CARE
I, Renzo Boswell, performed the initial face to face bedside interview with this patient regarding history of present illness, review of symptoms and relevant past medical, social and family history.  I completed an independent physical examination.    The history, relevant review of systems, past medical and surgical history, medical decision making, and physical examination was documented by the scribe in my presence and I attest to the accuracy of the documentation.

## 2019-11-20 NOTE — ED STATDOCS - OBJECTIVE STATEMENT
89 y/o F pt with significant PMHx of Osteoarthritis in right hip, presents to the ED BIBEMS c/o left hip pain s/p fall, onset PTA. She reports that she had a mechanical fall in her bathroom, falling onto the tub. Negative head trauma, negative LOC. She is currently taking Ambien and duloxetine. Denies fever, back pain, neck pain, chest pain, abdominal pain, head trauma and any anticoagulant use. No further acute complaints at this time.    SHx: right hip replacement.  Ortho: Dr. Kim

## 2019-11-21 ENCOUNTER — TRANSCRIPTION ENCOUNTER (OUTPATIENT)
Age: 84
End: 2019-11-21

## 2019-11-21 DIAGNOSIS — Z29.9 ENCOUNTER FOR PROPHYLACTIC MEASURES, UNSPECIFIED: ICD-10-CM

## 2019-11-21 DIAGNOSIS — G47.00 INSOMNIA, UNSPECIFIED: ICD-10-CM

## 2019-11-21 DIAGNOSIS — F32.9 MAJOR DEPRESSIVE DISORDER, SINGLE EPISODE, UNSPECIFIED: ICD-10-CM

## 2019-11-21 DIAGNOSIS — S72.002A FRACTURE OF UNSPECIFIED PART OF NECK OF LEFT FEMUR, INITIAL ENCOUNTER FOR CLOSED FRACTURE: ICD-10-CM

## 2019-11-21 LAB
ANION GAP SERPL CALC-SCNC: 13 MMOL/L — SIGNIFICANT CHANGE UP (ref 5–17)
BUN SERPL-MCNC: 27 MG/DL — HIGH (ref 8–20)
CALCIUM SERPL-MCNC: 9.6 MG/DL — SIGNIFICANT CHANGE UP (ref 8.6–10.2)
CHLORIDE SERPL-SCNC: 97 MMOL/L — LOW (ref 98–107)
CO2 SERPL-SCNC: 24 MMOL/L — SIGNIFICANT CHANGE UP (ref 22–29)
CREAT SERPL-MCNC: 0.96 MG/DL — SIGNIFICANT CHANGE UP (ref 0.5–1.3)
GLUCOSE SERPL-MCNC: 138 MG/DL — HIGH (ref 70–115)
HCT VFR BLD CALC: 38.1 % — SIGNIFICANT CHANGE UP (ref 34.5–45)
HGB BLD-MCNC: 12.3 G/DL — SIGNIFICANT CHANGE UP (ref 11.5–15.5)
MCHC RBC-ENTMCNC: 31 PG — SIGNIFICANT CHANGE UP (ref 27–34)
MCHC RBC-ENTMCNC: 32.3 GM/DL — SIGNIFICANT CHANGE UP (ref 32–36)
MCV RBC AUTO: 96 FL — SIGNIFICANT CHANGE UP (ref 80–100)
PLATELET # BLD AUTO: 291 K/UL — SIGNIFICANT CHANGE UP (ref 150–400)
POTASSIUM SERPL-MCNC: 5 MMOL/L — SIGNIFICANT CHANGE UP (ref 3.5–5.3)
POTASSIUM SERPL-SCNC: 5 MMOL/L — SIGNIFICANT CHANGE UP (ref 3.5–5.3)
RBC # BLD: 3.97 M/UL — SIGNIFICANT CHANGE UP (ref 3.8–5.2)
RBC # FLD: 13.4 % — SIGNIFICANT CHANGE UP (ref 10.3–14.5)
SODIUM SERPL-SCNC: 134 MMOL/L — LOW (ref 135–145)
WBC # BLD: 11.09 K/UL — HIGH (ref 3.8–10.5)
WBC # FLD AUTO: 11.09 K/UL — HIGH (ref 3.8–10.5)

## 2019-11-21 PROCEDURE — 27095 INJECTION FOR HIP X-RAY: CPT | Mod: LT

## 2019-11-21 PROCEDURE — 73502 X-RAY EXAM HIP UNI 2-3 VIEWS: CPT | Mod: 26,LT

## 2019-11-21 PROCEDURE — 99223 1ST HOSP IP/OBS HIGH 75: CPT

## 2019-11-21 PROCEDURE — 27245 TREAT THIGH FRACTURE: CPT | Mod: LT

## 2019-11-21 RX ORDER — DULOXETINE HYDROCHLORIDE 30 MG/1
60 CAPSULE, DELAYED RELEASE ORAL DAILY
Refills: 0 | Status: DISCONTINUED | OUTPATIENT
Start: 2019-11-21 | End: 2019-11-22

## 2019-11-21 RX ORDER — GABAPENTIN 400 MG/1
1 CAPSULE ORAL
Qty: 0 | Refills: 0 | DISCHARGE

## 2019-11-21 RX ORDER — SENNA PLUS 8.6 MG/1
2 TABLET ORAL AT BEDTIME
Refills: 0 | Status: DISCONTINUED | OUTPATIENT
Start: 2019-11-21 | End: 2019-11-26

## 2019-11-21 RX ORDER — ACETAMINOPHEN 500 MG
650 TABLET ORAL ONCE
Refills: 0 | Status: COMPLETED | OUTPATIENT
Start: 2019-11-21 | End: 2019-11-21

## 2019-11-21 RX ORDER — KETOROLAC TROMETHAMINE 30 MG/ML
15 SYRINGE (ML) INJECTION EVERY 6 HOURS
Refills: 0 | Status: DISCONTINUED | OUTPATIENT
Start: 2019-11-21 | End: 2019-11-21

## 2019-11-21 RX ORDER — OXYCODONE HYDROCHLORIDE 5 MG/1
2.5 TABLET ORAL
Refills: 0 | Status: DISCONTINUED | OUTPATIENT
Start: 2019-11-21 | End: 2019-11-26

## 2019-11-21 RX ORDER — DULOXETINE HYDROCHLORIDE 30 MG/1
60 CAPSULE, DELAYED RELEASE ORAL DAILY
Refills: 0 | Status: DISCONTINUED | OUTPATIENT
Start: 2019-11-21 | End: 2019-11-21

## 2019-11-21 RX ORDER — HYDROMORPHONE HYDROCHLORIDE 2 MG/ML
0.5 INJECTION INTRAMUSCULAR; INTRAVENOUS; SUBCUTANEOUS
Refills: 0 | Status: DISCONTINUED | OUTPATIENT
Start: 2019-11-21 | End: 2019-11-21

## 2019-11-21 RX ORDER — ZOLPIDEM TARTRATE 10 MG/1
5 TABLET ORAL AT BEDTIME
Refills: 0 | Status: DISCONTINUED | OUTPATIENT
Start: 2019-11-21 | End: 2019-11-26

## 2019-11-21 RX ORDER — ACETAMINOPHEN 500 MG
975 TABLET ORAL EVERY 8 HOURS
Refills: 0 | Status: DISCONTINUED | OUTPATIENT
Start: 2019-11-21 | End: 2019-11-26

## 2019-11-21 RX ORDER — SODIUM CHLORIDE 9 MG/ML
1000 INJECTION INTRAMUSCULAR; INTRAVENOUS; SUBCUTANEOUS
Refills: 0 | Status: DISCONTINUED | OUTPATIENT
Start: 2019-11-21 | End: 2019-11-22

## 2019-11-21 RX ORDER — ONDANSETRON 8 MG/1
4 TABLET, FILM COATED ORAL ONCE
Refills: 0 | Status: DISCONTINUED | OUTPATIENT
Start: 2019-11-21 | End: 2019-11-21

## 2019-11-21 RX ORDER — DULOXETINE HYDROCHLORIDE 30 MG/1
60 CAPSULE, DELAYED RELEASE ORAL DAILY
Refills: 0 | Status: DISCONTINUED | OUTPATIENT
Start: 2019-11-21 | End: 2019-11-26

## 2019-11-21 RX ORDER — GABAPENTIN 400 MG/1
300 CAPSULE ORAL THREE TIMES A DAY
Refills: 0 | Status: DISCONTINUED | OUTPATIENT
Start: 2019-11-21 | End: 2019-11-26

## 2019-11-21 RX ORDER — ENOXAPARIN SODIUM 100 MG/ML
40 INJECTION SUBCUTANEOUS EVERY 24 HOURS
Refills: 0 | Status: DISCONTINUED | OUTPATIENT
Start: 2019-11-22 | End: 2019-11-26

## 2019-11-21 RX ORDER — SODIUM CHLORIDE 9 MG/ML
1000 INJECTION, SOLUTION INTRAVENOUS
Refills: 0 | Status: DISCONTINUED | OUTPATIENT
Start: 2019-11-21 | End: 2019-11-21

## 2019-11-21 RX ORDER — OXYCODONE AND ACETAMINOPHEN 5; 325 MG/1; MG/1
1 TABLET ORAL EVERY 4 HOURS
Refills: 0 | Status: DISCONTINUED | OUTPATIENT
Start: 2019-11-21 | End: 2019-11-21

## 2019-11-21 RX ORDER — OXYCODONE HYDROCHLORIDE 5 MG/1
5 TABLET ORAL EVERY 4 HOURS
Refills: 0 | Status: DISCONTINUED | OUTPATIENT
Start: 2019-11-21 | End: 2019-11-26

## 2019-11-21 RX ORDER — SODIUM CHLORIDE 9 MG/ML
1000 INJECTION, SOLUTION INTRAVENOUS
Refills: 0 | Status: DISCONTINUED | OUTPATIENT
Start: 2019-11-21 | End: 2019-11-22

## 2019-11-21 RX ORDER — CEFAZOLIN SODIUM 1 G
2000 VIAL (EA) INJECTION
Refills: 0 | Status: COMPLETED | OUTPATIENT
Start: 2019-11-21 | End: 2019-11-22

## 2019-11-21 RX ADMIN — HYDROMORPHONE HYDROCHLORIDE 0.5 MILLIGRAM(S): 2 INJECTION INTRAMUSCULAR; INTRAVENOUS; SUBCUTANEOUS at 01:54

## 2019-11-21 RX ADMIN — ZOLPIDEM TARTRATE 5 MILLIGRAM(S): 10 TABLET ORAL at 00:27

## 2019-11-21 RX ADMIN — HYDROMORPHONE HYDROCHLORIDE 0.5 MILLIGRAM(S): 2 INJECTION INTRAMUSCULAR; INTRAVENOUS; SUBCUTANEOUS at 08:40

## 2019-11-21 RX ADMIN — OXYCODONE HYDROCHLORIDE 5 MILLIGRAM(S): 5 TABLET ORAL at 21:30

## 2019-11-21 RX ADMIN — Medication 650 MILLIGRAM(S): at 10:28

## 2019-11-21 RX ADMIN — HYDROMORPHONE HYDROCHLORIDE 0.5 MILLIGRAM(S): 2 INJECTION INTRAMUSCULAR; INTRAVENOUS; SUBCUTANEOUS at 08:17

## 2019-11-21 RX ADMIN — Medication 975 MILLIGRAM(S): at 22:26

## 2019-11-21 RX ADMIN — Medication 975 MILLIGRAM(S): at 21:56

## 2019-11-21 RX ADMIN — HYDROMORPHONE HYDROCHLORIDE 0.5 MILLIGRAM(S): 2 INJECTION INTRAMUSCULAR; INTRAVENOUS; SUBCUTANEOUS at 05:25

## 2019-11-21 RX ADMIN — HYDROMORPHONE HYDROCHLORIDE 0.5 MILLIGRAM(S): 2 INJECTION INTRAMUSCULAR; INTRAVENOUS; SUBCUTANEOUS at 06:10

## 2019-11-21 RX ADMIN — Medication 15 MILLIGRAM(S): at 19:54

## 2019-11-21 RX ADMIN — SODIUM CHLORIDE 100 MILLILITER(S): 9 INJECTION, SOLUTION INTRAVENOUS at 19:00

## 2019-11-21 RX ADMIN — ZOLPIDEM TARTRATE 5 MILLIGRAM(S): 10 TABLET ORAL at 21:55

## 2019-11-21 RX ADMIN — Medication 100 MILLIGRAM(S): at 21:56

## 2019-11-21 RX ADMIN — Medication 15 MILLIGRAM(S): at 20:07

## 2019-11-21 RX ADMIN — GABAPENTIN 300 MILLIGRAM(S): 400 CAPSULE ORAL at 21:55

## 2019-11-21 RX ADMIN — Medication 650 MILLIGRAM(S): at 11:20

## 2019-11-21 RX ADMIN — DULOXETINE HYDROCHLORIDE 60 MILLIGRAM(S): 30 CAPSULE, DELAYED RELEASE ORAL at 10:30

## 2019-11-21 RX ADMIN — HYDROMORPHONE HYDROCHLORIDE 0.5 MILLIGRAM(S): 2 INJECTION INTRAMUSCULAR; INTRAVENOUS; SUBCUTANEOUS at 01:14

## 2019-11-21 RX ADMIN — OXYCODONE HYDROCHLORIDE 5 MILLIGRAM(S): 5 TABLET ORAL at 20:45

## 2019-11-21 NOTE — H&P ADULT - HISTORY OF PRESENT ILLNESS
89 year old female PMH anemia, depression, myelodysplastic disorder, OA, neuropathy, hip replacement (july 2018) presents to ED with mechanical fall. Found to have left Hip fracture. Patient states she was trying to get to the bathroom (in a hurry), when she tripped and fell. She did not lose coconsciousness. She denies current chest pain, chest pain prior to the fall, and no recent episodes of chest pain/SOB. No palpitations. Denies fever, chills, nausea, vomiting. Former smoker 30 pack year history, drinks one glass of scotch per night, no drug use. Complains of hip pain. 89 year old female PMH anemia, depression, myelodysplastic disorder, OA, neuropathy, hip replacement (july 2018) presents to ED with mechanical fall. Found to have left Hip fracture. Patient states she was trying to get to the bathroom (in a hurry), when she tripped and fell. She did not lose coconsciousness. She denies current chest pain, chest pain prior to the fall, and no recent episodes of chest pain/SOB. No palpitations. Denies fever, chills, nausea, vomiting. Former smoker 30 pack year history, drinks one glass of scotch per night, no drug use. Complains of hip pain. In the ED found to have  impacted comminuted LEFT intertrochanteric fracture for OR in the morning.

## 2019-11-21 NOTE — H&P ADULT - NSICDXPASTMEDICALHX_GEN_ALL_CORE_FT
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

## 2019-11-21 NOTE — H&P ADULT - NSICDXPASTSURGICALHX_GEN_ALL_CORE_FT
PAST SURGICAL HISTORY:  Bilateral cataracts surgery    History of appendectomy     History of right oophorectomy     History of tonsillectomy and adenoidectomy     History of varicose veins laser removal    S/P wrist surgery left wrist fracture repair

## 2019-11-21 NOTE — DISCHARGE NOTE PROVIDER - CARE PROVIDER_API CALL
Arturo Deleon)  Orthopaedic Surgery  [address] [place] [address]  Phone: [phone]  Fax: [phone]  Follow Up Time:

## 2019-11-21 NOTE — PROGRESS NOTE ADULT - ASSESSMENT
89 year old female PMH anemia, depression, myelodysplastic disorder, OA, neuropathy, hip replacement (july 2018) presents to ED with mechanical fall. Found to have left Hip fracture. Patient states she was trying to get to the bathroom (in a hurry), when she tripped and fell. She did not lose coconsciousness. She denies current chest pain, chest pain prior to the fall, and no recent episodes of chest pain/SOB. No palpitations. Denies fever, chills, nausea, vomiting. Former smoker 30 pack year history, drinks one glass of scotch per night, no drug use. Complains of hip pain. In the ED found to have  impacted comminuted LEFT intertrochanteric fracture for OR in the morning.         Problem/Plan - 1:  ·  Problem: Closed fracture of left hip, initial encounter.  Plan: s/p L hip intramedullary nail  Cont with pain control  Advance Diet as tolerated.      Problem/Plan - 2:  ·  Problem: Depression.  Plan: cont with Cymbalta.      Problem/Plan - 3:  ·  Problem: Insomnia.  Plan: cont with zolpidem.      Problem/Plan - 4:  ·  Problem: Need for prophylactic measure.  Plan: scd to right LE, AC as pr ortho after procedure.

## 2019-11-21 NOTE — PROGRESS NOTE ADULT - SUBJECTIVE AND OBJECTIVE BOX
NILSA VILLEDA    [id number]    89y      Female    INTERVAL HPI/OVERNIGHT EVENTS:  Pt is 89 year old female PMH anemia, depression, myelodysplastic disorder, OA, neuropathy, hip replacement (july 2018) presents to ED with mechanical fall. Found to have left Hip fracture.  Post-op s/p L hip intramedullary nail.  Pt doing well. A & O x 3. NAD .  Denies SOB, CP, abd pain.    REVIEW OF SYSTEMS:    CONSTITUTIONAL: No fever, weight loss, or fatigue  RESPIRATORY: No cough, wheezing, hemoptysis; No shortness of breath  CARDIOVASCULAR: No chest pain, palpitations  GASTROINTESTINAL: No abdominal or epigastric pain. No nausea, vomiting  NEUROLOGICAL: No headaches, memory loss, loss of strength.  MISCELLANEOUS:      Vital Signs Last 24 Hrs  T(C): 36.6 (21 Nov 2019 15:42), Max: 37 (20 Nov 2019 21:05)  T(F): 97.9 (21 Nov 2019 15:42), Max: 98.6 (20 Nov 2019 21:05)  HR: 82 (21 Nov 2019 17:15) (76 - 92)  BP: 133/64 (21 Nov 2019 17:15) (95/57 - 155/77)  BP(mean): --  RR: 15 (21 Nov 2019 17:15) (12 - 20)  SpO2: 100% (21 Nov 2019 17:15) (92% - 100%)    PHYSICAL EXAM:    GENERAL: NAD, well-groomed, elderly, pleasant  HEENT: PERRL, +EOMI  NECK: soft, Supple, No JVD,   CHEST/LUNG: Clear to ascultation bilaterally; No wheezing  HEART: S1S2+, Regular rate and rhythm; No murmurs, rubs, or gallops  ABDOMEN: Soft, Nontender, Nondistended; Bowel sounds present  EXTREMITIES:  Lt hip with dressing C/D/I, + swelling of Lt. LE, NVI- b/l   SKIN: No rashes or lesions  NEURO: AAOX3, no focal deficits, no motor r sensory loss  PSYCH: normal mood      LABS:                        12.3   11.09 )-----------( 291      ( 21 Nov 2019 06:47 )             38.1     11-21    134<L>  |  97<L>  |  27.0<H>  ----------------------------<  138<H>  5.0   |  24.0  |  0.96    Ca    9.6      21 Nov 2019 06:47    TPro  7.8  /  Alb  4.0  /  TBili  0.2<L>  /  DBili  x   /  AST  21  /  ALT  11  /  AlkPhos  130<H>  11-20    PT/INR - ( 20 Nov 2019 18:58 )   PT: 10.7 sec;   INR: 0.93 ratio         PTT - ( 20 Nov 2019 18:58 )  PTT:29.4 sec        MEDICATIONS  (STANDING):  lactated ringers. 1000 milliLiter(s) (100 mL/Hr) IV Continuous <Continuous>    MEDICATIONS  (PRN):  HYDROmorphone  Injectable 0.5 milliGRAM(s) IV Push every 10 minutes PRN Moderate Pain (4 - 6)  ondansetron Injectable 4 milliGRAM(s) IV Push once PRN Nausea and/or Vomiting      RADIOLOGY & ADDITIONAL TESTS:    < from: CT Pelvis No Cont (11.20.19 @ 20:55) >  IMPRESSION:  impacted comminuted LEFT intertrochanteric fracture.

## 2019-11-21 NOTE — H&P ADULT - NSHPPHYSICALEXAM_GEN_ALL_CORE
T(C): 36.6 (11-21-19 @ 01:06), Max: 37 (11-20-19 @ 21:05)  HR: 76 (11-21-19 @ 01:06) (76 - 92)  BP: 155/77 (11-21-19 @ 01:06) (110/64 - 155/77)  RR: 18 (11-21-19 @ 01:06) (18 - 18)  SpO2: 97% (11-21-19 @ 01:06) (92% - 97%)    GENERAL: patient appears well, no acute distress, appropriate, pleasant  EYES: sclera clear, no exudates  ENMT: oropharynx clear without erythema, no exudates, moist mucous membranes  NECK: supple, soft, no thyromegaly noted  LUNGS: good air entry bilaterally, clear to auscultation, symmetric breath sounds, no wheezing or rhonchi appreciated  HEART: soft S1/S2, regular rate and rhythm, no murmurs noted, no lower extremity edema  GASTROINTESTINAL: abdomen is soft, nontender, nondistended, normoactive bowel sounds, no palpable masses  INTEGUMENT: good skin turgor, warm skin, appears well perfused  MUSCULOSKELETAL: externally rotated left LE   NEUROLOGIC: awake, alert, oriented x3, good muscle tone in 4 extremities, no obvious sensory deficits  PSYCHIATRIC: mood is good, affect is congruent, linear and logical thought process  HEME/LYMPH: no palpable supraclavicular nodules, no obvious ecchymosis or petechiae

## 2019-11-21 NOTE — PROGRESS NOTE ADULT - SUBJECTIVE AND OBJECTIVE BOX
Ortho Post Op Check    Name: NILSA VILLEDA    MR #: [id number]    Procedure: left hip IMN  Surgeon: Adrienne    Pt comfortable without complaints, pain controlled  Denies CP, SOB, N/V, numbness/tingling     General Exam:  Vital Signs Last 24 Hrs  T(C): 36.6 (11-21-19 @ 21:58), Max: 36.6 (11-21-19 @ 21:58)  T(F): 97.8 (11-21-19 @ 21:58), Max: 97.8 (11-21-19 @ 21:58)  HR: 81 (11-21-19 @ 21:58) (78 - 85)  BP: 123/76 (11-21-19 @ 21:58) (102/53 - 152/81)  BP(mean): --  RR: 18 (11-21-19 @ 21:58) (12 - 22)  SpO2: 95% (11-21-19 @ 21:58) (94% - 100%)    General: Pt Alert and oriented, NAD, controlled pain.  Dressings C/D/I. No bleeding.  Pulses: 2+ dorsalis pedis pulse. Cap refill < 2 sec.  Sensation: Grossly intact to light touch without deficit.  Motor: + EHL/FHL/TA/GS                          12.3   11.09 )-----------( 291      ( 21 Nov 2019 06:47 )             38.1   21 Nov 2019 06:47    134    |  97     |  27.0   ----------------------------<  138    5.0     |  24.0   |  0.96       TPro  7.8    /  Alb  4.0    /  TBili  0.2    /  DBili  x      /  AST  21     /  ALT  11     /  AlkPhos  130    20 Nov 2019 18:58      Post-op X-Ray:    Left Hip IMN noted - pt is WBAT    A/P: 89yFemale POD#0 s/p left hip IMN  - Stable  - Pain Control  - DVT ppx: lovenox  - Post op abx: ancef  - Weight bearing status: wbat

## 2019-11-21 NOTE — H&P ADULT - PROBLEM SELECTOR PLAN 1
Plan for OR in AM for Left Hip fracture fixation  Cont with pain control  Hold AC, NPO after midnight   Patient is an intermediate risk RCRI of class I, pt is optimized from medicine standpoint, no acute contraindication to surgery and may proceed with planned procedure with routine hemodynamic monitoring.

## 2019-11-21 NOTE — DISCHARGE NOTE PROVIDER - CARE PROVIDERS DIRECT ADDRESSES
[email or web address]

## 2019-11-21 NOTE — DISCHARGE NOTE PROVIDER - NSDCCPCAREPLAN_GEN_ALL_CORE_FT
PRINCIPAL DISCHARGE DIAGNOSIS  Diagnosis: Closed fracture of left hip, initial encounter  Assessment and Plan of Treatment: PRINCIPAL DISCHARGE DIAGNOSIS  Diagnosis: Closed fracture of left hip, initial encounter  Assessment and Plan of Treatment:       SECONDARY DISCHARGE DIAGNOSES  Diagnosis: Anemia  Assessment and Plan of Treatment: PRINCIPAL DISCHARGE DIAGNOSIS  Diagnosis: Closed fracture of left hip, initial encounter  Assessment and Plan of Treatment: Pain control  Physical therapy      SECONDARY DISCHARGE DIAGNOSES  Diagnosis: Anemia  Assessment and Plan of Treatment: Monitor hemoglobin  David post operative anemia

## 2019-11-21 NOTE — DISCHARGE NOTE PROVIDER - NSDCFUADDINST_GEN_ALL_CORE_FT
ORTHOPEDICS: Patient is weight bearing as tolerated of the left lower extremity. Patient will take lovenox for 4 weeks duration for DVT prophylaxis. Pt will follow up in office in 2-3 weeks - call office for appointment. Pt will take pain medication as prescribed and titrate according to prescription. Pt may continue dry dressing changes as needed, may removed dressing POD#7 (Patient may shower POD#3). ORTHOPEDICS: Patient is weight bearing as tolerated of the left lower extremity. Patient will take lovenox for 4 weeks duration for DVT prophylaxis. Pt will follow up in office in 2-3 weeks - call office for appointment. Pt will take pain medication as prescribed and titrate according to prescription. Pt may remove dressing on 11/30/19. Patient may shower after 11/24/19.

## 2019-11-21 NOTE — DISCHARGE NOTE PROVIDER - HOSPITAL COURSE
The patient is an  89 year old female with a past medical history of  anemia, depression, myelodysplastic disorder, OA, neuropathy, hip replacement (july 2018) who presented to ED with mechanical fall. Found to have left Hip fracture.  In the ED found to have  impacted comminuted left intertrochanteric fracture s/p IMN. The patient is an  89 year old female with a past medical history of  anemia, depression, myelodysplastic disorder, OA, neuropathy, hip replacement (july 2018) who presented to ED with mechanical fall. Found to have left Hip fracture.  In the ED found to have  impacted comminuted left intertrochanteric fracture s/p IMN. Evaluated by PT, recommended Acute rehab. Noted to have post operative anemia with no acute signs or symptoms of active bleeding. To continue to monitor.         46 mins spent

## 2019-11-21 NOTE — DISCHARGE NOTE PROVIDER - NSDCMRMEDTOKEN_GEN_ALL_CORE_FT
Ambien 10 mg oral tablet: 1 tab(s) orally once a day (at bedtime)  Cymbalta 60 mg oral delayed release capsule: 1 cap(s) orally once a day (at bedtime) Ambien 10 mg oral tablet: 1 tab(s) orally once a day (at bedtime)  Cymbalta 60 mg oral delayed release capsule: 1 cap(s) orally once a day (at bedtime)  enoxaparin: 40 unit(s) subcutaneous once a day  gabapentin 300 mg oral capsule: 1 cap(s) orally 3 times a day  oxyCODONE 5 mg oral tablet: 1 tab(s) orally every 4 hours, As needed, Severe Pain (7 - 10)  polyethylene glycol 3350 oral powder for reconstitution: 17 gram(s) orally once a day  senna oral tablet: 2 tab(s) orally once a day (at bedtime), As needed, Constipation

## 2019-11-22 LAB
ANION GAP SERPL CALC-SCNC: 14 MMOL/L — SIGNIFICANT CHANGE UP (ref 5–17)
ANISOCYTOSIS BLD QL: SLIGHT — SIGNIFICANT CHANGE UP
BASOPHILS # BLD AUTO: 0 K/UL — SIGNIFICANT CHANGE UP (ref 0–0.2)
BASOPHILS NFR BLD AUTO: 0 % — SIGNIFICANT CHANGE UP (ref 0–2)
BUN SERPL-MCNC: 31 MG/DL — HIGH (ref 8–20)
CALCIUM SERPL-MCNC: 8.7 MG/DL — SIGNIFICANT CHANGE UP (ref 8.6–10.2)
CHLORIDE SERPL-SCNC: 100 MMOL/L — SIGNIFICANT CHANGE UP (ref 98–107)
CO2 SERPL-SCNC: 21 MMOL/L — LOW (ref 22–29)
CREAT SERPL-MCNC: 1.15 MG/DL — SIGNIFICANT CHANGE UP (ref 0.5–1.3)
ELLIPTOCYTES BLD QL SMEAR: SLIGHT — SIGNIFICANT CHANGE UP
EOSINOPHIL # BLD AUTO: 0 K/UL — SIGNIFICANT CHANGE UP (ref 0–0.5)
EOSINOPHIL NFR BLD AUTO: 0 % — SIGNIFICANT CHANGE UP (ref 0–6)
GIANT PLATELETS BLD QL SMEAR: PRESENT — SIGNIFICANT CHANGE UP
GLUCOSE SERPL-MCNC: 131 MG/DL — HIGH (ref 70–115)
HCT VFR BLD CALC: 29.9 % — LOW (ref 34.5–45)
HGB BLD-MCNC: 9.5 G/DL — LOW (ref 11.5–15.5)
HYPOCHROMIA BLD QL: SLIGHT — SIGNIFICANT CHANGE UP
LYMPHOCYTES # BLD AUTO: 1.14 K/UL — SIGNIFICANT CHANGE UP (ref 1–3.3)
LYMPHOCYTES # BLD AUTO: 12.3 % — LOW (ref 13–44)
MACROCYTES BLD QL: SLIGHT — SIGNIFICANT CHANGE UP
MANUAL SMEAR VERIFICATION: SIGNIFICANT CHANGE UP
MCHC RBC-ENTMCNC: 30.8 PG — SIGNIFICANT CHANGE UP (ref 27–34)
MCHC RBC-ENTMCNC: 31.8 GM/DL — LOW (ref 32–36)
MCV RBC AUTO: 97.1 FL — SIGNIFICANT CHANGE UP (ref 80–100)
METAMYELOCYTES # FLD: 0.9 % — HIGH (ref 0–0)
MICROCYTES BLD QL: SLIGHT — SIGNIFICANT CHANGE UP
MONOCYTES # BLD AUTO: 0.16 K/UL — SIGNIFICANT CHANGE UP (ref 0–0.9)
MONOCYTES NFR BLD AUTO: 1.7 % — LOW (ref 2–14)
NEUTROPHILS # BLD AUTO: 7.88 K/UL — HIGH (ref 1.8–7.4)
NEUTROPHILS NFR BLD AUTO: 85.1 % — HIGH (ref 43–77)
OVALOCYTES BLD QL SMEAR: SLIGHT — SIGNIFICANT CHANGE UP
PLAT MORPH BLD: NORMAL — SIGNIFICANT CHANGE UP
PLATELET # BLD AUTO: 201 K/UL — SIGNIFICANT CHANGE UP (ref 150–400)
PLATELET COUNT - ESTIMATE: NORMAL — SIGNIFICANT CHANGE UP
POIKILOCYTOSIS BLD QL AUTO: SIGNIFICANT CHANGE UP
POTASSIUM SERPL-MCNC: 4.8 MMOL/L — SIGNIFICANT CHANGE UP (ref 3.5–5.3)
POTASSIUM SERPL-SCNC: 4.8 MMOL/L — SIGNIFICANT CHANGE UP (ref 3.5–5.3)
RBC # BLD: 3.08 M/UL — LOW (ref 3.8–5.2)
RBC # FLD: 13.5 % — SIGNIFICANT CHANGE UP (ref 10.3–14.5)
RBC BLD AUTO: NORMAL — SIGNIFICANT CHANGE UP
SODIUM SERPL-SCNC: 135 MMOL/L — SIGNIFICANT CHANGE UP (ref 135–145)
WBC # BLD: 9.26 K/UL — SIGNIFICANT CHANGE UP (ref 3.8–10.5)
WBC # FLD AUTO: 9.26 K/UL — SIGNIFICANT CHANGE UP (ref 3.8–10.5)

## 2019-11-22 PROCEDURE — 99223 1ST HOSP IP/OBS HIGH 75: CPT | Mod: GC

## 2019-11-22 PROCEDURE — 99233 SBSQ HOSP IP/OBS HIGH 50: CPT

## 2019-11-22 RX ORDER — POLYETHYLENE GLYCOL 3350 17 G/17G
17 POWDER, FOR SOLUTION ORAL DAILY
Refills: 0 | Status: DISCONTINUED | OUTPATIENT
Start: 2019-11-22 | End: 2019-11-26

## 2019-11-22 RX ADMIN — OXYCODONE HYDROCHLORIDE 5 MILLIGRAM(S): 5 TABLET ORAL at 11:29

## 2019-11-22 RX ADMIN — Medication 975 MILLIGRAM(S): at 06:30

## 2019-11-22 RX ADMIN — Medication 100 MILLIGRAM(S): at 05:59

## 2019-11-22 RX ADMIN — OXYCODONE HYDROCHLORIDE 5 MILLIGRAM(S): 5 TABLET ORAL at 17:00

## 2019-11-22 RX ADMIN — Medication 975 MILLIGRAM(S): at 22:24

## 2019-11-22 RX ADMIN — GABAPENTIN 300 MILLIGRAM(S): 400 CAPSULE ORAL at 21:20

## 2019-11-22 RX ADMIN — ZOLPIDEM TARTRATE 5 MILLIGRAM(S): 10 TABLET ORAL at 21:23

## 2019-11-22 RX ADMIN — POLYETHYLENE GLYCOL 3350 17 GRAM(S): 17 POWDER, FOR SOLUTION ORAL at 15:02

## 2019-11-22 RX ADMIN — Medication 975 MILLIGRAM(S): at 13:07

## 2019-11-22 RX ADMIN — DULOXETINE HYDROCHLORIDE 60 MILLIGRAM(S): 30 CAPSULE, DELAYED RELEASE ORAL at 14:57

## 2019-11-22 RX ADMIN — Medication 975 MILLIGRAM(S): at 14:07

## 2019-11-22 RX ADMIN — OXYCODONE HYDROCHLORIDE 5 MILLIGRAM(S): 5 TABLET ORAL at 12:29

## 2019-11-22 RX ADMIN — OXYCODONE HYDROCHLORIDE 5 MILLIGRAM(S): 5 TABLET ORAL at 00:30

## 2019-11-22 RX ADMIN — Medication 975 MILLIGRAM(S): at 21:20

## 2019-11-22 RX ADMIN — ENOXAPARIN SODIUM 40 MILLIGRAM(S): 100 INJECTION SUBCUTANEOUS at 05:59

## 2019-11-22 RX ADMIN — GABAPENTIN 300 MILLIGRAM(S): 400 CAPSULE ORAL at 13:06

## 2019-11-22 RX ADMIN — OXYCODONE HYDROCHLORIDE 5 MILLIGRAM(S): 5 TABLET ORAL at 16:00

## 2019-11-22 RX ADMIN — OXYCODONE HYDROCHLORIDE 5 MILLIGRAM(S): 5 TABLET ORAL at 00:59

## 2019-11-22 RX ADMIN — Medication 975 MILLIGRAM(S): at 05:59

## 2019-11-22 RX ADMIN — GABAPENTIN 300 MILLIGRAM(S): 400 CAPSULE ORAL at 05:59

## 2019-11-22 NOTE — PHYSICAL THERAPY INITIAL EVALUATION ADULT - CRITERIA FOR SKILLED THERAPEUTIC INTERVENTIONS
Anesthetic History   No history of anesthetic complications            Review of Systems / Medical History  Patient summary reviewed, nursing notes reviewed and pertinent labs reviewed    Pulmonary  Within defined limits                 Neuro/Psych   Within defined limits           Cardiovascular  Within defined limits                Exercise tolerance: >4 METS     GI/Hepatic/Renal  Within defined limits              Endo/Other        Arthritis  Pertinent negatives: No diabetes, hypothyroidism, hyperthyroidism, obesity and blood dyscrasia   Other Findings              Physical Exam    Airway  Mallampati: III  TM Distance: 4 - 6 cm  Neck ROM: normal range of motion   Mouth opening: Normal     Cardiovascular  Regular rate and rhythm,  S1 and S2 normal,  no murmur, click, rub, or gallop             Dental  No notable dental hx       Pulmonary  Breath sounds clear to auscultation               Abdominal  GI exam deferred       Other Findings            Anesthetic Plan    ASA: 1  Anesthesia type: general          Induction: Intravenous  Anesthetic plan and risks discussed with: Patient      GA/LMA anticipated equipment needs at discharge/anticipated discharge recommendation/risk reduction/prevention/rehab potential/predicted duration of therapy intervention/functional limitations in following categories/impairments found/therapy frequency

## 2019-11-22 NOTE — OCCUPATIONAL THERAPY INITIAL EVALUATION ADULT - PHYSICAL ASSIST/NONPHYSICAL ASSIST: TOILET, REHAB EVAL
1 person assist/verbal cues/Verbal cues for safety for proper body position and for sequencing of movement.

## 2019-11-22 NOTE — OCCUPATIONAL THERAPY INITIAL EVALUATION ADULT - PHYSICAL ASSIST/NONPHYSICAL ASSIST: STAND/SIT, REHAB EVAL
1 person assist/verbal cues/Verbal cues for safety for proper hand placement prior to and during transfer reminders to reach back for bed and slowly lower self down.

## 2019-11-22 NOTE — PROGRESS NOTE ADULT - SUBJECTIVE AND OBJECTIVE BOX
NILSA VILLEDA    [id number]    History:  The patient is status post Left HIP IMN for traumatic hip fracture now post op day 1. Patient is doing well. The patient's pain is controlled using the prescribed pain medications. The patient is participating in physical therapy. Denies nausea, vomiting, chest pain, shortness of breath, abdominal pain or fever. No new complaints. No acute motor or sensory changes are reported.    Vital Signs Last 24 Hrs  T(C): 36.6 (22 Nov 2019 05:09), Max: 36.7 (21 Nov 2019 23:32)  T(F): 97.8 (22 Nov 2019 05:09), Max: 98.1 (21 Nov 2019 23:32)  HR: 88 (22 Nov 2019 05:09) (78 - 88)  BP: 116/74 (22 Nov 2019 05:09) (95/57 - 155/80)  BP(mean): --  RR: 18 (22 Nov 2019 05:09) (12 - 22)  SpO2: 95% (22 Nov 2019 05:09) (94% - 100%)  I&O's Summary    21 Nov 2019 07:01  -  22 Nov 2019 07:00  --------------------------------------------------------  IN: 540 mL / OUT: 350 mL / NET: 190 mL                              12.3   11.09 )-----------( 291      ( 21 Nov 2019 06:47 )             38.1     11-21    134<L>  |  97<L>  |  27.0<H>  ----------------------------<  138<H>  5.0   |  24.0  |  0.96    Ca    9.6      21 Nov 2019 06:47    TPro  7.8  /  Alb  4.0  /  TBili  0.2<L>  /  DBili  x   /  AST  21  /  ALT  11  /  AlkPhos  130<H>  11-20      MEDICATIONS  (STANDING):  acetaminophen   Tablet .. 975 milliGRAM(s) Oral every 8 hours  ceFAZolin   IVPB 2000 milliGRAM(s) IV Intermittent once  DULoxetine 60 milliGRAM(s) Oral daily  DULoxetine 60 milliGRAM(s) Oral daily  enoxaparin Injectable 40 milliGRAM(s) SubCutaneous every 24 hours  gabapentin 300 milliGRAM(s) Oral three times a day  lactated ringers. 1000 milliLiter(s) (100 mL/Hr) IV Continuous <Continuous>  sodium chloride 0.9%. 1000 milliLiter(s) (125 mL/Hr) IV Continuous <Continuous>    MEDICATIONS  (PRN):  ketorolac   Injectable 15 milliGRAM(s) IV Push every 6 hours PRN Moderate Pain (4 - 6)  oxyCODONE    IR 2.5 milliGRAM(s) Oral every 3 hours PRN Moderate Pain (4 - 6)  oxyCODONE    IR 5 milliGRAM(s) Oral every 4 hours PRN Severe Pain (7 - 10)  senna 2 Tablet(s) Oral at bedtime PRN Constipation  zolpidem 5 milliGRAM(s) Oral at bedtime PRN Insomnia      Physical exam:    No distress.  Alert and oriented.  Non labored breathing  Left HIP, The dressing is clean, dry and intact. No periwound erythema, discharge, drainage is noted. Sensation to light touch is grossly intact without focal deficit and is symmetric bilaterally. No calf tenderness. Sensation to light touch is grossly intact distally. Motor function distally is 5/5. No foot drop. 2+ dorsalis pedis pulse. Capillary refill is less than 2 seconds. No cyanosis.    Primary Orthopedic Assessment:  POST OP Day 1  Orthopedic Stable s/p left HIP IMN  •   Plan:   • DVT prophylaxis as prescribed, including use of compression devices and ankle pumps  • Continue physical therapy  • WBAT  • Pain control as clinically indicated  • Incentive spirometry encouraged  • Discharge planning – anticipated discharge is Home vs acute rehabilitation

## 2019-11-22 NOTE — OCCUPATIONAL THERAPY INITIAL EVALUATION ADULT - PHYSICAL ASSIST/NONPHYSICAL ASSIST: SIT/STAND, REHAB EVAL
Verbal cues for safety for proper hand placement prior to and during transfer reminders to push off of bed./1 person assist/verbal cues

## 2019-11-22 NOTE — OCCUPATIONAL THERAPY INITIAL EVALUATION ADULT - LIVES WITH, PROFILE
alone/Pt lives alone in a private house. 2-[address] with hand rail, no steps inside. Pt reports has to do steps to do laundry but her daughter will take care of. Pt family available to assist as needed.

## 2019-11-22 NOTE — OCCUPATIONAL THERAPY INITIAL EVALUATION ADULT - IMPAIRMENTS CONTRIBUTING IMPAIRED BED MOBILITY, REHAB EVAL
decreased flexibility/Pt functional activity tolerance is decreased; deconditioned/impaired balance/decreased strength/pain/decreased ROM

## 2019-11-22 NOTE — CONSULT NOTE ADULT - ATTENDING COMMENTS
Patient seen and examined and cased discussed with resident. Agree with recommendations. Patient would benefit from acute rehabilitation, she needs OT eval, and recommend ice and pain meds prior to bedside therapy/Lidoderm patch to left hip.

## 2019-11-22 NOTE — CONSULT NOTE ADULT - SUBJECTIVE AND OBJECTIVE BOX
CC: Evaluation of Rehabilitation Needs  HPI: 89 year old female PMH anemia, depression, myelodysplastic disorder, OA, neuropathy, hip replacement (july 2018) presents to ED with mechanical fall. Found to have left Hip fracture. Patient states she was trying to get to the bathroom (in a hurry), when she tripped and fell. She did not lose coconsciousness. Former smoker 30 pack year history, drinks one glass of scotch per night, no drug use. Complains of hip pain In the ED and found to have impacted comminuted LEFT intertrochanteric fracture for OR in the morning. (21 Nov 2019 00:04) Pt underwent  left HIP IMN on 11/21. PM&R was consulted to evaluate post-acute disposition.    REVIEW OF SYSTEMS  Constitutional - No fever, No fatigue  HEENT - No visual disturbances, No difficulty hearing,  No neck pain  Respiratory - No cough, No wheezing, No shortness of breath  Cardiovascular - No chest pain, No palpitations  Gastrointestinal - No abdominal pain  Neurological - No headaches, No loss of strength, No numbness  Skin - +hip dressing  Musculoskeletal - +left hip pain  Psychiatric - No depression, No anxiety    PAST MEDICAL & SURGICAL HISTORY  Fracture of unspecified part of neck of left femur, initial encounter for closed fracture  Myelodysplastic disease  Wrist fracture, left  Neuropathy of both feet  Constipation  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  Anemia  Depression  Osteoarthritis  Insomnia  Bilateral cataracts  History of appendectomy  History of tonsillectomy and adenoidectomy  History of right oophorectomy    SOCIAL HISTORY  Smoking -  Former smoker 30 pack year history  EtOH - drinks one glass of scotch per night  Drugs - Denied    FUNCTIONAL HISTORY  _______ hand dominant  Lives with ______ in a ______ with ___ JACQUI + HR and ___ STI + HR  Independent in ambulation, ADL's, transfers prior to hospitalization    CURRENT FUNCTIONAL STATUS  Bed mobility - TBA  Transfers - TBA  Gait - TBA  ADL's -TBA    FAMILY HISTORY   Reviewed and non-contributory    ALLERGIES  No Known Allergies    VITALS  T(C): 36.5 (11-22-19 @ 07:13)  T(F): 97.7 (11-22-19 @ 07:13), Max: 98.1 (11-21-19 @ 23:32)  HR: 81 (11-22-19 @ 07:13) (78 - 88)  BP: 105/60 (11-22-19 @ 07:13) (95/57 - 155/80)  RR:  (12 - 22)  SpO2:  (94% - 100%)  Wt(kg): --  Constitutional - NAD, Comfortable  HEENT - NCAT  Neck - Supple, No limited ROM  Chest - good chest expansion, good respiratory effort  Cardio - warm and well perfused  Abdomen -  Soft, NTND  Extremities - mild left LE swelling  Neurologic Exam -                    Cognitive - Awake, Alert, AAOx3     Communication - Fluent, No dysarthria     Cranial Nerves - CN 2-12 grossly  intact     Motor -                     LEFT    UE - ShAB 5/5, EF 5/5, EE 5/5,   WNL                    RIGHT UE - ShAB 5/5, EF 5/5, EE 5/5,,  WNL                    LEFT    LE - HF NT, KE 5/5, DF 5/5, PF 5/5                    RIGHT LE - HF 5/5, KE 5/5, DF 5/5, PF 5/5        Sensory - Intact to LT     Reflexes - DTR Intact, No primitive reflexive  Psychiatric - Mood stable, Affect WNL    RECENT LABS/IMAGING                        9.5    9.26  )-----------( 201      ( 22 Nov 2019 07:53 )             29.9     11-22    135  |  100  |  31.0<H>  ----------------------------<  131<H>  4.8   |  21.0<L>  |  1.15    Ca    8.7      22 Nov 2019 07:53    TPro  7.8  /  Alb  4.0  /  TBili  0.2<L>  /  DBili  x   /  AST  21  /  ALT  11  /  AlkPhos  130<H>  11-20    PT/INR - ( 20 Nov 2019 18:58 )   PT: 10.7 sec;   INR: 0.93 ratio         PTT - ( 20 Nov 2019 18:58 )  PTT:29.4 sec  PROCEDURE DATE:  11/20/2019    INTERPRETATION:  Head CT without contrast   COMPARISON: 11/5/2018 brain CT.  CLINICAL INFORMATION: Head injury following fall. Pain. Assess for   intracranial hemorrhage..  TECHNIQUE: Contiguous axial 2.5 mm slice thickness images of the head   were obtained without the use of intravenous contrast media.  This scan was performed using automatic exposure control (radiation dose   reduction software) to obtain a diagnostic image quality scan with   patient dose as low as reasonably achievable.     FINDINGS:  Vascular calcification seen within carotid siphon vessels.     Mild cerebral volume loss is present with secondary proportional   prominence of the sulci and ventricles.  The posterior fossa structures and basal cisterns appear normal.  There is no intracranial hemorrhage.   There is no intracranial mass or midline shift.  There is no sulcal effacement to suggest acute stroke.    The basal ganglia and thalami appear normal in morphology and attenuation.    The visualized portions of the optic globes and paranasal sinuses are   normal.    There are no skull fractures.    IMPRESSION:  No acute intracranial findings.  If acute stroke isof clinical concern, MRI with diffusion-weighted   images would be helpful for further characterization.  ANATOLY CASTILLO M.D., ATTENDING RADIOLOGIST  This document has been electronically signed. Nov 20 2019  8:59PM  EXAM:  CT PELVIS ONLY                        PROCEDURE DATE:  11/20/2019    INTERPRETATION:  CT  CLINICAL INFORMATION:  Pain.  TECHNIQUE:  Contiguous axial 0.63 mm sections were obtained using a single helical acquisition.  This data set was reconstructed  as axial   2.5 mm sections.  Imaging post processing software was employed to generate sagittal and coronal and 3D reformatted images. These additional   reformatted images were used to evaluate osseous alignment and the spatial relationships of the osseous structure, soft tissues and   vasculature.   This scan was performed using automatic exposure control (radiation dose reduction software) to obtain a diagnostic image quality   scan with patient dose as low as reasonably achievable.  FINDINGS:   X-rays performed earlier available for review.  There is an impacted comminuted LEFT intertrochanteric fracture. The humeral head is located. The LEFT acetabulum is intact. The RIGHT hip prosthesis is intact. The pelvic girdle is intact. The sacrum is intact.   Degenerative changes involve the lower lumbar spine.  Visualized bowel. The bladder is obscured from streak artifact.  IMPRESSION:  impacted comminuted LEFT intertrochanteric fracture.   CHANCE WEAVER M.D., ATTENDING RADIOLOGIST  This document has been electronically signed. Nov 20 2019  9:11PM    MEDICATIONS   MEDICATIONS  (STANDING):  acetaminophen   Tablet .. 975 milliGRAM(s) Oral every 8 hours  ceFAZolin   IVPB 2000 milliGRAM(s) IV Intermittent once  DULoxetine 60 milliGRAM(s) Oral daily  enoxaparin Injectable 40 milliGRAM(s) SubCutaneous every 24 hours  gabapentin 300 milliGRAM(s) Oral three times a day  lactated ringers. 1000 milliLiter(s) (100 mL/Hr) IV Continuous <Continuous>  sodium chloride 0.9%. 1000 milliLiter(s) (125 mL/Hr) IV Continuous <Continuous>    MEDICATIONS  (PRN):  ketorolac   Injectable 15 milliGRAM(s) IV Push every 6 hours PRN Moderate Pain (4 - 6)  oxyCODONE    IR 2.5 milliGRAM(s) Oral every 3 hours PRN Moderate Pain (4 - 6)  oxyCODONE    IR 5 milliGRAM(s) Oral every 4 hours PRN Severe Pain (7 - 10)  senna 2 Tablet(s) Oral at bedtime PRN Constipation  zolpidem 5 milliGRAM(s) Oral at bedtime PRN Insomnia      ASSESSMENT/PLAN  89 y/o F with comminuted LEFT hip intertrochanteric fracture 2/2 mechanical fall s/p left HIP IMN, with functional, gait, and ADL impairments.  *Disposition incomplete pending attending rounds*    Disposition -  PT - ROM, Bed Mob, Transfers, Amb with AD   OT - ADLs, ROM  Precautions - Falls, Cardiac  DVT Prophylaxis - Lovenox as per primary team  Weight bearing status -WBAT  Skin - Turn Q2hrs  Diet - Regular   Thank you for allowing us to participate in this patient's care. CC: Evaluation of Rehabilitation Needs  HPI: 89 year old female PMH anemia, depression, myelodysplastic disorder, OA, neuropathy, hip replacement (july 2018) presents to ED with mechanical fall. Found to have left Hip fracture. Patient states she was trying to get to the bathroom (in a hurry), when she tripped and fell. She did not lose coconsciousness. Former smoker 30 pack year history, drinks one glass of scotch per night, no drug use. Complains of hip pain In the ED and found to have impacted comminuted LEFT intertrochanteric fracture for OR in the morning. (21 Nov 2019 00:04) Pt underwent  left HIP IMN on 11/21. PM&R was consulted to evaluate post-acute disposition.    REVIEW OF SYSTEMS  Constitutional - No fever, No fatigue  HEENT - No visual disturbances, No difficulty hearing,  No neck pain, +dizziness  Respiratory - No cough, No wheezing, No shortness of breath  Cardiovascular - No chest pain, No palpitations  Gastrointestinal - No abdominal pain  Neurological - No headaches, No loss of strength, No numbness  Skin - +hip dressing  Musculoskeletal - +left hip pain  Psychiatric - No depression, No anxiety    PAST MEDICAL & SURGICAL HISTORY  Fracture of unspecified part of neck of left femur, initial encounter for closed fracture  Myelodysplastic disease  Wrist fracture, left  Neuropathy of both feet  Constipation  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  Anemia  Depression  Osteoarthritis  Insomnia  Bilateral cataracts  History of appendectomy  History of tonsillectomy and adenoidectomy  History of right oophorectomy    SOCIAL HISTORY  Smoking -  Former smoker 30 pack year history  EtOH - drinks one glass of scotch per night  Drugs - Denied    FUNCTIONAL HISTORY  Lives alone in a  with 4 JACQUI and 0 STI, laundry room in the basement  Independent in ambulation, ADL's, transfers prior to hospitalization, +driving    CURRENT FUNCTIONAL STATUS (11/22)  Bed mobility - mod assist  Transfers - min-mod assist  Gait - min-mod assist with RW  ADL's -TBA    FAMILY HISTORY   Reviewed and non-contributory    ALLERGIES  No Known Allergies    VITALS  T(C): 36.5 (11-22-19 @ 07:13)  T(F): 97.7 (11-22-19 @ 07:13), Max: 98.1 (11-21-19 @ 23:32)  HR: 81 (11-22-19 @ 07:13) (78 - 88)  BP: 105/60 (11-22-19 @ 07:13) (95/57 - 155/80)  RR:  (12 - 22)  SpO2:  (94% - 100%)  Wt(kg): --  Constitutional - NAD, Comfortable  HEENT - NCAT  Neck - Supple, No limited ROM  Chest - good chest expansion, good respiratory effort  Cardio - warm and well perfused  Abdomen -  Soft, NTND  Extremities - trace left LE swelling, left hip surgical dressings x3 clean and intact  Neurologic Exam -                    Cognitive - Awake, Alert, AAOx3     Communication - Fluent, No dysarthria     Cranial Nerves - CN 2-12 grossly intact     Motor -   left proximal LE exam limited 2/2 pain                    LEFT    UE - ShAB 5/5, EF 5/5, EE 5/5,   WNL                    RIGHT UE - ShAB 5/5, EF 5/5, EE 5/5,,  WNL                    LEFT    LE - HF NT, KE >2/5, DF 5/5, PF 5/5                    RIGHT LE - HF 5/5, KE 5/5, DF 5/5, PF 5/5        Sensory - Intact to LT x 4 extremities      Reflexes - DTR Intact, No primitive reflexive  Psychiatric - Mood stable, Affect WNL    RECENT LABS/IMAGING                        9.5    9.26  )-----------( 201      ( 22 Nov 2019 07:53 )             29.9     11-22    135  |  100  |  31.0<H>  ----------------------------<  131<H>  4.8   |  21.0<L>  |  1.15    Ca    8.7      22 Nov 2019 07:53    TPro  7.8  /  Alb  4.0  /  TBili  0.2<L>  /  DBili  x   /  AST  21  /  ALT  11  /  AlkPhos  130<H>  11-20    PT/INR - ( 20 Nov 2019 18:58 )   PT: 10.7 sec;   INR: 0.93 ratio         PTT - ( 20 Nov 2019 18:58 )  PTT:29.4 sec  PROCEDURE DATE:  11/20/2019    INTERPRETATION:  Head CT without contrast   COMPARISON: 11/5/2018 brain CT.  CLINICAL INFORMATION: Head injury following fall. Pain. Assess for   intracranial hemorrhage..  TECHNIQUE: Contiguous axial 2.5 mm slice thickness images of the head   were obtained without the use of intravenous contrast media.  This scan was performed using automatic exposure control (radiation dose   reduction software) to obtain a diagnostic image quality scan with   patient dose as low as reasonably achievable.     FINDINGS:  Vascular calcification seen within carotid siphon vessels.     Mild cerebral volume loss is present with secondary proportional   prominence of the sulci and ventricles.  The posterior fossa structures and basal cisterns appear normal.  There is no intracranial hemorrhage.   There is no intracranial mass or midline shift.  There is no sulcal effacement to suggest acute stroke.    The basal ganglia and thalami appear normal in morphology and attenuation.    The visualized portions of the optic globes and paranasal sinuses are   normal.    There are no skull fractures.    IMPRESSION:  No acute intracranial findings.  If acute stroke isof clinical concern, MRI with diffusion-weighted   images would be helpful for further characterization.  ANATOLY CASTILLO M.D., ATTENDING RADIOLOGIST  This document has been electronically signed. Nov 20 2019  8:59PM  EXAM:  CT PELVIS ONLY                        PROCEDURE DATE:  11/20/2019    INTERPRETATION:  CT  CLINICAL INFORMATION:  Pain.  TECHNIQUE:  Contiguous axial 0.63 mm sections were obtained using a single helical acquisition.  This data set was reconstructed  as axial   2.5 mm sections.  Imaging post processing software was employed to generate sagittal and coronal and 3D reformatted images. These additional   reformatted images were used to evaluate osseous alignment and the spatial relationships of the osseous structure, soft tissues and   vasculature.   This scan was performed using automatic exposure control (radiation dose reduction software) to obtain a diagnostic image quality   scan with patient dose as low as reasonably achievable.  FINDINGS:   X-rays performed earlier available for review.  There is an impacted comminuted LEFT intertrochanteric fracture. The humeral head is located. The LEFT acetabulum is intact. The RIGHT hip prosthesis is intact. The pelvic girdle is intact. The sacrum is intact.   Degenerative changes involve the lower lumbar spine.  Visualized bowel. The bladder is obscured from streak artifact.  IMPRESSION:  impacted comminuted LEFT intertrochanteric fracture.   CHANCE WEAVER M.D., ATTENDING RADIOLOGIST  This document has been electronically signed. Nov 20 2019  9:11PM    MEDICATIONS   MEDICATIONS  (STANDING):  acetaminophen   Tablet .. 975 milliGRAM(s) Oral every 8 hours  ceFAZolin   IVPB 2000 milliGRAM(s) IV Intermittent once  DULoxetine 60 milliGRAM(s) Oral daily  enoxaparin Injectable 40 milliGRAM(s) SubCutaneous every 24 hours  gabapentin 300 milliGRAM(s) Oral three times a day  lactated ringers. 1000 milliLiter(s) (100 mL/Hr) IV Continuous <Continuous>  sodium chloride 0.9%. 1000 milliLiter(s) (125 mL/Hr) IV Continuous <Continuous>    MEDICATIONS  (PRN):  ketorolac   Injectable 15 milliGRAM(s) IV Push every 6 hours PRN Moderate Pain (4 - 6)  oxyCODONE    IR 2.5 milliGRAM(s) Oral every 3 hours PRN Moderate Pain (4 - 6)  oxyCODONE    IR 5 milliGRAM(s) Oral every 4 hours PRN Severe Pain (7 - 10)  senna 2 Tablet(s) Oral at bedtime PRN Constipation  zolpidem 5 milliGRAM(s) Oral at bedtime PRN Insomnia      ASSESSMENT/PLAN  89 y/o F with comminuted LEFT hip intertrochanteric fracture 2/2 mechanical fall s/p left HIP IMN, with functional, gait, and ADL impairments.    Disposition - Pt has significant functional impairments versus baseline, which is independent in the community and driving.  Needs acute inpatient rehabilitation for intensive therapies (PT, OT) to restore her function towards independence, while being closely monitored for her ongoing medical issues, which can quickly destabilize if not closely monitored and managed.  Expected to tolerate and benefit from 3 hrs/day, >=5 days/wk of multidisciplinary therapies. Recommend acute inpatient rehabilitation once deemed medically and surgically stable as per the primary treating team(s).  PT - ROM, Bed Mob, Transfers, Amb with AD   OT - ADLs, ROM  Pain - continue with oxycodone and tylenol prn before therapies, consider adding Lidoderm patch for left hip pain  Precautions - Falls, Cardiac  DVT Prophylaxis - Lovenox as per primary team  Weight bearing status -WBAT  Skin - Turn Q2hrs  Diet - Regular   Thank you for allowing us to participate in this patient's care. CC: Evaluation of Rehabilitation Needs  HPI: 89 year old female PMH anemia, depression, myelodysplastic disorder, OA, neuropathy, hip replacement (july 2018) presents to ED with mechanical fall. Found to have left Hip fracture. Patient states she was trying to get to the bathroom (in a hurry), when she tripped and fell. She did not lose coconsciousness. Former smoker 30 pack year history, drinks one glass of scotch per night, no drug use. Complains of hip pain In the ED and found to have impacted comminuted LEFT intertrochanteric fracture for OR in the morning. (21 Nov 2019 00:04) Pt underwent  left HIP IMN on 11/21. PM&R was consulted to evaluate post-acute disposition.    REVIEW OF SYSTEMS  Constitutional - No fever, No fatigue  HEENT - No visual disturbances, No difficulty hearing,  No neck pain, +dizziness  Respiratory - No cough, No wheezing, No shortness of breath  Cardiovascular - No chest pain, No palpitations  Gastrointestinal - No abdominal pain  Neurological - No headaches, No loss of strength, No numbness  Skin - +hip dressing  Musculoskeletal - +left hip pain  Psychiatric - No depression, No anxiety    PAST MEDICAL & SURGICAL HISTORY  Fracture of unspecified part of neck of left femur, initial encounter for closed fracture  Myelodysplastic disease  Wrist fracture, left  Neuropathy of both feet  Constipation  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  Anemia  Depression  Osteoarthritis  Insomnia  Bilateral cataracts  History of appendectomy  History of tonsillectomy and adenoidectomy  History of right oophorectomy    SOCIAL HISTORY  Smoking -  Former smoker 30 pack year history  EtOH - drinks one glass of scotch per night  Drugs - Denied    FUNCTIONAL HISTORY  Lives alone in a  with 4 JACQUI and 0 STI, laundry room in the basement  Independent in ambulation, ADL's, transfers prior to hospitalization, +driving    CURRENT FUNCTIONAL STATUS (11/22)  Bed mobility - mod assist  Transfers - min-mod assist  Gait - min-mod assist with RW  ADL's -TBA    FAMILY HISTORY   Reviewed and non-contributory    ALLERGIES  No Known Allergies    VITALS  T(C): 36.5 (11-22-19 @ 07:13)  T(F): 97.7 (11-22-19 @ 07:13), Max: 98.1 (11-21-19 @ 23:32)  HR: 81 (11-22-19 @ 07:13) (78 - 88)  BP: 105/60 (11-22-19 @ 07:13) (95/57 - 155/80)  RR:  (12 - 22)  SpO2:  (94% - 100%)  Wt(kg): --    Constitutional - NAD, Comfortable  HEENT - NCAT  Neck - Supple, No limited ROM  Chest - good chest expansion, good respiratory effort  Cardio - warm and well perfused  Abdomen -  Soft, NTND  Extremities - trace left LE swelling, left hip surgical dressings x3 clean and intact  Neurologic Exam -                    Cognitive - Awake, Alert, AAOx3     Communication - Fluent, No dysarthria     Cranial Nerves - CN 2-12 grossly intact     Motor -   left proximal LE exam limited 2/2 pain                    LEFT    UE - ShAB 5/5, EF 5/5, EE 5/5,   WNL                    RIGHT UE - ShAB 5/5, EF 5/5, EE 5/5,,  WNL                    LEFT    LE - HF NT, KE >2/5, DF 5/5, PF 5/5                    RIGHT LE - HF 5/5, KE 5/5, DF 5/5, PF 5/5        Sensory - Intact to LT x 4 extremities      Reflexes - DTR Intact, No primitive reflexive  Psychiatric - Mood stable, Affect WNL    RECENT LABS/IMAGING                        9.5    9.26  )-----------( 201      ( 22 Nov 2019 07:53 )             29.9     11-22    135  |  100  |  31.0<H>  ----------------------------<  131<H>  4.8   |  21.0<L>  |  1.15    Ca    8.7      22 Nov 2019 07:53    TPro  7.8  /  Alb  4.0  /  TBili  0.2<L>  /  DBili  x   /  AST  21  /  ALT  11  /  AlkPhos  130<H>  11-20    PT/INR - ( 20 Nov 2019 18:58 )   PT: 10.7 sec;   INR: 0.93 ratio         PTT - ( 20 Nov 2019 18:58 )  PTT:29.4 sec  PROCEDURE DATE:  11/20/2019    INTERPRETATION:  Head CT without contrast   COMPARISON: 11/5/2018 brain CT.  CLINICAL INFORMATION: Head injury following fall. Pain. Assess for   intracranial hemorrhage..  TECHNIQUE: Contiguous axial 2.5 mm slice thickness images of the head   were obtained without the use of intravenous contrast media.  This scan was performed using automatic exposure control (radiation dose   reduction software) to obtain a diagnostic image quality scan with   patient dose as low as reasonably achievable.     FINDINGS:  Vascular calcification seen within carotid siphon vessels.     Mild cerebral volume loss is present with secondary proportional   prominence of the sulci and ventricles.  The posterior fossa structures and basal cisterns appear normal.  There is no intracranial hemorrhage.   There is no intracranial mass or midline shift.  There is no sulcal effacement to suggest acute stroke.    The basal ganglia and thalami appear normal in morphology and attenuation.    The visualized portions of the optic globes and paranasal sinuses are   normal.    There are no skull fractures.    IMPRESSION:  No acute intracranial findings.  If acute stroke isof clinical concern, MRI with diffusion-weighted   images would be helpful for further characterization.  ANATOLY CASTILLO M.D., ATTENDING RADIOLOGIST  This document has been electronically signed. Nov 20 2019  8:59PM  EXAM:  CT PELVIS ONLY                        PROCEDURE DATE:  11/20/2019    INTERPRETATION:  CT  CLINICAL INFORMATION:  Pain.  TECHNIQUE:  Contiguous axial 0.63 mm sections were obtained using a single helical acquisition.  This data set was reconstructed  as axial   2.5 mm sections.  Imaging post processing software was employed to generate sagittal and coronal and 3D reformatted images. These additional   reformatted images were used to evaluate osseous alignment and the spatial relationships of the osseous structure, soft tissues and   vasculature.   This scan was performed using automatic exposure control (radiation dose reduction software) to obtain a diagnostic image quality   scan with patient dose as low as reasonably achievable.  FINDINGS:   X-rays performed earlier available for review.  There is an impacted comminuted LEFT intertrochanteric fracture. The humeral head is located. The LEFT acetabulum is intact. The RIGHT hip prosthesis is intact. The pelvic girdle is intact. The sacrum is intact.   Degenerative changes involve the lower lumbar spine.  Visualized bowel. The bladder is obscured from streak artifact.  IMPRESSION:  impacted comminuted LEFT intertrochanteric fracture.   CHANCE WEAVER M.D., ATTENDING RADIOLOGIST  This document has been electronically signed. Nov 20 2019  9:11PM    MEDICATIONS   MEDICATIONS  (STANDING):  acetaminophen   Tablet .. 975 milliGRAM(s) Oral every 8 hours  ceFAZolin   IVPB 2000 milliGRAM(s) IV Intermittent once  DULoxetine 60 milliGRAM(s) Oral daily  enoxaparin Injectable 40 milliGRAM(s) SubCutaneous every 24 hours  gabapentin 300 milliGRAM(s) Oral three times a day  lactated ringers. 1000 milliLiter(s) (100 mL/Hr) IV Continuous <Continuous>  sodium chloride 0.9%. 1000 milliLiter(s) (125 mL/Hr) IV Continuous <Continuous>    MEDICATIONS  (PRN):  ketorolac   Injectable 15 milliGRAM(s) IV Push every 6 hours PRN Moderate Pain (4 - 6)  oxyCODONE    IR 2.5 milliGRAM(s) Oral every 3 hours PRN Moderate Pain (4 - 6)  oxyCODONE    IR 5 milliGRAM(s) Oral every 4 hours PRN Severe Pain (7 - 10)  senna 2 Tablet(s) Oral at bedtime PRN Constipation  zolpidem 5 milliGRAM(s) Oral at bedtime PRN Insomnia      ASSESSMENT/PLAN  89 y/o F with comminuted LEFT hip intertrochanteric fracture 2/2 mechanical fall s/p left HIP IMN, with functional, gait, and ADL impairments.    Disposition - Pt has significant functional impairments versus baseline, which is independent in the community and driving.  Needs acute inpatient rehabilitation for intensive therapies (PT, OT) to restore her function towards independence, while being closely monitored for her ongoing medical issues, which can quickly destabilize if not closely monitored and managed.  Expected to tolerate and benefit from 3 hrs/day, >=5 days/wk of multidisciplinary therapies. Recommend acute inpatient rehabilitation once deemed medically and surgically stable as per the primary treating team(s).  PT - ROM, Bed Mob, Transfers, Amb with AD   OT - ADLs, ROM  Pain - continue with oxycodone and tylenol prn before therapies, consider adding Lidoderm patch for left hip pain  Precautions - Falls, Cardiac  DVT Prophylaxis - Lovenox as per primary team  Weight bearing status -WBAT  Skin - Turn Q2hrs  Diet - Regular   Thank you for allowing us to participate in this patient's care.

## 2019-11-22 NOTE — OCCUPATIONAL THERAPY INITIAL EVALUATION ADULT - PLANNED THERAPY INTERVENTIONS, OT EVAL
ADL retraining/balance training/bed mobility training/motor coordination training/ROM/IADL retraining/strengthening/transfer training/neuromuscular re-education

## 2019-11-22 NOTE — PHYSICAL THERAPY INITIAL EVALUATION ADULT - IMPAIRMENTS CONTRIBUTING TO GAIT DEVIATIONS, PT EVAL
excessive external rotation on the left LE/impaired balance/decreased flexibility/impaired postural control/decreased strength/decreased ROM

## 2019-11-22 NOTE — PHYSICAL THERAPY INITIAL EVALUATION ADULT - GAIT DEVIATIONS NOTED, PT EVAL
decreased weight-shifting ability/left LE, decrased standing time on  the left LE, antalgic gait/decreased velocity of limb motion

## 2019-11-22 NOTE — OCCUPATIONAL THERAPY INITIAL EVALUATION ADULT - PHYSICAL ASSIST/NONPHYSICAL ASSIST: SUPINE/SIT, REHAB EVAL
1 person assist/Physical assist needed to maneuver bilateral LE and achieve upright trunk posture. Patient able to self support on edge of bed. Verbal cues for safety and for proper use of bed rails/sequencing of movement/verbal cues

## 2019-11-22 NOTE — OCCUPATIONAL THERAPY INITIAL EVALUATION ADULT - ADDITIONAL COMMENTS
Pt has a tub with curtains and no grab bars  Pt owns a cane, RW, shower chair, wheelchair and raised toilet seat  Pt is right handed and still drives

## 2019-11-22 NOTE — OCCUPATIONAL THERAPY INITIAL EVALUATION ADULT - LEVEL OF INDEPENDENCE: EATING, OT EVAL
Level of assist to asses; pt reports can do independently does not require set up; pt currently on aspiration precautions/supervision

## 2019-11-22 NOTE — PHYSICAL THERAPY INITIAL EVALUATION ADULT - ACTIVE RANGE OF MOTION EXAMINATION, REHAB EVAL
bilateral upper extremity Active ROM was WFL (within functional limits)/Left LE decreased active ROM noted 2-/5, passive not assessed due to increased pain/Right LE Active ROM was WFL (within functional limits)

## 2019-11-22 NOTE — OCCUPATIONAL THERAPY INITIAL EVALUATION ADULT - LEVEL OF INDEPENDENCE: DRESS LOWER BODY, OT EVAL
to don/doff socks secondary to fatigue and lightheadedness/dependent (less than 25% patients effort)

## 2019-11-22 NOTE — OCCUPATIONAL THERAPY INITIAL EVALUATION ADULT - SENSORY TESTS
Patient reports numbness and tingling in bilateral feet-old symptom; does not offer any complaints or changes in sensation

## 2019-11-22 NOTE — PROGRESS NOTE ADULT - ASSESSMENT
Patient is a 89 year old female PMH anemia, depression, myelodysplastic disorder, OA, neuropathy, hip replacement (july 2018) presents to ED with mechanical fall. Found to have left Hip fracture. Patient states she was trying to get to the bathroom (in a hurry), when she tripped and fell. She did not lose coconsciousness. She denies current chest pain, chest pain prior to the fall, and no recent episodes of chest pain/SOB. No palpitations. Denies fever, chills, nausea, vomiting. Former smoker 30 pack year history, drinks one glass of scotch per night, no drug use. Complains of hip pain. In the ED found to have  impacted comminuted LEFT intertrochanteric fracture s/p IMN.     Problem/Plan - 1:  ·  Problem: Closed fracture of left hip, initial encounter.  Plan: s/p L hip intramedullary nail  PT/OT/PMR recommending acute rehab  Analgesia PRN  Ortho recommendations appreciated     Problem/Plan - 2:  ·  Problem: Depression.  Plan: cont with Cymbalta.      Problem/Plan - 3:  ·  Problem: Insomnia.  Plan: cont with zolpidem.      Problem/Plan - 4:  ·  Problem: Anemia. Plan: likely due to blood loss post op. Left hip ecchymosis noted. No tense collection. Check iron studies. Repeat H/H in AM.     Problem/Plan - 5:  ·  Problem: Constipation. Plan: bowel regimen    DVT ppx - Lovenox

## 2019-11-22 NOTE — PHYSICAL THERAPY INITIAL EVALUATION ADULT - ADDITIONAL COMMENTS
as per pt.: lives in the private house with 2 steps to enter, (+) rail, (-) DME, (+) driving, family available to assist as needed upon D/C home

## 2019-11-22 NOTE — PROGRESS NOTE ADULT - SUBJECTIVE AND OBJECTIVE BOX
CHIEF COMPLAINT/INTERVAL HISTORY:    Patient is a 89y old  Female who presents with a chief complaint of hip fracture (22 Nov 2019 12:29)      HPI:  89 year old female PMH anemia, depression, myelodysplastic disorder, OA, neuropathy, hip replacement (july 2018) presents to ED with mechanical fall. Found to have left Hip fracture. Patient states she was trying to get to the bathroom (in a hurry), when she tripped and fell. She did not lose coconsciousness. She denies current chest pain, chest pain prior to the fall, and no recent episodes of chest pain/SOB. No palpitations. Denies fever, chills, nausea, vomiting. Former smoker 30 pack year history, drinks one glass of scotch per night, no drug use. Complains of hip pain. In the ED found to have  impacted comminuted LEFT intertrochanteric fracture for OR in the morning. (21 Nov 2019 00:04)      SUBJECTIVE & OBJECTIVE: Pt seen and examined at bedside.     ICU Vital Signs Last 24 Hrs  T(C): 36.5 (22 Nov 2019 15:18), Max: 36.7 (21 Nov 2019 23:32)  T(F): 97.7 (22 Nov 2019 15:18), Max: 98.1 (21 Nov 2019 23:32)  HR: 88 (22 Nov 2019 15:18) (79 - 88)  BP: 131/73 (22 Nov 2019 15:18) (101/65 - 155/80)  BP(mean): --  ABP: --  ABP(mean): --  RR: 18 (22 Nov 2019 15:18) (15 - 22)  SpO2: 94% (22 Nov 2019 15:18) (94% - 99%)        MEDICATIONS  (STANDING):  acetaminophen   Tablet .. 975 milliGRAM(s) Oral every 8 hours  ceFAZolin   IVPB 2000 milliGRAM(s) IV Intermittent once  DULoxetine 60 milliGRAM(s) Oral daily  enoxaparin Injectable 40 milliGRAM(s) SubCutaneous every 24 hours  gabapentin 300 milliGRAM(s) Oral three times a day  lactated ringers. 1000 milliLiter(s) (100 mL/Hr) IV Continuous <Continuous>  polyethylene glycol 3350 17 Gram(s) Oral daily  sodium chloride 0.9%. 1000 milliLiter(s) (125 mL/Hr) IV Continuous <Continuous>    MEDICATIONS  (PRN):  ketorolac   Injectable 15 milliGRAM(s) IV Push every 6 hours PRN Moderate Pain (4 - 6)  oxyCODONE    IR 2.5 milliGRAM(s) Oral every 3 hours PRN Moderate Pain (4 - 6)  oxyCODONE    IR 5 milliGRAM(s) Oral every 4 hours PRN Severe Pain (7 - 10)  senna 2 Tablet(s) Oral at bedtime PRN Constipation  zolpidem 5 milliGRAM(s) Oral at bedtime PRN Insomnia      LABS:                        9.5    9.26  )-----------( 201      ( 22 Nov 2019 07:53 )             29.9     11-22    135  |  100  |  31.0<H>  ----------------------------<  131<H>  4.8   |  21.0<L>  |  1.15    Ca    8.7      22 Nov 2019 07:53    TPro  7.8  /  Alb  4.0  /  TBili  0.2<L>  /  DBili  x   /  AST  21  /  ALT  11  /  AlkPhos  130<H>  11-20    PT/INR - ( 20 Nov 2019 18:58 )   PT: 10.7 sec;   INR: 0.93 ratio         PTT - ( 20 Nov 2019 18:58 )  PTT:29.4 sec    PHYSICAL EXAM:    GENERAL: elderly female, laying in bed, NAD  HEAD:  Atraumatic, Normocephalic  EYES: EOMI, PERRLA, conjunctiva and sclera clear  ENMT: Moist mucous membranes  NECK: Supple   NERVOUS SYSTEM:  Alert & Oriented X3   CHEST/LUNG: bilateral air entry, coarse breath sounds  HEART: Regular rate and rhythm; + S1/S2  ABDOMEN: Soft, Nontender, Nondistended; Bowel sounds present  EXTREMITIES:  left hip ecchymosis and tenderness

## 2019-11-22 NOTE — OCCUPATIONAL THERAPY INITIAL EVALUATION ADULT - PHYSICAL ASSIST/NONPHYSICAL ASSIST: SIT/SUPINE, REHAB EVAL
1 person assist/Physical assist required to maneuver bilateral LE and to straighten body out in bed. Verbal cues for proper sequence of movement and for safety/verbal cues

## 2019-11-23 LAB
ANION GAP SERPL CALC-SCNC: 10 MMOL/L — SIGNIFICANT CHANGE UP (ref 5–17)
BASOPHILS # BLD AUTO: 0.03 K/UL — SIGNIFICANT CHANGE UP (ref 0–0.2)
BASOPHILS NFR BLD AUTO: 0.4 % — SIGNIFICANT CHANGE UP (ref 0–2)
BUN SERPL-MCNC: 35 MG/DL — HIGH (ref 8–20)
CALCIUM SERPL-MCNC: 8.9 MG/DL — SIGNIFICANT CHANGE UP (ref 8.6–10.2)
CHLORIDE SERPL-SCNC: 102 MMOL/L — SIGNIFICANT CHANGE UP (ref 98–107)
CO2 SERPL-SCNC: 23 MMOL/L — SIGNIFICANT CHANGE UP (ref 22–29)
CREAT SERPL-MCNC: 1.06 MG/DL — SIGNIFICANT CHANGE UP (ref 0.5–1.3)
EOSINOPHIL # BLD AUTO: 0.3 K/UL — SIGNIFICANT CHANGE UP (ref 0–0.5)
EOSINOPHIL NFR BLD AUTO: 3.6 % — SIGNIFICANT CHANGE UP (ref 0–6)
GLUCOSE SERPL-MCNC: 108 MG/DL — SIGNIFICANT CHANGE UP (ref 70–115)
HCT VFR BLD CALC: 26.9 % — LOW (ref 34.5–45)
HGB BLD-MCNC: 8.6 G/DL — LOW (ref 11.5–15.5)
IMM GRANULOCYTES NFR BLD AUTO: 0.5 % — SIGNIFICANT CHANGE UP (ref 0–1.5)
LYMPHOCYTES # BLD AUTO: 1.87 K/UL — SIGNIFICANT CHANGE UP (ref 1–3.3)
LYMPHOCYTES # BLD AUTO: 22.6 % — SIGNIFICANT CHANGE UP (ref 13–44)
MAGNESIUM SERPL-MCNC: 2.3 MG/DL — SIGNIFICANT CHANGE UP (ref 1.6–2.6)
MCHC RBC-ENTMCNC: 31.2 PG — SIGNIFICANT CHANGE UP (ref 27–34)
MCHC RBC-ENTMCNC: 32 GM/DL — SIGNIFICANT CHANGE UP (ref 32–36)
MCV RBC AUTO: 97.5 FL — SIGNIFICANT CHANGE UP (ref 80–100)
MONOCYTES # BLD AUTO: 1.07 K/UL — HIGH (ref 0–0.9)
MONOCYTES NFR BLD AUTO: 12.9 % — SIGNIFICANT CHANGE UP (ref 2–14)
NEUTROPHILS # BLD AUTO: 4.97 K/UL — SIGNIFICANT CHANGE UP (ref 1.8–7.4)
NEUTROPHILS NFR BLD AUTO: 60 % — SIGNIFICANT CHANGE UP (ref 43–77)
PHOSPHATE SERPL-MCNC: 3.7 MG/DL — SIGNIFICANT CHANGE UP (ref 2.4–4.7)
PLATELET # BLD AUTO: 202 K/UL — SIGNIFICANT CHANGE UP (ref 150–400)
POTASSIUM SERPL-MCNC: 4.8 MMOL/L — SIGNIFICANT CHANGE UP (ref 3.5–5.3)
POTASSIUM SERPL-SCNC: 4.8 MMOL/L — SIGNIFICANT CHANGE UP (ref 3.5–5.3)
RBC # BLD: 2.76 M/UL — LOW (ref 3.8–5.2)
RBC # FLD: 13.7 % — SIGNIFICANT CHANGE UP (ref 10.3–14.5)
SODIUM SERPL-SCNC: 135 MMOL/L — SIGNIFICANT CHANGE UP (ref 135–145)
WBC # BLD: 8.28 K/UL — SIGNIFICANT CHANGE UP (ref 3.8–10.5)
WBC # FLD AUTO: 8.28 K/UL — SIGNIFICANT CHANGE UP (ref 3.8–10.5)

## 2019-11-23 PROCEDURE — 99233 SBSQ HOSP IP/OBS HIGH 50: CPT

## 2019-11-23 RX ADMIN — OXYCODONE HYDROCHLORIDE 5 MILLIGRAM(S): 5 TABLET ORAL at 19:36

## 2019-11-23 RX ADMIN — Medication 975 MILLIGRAM(S): at 21:06

## 2019-11-23 RX ADMIN — Medication 975 MILLIGRAM(S): at 14:10

## 2019-11-23 RX ADMIN — POLYETHYLENE GLYCOL 3350 17 GRAM(S): 17 POWDER, FOR SOLUTION ORAL at 13:17

## 2019-11-23 RX ADMIN — GABAPENTIN 300 MILLIGRAM(S): 400 CAPSULE ORAL at 21:06

## 2019-11-23 RX ADMIN — OXYCODONE HYDROCHLORIDE 5 MILLIGRAM(S): 5 TABLET ORAL at 20:09

## 2019-11-23 RX ADMIN — DULOXETINE HYDROCHLORIDE 60 MILLIGRAM(S): 30 CAPSULE, DELAYED RELEASE ORAL at 13:10

## 2019-11-23 RX ADMIN — GABAPENTIN 300 MILLIGRAM(S): 400 CAPSULE ORAL at 05:29

## 2019-11-23 RX ADMIN — ZOLPIDEM TARTRATE 5 MILLIGRAM(S): 10 TABLET ORAL at 21:06

## 2019-11-23 RX ADMIN — Medication 975 MILLIGRAM(S): at 05:29

## 2019-11-23 RX ADMIN — ENOXAPARIN SODIUM 40 MILLIGRAM(S): 100 INJECTION SUBCUTANEOUS at 05:29

## 2019-11-23 RX ADMIN — Medication 975 MILLIGRAM(S): at 06:39

## 2019-11-23 RX ADMIN — GABAPENTIN 300 MILLIGRAM(S): 400 CAPSULE ORAL at 13:10

## 2019-11-23 RX ADMIN — Medication 975 MILLIGRAM(S): at 13:10

## 2019-11-23 RX ADMIN — Medication 975 MILLIGRAM(S): at 22:24

## 2019-11-23 NOTE — PROGRESS NOTE ADULT - ASSESSMENT
Patient is a 89 year old female PMH anemia, depression, myelodysplastic disorder, OA, neuropathy, hip replacement (july 2018) presents to ED with mechanical fall. Found to have left Hip fracture. Patient states she was trying to get to the bathroom (in a hurry), when she tripped and fell. She did not lose coconsciousness. She denies current chest pain, chest pain prior to the fall, and no recent episodes of chest pain/SOB. No palpitations. Denies fever, chills, nausea, vomiting. Former smoker 30 pack year history, drinks one glass of scotch per night, no drug use. Complains of hip pain. In the ED found to have  impacted comminuted LEFT intertrochanteric fracture s/p IMN.     Problem/Plan - 1:  ·  Problem: Closed fracture of left hip, initial encounter.  Plan: s/p L hip intramedullary nail POD #2  PT/OT/PMR recommending acute rehab  Analgesia PRN  Ortho recommendations appreciated  DVT ppx  WBAT     Problem/Plan - 2:  ·  Problem: Depression.  Plan: cont with Cymbalta.      Problem/Plan - 3:  ·  Problem: Insomnia.  Plan: cont with zolpidem.      Problem/Plan - 4:  ·  Problem: Anemia. Plan: likely due to blood loss post op. Left hip ecchymosis noted. No tense collection. Check iron studies. Repeat H/H in AM.     Problem/Plan - 5:  ·  Problem: Constipation. Plan: continue bowel regimen    DVT ppx - Lovenox    Attending Attestation:   Plan discussed with patient, [facility]    Dispo - If hb remains stable; tentative plans for acute rehab on Monday.

## 2019-11-23 NOTE — PROGRESS NOTE ADULT - SUBJECTIVE AND OBJECTIVE BOX
CHIEF COMPLAINT/INTERVAL HISTORY:    Patient is a 89y old  Female who presents with a chief complaint of hip fracture (23 Nov 2019 08:25)    SUBJECTIVE & OBJECTIVE: Pt seen and examined at bedside. No overnight events. No new complaints; resting comfortably. Hb trending down, will need to monitor closely.    ROS: No chest pain, palpitations, SOB, light headedness, dizziness, headache, nausea/vomiting, fevers/chills, abdominal pain, dysuria or increased urinary frequency.    ICU Vital Signs Last 24 Hrs  T(C): 37 (23 Nov 2019 19:42), Max: 37 (23 Nov 2019 19:42)  T(F): 98.6 (23 Nov 2019 19:42), Max: 98.6 (23 Nov 2019 19:42)  HR: 81 (23 Nov 2019 19:42) (81 - 87)  BP: 123/61 (23 Nov 2019 19:42) (115/66 - 130/75)  BP(mean): --  ABP: --  ABP(mean): --  RR: 17 (23 Nov 2019 19:42) (16 - 18)  SpO2: 96% (23 Nov 2019 19:42) (94% - 96%)        MEDICATIONS  (STANDING):  acetaminophen   Tablet .. 975 milliGRAM(s) Oral every 8 hours  ceFAZolin   IVPB 2000 milliGRAM(s) IV Intermittent once  DULoxetine 60 milliGRAM(s) Oral daily  enoxaparin Injectable 40 milliGRAM(s) SubCutaneous every 24 hours  gabapentin 300 milliGRAM(s) Oral three times a day  polyethylene glycol 3350 17 Gram(s) Oral daily    MEDICATIONS  (PRN):  ketorolac   Injectable 15 milliGRAM(s) IV Push every 6 hours PRN Moderate Pain (4 - 6)  oxyCODONE    IR 2.5 milliGRAM(s) Oral every 3 hours PRN Moderate Pain (4 - 6)  oxyCODONE    IR 5 milliGRAM(s) Oral every 4 hours PRN Severe Pain (7 - 10)  senna 2 Tablet(s) Oral at bedtime PRN Constipation  zolpidem 5 milliGRAM(s) Oral at bedtime PRN Insomnia      LABS:                        8.6    8.28  )-----------( 202      ( 23 Nov 2019 08:00 )             26.9     11-23    135  |  102  |  35.0<H>  ----------------------------<  108  4.8   |  23.0  |  1.06    Ca    8.9      23 Nov 2019 08:00  Phos  3.7     11-23  Mg     2.3     11-23    PHYSICAL EXAM:    GENERAL: elderly female, laying in bed, NAD  HEAD:  Atraumatic, Normocephalic  EYES: EOMI, PERRLA, conjunctiva and sclera clear  ENMT: Moist mucous membranes  NECK: Supple  NERVOUS SYSTEM:  Alert & Oriented X3  CHEST/LUNG: Clear to auscultation bilaterally; No rales, rhonchi, wheezing, or rubs  HEART: Regular rate and rhythm; + S1/S2  ABDOMEN: Soft, Nontender, Nondistended; +BS  EXTREMITIES:  no pedal edema, left hip ecchymosis

## 2019-11-23 NOTE — PROGRESS NOTE ADULT - SUBJECTIVE AND OBJECTIVE BOX
Ortho Progress note    Name: NILSA VILLEDA    MR #: [id number]    Procedure: left hip IMN  Surgeon: Dr. Deleon    Pt comfortable without complaints, pain controlled  Pt states she has been participating in PT  Denies CP, SOB, N/V, numbness/tingling     General Exam:  Vital Signs Last 24 Hrs  T(C): 36.4 (11-23-19 @ 05:24), Max: 36.4 (11-23-19 @ 05:24)  T(F): 97.5 (11-23-19 @ 05:24), Max: 97.5 (11-23-19 @ 05:24)  HR: 87 (11-23-19 @ 05:24) (87 - 87)  BP: 130/75 (11-23-19 @ 05:24) (130/75 - 130/75)  BP(mean): --  RR: 18 (11-23-19 @ 05:24) (18 - 18)  SpO2: 94% (11-23-19 @ 05:24) (94% - 94%)    General: Pt Alert and oriented, NAD, controlled pain.  Dressings C/D/I. No bleeding.  Dressings removed, Prineo in place, Incisions healing well  No active drainage or discharge  New dressings applied  Pulses: 2+ dorsalis pedis pulse. Cap refill < 2 sec.  Sensation: Grossly intact to light touch without deficit.  Motor: + EHL/FHL/TA/GS                        8.6    8.28  )-----------( 202      ( 23 Nov 2019 08:00 )             26.9     A/P: 89yFemale POD#2 s/p left hip IMN  - Ortho Stable  - Pain Control as clinically indicated  - DVT ppx: Lovenox 40 QD, SCD's  - Continue PT/OT  - Weight bearing status: WBAT  - Continue care per primary team  - DC planning

## 2019-11-24 LAB
ANION GAP SERPL CALC-SCNC: 13 MMOL/L — SIGNIFICANT CHANGE UP (ref 5–17)
BASOPHILS # BLD AUTO: 0.03 K/UL — SIGNIFICANT CHANGE UP (ref 0–0.2)
BASOPHILS NFR BLD AUTO: 0.3 % — SIGNIFICANT CHANGE UP (ref 0–2)
BUN SERPL-MCNC: 26 MG/DL — HIGH (ref 8–20)
CALCIUM SERPL-MCNC: 8.7 MG/DL — SIGNIFICANT CHANGE UP (ref 8.6–10.2)
CHLORIDE SERPL-SCNC: 99 MMOL/L — SIGNIFICANT CHANGE UP (ref 98–107)
CO2 SERPL-SCNC: 22 MMOL/L — SIGNIFICANT CHANGE UP (ref 22–29)
CREAT SERPL-MCNC: 0.9 MG/DL — SIGNIFICANT CHANGE UP (ref 0.5–1.3)
EOSINOPHIL # BLD AUTO: 0.53 K/UL — HIGH (ref 0–0.5)
EOSINOPHIL NFR BLD AUTO: 6.1 % — HIGH (ref 0–6)
GLUCOSE SERPL-MCNC: 111 MG/DL — SIGNIFICANT CHANGE UP (ref 70–115)
HCT VFR BLD CALC: 26.7 % — LOW (ref 34.5–45)
HGB BLD-MCNC: 8.1 G/DL — LOW (ref 11.5–15.5)
IMM GRANULOCYTES NFR BLD AUTO: 0.6 % — SIGNIFICANT CHANGE UP (ref 0–1.5)
LYMPHOCYTES # BLD AUTO: 2.61 K/UL — SIGNIFICANT CHANGE UP (ref 1–3.3)
LYMPHOCYTES # BLD AUTO: 30 % — SIGNIFICANT CHANGE UP (ref 13–44)
MAGNESIUM SERPL-MCNC: 2 MG/DL — SIGNIFICANT CHANGE UP (ref 1.6–2.6)
MCHC RBC-ENTMCNC: 29.7 PG — SIGNIFICANT CHANGE UP (ref 27–34)
MCHC RBC-ENTMCNC: 30.3 GM/DL — LOW (ref 32–36)
MCV RBC AUTO: 97.8 FL — SIGNIFICANT CHANGE UP (ref 80–100)
MONOCYTES # BLD AUTO: 0.89 K/UL — SIGNIFICANT CHANGE UP (ref 0–0.9)
MONOCYTES NFR BLD AUTO: 10.2 % — SIGNIFICANT CHANGE UP (ref 2–14)
NEUTROPHILS # BLD AUTO: 4.59 K/UL — SIGNIFICANT CHANGE UP (ref 1.8–7.4)
NEUTROPHILS NFR BLD AUTO: 52.8 % — SIGNIFICANT CHANGE UP (ref 43–77)
PHOSPHATE SERPL-MCNC: 2.8 MG/DL — SIGNIFICANT CHANGE UP (ref 2.4–4.7)
PLATELET # BLD AUTO: 225 K/UL — SIGNIFICANT CHANGE UP (ref 150–400)
POTASSIUM SERPL-MCNC: 4.6 MMOL/L — SIGNIFICANT CHANGE UP (ref 3.5–5.3)
POTASSIUM SERPL-SCNC: 4.6 MMOL/L — SIGNIFICANT CHANGE UP (ref 3.5–5.3)
RBC # BLD: 2.73 M/UL — LOW (ref 3.8–5.2)
RBC # FLD: 13.8 % — SIGNIFICANT CHANGE UP (ref 10.3–14.5)
SODIUM SERPL-SCNC: 134 MMOL/L — LOW (ref 135–145)
WBC # BLD: 8.7 K/UL — SIGNIFICANT CHANGE UP (ref 3.8–10.5)
WBC # FLD AUTO: 8.7 K/UL — SIGNIFICANT CHANGE UP (ref 3.8–10.5)

## 2019-11-24 PROCEDURE — 99233 SBSQ HOSP IP/OBS HIGH 50: CPT

## 2019-11-24 RX ADMIN — ENOXAPARIN SODIUM 40 MILLIGRAM(S): 100 INJECTION SUBCUTANEOUS at 06:13

## 2019-11-24 RX ADMIN — GABAPENTIN 300 MILLIGRAM(S): 400 CAPSULE ORAL at 21:50

## 2019-11-24 RX ADMIN — OXYCODONE HYDROCHLORIDE 5 MILLIGRAM(S): 5 TABLET ORAL at 21:50

## 2019-11-24 RX ADMIN — OXYCODONE HYDROCHLORIDE 5 MILLIGRAM(S): 5 TABLET ORAL at 22:15

## 2019-11-24 RX ADMIN — Medication 975 MILLIGRAM(S): at 22:15

## 2019-11-24 RX ADMIN — GABAPENTIN 300 MILLIGRAM(S): 400 CAPSULE ORAL at 06:13

## 2019-11-24 RX ADMIN — GABAPENTIN 300 MILLIGRAM(S): 400 CAPSULE ORAL at 13:23

## 2019-11-24 RX ADMIN — POLYETHYLENE GLYCOL 3350 17 GRAM(S): 17 POWDER, FOR SOLUTION ORAL at 13:24

## 2019-11-24 RX ADMIN — OXYCODONE HYDROCHLORIDE 5 MILLIGRAM(S): 5 TABLET ORAL at 09:00

## 2019-11-24 RX ADMIN — Medication 975 MILLIGRAM(S): at 13:24

## 2019-11-24 RX ADMIN — Medication 975 MILLIGRAM(S): at 21:50

## 2019-11-24 RX ADMIN — Medication 975 MILLIGRAM(S): at 06:13

## 2019-11-24 RX ADMIN — Medication 975 MILLIGRAM(S): at 14:17

## 2019-11-24 RX ADMIN — ZOLPIDEM TARTRATE 5 MILLIGRAM(S): 10 TABLET ORAL at 21:50

## 2019-11-24 RX ADMIN — OXYCODONE HYDROCHLORIDE 5 MILLIGRAM(S): 5 TABLET ORAL at 09:57

## 2019-11-24 RX ADMIN — DULOXETINE HYDROCHLORIDE 60 MILLIGRAM(S): 30 CAPSULE, DELAYED RELEASE ORAL at 13:23

## 2019-11-24 RX ADMIN — Medication 975 MILLIGRAM(S): at 07:02

## 2019-11-24 NOTE — PROGRESS NOTE ADULT - SUBJECTIVE AND OBJECTIVE BOX
CC: Left leg pain    INTERVAL HPI/OVERNIGHT EVENTS: Patient seen and examined, complaints of pain in the left calf worse with ambulation this morning.       Vital Signs Last 24 Hrs  T(C): 36.7 (24 Nov 2019 08:00), Max: 37 (23 Nov 2019 19:42)  T(F): 98.1 (24 Nov 2019 08:00), Max: 98.6 (23 Nov 2019 19:42)  HR: 84 (24 Nov 2019 08:00) (81 - 93)  BP: 116/71 (24 Nov 2019 08:00) (101/68 - 123/61)  BP(mean): --  RR: 19 (24 Nov 2019 08:00) (17 - 19)  SpO2: 95% (24 Nov 2019 08:00) (95% - 96%)    PHYSICAL EXAM:    GENERAL: NAD,AOX3  ENMT: Moist mucous membranes  NECK: Supple, No JVD  CHEST/LUNG: Clear to auscultation bilaterally; No rales, rhonchi, wheezing, or rubs  HEART: Regular rate and rhythm; No murmurs, rubs, or gallops  ABDOMEN: Soft, Nontender, Nondistended; Bowel sounds present  EXTREMITIES:  2+ Peripheral Pulses, No clubbing, cyanosis, or edema        MEDICATIONS  (STANDING):  acetaminophen   Tablet .. 975 milliGRAM(s) Oral every 8 hours  ceFAZolin   IVPB 2000 milliGRAM(s) IV Intermittent once  DULoxetine 60 milliGRAM(s) Oral daily  enoxaparin Injectable 40 milliGRAM(s) SubCutaneous every 24 hours  gabapentin 300 milliGRAM(s) Oral three times a day  polyethylene glycol 3350 17 Gram(s) Oral daily    MEDICATIONS  (PRN):  ketorolac   Injectable 15 milliGRAM(s) IV Push every 6 hours PRN Moderate Pain (4 - 6)  oxyCODONE    IR 2.5 milliGRAM(s) Oral every 3 hours PRN Moderate Pain (4 - 6)  oxyCODONE    IR 5 milliGRAM(s) Oral every 4 hours PRN Severe Pain (7 - 10)  senna 2 Tablet(s) Oral at bedtime PRN Constipation  zolpidem 5 milliGRAM(s) Oral at bedtime PRN Insomnia      Allergies    No Known Allergies    Intolerances          LABS:                          8.1    8.70  )-----------( 225      ( 24 Nov 2019 08:19 )             26.7     11-24    134<L>  |  99  |  26.0<H>  ----------------------------<  111  4.6   |  22.0  |  0.90    Ca    8.7      24 Nov 2019 08:17  Phos  2.8     11-24  Mg     2.0     11-24            RADIOLOGY & ADDITIONAL TESTS:

## 2019-11-24 NOTE — PROGRESS NOTE ADULT - ASSESSMENT
The patient is an  89 year old female with a past medical history of  anemia, depression, myelodysplastic disorder, OA, neuropathy, hip replacement (july 2018) who presented to ED with mechanical fall. Found to have left Hip fracture.  In the ED found to have  impacted comminuted left intertrochanteric fracture s/p IMN.    Assessment/Plan:    1. Left hip fracture status post IM Nail: Pain controlled  WIll monitor for worsening pain/swelling of the left calf  PT- Acute rehab  Bowel regimen ordered  VTE- on Lovenox subcut       2. Anemia: Likely post op. Continue to monitor Hb/Hct    3. Depression Cymbalta     Discharge disposition: in 24 hours to Acute rehab in Hb/hct stable

## 2019-11-25 LAB
ANION GAP SERPL CALC-SCNC: 12 MMOL/L — SIGNIFICANT CHANGE UP (ref 5–17)
BUN SERPL-MCNC: 21 MG/DL — HIGH (ref 8–20)
CALCIUM SERPL-MCNC: 8.6 MG/DL — SIGNIFICANT CHANGE UP (ref 8.6–10.2)
CHLORIDE SERPL-SCNC: 102 MMOL/L — SIGNIFICANT CHANGE UP (ref 98–107)
CO2 SERPL-SCNC: 25 MMOL/L — SIGNIFICANT CHANGE UP (ref 22–29)
CREAT SERPL-MCNC: 0.86 MG/DL — SIGNIFICANT CHANGE UP (ref 0.5–1.3)
GLUCOSE SERPL-MCNC: 106 MG/DL — SIGNIFICANT CHANGE UP (ref 70–115)
HCT VFR BLD CALC: 26.8 % — LOW (ref 34.5–45)
HGB BLD-MCNC: 8.4 G/DL — LOW (ref 11.5–15.5)
MCHC RBC-ENTMCNC: 30.7 PG — SIGNIFICANT CHANGE UP (ref 27–34)
MCHC RBC-ENTMCNC: 31.3 GM/DL — LOW (ref 32–36)
MCV RBC AUTO: 97.8 FL — SIGNIFICANT CHANGE UP (ref 80–100)
PLATELET # BLD AUTO: 260 K/UL — SIGNIFICANT CHANGE UP (ref 150–400)
POTASSIUM SERPL-MCNC: 4.6 MMOL/L — SIGNIFICANT CHANGE UP (ref 3.5–5.3)
POTASSIUM SERPL-SCNC: 4.6 MMOL/L — SIGNIFICANT CHANGE UP (ref 3.5–5.3)
RBC # BLD: 2.74 M/UL — LOW (ref 3.8–5.2)
RBC # FLD: 13.8 % — SIGNIFICANT CHANGE UP (ref 10.3–14.5)
SODIUM SERPL-SCNC: 139 MMOL/L — SIGNIFICANT CHANGE UP (ref 135–145)
WBC # BLD: 6.89 K/UL — SIGNIFICANT CHANGE UP (ref 3.8–10.5)
WBC # FLD AUTO: 6.89 K/UL — SIGNIFICANT CHANGE UP (ref 3.8–10.5)

## 2019-11-25 PROCEDURE — 99233 SBSQ HOSP IP/OBS HIGH 50: CPT

## 2019-11-25 PROCEDURE — 99232 SBSQ HOSP IP/OBS MODERATE 35: CPT

## 2019-11-25 RX ORDER — ENOXAPARIN SODIUM 100 MG/ML
40 INJECTION SUBCUTANEOUS
Qty: 0 | Refills: 0 | DISCHARGE
Start: 2019-11-25

## 2019-11-25 RX ORDER — OXYCODONE HYDROCHLORIDE 5 MG/1
1 TABLET ORAL
Qty: 0 | Refills: 0 | DISCHARGE
Start: 2019-11-25

## 2019-11-25 RX ORDER — SENNA PLUS 8.6 MG/1
2 TABLET ORAL
Qty: 0 | Refills: 0 | DISCHARGE
Start: 2019-11-25

## 2019-11-25 RX ORDER — GABAPENTIN 400 MG/1
1 CAPSULE ORAL
Qty: 0 | Refills: 0 | DISCHARGE
Start: 2019-11-25

## 2019-11-25 RX ORDER — POLYETHYLENE GLYCOL 3350 17 G/17G
17 POWDER, FOR SOLUTION ORAL
Qty: 0 | Refills: 0 | DISCHARGE
Start: 2019-11-25

## 2019-11-25 RX ADMIN — Medication 975 MILLIGRAM(S): at 13:02

## 2019-11-25 RX ADMIN — Medication 975 MILLIGRAM(S): at 21:14

## 2019-11-25 RX ADMIN — Medication 975 MILLIGRAM(S): at 14:00

## 2019-11-25 RX ADMIN — Medication 975 MILLIGRAM(S): at 21:21

## 2019-11-25 RX ADMIN — GABAPENTIN 300 MILLIGRAM(S): 400 CAPSULE ORAL at 21:14

## 2019-11-25 RX ADMIN — DULOXETINE HYDROCHLORIDE 60 MILLIGRAM(S): 30 CAPSULE, DELAYED RELEASE ORAL at 13:02

## 2019-11-25 RX ADMIN — OXYCODONE HYDROCHLORIDE 2.5 MILLIGRAM(S): 5 TABLET ORAL at 07:01

## 2019-11-25 RX ADMIN — GABAPENTIN 300 MILLIGRAM(S): 400 CAPSULE ORAL at 06:59

## 2019-11-25 RX ADMIN — GABAPENTIN 300 MILLIGRAM(S): 400 CAPSULE ORAL at 13:02

## 2019-11-25 RX ADMIN — OXYCODONE HYDROCHLORIDE 5 MILLIGRAM(S): 5 TABLET ORAL at 22:17

## 2019-11-25 RX ADMIN — OXYCODONE HYDROCHLORIDE 5 MILLIGRAM(S): 5 TABLET ORAL at 21:17

## 2019-11-25 RX ADMIN — ZOLPIDEM TARTRATE 5 MILLIGRAM(S): 10 TABLET ORAL at 21:14

## 2019-11-25 RX ADMIN — OXYCODONE HYDROCHLORIDE 2.5 MILLIGRAM(S): 5 TABLET ORAL at 07:15

## 2019-11-25 RX ADMIN — Medication 975 MILLIGRAM(S): at 07:15

## 2019-11-25 RX ADMIN — ENOXAPARIN SODIUM 40 MILLIGRAM(S): 100 INJECTION SUBCUTANEOUS at 06:59

## 2019-11-25 RX ADMIN — Medication 975 MILLIGRAM(S): at 06:59

## 2019-11-25 RX ADMIN — POLYETHYLENE GLYCOL 3350 17 GRAM(S): 17 POWDER, FOR SOLUTION ORAL at 13:02

## 2019-11-25 NOTE — DIETITIAN INITIAL EVALUATION ADULT. - PERTINENT LABORATORY DATA
11-25 Na139 mmol/L Glu 106 mg/dL K+ 4.6 mmol/L Cr  0.86 mg/dL BUN 21.0 mg/dL<H> Phos n/a   Alb n/a   PAB n/a

## 2019-11-25 NOTE — DIETITIAN INITIAL EVALUATION ADULT. - OTHER INFO
89 year old old female with PMH of anemia, depression, myelodysplastic disorder, OA, neuropathy, hip replacement (july 2018) who presented to ED with mechanical fall. Found to have left hip fracture s/p IMN. Pt reports prolonged decreased appetite/po intake. Breakfast tray observed at bedside not much consumed per plate waste. Pt states she is feeling weaker today and did overall not provide much history during interview. Reports she is not a big eater to begin with, no weight changes reported. Pt denies chewing/swallowing difficulty.

## 2019-11-25 NOTE — DIETITIAN INITIAL EVALUATION ADULT. - MALNUTRITION
NFPE: moderate muscle loss of temples, clavicles, shoulders, moderate fat loss of orbitals and triceps severe, chronic

## 2019-11-25 NOTE — PROGRESS NOTE ADULT - SUBJECTIVE AND OBJECTIVE BOX
CC: Follow up    INTERVAL HPI/OVERNIGHT EVENTS: Patient seen and examined, sitting up in a chair. Complains of pain in the left lower extremity worse with ambulation.       Vital Signs Last 24 Hrs  T(C): 36.8 (25 Nov 2019 07:35), Max: 36.9 (24 Nov 2019 21:37)  T(F): 98.3 (25 Nov 2019 07:35), Max: 98.5 (24 Nov 2019 21:37)  HR: 87 (25 Nov 2019 07:35) (78 - 87)  BP: 117/73 (25 Nov 2019 07:35) (102/62 - 120/69)  BP(mean): --  RR: 18 (25 Nov 2019 07:35) (17 - 18)  SpO2: 97% (25 Nov 2019 07:35) (94% - 97%)    PHYSICAL EXAM:    GENERAL: NAD, AOX3  ENMT: Moist mucous membranes  NECK: Supple, No JVD  CHEST/LUNG: Clear to auscultation bilaterally; No rales, rhonchi, wheezing, or rubs  HEART: Regular rate and rhythm; No murmurs, rubs, or gallops  ABDOMEN: Soft, Nontender, Nondistended; Bowel sounds present  EXTREMITIES:  2+ Peripheral Pulses, No clubbing, cyanosis, or edema        MEDICATIONS  (STANDING):  acetaminophen   Tablet .. 975 milliGRAM(s) Oral every 8 hours  ceFAZolin   IVPB 2000 milliGRAM(s) IV Intermittent once  DULoxetine 60 milliGRAM(s) Oral daily  enoxaparin Injectable 40 milliGRAM(s) SubCutaneous every 24 hours  gabapentin 300 milliGRAM(s) Oral three times a day  polyethylene glycol 3350 17 Gram(s) Oral daily    MEDICATIONS  (PRN):  ketorolac   Injectable 15 milliGRAM(s) IV Push every 6 hours PRN Moderate Pain (4 - 6)  oxyCODONE    IR 2.5 milliGRAM(s) Oral every 3 hours PRN Moderate Pain (4 - 6)  oxyCODONE    IR 5 milliGRAM(s) Oral every 4 hours PRN Severe Pain (7 - 10)  senna 2 Tablet(s) Oral at bedtime PRN Constipation  zolpidem 5 milliGRAM(s) Oral at bedtime PRN Insomnia      Allergies    No Known Allergies    Intolerances          LABS:                          8.4    6.89  )-----------( 260      ( 25 Nov 2019 06:30 )             26.8     11-25    139  |  102  |  21.0<H>  ----------------------------<  106  4.6   |  25.0  |  0.86    Ca    8.6      25 Nov 2019 06:30  Phos  2.8     11-24  Mg     2.0     11-24            RADIOLOGY & ADDITIONAL TESTS:

## 2019-11-25 NOTE — PROGRESS NOTE ADULT - SUBJECTIVE AND OBJECTIVE BOX
Patient feels "weak" all over, pain controlled, feels frustrated    REVIEW OF SYSTEMS  Constitutional - No fever,  No fatigue  HEENT - No vertigo, No neck pain  Neurological - No headaches, No memory loss, No loss of strength, No numbness, No tremors  Skin - No rashes, No lesions   Musculoskeletal - +joint pain, + muscle pain  Psychiatric - No depression, No anxiety    FUNCTIONAL PROGRESS  11/24    Bed Mobility  Bed Mobility Training Sit-to-Supine: minimum assist (75% patient effort);  1 person assist;  verbal cues;  bed rails  Bed Mobility Training Supine-to-Sit: minimum assist (75% patient effort);  1 person assist;  verbal cues;  bed rails  Bed Mobility Training Limitations: pain;  decreased strength;  decreased ROM;  decreased flexibility;  decreased ability to use legs for bridging/pushing    Sit-Stand Transfer Training  Transfer Training Sit-to-Stand Transfer: minimum assist (75% patient effort);  1 person assist;  verbal cues;  weight-bearing as tolerated   Left LE   rolling walker  Transfer Training Stand-to-Sit Transfer: minimum assist (75% patient effort);  verbal cues;  1 person assist;  weight-bearing as tolerated   Left LE   rolling walker  Sit-to-Stand Transfer Training Transfer Safety Analysis: pain;  decreased strength;  decreased ROM;  rolling walker    Gait Training  Gait Training: minimum assist (75% patient effort);  1 person assist;  verbal cues;  weight-bearing as tolerated   Left LE   rolling walker;  35ft  Gait Analysis: 3-point gait   decreased keyon;  decreased hip/knee flexion;  decreased step length;  decreased stride length;  decreased ROM;  decreased strength;  pain;  35ft;  rolling walker    Therapeutic Exercise  Therapeutic Exercise Rehab Effort: good  Therapeutic Exercise Detail: Ther ex of LE included ankle pumps and knee extension, pt demonstrated difficulty performing knee extension on Left. Pt instructed to perform as able throughout day. Pt verbalized understanding of therapeutic exercises.     Lower Body Dressing Training Assistance: minimum assist (75% patient effort);  1 person assist;  decreased activity endurance;  decreased strength    Upper Body Dressing Training  Upper Body Dressing Training Assistance: modified independent to don gown on front and back;  supervision;  while sitting at endge    Grooming Training  Grooming Training Assistance: supervsion;  verbal cues to stand up without bending over to lean on sink/counter ;  With RW;  to brush teeth, wash face, and hands    Toilet Training  Toilet Training Assistance: supervsion;  for hyigene and perineal care;  commode over toilet;  cues to use hands on grab bar during transfer for safety.      VITALS  T(C): 36.8 (11-25-19 @ 07:35), Max: 36.9 (11-24-19 @ 21:37)  HR: 87 (11-25-19 @ 07:35) (78 - 87)  BP: 117/73 (11-25-19 @ 07:35) (102/62 - 120/69)  RR: 18 (11-25-19 @ 07:35) (17 - 18)  SpO2: 97% (11-25-19 @ 07:35) (94% - 97%)  Wt(kg): --      Constitutional - NAD, Comfortable  HEENT - NCAT  Neck - Supple, No limited ROM  Chest - good chest expansion, good respiratory effort  Cardio - warm and well perfused  Abdomen -  Soft, NTND  Extremities - trace left LE swelling, left hip surgical dressings x3 clean and intact  Neurologic Exam -                    Cognitive - Awake, Alert, AAOx3     Communication - Fluent, No dysarthria     Cranial Nerves - CN 2-12 grossly intact     Motor -   left proximal LE exam limited 2/2 pain                    LEFT    UE - ShAB 5/5, EF 5/5, EE 5/5,   WNL                    RIGHT UE - ShAB 5/5, EF 5/5, EE 5/5,,  WNL                    LEFT    LE - HF NT, KE >2/5, DF 5/5, PF 5/5                    RIGHT LE - HF 5/5, KE 5/5, DF 5/5, PF 5/5        Sensory - Intact to LT x 4 extremities      Reflexes - DTR Intact, No primitive reflexive  Psychiatric - Mood stable, Affect WNL      MEDICATIONS   acetaminophen   Tablet .. 975 milliGRAM(s) every 8 hours  ceFAZolin   IVPB 2000 milliGRAM(s) once  DULoxetine 60 milliGRAM(s) daily  enoxaparin Injectable 40 milliGRAM(s) every 24 hours  gabapentin 300 milliGRAM(s) three times a day  ketorolac   Injectable 15 milliGRAM(s) every 6 hours PRN  oxyCODONE    IR 2.5 milliGRAM(s) every 3 hours PRN  oxyCODONE    IR 5 milliGRAM(s) every 4 hours PRN  polyethylene glycol 3350 17 Gram(s) daily  senna 2 Tablet(s) at bedtime PRN  zolpidem 5 milliGRAM(s) at bedtime PRN      RECENT LABS - Reviewed                        8.4    6.89  )-----------( 260      ( 25 Nov 2019 06:30 )             26.8     11-25    139  |  102  |  21.0<H>  ----------------------------<  106  4.6   |  25.0  |  0.86    Ca    8.6      25 Nov 2019 06:30  Phos  2.8     11-24  Mg     2.0     11-24      ASSESSMENT/PLAN  89 y/o F with comminuted LEFT hip intertrochanteric fracture 2/2 mechanical fall s/p left HIP IMN, with functional, gait, and ADL impairments.    Disposition - Pt has significant functional impairments versus baseline, which is independent in the community and driving.  Needs acute inpatient rehabilitation for intensive therapies (PT, OT) to restore her function towards independence, while being closely monitored for her ongoing medical issues, which can quickly destabilize if not closely monitored and managed.  Expected to tolerate and benefit from 3 hrs/day, >=5 days/wk of multidisciplinary therapies. Recommend acute inpatient rehabilitation once deemed medically and surgically stable as per the primary treating team(s).  PT - ROM, Bed Mob, Transfers, Amb with AD   OT - ADLs, ROM  Pain - continue with oxycodone and tylenol prn before therapies, consider adding Lidoderm patch for left hip pain  Precautions - Falls, Cardiac  DVT Prophylaxis - Lovenox as per primary team  Weight bearing status -WBAT  Skin - Turn Q2hrs  Diet - Regular   Thank you for allowing us to participate in this patient's care.

## 2019-11-25 NOTE — DIETITIAN INITIAL EVALUATION ADULT. - CONTINUE CURRENT NUTRITION CARE PLAN
yes/-- add Ensure Enlive TID to optimize po intake and provide an additional 350 kcal, 20g protein per serving.

## 2019-11-25 NOTE — DIETITIAN INITIAL EVALUATION ADULT. - ETIOLOGY
related to inability to meet sufficient protein-energy in setting of advanced age, depression, and prolonged decreased appetite

## 2019-11-25 NOTE — CHART NOTE - NSCHARTNOTEFT_GEN_A_CORE
Upon Nutritional Assessment by the Registered Dietitian your patient was determined to meet criteria / has evidence of the following diagnosis/diagnoses:          [ ]  Mild Protein Calorie Malnutrition        [ ]  Moderate Protein Calorie Malnutrition        [x] Severe Protein Calorie Malnutrition        [ ] Unspecified Protein Calorie Malnutrition        [ ] Underweight / BMI <19        [ ] Morbid Obesity / BMI > 40    Pt presents at high nutrition risk secondary to malnutrition (severe, chronic) related to inability to meet sufficient protein-energy in setting of advanced age, depression, and prolonged decreased appetite as evidenced by meeting <75% nutrient needs >1 month, moderate muscle loss of temples, clavicles, shoulders, moderate fat loss of orbitals and triceps.    Findings as based on:  •  Comprehensive nutrition assessment and consultation  •  Calorie counts (nutrient intake analysis)  •  Food acceptance and intake status from observations by staff  •  Follow up  •  Patient education  •  Intervention secondary to interdisciplinary rounds  •   concerns      Treatment:    The following has been recommended:    1) Continue diet as tolerated.  2) Add Ensure Enlive TID to optimize po intake and provide an additional 350 kcal, 20g protein per serving.  3) Rx: MVI and vit C 500mg daily.  4) Continue bowel regimen.  5) Encourage po intake.  6) Obtain daily weights to monitor trends.      PROVIDER Section:     By signing this assessment you are acknowledging and agree with the diagnosis/diagnoses assigned by the Registered Dietitian    Comments:

## 2019-11-25 NOTE — PROGRESS NOTE ADULT - ASSESSMENT
The patient is an  89 year old female with a past medical history of  anemia, depression, myelodysplastic disorder, OA, neuropathy, hip replacement (july 2018) who presented to ED with mechanical fall. Found to have left Hip fracture.  In the ED found to have  impacted comminuted left intertrochanteric fracture s/p IMN. Evaluated by PT, recommended Acute rehab. Noted to have post operative anemia with no acute signs or symptoms of active bleeding. To continue to monitor.     Assessment/Plan:    1. Left hip fracture status post IM Nail: Pain controlled  PT- Acute rehab  Bowel regimen ordered  VTE- on Lovenox subcut       2. Anemia: Likely post op. Continue to monitor Hb/Hct    3. Depression Cymbalta     Discharge disposition: Discharge to [place] rehab in [place]

## 2019-11-25 NOTE — DIETITIAN INITIAL EVALUATION ADULT. - ADD RECOMMEND
Rx: MVI and vit C 500mg daily. Encourage po intake at all meals. Continue bowel regimen. Obtain daily weights to monitor trends.

## 2019-11-25 NOTE — DIETITIAN INITIAL EVALUATION ADULT. - PERTINENT MEDS FT
MEDICATIONS  (STANDING):  acetaminophen   Tablet .. 975 milliGRAM(s) Oral every 8 hours  ceFAZolin   IVPB 2000 milliGRAM(s) IV Intermittent once  DULoxetine 60 milliGRAM(s) Oral daily  enoxaparin Injectable 40 milliGRAM(s) SubCutaneous every 24 hours  gabapentin 300 milliGRAM(s) Oral three times a day  polyethylene glycol 3350 17 Gram(s) Oral daily    MEDICATIONS  (PRN):  ketorolac   Injectable 15 milliGRAM(s) IV Push every 6 hours PRN Moderate Pain (4 - 6)  oxyCODONE    IR 2.5 milliGRAM(s) Oral every 3 hours PRN Moderate Pain (4 - 6)  oxyCODONE    IR 5 milliGRAM(s) Oral every 4 hours PRN Severe Pain (7 - 10)  senna 2 Tablet(s) Oral at bedtime PRN Constipation  zolpidem 5 milliGRAM(s) Oral at bedtime PRN Insomnia

## 2019-11-26 ENCOUNTER — INPATIENT (INPATIENT)
Facility: HOSPITAL | Age: 84
LOS: 13 days | Discharge: HOME HEALTH | DRG: 949 | End: 2019-12-10
Attending: PHYSICAL MEDICINE & REHABILITATION | Admitting: PHYSICAL MEDICINE & REHABILITATION
Payer: MEDICARE

## 2019-11-26 ENCOUNTER — TRANSCRIPTION ENCOUNTER (OUTPATIENT)
Age: 84
End: 2019-11-26

## 2019-11-26 VITALS
RESPIRATION RATE: 18 BRPM | TEMPERATURE: 98 F | DIASTOLIC BLOOD PRESSURE: 66 MMHG | SYSTOLIC BLOOD PRESSURE: 129 MMHG | OXYGEN SATURATION: 98 % | HEART RATE: 96 BPM

## 2019-11-26 VITALS
OXYGEN SATURATION: 95 % | WEIGHT: 147.27 LBS | SYSTOLIC BLOOD PRESSURE: 132 MMHG | TEMPERATURE: 98 F | DIASTOLIC BLOOD PRESSURE: 74 MMHG | HEART RATE: 86 BPM | RESPIRATION RATE: 15 BRPM

## 2019-11-26 DIAGNOSIS — Z98.890 OTHER SPECIFIED POSTPROCEDURAL STATES: Chronic | ICD-10-CM

## 2019-11-26 DIAGNOSIS — Z90.49 ACQUIRED ABSENCE OF OTHER SPECIFIED PARTS OF DIGESTIVE TRACT: Chronic | ICD-10-CM

## 2019-11-26 DIAGNOSIS — Z86.79 PERSONAL HISTORY OF OTHER DISEASES OF THE CIRCULATORY SYSTEM: Chronic | ICD-10-CM

## 2019-11-26 DIAGNOSIS — Z90.721 ACQUIRED ABSENCE OF OVARIES, UNILATERAL: Chronic | ICD-10-CM

## 2019-11-26 DIAGNOSIS — H26.9 UNSPECIFIED CATARACT: Chronic | ICD-10-CM

## 2019-11-26 DIAGNOSIS — S72.146A NONDISPLACED INTERTROCHANTERIC FRACTURE OF UNSPECIFIED FEMUR, INITIAL ENCOUNTER FOR CLOSED FRACTURE: ICD-10-CM

## 2019-11-26 LAB
HCT VFR BLD CALC: 26.9 % — LOW (ref 34.5–45)
HGB BLD-MCNC: 8.2 G/DL — LOW (ref 11.5–15.5)
MCHC RBC-ENTMCNC: 30 PG — SIGNIFICANT CHANGE UP (ref 27–34)
MCHC RBC-ENTMCNC: 30.5 GM/DL — LOW (ref 32–36)
MCV RBC AUTO: 98.5 FL — SIGNIFICANT CHANGE UP (ref 80–100)
PLATELET # BLD AUTO: 266 K/UL — SIGNIFICANT CHANGE UP (ref 150–400)
RBC # BLD: 2.73 M/UL — LOW (ref 3.8–5.2)
RBC # FLD: 13.9 % — SIGNIFICANT CHANGE UP (ref 10.3–14.5)
WBC # BLD: 7.45 K/UL — SIGNIFICANT CHANGE UP (ref 3.8–10.5)
WBC # FLD AUTO: 7.45 K/UL — SIGNIFICANT CHANGE UP (ref 3.8–10.5)

## 2019-11-26 PROCEDURE — 70450 CT HEAD/BRAIN W/O DYE: CPT

## 2019-11-26 PROCEDURE — 84100 ASSAY OF PHOSPHORUS: CPT

## 2019-11-26 PROCEDURE — 93005 ELECTROCARDIOGRAM TRACING: CPT

## 2019-11-26 PROCEDURE — C1769: CPT

## 2019-11-26 PROCEDURE — 96375 TX/PRO/DX INJ NEW DRUG ADDON: CPT

## 2019-11-26 PROCEDURE — 36415 COLL VENOUS BLD VENIPUNCTURE: CPT

## 2019-11-26 PROCEDURE — 85730 THROMBOPLASTIN TIME PARTIAL: CPT

## 2019-11-26 PROCEDURE — 99239 HOSP IP/OBS DSCHRG MGMT >30: CPT

## 2019-11-26 PROCEDURE — 80048 BASIC METABOLIC PNL TOTAL CA: CPT

## 2019-11-26 PROCEDURE — 97110 THERAPEUTIC EXERCISES: CPT

## 2019-11-26 PROCEDURE — 71045 X-RAY EXAM CHEST 1 VIEW: CPT

## 2019-11-26 PROCEDURE — 72170 X-RAY EXAM OF PELVIS: CPT

## 2019-11-26 PROCEDURE — 72192 CT PELVIS W/O DYE: CPT

## 2019-11-26 PROCEDURE — 97167 OT EVAL HIGH COMPLEX 60 MIN: CPT

## 2019-11-26 PROCEDURE — 86850 RBC ANTIBODY SCREEN: CPT

## 2019-11-26 PROCEDURE — 85027 COMPLETE CBC AUTOMATED: CPT

## 2019-11-26 PROCEDURE — 99233 SBSQ HOSP IP/OBS HIGH 50: CPT

## 2019-11-26 PROCEDURE — 73502 X-RAY EXAM HIP UNI 2-3 VIEWS: CPT

## 2019-11-26 PROCEDURE — 80053 COMPREHEN METABOLIC PANEL: CPT

## 2019-11-26 PROCEDURE — 96374 THER/PROPH/DIAG INJ IV PUSH: CPT

## 2019-11-26 PROCEDURE — 97116 GAIT TRAINING THERAPY: CPT

## 2019-11-26 PROCEDURE — 85610 PROTHROMBIN TIME: CPT

## 2019-11-26 PROCEDURE — 97535 SELF CARE MNGMENT TRAINING: CPT

## 2019-11-26 PROCEDURE — 86900 BLOOD TYPING SEROLOGIC ABO: CPT

## 2019-11-26 PROCEDURE — 97530 THERAPEUTIC ACTIVITIES: CPT

## 2019-11-26 PROCEDURE — 86901 BLOOD TYPING SEROLOGIC RH(D): CPT

## 2019-11-26 PROCEDURE — 96376 TX/PRO/DX INJ SAME DRUG ADON: CPT

## 2019-11-26 PROCEDURE — 97163 PT EVAL HIGH COMPLEX 45 MIN: CPT

## 2019-11-26 PROCEDURE — C1713: CPT

## 2019-11-26 PROCEDURE — 76000 FLUOROSCOPY <1 HR PHYS/QHP: CPT

## 2019-11-26 PROCEDURE — 83735 ASSAY OF MAGNESIUM: CPT

## 2019-11-26 PROCEDURE — 72125 CT NECK SPINE W/O DYE: CPT

## 2019-11-26 PROCEDURE — 99285 EMERGENCY DEPT VISIT HI MDM: CPT | Mod: 25

## 2019-11-26 RX ORDER — ENOXAPARIN SODIUM 100 MG/ML
40 INJECTION SUBCUTANEOUS EVERY 24 HOURS
Refills: 0 | Status: DISCONTINUED | OUTPATIENT
Start: 2019-11-26 | End: 2019-12-10

## 2019-11-26 RX ORDER — OXYCODONE HYDROCHLORIDE 5 MG/1
2.5 TABLET ORAL
Refills: 0 | Status: DISCONTINUED | OUTPATIENT
Start: 2019-11-26 | End: 2019-11-29

## 2019-11-26 RX ORDER — SENNA PLUS 8.6 MG/1
2 TABLET ORAL AT BEDTIME
Refills: 0 | Status: DISCONTINUED | OUTPATIENT
Start: 2019-11-26 | End: 2019-12-10

## 2019-11-26 RX ORDER — OXYCODONE HYDROCHLORIDE 5 MG/1
5 TABLET ORAL EVERY 4 HOURS
Refills: 0 | Status: DISCONTINUED | OUTPATIENT
Start: 2019-11-26 | End: 2019-11-29

## 2019-11-26 RX ORDER — GABAPENTIN 400 MG/1
300 CAPSULE ORAL THREE TIMES A DAY
Refills: 0 | Status: DISCONTINUED | OUTPATIENT
Start: 2019-11-26 | End: 2019-12-10

## 2019-11-26 RX ORDER — ZOLPIDEM TARTRATE 10 MG/1
5 TABLET ORAL AT BEDTIME
Refills: 0 | Status: DISCONTINUED | OUTPATIENT
Start: 2019-11-26 | End: 2019-11-27

## 2019-11-26 RX ORDER — DULOXETINE HYDROCHLORIDE 30 MG/1
60 CAPSULE, DELAYED RELEASE ORAL DAILY
Refills: 0 | Status: DISCONTINUED | OUTPATIENT
Start: 2019-11-26 | End: 2019-12-10

## 2019-11-26 RX ORDER — PANTOPRAZOLE SODIUM 20 MG/1
40 TABLET, DELAYED RELEASE ORAL
Refills: 0 | Status: DISCONTINUED | OUTPATIENT
Start: 2019-11-26 | End: 2019-12-10

## 2019-11-26 RX ORDER — ACETAMINOPHEN 500 MG
975 TABLET ORAL EVERY 8 HOURS
Refills: 0 | Status: COMPLETED | OUTPATIENT
Start: 2019-11-26 | End: 2019-11-29

## 2019-11-26 RX ORDER — POLYETHYLENE GLYCOL 3350 17 G/17G
17 POWDER, FOR SOLUTION ORAL DAILY
Refills: 0 | Status: DISCONTINUED | OUTPATIENT
Start: 2019-11-26 | End: 2019-12-10

## 2019-11-26 RX ADMIN — ZOLPIDEM TARTRATE 5 MILLIGRAM(S): 10 TABLET ORAL at 22:27

## 2019-11-26 RX ADMIN — GABAPENTIN 300 MILLIGRAM(S): 400 CAPSULE ORAL at 22:23

## 2019-11-26 RX ADMIN — Medication 975 MILLIGRAM(S): at 05:43

## 2019-11-26 RX ADMIN — GABAPENTIN 300 MILLIGRAM(S): 400 CAPSULE ORAL at 05:40

## 2019-11-26 RX ADMIN — OXYCODONE HYDROCHLORIDE 5 MILLIGRAM(S): 5 TABLET ORAL at 22:21

## 2019-11-26 RX ADMIN — GABAPENTIN 300 MILLIGRAM(S): 400 CAPSULE ORAL at 14:06

## 2019-11-26 RX ADMIN — Medication 975 MILLIGRAM(S): at 15:00

## 2019-11-26 RX ADMIN — DULOXETINE HYDROCHLORIDE 60 MILLIGRAM(S): 30 CAPSULE, DELAYED RELEASE ORAL at 11:06

## 2019-11-26 RX ADMIN — OXYCODONE HYDROCHLORIDE 5 MILLIGRAM(S): 5 TABLET ORAL at 19:28

## 2019-11-26 RX ADMIN — Medication 975 MILLIGRAM(S): at 05:40

## 2019-11-26 RX ADMIN — Medication 975 MILLIGRAM(S): at 22:27

## 2019-11-26 RX ADMIN — ENOXAPARIN SODIUM 40 MILLIGRAM(S): 100 INJECTION SUBCUTANEOUS at 19:31

## 2019-11-26 RX ADMIN — ENOXAPARIN SODIUM 40 MILLIGRAM(S): 100 INJECTION SUBCUTANEOUS at 05:40

## 2019-11-26 RX ADMIN — DULOXETINE HYDROCHLORIDE 60 MILLIGRAM(S): 30 CAPSULE, DELAYED RELEASE ORAL at 22:22

## 2019-11-26 RX ADMIN — Medication 975 MILLIGRAM(S): at 23:00

## 2019-11-26 RX ADMIN — OXYCODONE HYDROCHLORIDE 5 MILLIGRAM(S): 5 TABLET ORAL at 12:05

## 2019-11-26 RX ADMIN — OXYCODONE HYDROCHLORIDE 5 MILLIGRAM(S): 5 TABLET ORAL at 11:05

## 2019-11-26 RX ADMIN — POLYETHYLENE GLYCOL 3350 17 GRAM(S): 17 POWDER, FOR SOLUTION ORAL at 19:33

## 2019-11-26 RX ADMIN — Medication 975 MILLIGRAM(S): at 14:04

## 2019-11-26 RX ADMIN — POLYETHYLENE GLYCOL 3350 17 GRAM(S): 17 POWDER, FOR SOLUTION ORAL at 11:06

## 2019-11-26 NOTE — H&P ADULT - ATTENDING COMMENTS
89yo Female with comminuted LEFT hip intertrochanteric fracture 2/2 mechanical fall s/p left HIP IMN, with functional, gait, and ADL impairments.  Stopped prn ambien , started melatonin for sleep.   Start inpatient rehab program  Vital Signs Last 24 Hrs  T(C): 36.8 (27 Nov 2019 08:46), Max: 36.8 (26 Nov 2019 17:08)  T(F): 98.2 (27 Nov 2019 08:46), Max: 98.3 (26 Nov 2019 20:30)  HR: 74 (27 Nov 2019 08:46) (74 - 96)  BP: 108/69 (27 Nov 2019 08:46) (108/69 - 132/74)  BP(mean): --  RR: 12 (27 Nov 2019 08:46) (12 - 18)      MEDICAL PROGNOSIS: GOOD                                   REHAB POTENTIAL: GOOD  ESTIMATED DISPOSITION: HOME                             ELOS: 10-14 Days   EXPECTED THERAPY:     P.T. 2hr/day       O.T. 1hr/day      S.L.P. 0hr/day      EXP FREQUENCY: 5 days per 7 day period     PRESCREEN COMPARISION: I have reviewed the prescreen information and I have found no relevent changes between the preadmission screening and my post admission evaulation     RATIONALE FOR INPATIENT ADMISSION - Patient demonstrates the following: (check all that apply)  [X] Medically appropriate for rehabilitation admission  [X] Has attainable rehab goals with an appropriate initial discharge plan  [X] Has rehabilitation potential (expected to make a significant improvement within a reasonable period of time)   [X] Requires close medical managment by a rehab physician, rehab nursing care, Hospitalist and comprehensive interdisciplinary team (including PT, OT, & or SLP, Prosthetics and Orthotics)      SpO2: 96% (27 Nov 2019 08:46) (93% - 98%)

## 2019-11-26 NOTE — PROGRESS NOTE ADULT - SUBJECTIVE AND OBJECTIVE BOX
No new complaints, continues to have left hip pain, some relief with pain meds.     REVIEW OF SYSTEMS  Constitutional - No fever,  No fatigue  HEENT - No vertigo, No neck pain  Neurological - No headaches, No memory loss, No loss of strength, No numbness, No tremors  Skin - No rashes, No lesions   Psychiatric - No depression, No anxiety    FUNCTIONAL PROGRESS  11/25    Sit-Stand Transfer Training  Transfer Training Sit-to-Stand Transfer: minimum assist (75% patient effort);  1 person assist;  weight-bearing as tolerated   rolling walker  Transfer Training Stand-to-Sit Transfer: minimum assist (75% patient effort);  1 person assist;  weight-bearing as tolerated   rolling walker  Sit-to-Stand Transfer Training Transfer Safety Analysis: impaired balance    Gait Training  Gait Training: minimum assist (75% patient effort);  weight-bearing as tolerated   rolling walker;  25 feet  Gait Analysis: 2-point gait   decreased keyon;  decreased step length;  impaired balance;  decreased strength;  decreased ROM;  25 feet;  rolling walker        VITALS  T(C): 36.6 (11-26-19 @ 08:15), Max: 36.9 (11-25-19 @ 15:53)  HR: 90 (11-26-19 @ 08:15) (82 - 90)  BP: 123/78 (11-26-19 @ 08:15) (92/62 - 127/80)  RR: 18 (11-26-19 @ 08:15) (16 - 18)  SpO2: 95% (11-26-19 @ 08:15) (95% - 99%)  Wt(kg): --      Constitutional - NAD, Comfortable  HEENT - NCAT  Neck - Supple, No limited ROM  Chest - good chest expansion, good respiratory effort  Cardio - warm and well perfused  Abdomen -  Soft, NTND  Extremities - trace left LE swelling, left hip surgical dressings x3 clean and intact  Neurologic Exam -                    Cognitive - Awake, Alert, AAOx3     Communication - Fluent, No dysarthria     Cranial Nerves - CN 2-12 grossly intact     Motor -   left proximal LE exam limited 2/2 pain                    LEFT    UE - ShAB 5/5, EF 5/5, EE 5/5,   WNL                    RIGHT UE - ShAB 5/5, EF 5/5, EE 5/5,,  WNL                    LEFT    LE - HF NT, KE >2/5, DF 5/5, PF 5/5                    RIGHT LE - HF 5/5, KE 5/5, DF 5/5, PF 5/5        Sensory - Intact to LT x 4 extremities      Reflexes - DTR Intact, No primitive reflexive  Psychiatric - Mood stable, Affect WNL        MEDICATIONS   acetaminophen   Tablet .. 975 milliGRAM(s) every 8 hours  ceFAZolin   IVPB 2000 milliGRAM(s) once  DULoxetine 60 milliGRAM(s) daily  enoxaparin Injectable 40 milliGRAM(s) every 24 hours  gabapentin 300 milliGRAM(s) three times a day  ketorolac   Injectable 15 milliGRAM(s) every 6 hours PRN  oxyCODONE    IR 2.5 milliGRAM(s) every 3 hours PRN  oxyCODONE    IR 5 milliGRAM(s) every 4 hours PRN  polyethylene glycol 3350 17 Gram(s) daily  senna 2 Tablet(s) at bedtime PRN  zolpidem 5 milliGRAM(s) at bedtime PRN      RECENT LABS - Reviewed                        8.2    7.45  )-----------( 266      ( 26 Nov 2019 06:32 )             26.9     11-25    139  |  102  |  21.0<H>  ----------------------------<  106  4.6   |  25.0  |  0.86    Ca    8.6      25 Nov 2019 06:30        ASSESSMENT/PLAN  89 y/o F with comminuted LEFT hip intertrochanteric fracture 2/2 mechanical fall s/p left HIP IMN, with functional, gait, and ADL impairments.    Disposition - Pt has significant functional impairments versus baseline, which is independent in the community and driving.  Needs acute inpatient rehabilitation for intensive therapies (PT, OT) to restore her function towards independence, while being closely monitored for her ongoing medical issues, which can quickly destabilize if not closely monitored and managed.  Expected to tolerate and benefit from 3 hrs/day, >=5 days/wk of multidisciplinary therapies. Recommend acute inpatient rehabilitation once deemed medically and surgically stable as per the primary treating team(s).  PT - ROM, Bed Mob, Transfers, Amb with AD   OT - ADLs, ROM  Pain - continue with oxycodone and tylenol prn before therapies, consider adding Lidoderm patch for left hip pain  Precautions - Falls, Cardiac  DVT Prophylaxis - Lovenox as per primary team  Weight bearing status -WBAT  Skin - Turn Q2hrs  Diet - Regular   Thank you for allowing us to participate in this patient's care.

## 2019-11-26 NOTE — H&P ADULT - NSHPPHYSICALEXAM_GEN_ALL_CORE
Constitutional - NAD, Comfortable  HEENT - NCAT  Neck - Supple, No limited ROM  Chest - good chest expansion, good respiratory effort  Cardio - warm and well perfused  Abdomen -  Soft, NTND  Extremities - trace left LE swelling, left hip surgical dressings x3 clean and intact  Neurologic Exam -                    Cognitive - Awake, Alert, AAOx3     Communication - Fluent, No dysarthria     Cranial Nerves - CN 2-12 grossly intact     Motor -   left proximal LE exam limited 2/2 pain                    LEFT    UE - ShAB 5/5, EF 5/5, EE 5/5,   WNL                    RIGHT UE - ShAB 5/5, EF 5/5, EE 5/5,,  WNL                    LEFT    LE - HF NT, KE >2/5, DF 5/5, PF 5/5                    RIGHT LE - HF 5/5, KE 5/5, DF 5/5, PF 5/5        Sensory - Intact to LT x 4 extremities      Reflexes - DTR Intact, No primitive reflexive  Psychiatric - Mood stable, Affect WNL Constitutional - NAD, Comfortable  HEENT - NCAT  Neck - Supple, No limited ROM  Chest - good chest expansion, good respiratory effort  Cardio - warm and well perfused  Abdomen -  Soft, NTND  Extremities - trace left LE swelling, left hip surgical dressings x3 clean and intact  Neurologic Exam -                    Cognitive - Awake, Alert, AAOx3     Communication - Fluent, No dysarthria     Cranial Nerves - CN 2-12 grossly intact     Motor -   left proximal LE exam limited 2/2 pain                    LEFT    UE - ShAB 5/5, EF 5/5, EE 5/5,   WNL                    RIGHT UE - ShAB 5/5, EF 5/5, EE 5/5,,  WNL                    LEFT    LE - HF limited 2/2 pain, KE >2/5, DF 5/5, PF 5/5                    RIGHT LE - HF 5/5, KE 5/5, DF 5/5, PF 5/5        Sensory - Intact to LT x 4 extremities      Reflexes - DTR Intact, No primitive reflexive  Psychiatric - Mood stable, Affect WNL VS  T(C): 36.8 (11-26 @ 17:08)  HR: 86 (11-26 @ 17:08)  BP: 132/74 (11-26 @ 17:08)  RR: 15 (11-26 @ 17:08)  SpO2: 95% (11-26 @ 17:08)    Constitutional - NAD, Comfortable  HEENT - NCAT  Neck - Supple, No limited ROM  Chest - good chest expansion, good respiratory effort  Cardio - warm and well perfused  Abdomen -  Soft, NTND  Extremities - LLE edema, left hip surgical dressings x3 clean and intact, ecchymosis noted at left medial thigh and lateral thigh  Neurologic Exam -                    Cognitive - Awake, Alert, AAOx3     Communication - Fluent, No dysarthria     Cranial Nerves - CN 2-12 grossly intact     Motor -   left proximal LE exam limited 2/2 pain                    LEFT    UE - 5/5                    RIGHT UE - 5/5                    LEFT    LE - HF limited 2/2 pain, KE 5/5, DF 5/5, PF 5/5                    RIGHT LE - 5/5        Sensory - Intact to LT x 4 extremities      Reflexes - 2+ bilateral biceps,1+ bilateral patellar, negative babinski bilateral   Psychiatric - Mood stable, Affect WNL

## 2019-11-26 NOTE — PROGRESS NOTE ADULT - ASSESSMENT
The patient is an  89 year old female with a past medical history of  anemia, depression, myelodysplastic disorder, OA, neuropathy, hip replacement (july 2018) who presented to ED with mechanical fall. Found to have left Hip fracture.  In the ED found to have  impacted comminuted left intertrochanteric fracture s/p IMN. Evaluated by PT, recommended Acute rehab. Noted to have post operative anemia with no acute signs or symptoms of active bleeding. To continue to monitor.     Assessment/Plan:    1. Left hip fracture status post IM Nail: Pain controlled  PT- Acute rehab  Bowel regimen ordered  VTE- on Lovenox subcut       2. Anemia: Likely post op. Continue to monitor Hb/Hct    3. Depression Cymbalta     Discharge disposition: Discharge to acute rehab

## 2019-11-26 NOTE — DISCHARGE NOTE NURSING/CASE MANAGEMENT/SOCIAL WORK - NSDCPELOVENOX_GEN_ALL_CORE
Enoxaparin/Lovenox - Potential for adverse drug reactions and interactions/Enoxaparin/Lovenox - Compliance/Enoxaparin/Lovenox - Dietary Advice/Enoxaparin/Lovenox - Follow up monitoring

## 2019-11-26 NOTE — DISCHARGE NOTE NURSING/CASE MANAGEMENT/SOCIAL WORK - PATIENT PORTAL LINK FT
You can access the FollowMyHealth Patient Portal offered by [facility] by registering at the following website: [email or web address]. By joining [email or web address]’s FollowMyHealth portal, you will also be able to view your health information using other applications (apps) compatible with our system.

## 2019-11-26 NOTE — H&P ADULT - NSHPREVIEWOFSYSTEMS_GEN_ALL_CORE
REVIEW OF SYSTEMS  Constitutional - Denies fevers, chills  HEENT - Denies changes in vision or hearing  Respiratory - Denies cough, dyspnea  Cardiovascular - Denies chest pain, palpitations  Gastrointestinal - Denies n/v, constipation, bowel incontinence  Genitourinary - Denies dysuria, urinary incontinence  Neurological - Denies weakness, numbness, headaches  Skin - Denies rashes  Musculoskeletal - Denies arthralgia, myalgias, back pain  Psychiatric - Denies depressed mood, anxiety

## 2019-11-26 NOTE — H&P ADULT - HISTORY OF PRESENT ILLNESS
89F with PMH of OA s/p right INÉS, anemia, depression, insomnia, myelodysplastic disorder, neuropathy, hx GBS presented to [facility] on 11/20/19 s/p mechanical fall. Patient states she was rushing to go to the bathroom, tripped and fell on her left side onto the tub. Denies head strike or LOC. Denies preceding HA, lightheadness, dizziness, vision changes, CP, SOB, palpitations. Complained of left hip/groin pain in ED, denied numbness or tingling. Imaging showed an impacted, comminuted left intertrochanteric fracture. Orthopedics was consulted and patient is s/p left hip IMN 11/21/19. Post-op course with anemia, which is stable. Patient deemed medically stable for discharge to IRF on 11/26/19 NILSA VILLEDA is a 89yo Female with PMH of OA s/p right INÉS, anemia, depression, insomnia, myelodysplastic disorder, neuropathy, hx GBS presented to [facility] on 11/20/19 s/p mechanical fall. Patient states she was rushing to go to the bathroom, tripped and fell on her left side onto the tub. Denies head strike or LOC. Denies preceding HA, lightheadness, dizziness, vision changes, CP, SOB, palpitations. Complained of left hip/groin pain in ED, denied numbness or tingling. Imaging showed an impacted, comminuted left intertrochanteric fracture. Orthopedics was consulted and patient is s/p left hip IMN 11/21/19. Post-op course with anemia, which is stable. Patient deemed medically stable for discharge to IRF on 11/26/19.

## 2019-11-26 NOTE — H&P ADULT - ASSESSMENT
ASSESSMENT/PLAN  INLSA VILLEDA is a 89yo Female with  comminuted LEFT hip intertrochanteric fracture 2/2 mechanical fall s/p left HIP IMN, with functional, gait, and ADL impairments.    #LEFT hip intertrochanteric fracture s/p left HIP IMN  - Gait Instability, ADL impairments and Functional impairments: start Comprehensive Rehab Program of PT/OT  - LLE WBAT  - Lovenox x4 weeks (end 12/20)  - dressing may be removed 11/30  - Follow up with Dr. Arturo davidson in 2-3 weeks.     #post-op Anemia  - 11/26 hgb 8.2  - monitor    #Depression  - cont cymbalta 60    #Pain Mgmt   - Tylenol PRN, Oxycodone PRN  - gabapentin 300 TID    #GI/Bowel Mgmt   - Continent c/w Senna and Miralax    #/Bladder Mgmt   - Continent, PVR    #FEN   - Diet - Regular + Thins      #Precautions / PROPHYLAXIS:   - Falls  - ortho: Weight bearing status: WBAT   - Lungs: Aspiration, Incentive Spirometer   - Pressure injury/Skin: Turn Q2hrs while in bed, OOB to Chair, PT/OT    - DVT: Lovenox, SCDs, TEDs         MEDICAL PROGNOSIS: GOOD                                   REHAB POTENTIAL: GOOD  ESTIMATED DISPOSITION: HOME                             ELOS: 10-14 Days   EXPECTED THERAPY:     P.T. 2hr/day       O.T. 1hr/day      S.L.P. 0hr/day      EXP FREQUENCY: 5 days per 7 day period     PRESCREEN COMPARISION: I have reviewed the prescreen information and I have found no relevent changes between the preadmission screening and my post admission evaulation     RATIONALE FOR INPATIENT ADMISSION - Patient demonstrates the following: (check all that apply)  [X] Medically appropriate for rehabilitation admission  [X] Has attainable rehab goals with an appropriate initial discharge plan  [X] Has rehabilitation potential (expected to make a significant improvement within a reasonable period of time)   [X] Requires close medical managment by a rehab physician, rehab nursing care, Hospitalist and comprehensive interdisciplinary team (including PT, OT, & or SLP, Prosthetics and Orthotics) NILSA VILLEDA is a 89yo Female with comminuted LEFT hip intertrochanteric fracture 2/2 mechanical fall s/p left HIP IMN, with functional, gait, and ADL impairments.    #LEFT hip intertrochanteric fracture s/p left HIP IMN  - Gait Instability, ADL impairments and Functional impairments: start Comprehensive Rehab Program of PT/OT  - LLE WBAT  - Lovenox x4 weeks (end 12/20)  - dressing may be removed 11/30  - Follow up with Dr. Arturo davidson in 2-3 weeks.     #post-op Anemia  - 11/26 hgb 8.2  - monitor    #Depression  - cont cymbalta 60    #Pain Mgmt   - Tylenol PRN, Oxycodone PRN  - gabapentin 300 TID    #GI/Bowel Mgmt   - Continent c/w Senna and Miralax    #/Bladder Mgmt   - Continent, PVR    #FEN   - Diet - Regular + Thins      #Precautions / PROPHYLAXIS:   - Falls  - ortho: Weight bearing status: WBAT   - Lungs: Aspiration, Incentive Spirometer   - Pressure injury/Skin: Turn Q2hrs while in bed, OOB to Chair, PT/OT    - DVT: Lovenox, SCDs, TEDs         MEDICAL PROGNOSIS: GOOD                                   REHAB POTENTIAL: GOOD  ESTIMATED DISPOSITION: HOME                             ELOS: 10-14 Days   EXPECTED THERAPY:     P.T. 2hr/day       O.T. 1hr/day      S.L.P. 0hr/day      EXP FREQUENCY: 5 days per 7 day period     PRESCREEN COMPARISION: I have reviewed the prescreen information and I have found no relevent changes between the preadmission screening and my post admission evaulation     RATIONALE FOR INPATIENT ADMISSION - Patient demonstrates the following: (check all that apply)  [X] Medically appropriate for rehabilitation admission  [X] Has attainable rehab goals with an appropriate initial discharge plan  [X] Has rehabilitation potential (expected to make a significant improvement within a reasonable period of time)   [X] Requires close medical managment by a rehab physician, rehab nursing care, Hospitalist and comprehensive interdisciplinary team (including PT, OT, & or SLP, Prosthetics and Orthotics)

## 2019-11-26 NOTE — H&P ADULT - NSHPSOCIALHISTORY_GEN_ALL_CORE
SOCIAL HISTORY  Smoking -  Former smoker 30 pack year history  EtOH - drinks one glass of scotch per night  Drugs - Denied    FUNCTIONAL HISTORY  Lives alone in a PH with 4 JACQUI and 0 STI, laundry room in the basement  Independent in ambulation, ADL's, transfers prior to hospitalization, +driving    CURRENT FUNCTIONAL STATUS (11/22)  Bed mobility - mod assist  Transfers - min-mod assist  Gait - min-mod assist with RW  ADL's -TBA SOCIAL HISTORY  Smoking -  Former smoker 30 pack year history  EtOH - drinks one glass of scotch per night  Drugs - Denied    FUNCTIONAL HISTORY  Lives alone in a PH with 4 JACQUI and 0 STI, laundry room in the basement  Independent in ambulation, ADL's, transfers prior to hospitalization, +driving    CURRENT FUNCTIONAL STATUS  Sit-Stand Transfer Training  Transfer Training Sit-to-Stand Transfer: minimum assist (75% patient effort);  1 person assist;  weight-bearing as tolerated   rolling walker  Transfer Training Stand-to-Sit Transfer: minimum assist (75% patient effort);  1 person assist;  weight-bearing as tolerated   rolling walker  Sit-to-Stand Transfer Training Transfer Safety Analysis: impaired balance    Gait Training  Gait Training: minimum assist (75% patient effort);  weight-bearing as tolerated   rolling walker;  25 feet  Gait Analysis: 2-point gait   decreased keyon;  decreased step length;  impaired balance;  decreased strength;  decreased ROM;  25 feet;  rolling walker SOCIAL HISTORY  Smoking -  Former smoker 30 pack year history, quit 20yrs ago  EtOH - drinks one glass of scotch per night  Drugs - Denied    FUNCTIONAL HISTORY  Lives alone in a PH with 4 JACQUI and 2 steps inside to laundry room in the basement  Independent in ambulation, ADL's, transfers prior to hospitalization, +driving    CURRENT FUNCTIONAL STATUS  Sit-Stand Transfer Training  Transfer Training Sit-to-Stand Transfer: minimum assist (75% patient effort);  1 person assist;  weight-bearing as tolerated   rolling walker  Transfer Training Stand-to-Sit Transfer: minimum assist (75% patient effort);  1 person assist;  weight-bearing as tolerated   rolling walker  Sit-to-Stand Transfer Training Transfer Safety Analysis: impaired balance    Gait Training  Gait Training: minimum assist (75% patient effort);  weight-bearing as tolerated   rolling walker;  25 feet  Gait Analysis: 2-point gait   decreased keyon;  decreased step length;  impaired balance;  decreased strength;  decreased ROM;  25 feet;  rolling walker

## 2019-11-26 NOTE — H&P ADULT - NSHPLABSRESULTS_GEN_ALL_CORE
RECENT LABS/IMAGING                        8.2    7.45  )-----------( 266      ( 26 Nov 2019 06:32 )             26.9     11-25    139  |  102  |  21.0<H>  ----------------------------<  106  4.6   |  25.0  |  0.86    Ca    8.6      25 Nov 2019 06:30      < from: Xray Hip 2-3 Views, Left (11.21.19 @ 18:48) >  impression:  Status post ORIF intertrochanteric fracture with intramedullary nail and helical blade fixation. Cement is present at the femoral head neck junction.  Partially imaged is a right total hip arthroplasty, distal femoral stem not included.  There is an age indeterminate right parasymphyseal/superior pubic ramus fracture.

## 2019-11-26 NOTE — H&P ADULT - NSICDXPASTMEDICALHX_GEN_ALL_CORE_FT
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

## 2019-11-26 NOTE — PROGRESS NOTE ADULT - SUBJECTIVE AND OBJECTIVE BOX
CC: Follow up    INTERVAL HPI/OVERNIGHT EVENTS:  Patient seen and examined, no acute complaints overnight. Pain controlled.       Vital Signs Last 24 Hrs  T(C): 36.6 (26 Nov 2019 08:15), Max: 36.9 (25 Nov 2019 15:53)  T(F): 97.9 (26 Nov 2019 08:15), Max: 98.5 (25 Nov 2019 15:53)  HR: 90 (26 Nov 2019 08:15) (82 - 90)  BP: 123/78 (26 Nov 2019 08:15) (92/62 - 127/80)  BP(mean): --  RR: 18 (26 Nov 2019 08:15) (16 - 18)  SpO2: 95% (26 Nov 2019 08:15) (95% - 99%)    PHYSICAL EXAM:    GENERAL: NAD, AOX3  HEAD:  Atraumatic, Normocephalic  CHEST/LUNG: Clear to auscultation bilaterally; No rales, rhonchi, wheezing, or rubs  HEART: Regular rate and rhythm; No murmurs, rubs, or gallops  ABDOMEN: Soft, Nontender, Nondistended; Bowel sounds present  EXTREMITIES:  2+ Peripheral Pulses, No clubbing, cyanosis, or edema        MEDICATIONS  (STANDING):  acetaminophen   Tablet .. 975 milliGRAM(s) Oral every 8 hours  ceFAZolin   IVPB 2000 milliGRAM(s) IV Intermittent once  DULoxetine 60 milliGRAM(s) Oral daily  enoxaparin Injectable 40 milliGRAM(s) SubCutaneous every 24 hours  gabapentin 300 milliGRAM(s) Oral three times a day  polyethylene glycol 3350 17 Gram(s) Oral daily    MEDICATIONS  (PRN):  ketorolac   Injectable 15 milliGRAM(s) IV Push every 6 hours PRN Moderate Pain (4 - 6)  oxyCODONE    IR 2.5 milliGRAM(s) Oral every 3 hours PRN Moderate Pain (4 - 6)  oxyCODONE    IR 5 milliGRAM(s) Oral every 4 hours PRN Severe Pain (7 - 10)  senna 2 Tablet(s) Oral at bedtime PRN Constipation  zolpidem 5 milliGRAM(s) Oral at bedtime PRN Insomnia      Allergies    No Known Allergies    Intolerances          LABS:                          8.2    7.45  )-----------( 266      ( 26 Nov 2019 06:32 )             26.9     11-25    139  |  102  |  21.0<H>  ----------------------------<  106  4.6   |  25.0  |  0.86    Ca    8.6      25 Nov 2019 06:30            RADIOLOGY & ADDITIONAL TESTS:

## 2019-11-27 LAB
ALBUMIN SERPL ELPH-MCNC: 2.4 G/DL — LOW (ref 3.3–5)
ALP SERPL-CCNC: 112 U/L — SIGNIFICANT CHANGE UP (ref 40–120)
ALT FLD-CCNC: 26 U/L — SIGNIFICANT CHANGE UP (ref 10–45)
ANION GAP SERPL CALC-SCNC: 5 MMOL/L — SIGNIFICANT CHANGE UP (ref 5–17)
AST SERPL-CCNC: 41 U/L — HIGH (ref 10–40)
BASOPHILS # BLD AUTO: 0.04 K/UL — SIGNIFICANT CHANGE UP (ref 0–0.2)
BASOPHILS NFR BLD AUTO: 0.6 % — SIGNIFICANT CHANGE UP (ref 0–2)
BILIRUB SERPL-MCNC: 0.8 MG/DL — SIGNIFICANT CHANGE UP (ref 0.2–1.2)
BUN SERPL-MCNC: 20 MG/DL — SIGNIFICANT CHANGE UP (ref 7–23)
CALCIUM SERPL-MCNC: 8.6 MG/DL — SIGNIFICANT CHANGE UP (ref 8.4–10.5)
CHLORIDE SERPL-SCNC: 100 MMOL/L — SIGNIFICANT CHANGE UP (ref 96–108)
CO2 SERPL-SCNC: 31 MMOL/L — SIGNIFICANT CHANGE UP (ref 22–31)
CREAT SERPL-MCNC: 0.91 MG/DL — SIGNIFICANT CHANGE UP (ref 0.5–1.3)
EOSINOPHIL # BLD AUTO: 0.66 K/UL — HIGH (ref 0–0.5)
EOSINOPHIL NFR BLD AUTO: 9.2 % — HIGH (ref 0–6)
GLUCOSE SERPL-MCNC: 102 MG/DL — HIGH (ref 70–99)
HCT VFR BLD CALC: 26.7 % — LOW (ref 34.5–45)
HGB BLD-MCNC: 8.3 G/DL — LOW (ref 11.5–15.5)
IMM GRANULOCYTES NFR BLD AUTO: 1.2 % — SIGNIFICANT CHANGE UP (ref 0–1.5)
LYMPHOCYTES # BLD AUTO: 2.15 K/UL — SIGNIFICANT CHANGE UP (ref 1–3.3)
LYMPHOCYTES # BLD AUTO: 29.8 % — SIGNIFICANT CHANGE UP (ref 13–44)
MCHC RBC-ENTMCNC: 30.5 PG — SIGNIFICANT CHANGE UP (ref 27–34)
MCHC RBC-ENTMCNC: 31.1 GM/DL — LOW (ref 32–36)
MCV RBC AUTO: 98.2 FL — SIGNIFICANT CHANGE UP (ref 80–100)
MONOCYTES # BLD AUTO: 0.91 K/UL — HIGH (ref 0–0.9)
MONOCYTES NFR BLD AUTO: 12.6 % — SIGNIFICANT CHANGE UP (ref 2–14)
NEUTROPHILS # BLD AUTO: 3.36 K/UL — SIGNIFICANT CHANGE UP (ref 1.8–7.4)
NEUTROPHILS NFR BLD AUTO: 46.6 % — SIGNIFICANT CHANGE UP (ref 43–77)
NRBC # BLD: 0 /100 WBCS — SIGNIFICANT CHANGE UP (ref 0–0)
PLATELET # BLD AUTO: 299 K/UL — SIGNIFICANT CHANGE UP (ref 150–400)
POTASSIUM SERPL-MCNC: 4.2 MMOL/L — SIGNIFICANT CHANGE UP (ref 3.5–5.3)
POTASSIUM SERPL-SCNC: 4.2 MMOL/L — SIGNIFICANT CHANGE UP (ref 3.5–5.3)
PROT SERPL-MCNC: 6.3 G/DL — SIGNIFICANT CHANGE UP (ref 6–8.3)
RBC # BLD: 2.72 M/UL — LOW (ref 3.8–5.2)
RBC # FLD: 14.2 % — SIGNIFICANT CHANGE UP (ref 10.3–14.5)
SODIUM SERPL-SCNC: 136 MMOL/L — SIGNIFICANT CHANGE UP (ref 135–145)
WBC # BLD: 7.21 K/UL — SIGNIFICANT CHANGE UP (ref 3.8–10.5)
WBC # FLD AUTO: 7.21 K/UL — SIGNIFICANT CHANGE UP (ref 3.8–10.5)

## 2019-11-27 PROCEDURE — 99223 1ST HOSP IP/OBS HIGH 75: CPT

## 2019-11-27 PROCEDURE — 93010 ELECTROCARDIOGRAM REPORT: CPT

## 2019-11-27 RX ORDER — ZOLPIDEM TARTRATE 10 MG/1
5 TABLET ORAL AT BEDTIME
Refills: 0 | Status: DISCONTINUED | OUTPATIENT
Start: 2019-11-27 | End: 2019-11-29

## 2019-11-27 RX ORDER — LANOLIN ALCOHOL/MO/W.PET/CERES
3 CREAM (GRAM) TOPICAL AT BEDTIME
Refills: 0 | Status: DISCONTINUED | OUTPATIENT
Start: 2019-11-27 | End: 2019-12-10

## 2019-11-27 RX ADMIN — Medication 975 MILLIGRAM(S): at 13:01

## 2019-11-27 RX ADMIN — OXYCODONE HYDROCHLORIDE 5 MILLIGRAM(S): 5 TABLET ORAL at 11:25

## 2019-11-27 RX ADMIN — Medication 975 MILLIGRAM(S): at 21:54

## 2019-11-27 RX ADMIN — PANTOPRAZOLE SODIUM 40 MILLIGRAM(S): 20 TABLET, DELAYED RELEASE ORAL at 08:12

## 2019-11-27 RX ADMIN — Medication 975 MILLIGRAM(S): at 21:03

## 2019-11-27 RX ADMIN — Medication 975 MILLIGRAM(S): at 06:51

## 2019-11-27 RX ADMIN — OXYCODONE HYDROCHLORIDE 5 MILLIGRAM(S): 5 TABLET ORAL at 05:26

## 2019-11-27 RX ADMIN — GABAPENTIN 300 MILLIGRAM(S): 400 CAPSULE ORAL at 05:24

## 2019-11-27 RX ADMIN — DULOXETINE HYDROCHLORIDE 60 MILLIGRAM(S): 30 CAPSULE, DELAYED RELEASE ORAL at 11:25

## 2019-11-27 RX ADMIN — OXYCODONE HYDROCHLORIDE 5 MILLIGRAM(S): 5 TABLET ORAL at 16:15

## 2019-11-27 RX ADMIN — ZOLPIDEM TARTRATE 5 MILLIGRAM(S): 10 TABLET ORAL at 21:00

## 2019-11-27 RX ADMIN — ENOXAPARIN SODIUM 40 MILLIGRAM(S): 100 INJECTION SUBCUTANEOUS at 21:03

## 2019-11-27 RX ADMIN — OXYCODONE HYDROCHLORIDE 5 MILLIGRAM(S): 5 TABLET ORAL at 21:00

## 2019-11-27 RX ADMIN — OXYCODONE HYDROCHLORIDE 5 MILLIGRAM(S): 5 TABLET ORAL at 21:54

## 2019-11-27 RX ADMIN — GABAPENTIN 300 MILLIGRAM(S): 400 CAPSULE ORAL at 13:01

## 2019-11-27 RX ADMIN — Medication 975 MILLIGRAM(S): at 05:26

## 2019-11-27 RX ADMIN — OXYCODONE HYDROCHLORIDE 5 MILLIGRAM(S): 5 TABLET ORAL at 06:51

## 2019-11-27 RX ADMIN — GABAPENTIN 300 MILLIGRAM(S): 400 CAPSULE ORAL at 21:03

## 2019-11-27 RX ADMIN — Medication 975 MILLIGRAM(S): at 13:45

## 2019-11-27 RX ADMIN — OXYCODONE HYDROCHLORIDE 5 MILLIGRAM(S): 5 TABLET ORAL at 12:00

## 2019-11-27 RX ADMIN — POLYETHYLENE GLYCOL 3350 17 GRAM(S): 17 POWDER, FOR SOLUTION ORAL at 11:25

## 2019-11-27 RX ADMIN — OXYCODONE HYDROCHLORIDE 5 MILLIGRAM(S): 5 TABLET ORAL at 15:35

## 2019-11-27 NOTE — DIETITIAN INITIAL EVALUATION ADULT. - ENERGY NEEDS
Height: 63Inches   Weight: 147lb   Body Mass Index (BMI): 26.0kg/m2   Ideal Body Weight Range: 115lb (+/-10%)   Percent Ideal Body Weight ~128%

## 2019-11-27 NOTE — CONSULT NOTE ADULT - ASSESSMENT
89F s/p left hip intertrochanteric fracture after a mechanical fall s/p left hip ORIF with IM nail placement now at acute rehab    #Left hip intertrochanteric fracture s/p left hip ORIF and IM nail placement   #Left hip pain secondary to above  -PT/OT  -DVT ppx: Lovenox x 4 weeks   -pain control as needed    #Acute blood loss anemia, postoperatively   -H/H now stable  -Routine CBC monitoring     #Depression: c/w Cymbalta

## 2019-11-27 NOTE — DIETITIAN INITIAL EVALUATION ADULT. - PHYSICAL APPEARANCE
Temporalis, Orbital Fat Pads, Buccal Fat Pads & Clavicle Wasting Observed  (Per Nutrition Focused Physical Exam)

## 2019-11-27 NOTE — DIETITIAN INITIAL EVALUATION ADULT. - OTHER INFO
89yr Old Female - Dx: Femur Fx - Initial Assessment - Diet - Regular Diet w/ Thin Liquids (Recommend Initiate Ensure Enlive 8oz PO BID), Denies Food Allergy/Intolerance, Tolerates Diet Well, No Chewing/Swallowing Complications (Per Patient), Surgical Incision on Left Hip & No Pressure Ulcers (as Per Nursing Flow Sheets), +1 Edema Noted to Left Lower Extremity (as Per Nursing Flow Sheets), No Recent Diarrhea/Constipation & Some Nausea/Vomiting(as Per Patient)

## 2019-11-27 NOTE — CHART NOTE - NSCHARTNOTEFT_GEN_A_CORE
Upon Nutritional Assessment by the Registered Dietitian Your Patient was Determined to Meet criteria/has Evidence of the Following Diagnosis:          [X]  Acute Moderate Protein-Calorie Malnutrition    Findings as based on:  [X] Comprehensive Nutrition Assessment   [X] Nutrition Focused Physical Exam - Temporalis, Orbital Fat Pads, Buccal Fat Pads & Clavicle Wasting/Depletion Observed  [X] Other: Poor PO Intake 5+ Days     Nutrition Plan/Recommendations:    1) Ensure Enlive 8oz PO BID (Provides 700kcal & 40grams of Protein)     PROVIDER Section:   By Signing This Assessment You Are Acknowledging & Agree with The Diagnosis Assigned by the Registered Dietitian    Damián Barba RDN

## 2019-11-27 NOTE — CONSULT NOTE ADULT - SUBJECTIVE AND OBJECTIVE BOX
Patient is a 89y old  Female who presents with a chief complaint of Left intertrochanteric fracture s/p left hip IMN (2019 10:20)    HPI:  NILSA VILLEDA is a 89yo Female with PMH of OA s/p right INÉS, anemia, depression, insomnia, myelodysplastic disorder, neuropathy, hx GBS presented to [facility] on 19 s/p mechanical fall. Patient states she was rushing to go to the bathroom, tripped and fell on her left side onto the tub. Denies head strike or LOC. Denies preceding HA, lightheadness, dizziness, vision changes, CP, SOB, palpitations. Complained of left hip/groin pain in ED, denied numbness or tingling. Imaging showed an impacted, comminuted left intertrochanteric fracture. Orthopedics was consulted and patient is s/p left hip IMN 19. Post-op course with anemia, which is stable. Patient deemed medically stable for discharge to IRF on 19. (2019 10:20)    Currently, patient complains of left hip pain, 7/10, dull, radiating down to knee, worse with movement, in context of recent surgery/fall, improved with rest. Also c/o fatigue. No SOB. No chest pain. No N/V. No calf tenderness .    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 975 milliGRAM(s) Oral every 8 hours  DULoxetine 60 milliGRAM(s) Oral daily  enoxaparin Injectable 40 milliGRAM(s) SubCutaneous every 24 hours  gabapentin 300 milliGRAM(s) Oral three times a day  pantoprazole    Tablet 40 milliGRAM(s) Oral before breakfast  polyethylene glycol 3350 17 Gram(s) Oral daily    MEDICATIONS  (PRN):  oxyCODONE    IR 2.5 milliGRAM(s) Oral every 3 hours PRN Moderate Pain (4 - 6)  oxyCODONE    IR 5 milliGRAM(s) Oral every 4 hours PRN Severe Pain (7 - 10)  senna 2 Tablet(s) Oral at bedtime PRN Constipation  zolpidem 5 milliGRAM(s) Oral at bedtime PRN Insomnia    Review of Systems: Refer to HPI for pertinent positives and negatives. All other ROS reviewed and negative except as otherwise stated above.    Vital Signs Last 24 Hrs  T(F): 98.3 (2019 20:30), Max: 98.3 (2019 20:30)  HR: 85 (2019 20:30) (85 - 96)  BP: 115/70 (2019 20:30) (115/70 - 132/74)  RR: 16 (2019 20:30) (15 - 18)  SpO2: 93% (2019 20:30) (93% - 98%)  I&O's Summary    PHYSICAL EXAM:  GENERAL: NAD, well-groomed, well-developed  HEAD:  Atraumatic, Normocephalic  EYES: EOMI, PERRL, conjunctiva and sclera clear  ENMT: Moist mucous membranes, Good dentition  NECK: Supple, No JVD  CHEST/LUNG: Clear to auscultation bilaterally, non-labored breathing, good air entry  HEART: RRR; S1/S2, No murmur  ABDOMEN: Soft, Nontender, Nondistended; Bowel sounds present  VASCULAR: Normal pulses, Normal capillary refill  EXTREMITIES: No cyanosis, No edema, moves all 4 extremities  LYMPH: No lymphadenopathy noted  SKIN: Warm, Intact, left hip dressing C/D/I  PSYCH: Normal mood and affect  NERVOUS SYSTEM:  A/O x3, Good concentration; CN 2-12 intact, No focal deficits    LABS:                        8.3    7.21  )-----------( 299      ( 2019 05:45 )             26.7     11-    136  |  100  |  20  ----------------------------<  102  4.2   |  31  |  0.91    Ca    8.6      2019 05:45  Phos  2.8       Mg     2.0         TPro  6.3  /  Alb  2.4  /  TBili  0.8  /  DBili  x   /  AST  41  /  ALT  26  /  AlkPhos  112      eGFR if Non African American: 55 mL/min/1.73M2 (19 @ 05:45)  eGFR if African American: 64 mL/min/1.73M2 (19 @ 05:45)      RADIOLOGY & ADDITIONAL TESTS:  < from: Xray Hip 2-3 Views, Left (19 @ 18:48) >  Status post ORIF intertrochanteric fracture with intramedullary nail and   helical blade fixation. Cement is present at the femoral head neck   junction.    Partially imaged is a right total hip arthroplasty, distal femoral stem   not included.    There is an age indeterminate right parasymphyseal/superior pubic ramus   fracture.    < end of copied text > Patient is a 89y old  Female who presents with a chief complaint of Left intertrochanteric fracture s/p left hip IMN (2019 10:20)    HPI:  NILSA VILLEDA is a 89yo Female with PMH of OA s/p right INÉS, anemia, depression, insomnia, myelodysplastic disorder, neuropathy, hx GBS presented to [facility] on 19 s/p mechanical fall. Patient states she was rushing to go to the bathroom, tripped and fell on her left side onto the tub. Denies head strike or LOC. Denies preceding HA, lightheadness, dizziness, vision changes, CP, SOB, palpitations. Complained of left hip/groin pain in ED, denied numbness or tingling. Imaging showed an impacted, comminuted left intertrochanteric fracture. Orthopedics was consulted and patient is s/p left hip IMN 19. Post-op course with anemia, which is stable. Patient deemed medically stable for discharge to IRF on 19. (2019 10:20)    Currently, patient complains of left hip pain, 7/10, dull, radiating down to knee, worse with movement, in context of recent surgery/fall, improved with rest. Also c/o fatigue. No SOB. No chest pain. No N/V. No calf tenderness .    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 975 milliGRAM(s) Oral every 8 hours  DULoxetine 60 milliGRAM(s) Oral daily  enoxaparin Injectable 40 milliGRAM(s) SubCutaneous every 24 hours  gabapentin 300 milliGRAM(s) Oral three times a day  pantoprazole    Tablet 40 milliGRAM(s) Oral before breakfast  polyethylene glycol 3350 17 Gram(s) Oral daily    MEDICATIONS  (PRN):  oxyCODONE    IR 2.5 milliGRAM(s) Oral every 3 hours PRN Moderate Pain (4 - 6)  oxyCODONE    IR 5 milliGRAM(s) Oral every 4 hours PRN Severe Pain (7 - 10)  senna 2 Tablet(s) Oral at bedtime PRN Constipation  zolpidem 5 milliGRAM(s) Oral at bedtime PRN Insomnia    Review of Systems: Refer to HPI for pertinent positives and negatives. All other ROS reviewed and negative except as otherwise stated above.    Vital Signs Last 24 Hrs  T(F): 98.3 (2019 20:30), Max: 98.3 (2019 20:30)  HR: 85 (2019 20:30) (85 - 96)  BP: 115/70 (2019 20:30) (115/70 - 132/74)  RR: 16 (2019 20:30) (15 - 18)  SpO2: 93% (2019 20:30) (93% - 98%)  I&O's Summary    PHYSICAL EXAM:  GENERAL: NAD, well-groomed, well-developed  HEAD:  Atraumatic, Normocephalic  EYES: EOMI, PERRL, conjunctiva and sclera clear  ENMT: Moist mucous membranes, Good dentition  NECK: Supple, No JVD  CHEST/LUNG: Clear to auscultation bilaterally, non-labored breathing, good air entry  HEART: RRR; S1/S2, No murmur  ABDOMEN: Soft, Nontender, Nondistended; Bowel sounds present  VASCULAR: Normal pulses, Normal capillary refill  EXTREMITIES: No cyanosis, No edema, moves all 4 extremities  LYMPH: No lymphadenopathy noted  SKIN: Warm, Intact, left hip dressing C/D/I  PSYCH: Normal mood and affect  NERVOUS SYSTEM:  A/O x3, Good concentration; CN 2-12 intact, No focal deficits    LABS:                        8.3    7.21  )-----------( 299      ( 2019 05:45 )             26.7     11-    136  |  100  |  20  ----------------------------<  102  4.2   |  31  |  0.91    Ca    8.6      2019 05:45  Phos  2.8       Mg     2.0         TPro  6.3  /  Alb  2.4  /  TBili  0.8  /  DBili  x   /  AST  41  /  ALT  26  /  AlkPhos  112      eGFR if Non African American: 55 mL/min/1.73M2 (19 @ 05:45)  eGFR if African American: 64 mL/min/1.73M2 (19 @ 05:45)      RADIOLOGY & ADDITIONAL TESTS:  < from: Xray Hip 2-3 Views, Left (19 @ 18:48) >  Status post ORIF intertrochanteric fracture with intramedullary nail and   helical blade fixation. Cement is present at the femoral head neck   junction.    Partially imaged is a right total hip arthroplasty, distal femoral stem   not included.    There is an age indeterminate right parasymphyseal/superior pubic ramus   fracture.    < end of copied text >    Case d/w Dr. Estrada

## 2019-11-27 NOTE — DIETITIAN INITIAL EVALUATION ADULT. - DIET TYPE
on Regular Diet w/ Thin Liquids   Recommend Initiate Ensure Enlive 8oz PO BID   Education Provided on Need for Supplementation   Patient Does Not Follow Diet @Home, Consumes 2xMeals a Day  & Does Take Vitamin/Supplements @Home(Multivitamin) supplement (specify)/Recommend Initiate Ensure Enlive 8oz PO BID/regular

## 2019-11-27 NOTE — DIETITIAN INITIAL EVALUATION ADULT. - ADD RECOMMEND
1) Monitor Weights, Intake, Tolerance, Skin & Labwork   2) Ensure Enlive 8oz PO BID 3) Education Provided on Need for Supplementation 4) Continue Nutrition Plan of Care

## 2019-11-28 PROCEDURE — 99232 SBSQ HOSP IP/OBS MODERATE 35: CPT

## 2019-11-28 RX ADMIN — OXYCODONE HYDROCHLORIDE 5 MILLIGRAM(S): 5 TABLET ORAL at 13:40

## 2019-11-28 RX ADMIN — POLYETHYLENE GLYCOL 3350 17 GRAM(S): 17 POWDER, FOR SOLUTION ORAL at 11:29

## 2019-11-28 RX ADMIN — GABAPENTIN 300 MILLIGRAM(S): 400 CAPSULE ORAL at 14:00

## 2019-11-28 RX ADMIN — Medication 975 MILLIGRAM(S): at 06:03

## 2019-11-28 RX ADMIN — Medication 975 MILLIGRAM(S): at 23:42

## 2019-11-28 RX ADMIN — ZOLPIDEM TARTRATE 5 MILLIGRAM(S): 10 TABLET ORAL at 22:15

## 2019-11-28 RX ADMIN — Medication 975 MILLIGRAM(S): at 06:50

## 2019-11-28 RX ADMIN — Medication 975 MILLIGRAM(S): at 22:14

## 2019-11-28 RX ADMIN — PANTOPRAZOLE SODIUM 40 MILLIGRAM(S): 20 TABLET, DELAYED RELEASE ORAL at 05:56

## 2019-11-28 RX ADMIN — OXYCODONE HYDROCHLORIDE 5 MILLIGRAM(S): 5 TABLET ORAL at 22:14

## 2019-11-28 RX ADMIN — GABAPENTIN 300 MILLIGRAM(S): 400 CAPSULE ORAL at 05:56

## 2019-11-28 RX ADMIN — DULOXETINE HYDROCHLORIDE 60 MILLIGRAM(S): 30 CAPSULE, DELAYED RELEASE ORAL at 11:29

## 2019-11-28 RX ADMIN — OXYCODONE HYDROCHLORIDE 5 MILLIGRAM(S): 5 TABLET ORAL at 12:43

## 2019-11-28 RX ADMIN — GABAPENTIN 300 MILLIGRAM(S): 400 CAPSULE ORAL at 22:15

## 2019-11-28 RX ADMIN — ENOXAPARIN SODIUM 40 MILLIGRAM(S): 100 INJECTION SUBCUTANEOUS at 22:15

## 2019-11-28 RX ADMIN — OXYCODONE HYDROCHLORIDE 5 MILLIGRAM(S): 5 TABLET ORAL at 06:51

## 2019-11-28 RX ADMIN — OXYCODONE HYDROCHLORIDE 5 MILLIGRAM(S): 5 TABLET ORAL at 06:03

## 2019-11-28 RX ADMIN — OXYCODONE HYDROCHLORIDE 5 MILLIGRAM(S): 5 TABLET ORAL at 23:42

## 2019-11-28 NOTE — GOALS OF CARE CONVERSATION - ADVANCED CARE PLANNING - CONVERSATION DETAILS
MOLST discussed with patient, she states she has a MOLST (stating DNR/DNI) at home.  New MOLST completed so patient has one in her chart.  Patient expressed she would not want CPR or intubation, however would be agreeable to antibiotics if medically necessary, trial of IV fluids.  She would not want a feeding tube.    MOLST completed, copy to be given to patient, original placed in chart.

## 2019-11-28 NOTE — GOALS OF CARE CONVERSATION - ADVANCED CARE PLANNING - TREATMENT GUIDELINES
Encounter Date: 2019       History     Chief Complaint   Patient presents with    Flank Pain     UTI symptoms     44-year-old female presents complaining of bilateral flank pain, patient also reports that she is having some dark urine.  Patient denies dysuria patient reports flank pain is more prominent on the right than left she describes it as sharp, patient rates pain as 4/10, patient reports no other acute symptoms or abnormality she denies fever she denies shortness of breath she denies chest pain nausea or vomiting        Review of patient's allergies indicates:   Allergen Reactions    Influenza virus vaccines      Past Medical History:   Diagnosis Date    Hashimoto's thyroiditis     Osteoporosis      Past Surgical History:   Procedure Laterality Date    cesarian       Family History   Problem Relation Age of Onset    COPD Mother     Heart disease Mother     Heart attack Mother     Coronary artery disease Mother     Hypertension Mother     Diabetes Father     Mental illness Son         Autism     Social History     Tobacco Use    Smoking status: Current Every Day Smoker     Packs/day: 0.75     Types: Cigarettes     Last attempt to quit: 2018     Years since quittin.8    Smokeless tobacco: Never Used   Substance Use Topics    Alcohol use: No    Drug use: No     Review of Systems   Constitutional: Negative for activity change, appetite change, chills, diaphoresis, fatigue and fever.   HENT: Negative for congestion, rhinorrhea, sore throat, trouble swallowing and voice change.    Eyes: Negative for visual disturbance.   Respiratory: Negative for cough, choking, chest tightness, shortness of breath, wheezing and stridor.    Cardiovascular: Negative for chest pain, palpitations and leg swelling.   Gastrointestinal: Negative for abdominal distention, abdominal pain, blood in stool, constipation, diarrhea, nausea and vomiting.   Genitourinary: Positive for flank pain. Negative for  difficulty urinating, dysuria and frequency.   Musculoskeletal: Negative for arthralgias, back pain, joint swelling, myalgias and neck pain.   Skin: Negative for color change and rash.   Neurological: Negative for dizziness, syncope, speech difficulty, weakness, numbness and headaches.   Hematological: Negative for adenopathy. Does not bruise/bleed easily.   All other systems reviewed and are negative.      Physical Exam     Initial Vitals [07/30/19 1256]   BP Pulse Resp Temp SpO2   113/76 80 16 98.3 °F (36.8 °C) 100 %      MAP       --         Physical Exam    Nursing note and vitals reviewed.  Constitutional: She appears well-developed and well-nourished. She is not diaphoretic. No distress.   HENT:   Head: Normocephalic and atraumatic.   Right Ear: External ear normal.   Left Ear: External ear normal.   Nose: Nose normal.   Mouth/Throat: Oropharynx is clear and moist. No oropharyngeal exudate.   Eyes: Conjunctivae and EOM are normal. Pupils are equal, round, and reactive to light. Right eye exhibits no discharge. Left eye exhibits no discharge. No scleral icterus.   Neck: Normal range of motion. Neck supple. No thyromegaly present. No tracheal deviation present. No JVD present.   Cardiovascular: Normal rate, regular rhythm, normal heart sounds and intact distal pulses. Exam reveals no gallop and no friction rub.    No murmur heard.  Pulmonary/Chest: Breath sounds normal. No stridor. No respiratory distress. She has no wheezes. She has no rhonchi. She has no rales. She exhibits no tenderness.   Abdominal: Soft. Bowel sounds are normal. She exhibits no distension and no mass. There is no tenderness. There is no rebound and no guarding.   Musculoskeletal: Normal range of motion. She exhibits no edema or tenderness.   Lymphadenopathy:     She has no cervical adenopathy.   Neurological: She is alert and oriented to person, place, and time. She has normal strength. She displays normal reflexes. No cranial nerve deficit  or sensory deficit.   Skin: Skin is warm and dry. No rash and no abscess noted. No erythema. No pallor.         ED Course   Procedures  Labs Reviewed   CBC W/ AUTO DIFFERENTIAL - Abnormal; Notable for the following components:       Result Value    Mean Corpuscular Volume 75 (*)     Mean Corpuscular Hemoglobin 23.3 (*)     Mean Corpuscular Hemoglobin Conc 31.3 (*)     RDW 14.6 (*)     Immature Granulocytes 0.6 (*)     Immature Grans (Abs) 0.06 (*)     All other components within normal limits   COMPREHENSIVE METABOLIC PANEL - Abnormal; Notable for the following components:    Anion Gap 7 (*)     All other components within normal limits   URINALYSIS, REFLEX TO URINE CULTURE - Abnormal; Notable for the following components:    Bilirubin (UA) 1+ (*)     Nitrite, UA Positive (*)     Urobilinogen, UA 2.0-3.0 (*)     All other components within normal limits    Narrative:     Preferred Collection Type->Urine, Clean Catch  Specimen Source->Urine   URINALYSIS MICROSCOPIC - Abnormal; Notable for the following components:    RBC, UA 8 (*)     All other components within normal limits    Narrative:     Preferred Collection Type->Urine, Clean Catch  Specimen Source->Urine   POCT URINE PREGNANCY          Imaging Results          CT Renal Stone Study ABD Pelvis WO (Final result)  Result time 07/30/19 16:28:23    Final result by Guillermo Lawton MD (07/30/19 16:28:23)                 Impression:      1. Negative for hydronephrosis or urolithiasis.  2. Cholelithiasis.  3. Small right femoral hernia.      Electronically signed by: Guillermo Lawton MD  Date:    07/30/2019  Time:    16:28             Narrative:      CMS MANDATED QUALITY DATA - CT RADIATION - 436    All CT scans at this facility utilize dose modulation, iterative reconstruction, and/or weight based dosing when appropriate to reduce radiation dose to as low as reasonably achievable.    EXAMINATION:  CT RENAL STONE STUDY ABD PELVIS WO    CLINICAL HISTORY:  Hematuria;   bilateral flank pain, dysuria    TECHNIQUE:  CT abdomen and pelvis without IV or oral contrast. Study is tailored for detection of urinary tract calculi and evaluation of solid organs, hollow viscera, and vascular structures is limited.    COMPARISON:  04/17/2018    FINDINGS:  CT Abdomen:    2 mm left lower lobe subpleural nodule (series 3, image 16) is unchanged.  Visualized lung bases are otherwise clear.    Cholelithiasis lie near gallbladder neck, unchanged.  Noncontrast liver contains unchanged calcifications near anterior right hepatic lobe and junction with left hepatic lobe.  Non-contrast pancreas, spleen and adrenals are normal.    Noncontrast kidneys are normal, without hydronephrosis or urolithiasis.    Minor diffuse aortoiliac calcifications evident.  Intestines are unremarkable.  A normal appendix is present.  No free intraperitoneal gas.    No acute osseous abnormality.    CT Pelvis:    Uterus and adnexa are unremarkable.  No free pelvic fluid or adnexal mass.  Bladder is largely decompressed but unremarkable.  Minimal fluid density near the right common femoral vessels suggests small right femoral hernia, unchanged.                               US Lower Extremity Veins Left (Final result)  Result time 07/30/19 16:09:40    Final result by Ishmael Mederos IV, MD (07/30/19 16:09:40)                 Impression:      No findings of deep venous thrombosis involving the left lower extremity.      Electronically signed by: Ishmael Mederos IV., MD  Date:    07/30/2019  Time:    16:09             Narrative:    EXAMINATION:  US LOWER EXTREMITY VEINS LEFT    CLINICAL HISTORY:  Pain, unspecified    COMPARISON:  None.    FINDINGS:  Real-time, duplex and color Doppler interrogation of the left lower extremity deep venous system is performed.  This demonstrates patency of the common and superficial femoral veins, greater saphenous vein, popliteal vein and major calf veins. There are no findings of deep vein thrombosis.                                  Medical Decision Making:   History:   Old Medical Records: I decided to obtain old medical records.  Initial Assessment:   Emergent evaluation of a 44-year-old female presenting with flank pain differential diagnosis includes kidney stone, infection, obstruction              Attending Attestation:             Attending ED Notes:   Patient's labs are significant for urinary tract infection otherwise no acute findings noted.  Patient has imaging shows no significant acute findings patient will be started on a course of antibiotics and she is to follow up with PCP in the next to 3 days for re-evaluation patient cautioned to return immediately to the ER for any worsening or for any further concerns.             Clinical Impression:       ICD-10-CM ICD-9-CM   1. Pain R52 780.96   2. Urinary tract infection without hematuria, site unspecified N39.0 599.0                                Donal Francis MD  07/30/19 1030     DNR Order/IV fluid trial/Antibiotic trial

## 2019-11-28 NOTE — PROGRESS NOTE ADULT - SUBJECTIVE AND OBJECTIVE BOX
Patient is a 89y old  Female who presents with a chief complaint of Left intertrochanteric fracture s/p left hip IMN (27 Nov 2019 07:45)      HPI:  NILSA VILLEDA is a 89yo Female with PMH of OA s/p right INÉS, anemia, depression, insomnia, myelodysplastic disorder, neuropathy, hx GBS presented to [facility] on 11/20/19 s/p mechanical fall. Patient states she was rushing to go to the bathroom, tripped and fell on her left side onto the tub. Denies head strike or LOC. Denies preceding HA, lightheadness, dizziness, vision changes, CP, SOB, palpitations. Complained of left hip/groin pain in ED, denied numbness or tingling. Imaging showed an impacted, comminuted left intertrochanteric fracture. Orthopedics was consulted and patient is s/p left hip IMN 11/21/19. Post-op course with anemia, which is stable. Patient deemed medically stable for discharge to IRF on 11/26/19. (26 Nov 2019 10:20)    Patient seen and examined today. No acute events overnight. She reports pain ranges from 5-8/10 with movement.  She reports mild light headedness.     REVIEW OF SYSTEMS  Constitutional - No fever, No weight loss, No fatigue  HEENT - No eye pain, No visual disturbances, No difficulty hearing, No tinnitus, No vertigo, No neck pain  Respiratory - No cough, No wheezing, No shortness of breath  Cardiovascular - No chest pain, No palpitations  Gastrointestinal - No abdominal pain, No nausea, No vomiting, No diarrhea, No constipation  Genitourinary - No dysuria, No frequency, No hematuria, No incontinence  Neurological - No headaches, No memory loss, + loss of strength, No numbness, No tremors  Skin - No itching, No rashes, No lesions   Endocrine - No temperature intolerance  Musculoskeletal -+ joint pain, No joint swelling, No muscle pain  Psychiatric - No depression, No anxiety    PAST MEDICAL & SURGICAL HISTORY  Myelodysplastic disease  Wrist fracture, left  Neuropathy of both feet  Constipation  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  Anemia  Depression  Osteoarthritis  Insomnia  S/P wrist surgery  History of varicose veins  Bilateral cataracts  History of appendectomy  History of tonsillectomy and adenoidectomy  History of right oophorectomy      FAMILY HISTORY   Family history of diabetes mellitus (DM)      RECENT LABS/IMAGING  CBC Full  -  ( 27 Nov 2019 05:45 )  WBC Count : 7.21 K/uL  RBC Count : 2.72 M/uL  Hemoglobin : 8.3 g/dL  Hematocrit : 26.7 %  Platelet Count - Automated : 299 K/uL  Mean Cell Volume : 98.2 fl  Mean Cell Hemoglobin : 30.5 pg  Mean Cell Hemoglobin Concentration : 31.1 gm/dL  Auto Neutrophil # : 3.36 K/uL  Auto Lymphocyte # : 2.15 K/uL  Auto Monocyte # : 0.91 K/uL  Auto Eosinophil # : 0.66 K/uL  Auto Basophil # : 0.04 K/uL  Auto Neutrophil % : 46.6 %  Auto Lymphocyte % : 29.8 %  Auto Monocyte % : 12.6 %  Auto Eosinophil % : 9.2 %  Auto Basophil % : 0.6 %    11-27    136  |  100  |  20  ----------------------------<  102<H>  4.2   |  31  |  0.91    Ca    8.6      27 Nov 2019 05:45    TPro  6.3  /  Alb  2.4<L>  /  TBili  0.8  /  DBili  x   /  AST  41<H>  /  ALT  26  /  AlkPhos  112  11-27        VITALS  T(C): 36.5 (11-27-19 @ 20:01), Max: 36.5 (11-27-19 @ 20:01)  HR: 88 (11-28-19 @ 06:04) (85 - 88)  BP: 117/79 (11-28-19 @ 06:04) (117/79 - 134/73)  RR: 16 (11-27-19 @ 20:01) (16 - 16)  SpO2: 95% (11-27-19 @ 20:01) (95% - 95%)  Wt(kg): --    ALLERGIES  No Known Allergies      MEDICATIONS   acetaminophen   Tablet .. 975 milliGRAM(s) Oral every 8 hours  DULoxetine 60 milliGRAM(s) Oral daily  enoxaparin Injectable 40 milliGRAM(s) SubCutaneous every 24 hours  gabapentin 300 milliGRAM(s) Oral three times a day  melatonin 3 milliGRAM(s) Oral at bedtime  oxyCODONE    IR 5 milliGRAM(s) Oral every 4 hours PRN  oxyCODONE    IR 2.5 milliGRAM(s) Oral every 3 hours PRN  pantoprazole    Tablet 40 milliGRAM(s) Oral before breakfast  polyethylene glycol 3350 17 Gram(s) Oral daily  senna 2 Tablet(s) Oral at bedtime PRN  zolpidem 5 milliGRAM(s) Oral at bedtime PRN      ----------------------------------------------------------------------------------------  PHYSICAL EXAM  Constitutional - NAD, Comfortable  HEENT - NCAT, EOMI  Neck - Supple, No limited ROM  Chest - CTA bilaterally  Cardiovascular - RRR, S1S2, No murmurs  Abdomen - BS+, Soft, NTND  Extremities - + mild LLE swelling, No calf tenderness   Neurologic Exam -                    Cognitive - Awake, Alert, AAO to self, place, date, year, situation     Communication - Fluent, No dysarthria     Cranial Nerves - CN 2-12 intact     Motor -                     LEFT    UE - ShAB 5/5, EF 5/5, EE 5/5, WE 5/5,  5/5                    RIGHT UE - ShAB 5/5, EF 5/5, EE 5/5, WE 5/5,  5/5                    LEFT    LE - HF 3/5, KE 5/5, DF 5/5, PF 5/5                    RIGHT LE - HF 5/5, KE 5/5, DF 5/5, PF 5/5        Sensory - Intact to LT     Balance - WNL Static  Psychiatric - Mood stable, Affect WNL  ----------------------------------------------------------------------------------------  NILSA VILLEDA is a 89yo Female with comminuted LEFT hip intertrochanteric fracture 2/2 mechanical fall s/p left HIP IMN, with functional, gait, and ADL impairments.    continue current plan  -ambien reordered prn overnight per family request  -patient states she has MOLST at home, will see if family can bring in or complete new form.  -add ensure per nutrition    #LEFT hip intertrochanteric fracture s/p left HIP IMN  - Gait Instability, ADL impairments and Functional impairments: start Comprehensive Rehab Program of PT/OT  - LLE WBAT  - Lovenox x4 weeks (end 12/20)  - Follow up with Dr. Arturo Deleon outpatient in 2-3 weeks.     #post-op Anemia  - 11/27 hgb 8.3  - monitor    #Depression  - cont cymbalta 60    #Pain Mgmt   - Tylenol PRN, Oxycodone PRN  - gabapentin 300 TID    #GI/Bowel Mgmt   - Continent c/w Senna and Miralax    #/Bladder Mgmt   - Continent, PVR    #FEN   - Diet - Regular + Thins.  ensure supplement    #Precautions / PROPHYLAXIS:   - Falls  - ortho: Weight bearing status: WBAT   - Lungs: Aspiration, Incentive Spirometer   - Pressure injury/Skin: Turn Q2hrs while in bed, OOB to Chair, PT/OT    - DVT: Lovenox, SCDs, TEDs

## 2019-11-29 LAB
ANION GAP SERPL CALC-SCNC: 10 MMOL/L — SIGNIFICANT CHANGE UP (ref 5–17)
BUN SERPL-MCNC: 32 MG/DL — HIGH (ref 7–23)
CALCIUM SERPL-MCNC: 8.5 MG/DL — SIGNIFICANT CHANGE UP (ref 8.4–10.5)
CHLORIDE SERPL-SCNC: 99 MMOL/L — SIGNIFICANT CHANGE UP (ref 96–108)
CO2 SERPL-SCNC: 22 MMOL/L — SIGNIFICANT CHANGE UP (ref 22–31)
CREAT SERPL-MCNC: 1.11 MG/DL — SIGNIFICANT CHANGE UP (ref 0.5–1.3)
GLUCOSE SERPL-MCNC: 137 MG/DL — HIGH (ref 70–99)
HCT VFR BLD CALC: 26.5 % — LOW (ref 34.5–45)
HGB BLD-MCNC: 8.3 G/DL — LOW (ref 11.5–15.5)
MCHC RBC-ENTMCNC: 31.3 GM/DL — LOW (ref 32–36)
MCHC RBC-ENTMCNC: 31.4 PG — SIGNIFICANT CHANGE UP (ref 27–34)
MCV RBC AUTO: 100.4 FL — HIGH (ref 80–100)
NRBC # BLD: 0 /100 WBCS — SIGNIFICANT CHANGE UP (ref 0–0)
PLATELET # BLD AUTO: 330 K/UL — SIGNIFICANT CHANGE UP (ref 150–400)
POTASSIUM SERPL-MCNC: 4.5 MMOL/L — SIGNIFICANT CHANGE UP (ref 3.5–5.3)
POTASSIUM SERPL-SCNC: 4.5 MMOL/L — SIGNIFICANT CHANGE UP (ref 3.5–5.3)
RBC # BLD: 2.64 M/UL — LOW (ref 3.8–5.2)
RBC # FLD: 14.9 % — HIGH (ref 10.3–14.5)
SODIUM SERPL-SCNC: 131 MMOL/L — LOW (ref 135–145)
WBC # BLD: 8.05 K/UL — SIGNIFICANT CHANGE UP (ref 3.8–10.5)
WBC # FLD AUTO: 8.05 K/UL — SIGNIFICANT CHANGE UP (ref 3.8–10.5)

## 2019-11-29 PROCEDURE — 71045 X-RAY EXAM CHEST 1 VIEW: CPT | Mod: 26

## 2019-11-29 PROCEDURE — 74018 RADEX ABDOMEN 1 VIEW: CPT | Mod: 26

## 2019-11-29 RX ORDER — CELECOXIB 200 MG/1
100 CAPSULE ORAL DAILY
Refills: 0 | Status: DISCONTINUED | OUTPATIENT
Start: 2019-11-29 | End: 2019-12-02

## 2019-11-29 RX ORDER — LANOLIN ALCOHOL/MO/W.PET/CERES
3 CREAM (GRAM) TOPICAL ONCE
Refills: 0 | Status: COMPLETED | OUTPATIENT
Start: 2019-11-29 | End: 2019-11-29

## 2019-11-29 RX ORDER — OXYCODONE HYDROCHLORIDE 5 MG/1
2.5 TABLET ORAL
Refills: 0 | Status: DISCONTINUED | OUTPATIENT
Start: 2019-11-29 | End: 2019-12-02

## 2019-11-29 RX ADMIN — ENOXAPARIN SODIUM 40 MILLIGRAM(S): 100 INJECTION SUBCUTANEOUS at 21:27

## 2019-11-29 RX ADMIN — OXYCODONE HYDROCHLORIDE 2.5 MILLIGRAM(S): 5 TABLET ORAL at 01:06

## 2019-11-29 RX ADMIN — Medication 975 MILLIGRAM(S): at 15:18

## 2019-11-29 RX ADMIN — Medication 975 MILLIGRAM(S): at 21:26

## 2019-11-29 RX ADMIN — POLYETHYLENE GLYCOL 3350 17 GRAM(S): 17 POWDER, FOR SOLUTION ORAL at 12:27

## 2019-11-29 RX ADMIN — GABAPENTIN 300 MILLIGRAM(S): 400 CAPSULE ORAL at 21:27

## 2019-11-29 RX ADMIN — OXYCODONE HYDROCHLORIDE 5 MILLIGRAM(S): 5 TABLET ORAL at 14:10

## 2019-11-29 RX ADMIN — Medication 975 MILLIGRAM(S): at 17:28

## 2019-11-29 RX ADMIN — OXYCODONE HYDROCHLORIDE 5 MILLIGRAM(S): 5 TABLET ORAL at 05:07

## 2019-11-29 RX ADMIN — Medication 975 MILLIGRAM(S): at 22:22

## 2019-11-29 RX ADMIN — GABAPENTIN 300 MILLIGRAM(S): 400 CAPSULE ORAL at 05:06

## 2019-11-29 RX ADMIN — OXYCODONE HYDROCHLORIDE 2.5 MILLIGRAM(S): 5 TABLET ORAL at 01:02

## 2019-11-29 RX ADMIN — Medication 975 MILLIGRAM(S): at 06:06

## 2019-11-29 RX ADMIN — Medication 975 MILLIGRAM(S): at 05:06

## 2019-11-29 RX ADMIN — OXYCODONE HYDROCHLORIDE 2.5 MILLIGRAM(S): 5 TABLET ORAL at 21:11

## 2019-11-29 RX ADMIN — CELECOXIB 100 MILLIGRAM(S): 200 CAPSULE ORAL at 21:49

## 2019-11-29 RX ADMIN — OXYCODONE HYDROCHLORIDE 2.5 MILLIGRAM(S): 5 TABLET ORAL at 22:22

## 2019-11-29 RX ADMIN — OXYCODONE HYDROCHLORIDE 5 MILLIGRAM(S): 5 TABLET ORAL at 04:19

## 2019-11-29 RX ADMIN — PANTOPRAZOLE SODIUM 40 MILLIGRAM(S): 20 TABLET, DELAYED RELEASE ORAL at 05:06

## 2019-11-29 RX ADMIN — OXYCODONE HYDROCHLORIDE 5 MILLIGRAM(S): 5 TABLET ORAL at 12:27

## 2019-11-29 RX ADMIN — Medication 3 MILLIGRAM(S): at 21:49

## 2019-11-29 RX ADMIN — GABAPENTIN 300 MILLIGRAM(S): 400 CAPSULE ORAL at 15:15

## 2019-11-29 RX ADMIN — CELECOXIB 100 MILLIGRAM(S): 200 CAPSULE ORAL at 23:23

## 2019-11-29 RX ADMIN — DULOXETINE HYDROCHLORIDE 60 MILLIGRAM(S): 30 CAPSULE, DELAYED RELEASE ORAL at 12:27

## 2019-11-29 NOTE — PROGRESS NOTE ADULT - SUBJECTIVE AND OBJECTIVE BOX
Patient is a 89y old  Female who presents with a chief complaint of Left intertrochanteric fracture s/p left hip IMN (27 Nov 2019 07:45)      HPI:  NILSA VILLEDA is a 89yo Female with PMH of OA s/p right INÉS, anemia, depression, insomnia, myelodysplastic disorder, neuropathy, hx GBS presented to [facility] on 11/20/19 s/p mechanical fall. Patient states she was rushing to go to the bathroom, tripped and fell on her left side onto the tub. Denies head strike or LOC. Denies preceding HA, lightheadness, dizziness, vision changes, CP, SOB, palpitations. Complained of left hip/groin pain in ED, denied numbness or tingling. Imaging showed an impacted, comminuted left intertrochanteric fracture. Orthopedics was consulted and patient is s/p left hip IMN 11/21/19. Post-op course with anemia, which is stable. Patient deemed medically stable for discharge to IRF on 11/26/19. (26 Nov 2019 10:20)    Patient seen and examined today. No acute events overnight. She reports pain ranges from 5-8/10 with movement.   Reports episode of vomiting today. Denies nausea, abdominal pain. Last bowel movement yesterday. Also reprots SOB, no chest pain. O2 sat stable.     REVIEW OF SYSTEMS  Constitutional - No fever, No weight loss, No fatigue  HEENT - No eye pain, No visual disturbances, No difficulty hearing, No tinnitus, No vertigo, No neck pain  Respiratory - No cough, No wheezing, No shortness of breath  Cardiovascular - No chest pain, No palpitations  Gastrointestinal - No abdominal pain, No nausea, No vomiting, No diarrhea, No constipation  Genitourinary - No dysuria, No frequency, No hematuria, No incontinence  Neurological - No headaches, No memory loss, + loss of strength, No numbness, No tremors  Skin - No itching, No rashes, No lesions   Endocrine - No temperature intolerance  Musculoskeletal -+ joint pain, No joint swelling, No muscle pain  Psychiatric - No depression, No anxiety    PAST MEDICAL & SURGICAL HISTORY  Myelodysplastic disease  Wrist fracture, left  Neuropathy of both feet  Constipation  Guillain-[place]leroi syndrome following vaccination  Anemia  Depression  Osteoarthritis  Insomnia  S/P wrist surgery  History of varicose veins  Bilateral cataracts  History of appendectomy  History of tonsillectomy and adenoidectomy  History of right oophorectomy      FAMILY HISTORY   Family history of diabetes mellitus (DM)      RECENT LABS/IMAGING  CBC Full  -  ( 27 Nov 2019 05:45 )  WBC Count : 7.21 K/uL  RBC Count : 2.72 M/uL  Hemoglobin : 8.3 g/dL  Hematocrit : 26.7 %  Platelet Count - Automated : 299 K/uL  Mean Cell Volume : 98.2 fl  Mean Cell Hemoglobin : 30.5 pg  Mean Cell Hemoglobin Concentration : 31.1 gm/dL  Auto Neutrophil # : 3.36 K/uL  Auto Lymphocyte # : 2.15 K/uL  Auto Monocyte # : 0.91 K/uL  Auto Eosinophil # : 0.66 K/uL  Auto Basophil # : 0.04 K/uL  Auto Neutrophil % : 46.6 %  Auto Lymphocyte % : 29.8 %  Auto Monocyte % : 12.6 %  Auto Eosinophil % : 9.2 %  Auto Basophil % : 0.6 %             VITALS   Vital Signs Last 24 Hrs  T(C): 36.3 (29 Nov 2019 09:14), Max: 36.7 (29 Nov 2019 01:36)  T(F): 97.4 (29 Nov 2019 09:14), Max: 98 (29 Nov 2019 01:36)  HR: 87 (29 Nov 2019 09:14) (85 - 87)  BP: 116/67 (29 Nov 2019 09:14) (115/78 - 116/67)  BP(mean): --  RR: 14 (29 Nov 2019 09:14) (14 - 18)  SpO2: 97% (29 Nov 2019 09:14) (93% - 97%)  Wt(kg): --    ALLERGIES  No Known Allergies      MEDICATIONS   acetaminophen   Tablet .. 975 milliGRAM(s) Oral every 8 hours  DULoxetine 60 milliGRAM(s) Oral daily  enoxaparin Injectable 40 milliGRAM(s) SubCutaneous every 24 hours  gabapentin 300 milliGRAM(s) Oral three times a day  melatonin 3 milliGRAM(s) Oral at bedtime  oxyCODONE    IR 5 milliGRAM(s) Oral every 4 hours PRN  oxyCODONE    IR 2.5 milliGRAM(s) Oral every 3 hours PRN  pantoprazole    Tablet 40 milliGRAM(s) Oral before breakfast  polyethylene glycol 3350 17 Gram(s) Oral daily  senna 2 Tablet(s) Oral at bedtime PRN  zolpidem 5 milliGRAM(s) Oral at bedtime PRN      ----------------------------------------------------------------------------------------  PHYSICAL EXAM  Constitutional - NAD, Comfortable  HEENT - NCAT, EOMI  Neck - Supple, No limited ROM  Chest - CTA bilaterally  Cardiovascular - RRR, S1S2, No murmurs  Abdomen - BS+, Soft, NTND  Extremities - + mild LLE swelling, No calf tenderness   Neurologic Exam -                    Cognitive - Awake, Alert, AAO to self, place, date, year, situation     Communication - Fluent, No dysarthria     Cranial Nerves - CN 2-12 intact     Motor -                     LEFT    UE - ShAB 5/5, EF 5/5, EE 5/5, WE 5/5,  5/5                    RIGHT UE - ShAB 5/5, EF 5/5, EE 5/5, WE 5/5,  5/5                    LEFT    LE - HF 3/5, KE 5/5, DF 5/5, PF 5/5                    RIGHT LE - HF 5/5, KE 5/5, DF 5/5, PF 5/5        Sensory - Intact to LT     Balance - WNL Static  Psychiatric - Mood stable, Affect WNL  ----------------------------------------------------------------------------------------  NILSA VILLEDA is a 89yo Female with comminuted LEFT hip intertrochanteric fracture 2/2 mechanical fall s/p left HIP IMN, with functional, gait, and ADL impairments.    continue current plan  -ambien reordered prn overnight per family request  -patient states she has MOLST at home, will see if family can bring in or complete new form.  -add ensure per nutrition    #LEFT hip intertrochanteric fracture s/p left HIP IMN  - Gait Instability, ADL impairments and Functional impairments: start Comprehensive Rehab Program of PT/OT  - LLE WBAT  - Lovenox x4 weeks (end 12/20)  - Follow up with Dr. Arturo Deleon outpatient in 2-3 weeks.     #post-op Anemia  - 11/27 hgb 8.3  - monitor    #Depression  - cont cymbalta 60    #Pain Mgmt   - Tylenol PRN, Oxycodone PRN  - gabapentin 300 TID    #GI/Bowel Mgmt   - Continent c/w Senna and Miralax    # vomiting: likely from pain meds. KUB, labs.     # SOB: O2 sat stable. CXR pending.     #/Bladder Mgmt   - Continent, PVR    #FEN   - Diet - Regular + Thins.  ensure supplement    #Precautions / PROPHYLAXIS:   - Falls  - ortho: Weight bearing status: WBAT   - Lungs: Aspiration, Incentive Spirometer   - Pressure injury/Skin: Turn Q2hrs while in bed, OOB to Chair, PT/OT    - DVT: Lovenox, SCDs, TEDs

## 2019-11-30 PROCEDURE — 99232 SBSQ HOSP IP/OBS MODERATE 35: CPT

## 2019-11-30 RX ADMIN — OXYCODONE HYDROCHLORIDE 2.5 MILLIGRAM(S): 5 TABLET ORAL at 15:49

## 2019-11-30 RX ADMIN — OXYCODONE HYDROCHLORIDE 2.5 MILLIGRAM(S): 5 TABLET ORAL at 21:28

## 2019-11-30 RX ADMIN — ENOXAPARIN SODIUM 40 MILLIGRAM(S): 100 INJECTION SUBCUTANEOUS at 21:23

## 2019-11-30 RX ADMIN — POLYETHYLENE GLYCOL 3350 17 GRAM(S): 17 POWDER, FOR SOLUTION ORAL at 12:24

## 2019-11-30 RX ADMIN — DULOXETINE HYDROCHLORIDE 60 MILLIGRAM(S): 30 CAPSULE, DELAYED RELEASE ORAL at 12:24

## 2019-11-30 RX ADMIN — OXYCODONE HYDROCHLORIDE 2.5 MILLIGRAM(S): 5 TABLET ORAL at 11:38

## 2019-11-30 RX ADMIN — GABAPENTIN 300 MILLIGRAM(S): 400 CAPSULE ORAL at 21:22

## 2019-11-30 RX ADMIN — OXYCODONE HYDROCHLORIDE 2.5 MILLIGRAM(S): 5 TABLET ORAL at 14:01

## 2019-11-30 RX ADMIN — CELECOXIB 100 MILLIGRAM(S): 200 CAPSULE ORAL at 13:50

## 2019-11-30 RX ADMIN — OXYCODONE HYDROCHLORIDE 2.5 MILLIGRAM(S): 5 TABLET ORAL at 22:30

## 2019-11-30 RX ADMIN — CELECOXIB 100 MILLIGRAM(S): 200 CAPSULE ORAL at 12:33

## 2019-11-30 RX ADMIN — Medication 3 MILLIGRAM(S): at 21:22

## 2019-11-30 RX ADMIN — GABAPENTIN 300 MILLIGRAM(S): 400 CAPSULE ORAL at 05:25

## 2019-11-30 RX ADMIN — GABAPENTIN 300 MILLIGRAM(S): 400 CAPSULE ORAL at 13:51

## 2019-11-30 RX ADMIN — OXYCODONE HYDROCHLORIDE 2.5 MILLIGRAM(S): 5 TABLET ORAL at 10:03

## 2019-11-30 RX ADMIN — PANTOPRAZOLE SODIUM 40 MILLIGRAM(S): 20 TABLET, DELAYED RELEASE ORAL at 05:25

## 2019-11-30 NOTE — PROGRESS NOTE ADULT - SUBJECTIVE AND OBJECTIVE BOX
Patient is a 89y old  Female who presents with a chief complaint of Left intertrochanteric fracture s/p left hip IMN (29 Nov 2019 12:29)      HPI:  NILSA VILLEDA is a 89yo Female with PMH of OA s/p right INÉS, anemia, depression, insomnia, myelodysplastic disorder, neuropathy, hx GBS presented to [facility] on 11/20/19 s/p mechanical fall. Patient states she was rushing to go to the bathroom, tripped and fell on her left side onto the tub. Denies head strike or LOC. Denies preceding HA, lightheadness, dizziness, vision changes, CP, SOB, palpitations. Complained of left hip/groin pain in ED, denied numbness or tingling. Imaging showed an impacted, comminuted left intertrochanteric fracture. Orthopedics was consulted and patient is s/p left hip IMN 11/21/19. Post-op course with anemia, which is stable. Patient deemed medically stable for discharge to IRF on 11/26/19. (26 Nov 2019 10:20)    subjective: denies nausea or vomiting today. she states she had pain in left hip, now better with rest however had pain during therapy today.    REVIEW OF SYSTEMS  Constitutional - No fever, No weight loss, No fatigue  HEENT - No eye pain, No visual disturbances, No difficulty hearing, No tinnitus, No vertigo, No neck pain  Respiratory - No cough, No wheezing, No shortness of breath  Cardiovascular - No chest pain, No palpitations  Gastrointestinal - No abdominal pain, No nausea, No vomiting, No diarrhea, No constipation  Genitourinary - No dysuria, No frequency, No hematuria, No incontinence  Neurological - No headaches, No memory loss, + loss of strength, No numbness, No tremors  Skin - No itching, No rashes, No lesions   Endocrine - No temperature intolerance  Musculoskeletal - + joint pain, No joint swelling, No muscle pain  Psychiatric - No depression, No anxiety    PAST MEDICAL & SURGICAL HISTORY  Myelodysplastic disease  Wrist fracture, left  Neuropathy of both feet  Constipation  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  Anemia  Depression  Osteoarthritis  Insomnia  S/P wrist surgery  History of varicose veins  Bilateral cataracts  History of appendectomy  History of tonsillectomy and adenoidectomy  History of right oophorectomy    FAMILY HISTORY   Family history of diabetes mellitus (DM)      RECENT LABS/IMAGING  CBC Full  -  ( 29 Nov 2019 17:30 )  WBC Count : 8.05 K/uL  RBC Count : 2.64 M/uL  Hemoglobin : 8.3 g/dL  Hematocrit : 26.5 %  Platelet Count - Automated : 330 K/uL  Mean Cell Volume : 100.4 fl  Mean Cell Hemoglobin : 31.4 pg  Mean Cell Hemoglobin Concentration : 31.3 gm/dL  Auto Neutrophil # : x  Auto Lymphocyte # : x  Auto Monocyte # : x  Auto Eosinophil # : x  Auto Basophil # : x  Auto Neutrophil % : x  Auto Lymphocyte % : x  Auto Monocyte % : x  Auto Eosinophil % : x  Auto Basophil % : x    11-29    131<L>  |  99  |  32<H>  ----------------------------<  137<H>  4.5   |  22  |  1.11    Ca    8.5      29 Nov 2019 15:20    < from: Xray Chest 1 View AP/PA (11.29.19 @ 16:22) >    EXAM:  XR CHEST AP OR PA 1V      PROCEDURE DATE:  11/29/2019        INTERPRETATION:  AP erect chest on November 29, 2019 at 3:49 PM. Patient   had left femoral fracture fixation on November 20. Presently patient is   short of breath.    Heart is magnified by technique.    Slight density left midlung field laterally is again noted.    Chest is similar to November 20.    IMPRESSION: Slight left mid field density again noted.          < end of copied text >        VITALS  T(C): 36.7 (11-30-19 @ 08:18), Max: 36.7 (11-29-19 @ 21:27)  HR: 83 (11-30-19 @ 08:18) (83 - 86)  BP: 134/81 (11-30-19 @ 08:18) (110/70 - 138/77)  RR: 14 (11-30-19 @ 08:18) (14 - 14)  SpO2: 93% (11-30-19 @ 08:18) (93% - 97%)  Wt(kg): --    ALLERGIES  No Known Allergies      MEDICATIONS   celecoxib 100 milliGRAM(s) Oral daily  DULoxetine 60 milliGRAM(s) Oral daily  enoxaparin Injectable 40 milliGRAM(s) SubCutaneous every 24 hours  gabapentin 300 milliGRAM(s) Oral three times a day  melatonin 3 milliGRAM(s) Oral at bedtime  oxyCODONE    IR 2.5 milliGRAM(s) Oral every 3 hours PRN  pantoprazole    Tablet 40 milliGRAM(s) Oral before breakfast  polyethylene glycol 3350 17 Gram(s) Oral daily  senna 2 Tablet(s) Oral at bedtime PRN      ----------------------------------------------------------------------------------------  PHYSICAL EXAM  Constitutional - NAD, Comfortable  HEENT - NCAT, EOMI  Neck - Supple, No limited ROM  Chest - CTA bilaterally  Cardiovascular - RRR, S1S2, No murmurs  Abdomen - BS+, Soft, NTND  Extremities - + mild LLE swelling, No calf tenderness   Neurologic Exam -                    Cognitive - Awake, Alert, AAO to self, place, date, year, situation     Communication - Fluent, No dysarthria     Cranial Nerves - CN 2-12 intact     Motor -                     LEFT    UE - ShAB 5/5, EF 5/5, EE 5/5, WE 5/5,  5/5                    RIGHT UE - ShAB 5/5, EF 5/5, EE 5/5, WE 5/5,  5/5                    LEFT    LE - HF 3/5, KE 5/5, DF 5/5, PF 5/5                    RIGHT LE - HF 5/5, KE 5/5, DF 5/5, PF 5/5        Sensory - Intact to LT     Balance - WNL Static  Psychiatric - Mood stable, Affect WNL  ----------------------------------------------------------------------------------------  NILSA VILLEDA is a 89yo Female with comminuted LEFT hip intertrochanteric fracture 2/2 mechanical fall s/p left HIP IMN, with functional, gait, and ADL impairments.    continue current plan  -ambien reordered prn overnight per family request  -patient states she has MOLST at home, will see if family can bring in or complete new form.  -add ensure per nutrition    #LEFT hip intertrochanteric fracture s/p left HIP IMN  - Gait Instability, ADL impairments and Functional impairments: start Comprehensive Rehab Program of PT/OT  - LLE WBAT  - Lovenox x4 weeks (end 12/20)  - Follow up with Dr. Arturo Deleon outpatient in 2-3 weeks.     #post-op Anemia  - 11/27 hgb 8.3  - monitor    #Depression  - cont cymbalta 60    #Pain Mgmt   - Tylenol PRN, Oxycodone PRN  - gabapentin 300 TID    #GI/Bowel Mgmt   - Continent c/w Senna and Miralax    # vomiting: improved    # SOB: O2 sat stable. CXR unchanged from 11/20    #/Bladder Mgmt   - Continent, PVR    #FEN   - Diet - Regular + Thins.  ensure supplement    #Precautions / PROPHYLAXIS:   - Falls  - ortho: Weight bearing status: WBAT   - Lungs: Aspiration, Incentive Spirometer   - Pressure injury/Skin: Turn Q2hrs while in bed, OOB to Chair, PT/OT    - DVT: Lovenox, SCDs, TEDs

## 2019-12-01 PROCEDURE — 99232 SBSQ HOSP IP/OBS MODERATE 35: CPT | Mod: GC

## 2019-12-01 PROCEDURE — 99232 SBSQ HOSP IP/OBS MODERATE 35: CPT

## 2019-12-01 RX ORDER — HYDROCORTISONE 1 %
1 OINTMENT (GRAM) TOPICAL THREE TIMES A DAY
Refills: 0 | Status: DISCONTINUED | OUTPATIENT
Start: 2019-12-01 | End: 2019-12-01

## 2019-12-01 RX ORDER — ZOLPIDEM TARTRATE 10 MG/1
5 TABLET ORAL ONCE
Refills: 0 | Status: DISCONTINUED | OUTPATIENT
Start: 2019-12-01 | End: 2019-12-01

## 2019-12-01 RX ORDER — NYSTATIN CREAM 100000 [USP'U]/G
1 CREAM TOPICAL
Refills: 0 | Status: DISCONTINUED | OUTPATIENT
Start: 2019-12-01 | End: 2019-12-10

## 2019-12-01 RX ORDER — PETROLATUM,WHITE
1 JELLY (GRAM) TOPICAL
Refills: 0 | Status: DISCONTINUED | OUTPATIENT
Start: 2019-12-01 | End: 2019-12-10

## 2019-12-01 RX ORDER — FERROUS SULFATE 325(65) MG
325 TABLET ORAL
Refills: 0 | Status: DISCONTINUED | OUTPATIENT
Start: 2019-12-01 | End: 2019-12-10

## 2019-12-01 RX ORDER — ASCORBIC ACID 60 MG
500 TABLET,CHEWABLE ORAL DAILY
Refills: 0 | Status: DISCONTINUED | OUTPATIENT
Start: 2019-12-01 | End: 2019-12-10

## 2019-12-01 RX ADMIN — OXYCODONE HYDROCHLORIDE 2.5 MILLIGRAM(S): 5 TABLET ORAL at 14:49

## 2019-12-01 RX ADMIN — ENOXAPARIN SODIUM 40 MILLIGRAM(S): 100 INJECTION SUBCUTANEOUS at 21:49

## 2019-12-01 RX ADMIN — CELECOXIB 100 MILLIGRAM(S): 200 CAPSULE ORAL at 11:39

## 2019-12-01 RX ADMIN — ZOLPIDEM TARTRATE 5 MILLIGRAM(S): 10 TABLET ORAL at 22:13

## 2019-12-01 RX ADMIN — Medication 325 MILLIGRAM(S): at 18:04

## 2019-12-01 RX ADMIN — NYSTATIN CREAM 1 APPLICATION(S): 100000 CREAM TOPICAL at 18:06

## 2019-12-01 RX ADMIN — DULOXETINE HYDROCHLORIDE 60 MILLIGRAM(S): 30 CAPSULE, DELAYED RELEASE ORAL at 11:39

## 2019-12-01 RX ADMIN — OXYCODONE HYDROCHLORIDE 2.5 MILLIGRAM(S): 5 TABLET ORAL at 11:37

## 2019-12-01 RX ADMIN — OXYCODONE HYDROCHLORIDE 2.5 MILLIGRAM(S): 5 TABLET ORAL at 18:04

## 2019-12-01 RX ADMIN — PANTOPRAZOLE SODIUM 40 MILLIGRAM(S): 20 TABLET, DELAYED RELEASE ORAL at 06:23

## 2019-12-01 RX ADMIN — OXYCODONE HYDROCHLORIDE 2.5 MILLIGRAM(S): 5 TABLET ORAL at 18:07

## 2019-12-01 RX ADMIN — OXYCODONE HYDROCHLORIDE 2.5 MILLIGRAM(S): 5 TABLET ORAL at 11:12

## 2019-12-01 RX ADMIN — GABAPENTIN 300 MILLIGRAM(S): 400 CAPSULE ORAL at 06:23

## 2019-12-01 RX ADMIN — OXYCODONE HYDROCHLORIDE 2.5 MILLIGRAM(S): 5 TABLET ORAL at 14:06

## 2019-12-01 RX ADMIN — CELECOXIB 100 MILLIGRAM(S): 200 CAPSULE ORAL at 14:06

## 2019-12-01 RX ADMIN — Medication 1 APPLICATION(S): at 18:06

## 2019-12-01 RX ADMIN — OXYCODONE HYDROCHLORIDE 2.5 MILLIGRAM(S): 5 TABLET ORAL at 01:14

## 2019-12-01 RX ADMIN — Medication 500 MILLIGRAM(S): at 12:27

## 2019-12-01 RX ADMIN — OXYCODONE HYDROCHLORIDE 2.5 MILLIGRAM(S): 5 TABLET ORAL at 16:08

## 2019-12-01 RX ADMIN — OXYCODONE HYDROCHLORIDE 2.5 MILLIGRAM(S): 5 TABLET ORAL at 22:45

## 2019-12-01 RX ADMIN — POLYETHYLENE GLYCOL 3350 17 GRAM(S): 17 POWDER, FOR SOLUTION ORAL at 11:39

## 2019-12-01 RX ADMIN — OXYCODONE HYDROCHLORIDE 2.5 MILLIGRAM(S): 5 TABLET ORAL at 02:15

## 2019-12-01 RX ADMIN — GABAPENTIN 300 MILLIGRAM(S): 400 CAPSULE ORAL at 14:49

## 2019-12-01 RX ADMIN — Medication 3 MILLIGRAM(S): at 21:49

## 2019-12-01 RX ADMIN — GABAPENTIN 300 MILLIGRAM(S): 400 CAPSULE ORAL at 21:49

## 2019-12-01 RX ADMIN — OXYCODONE HYDROCHLORIDE 2.5 MILLIGRAM(S): 5 TABLET ORAL at 21:49

## 2019-12-01 RX ADMIN — OXYCODONE HYDROCHLORIDE 2.5 MILLIGRAM(S): 5 TABLET ORAL at 08:03

## 2019-12-01 NOTE — PROGRESS NOTE ADULT - SUBJECTIVE AND OBJECTIVE BOX
Patient is a 89y old  Female who presents with a chief complaint of Left intertrochanteric fracture s/p left hip IMN (01 Dec 2019 11:08)      HPI:  NILSA VILLEDA is a 89yo Female with PMH of OA s/p right INÉS, anemia, depression, insomnia, myelodysplastic disorder, neuropathy, hx GBS presented to [facility] on 11/20/19 s/p mechanical fall. Patient states she was rushing to go to the bathroom, tripped and fell on her left side onto the tub. Denies head strike or LOC. Denies preceding HA, lightheadness, dizziness, vision changes, CP, SOB, palpitations. Complained of left hip/groin pain in ED, denied numbness or tingling. Imaging showed an impacted, comminuted left intertrochanteric fracture. Orthopedics was consulted and patient is s/p left hip IMN 11/21/19. Post-op course with anemia, which is stable. Patient deemed medically stable for discharge to IRF on 11/26/19. (26 Nov 2019 10:20)    subjective: patient reports itching with rash under b/l breasts.      REVIEW OF SYSTEMS  Constitutional - No fever, No weight loss, No fatigue  HEENT - No eye pain, No visual disturbances, No difficulty hearing, No tinnitus, No vertigo, No neck pain  Respiratory - No cough, No wheezing, No shortness of breath  Cardiovascular - No chest pain, No palpitations  Gastrointestinal - No abdominal pain, No nausea, No vomiting, No diarrhea, No constipation  Genitourinary - No dysuria, No frequency, No hematuria, No incontinence  Neurological - No headaches, No memory loss, + loss of strength, No numbness, No tremors  Skin - + itching, +rash  Endocrine - No temperature intolerance  Musculoskeletal - + joint pain, No joint swelling, No muscle pain  Psychiatric - No depression, No anxiety    PAST MEDICAL & SURGICAL HISTORY  Myelodysplastic disease  Wrist fracture, left  Neuropathy of both feet  Constipation  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  Anemia  Depression  Osteoarthritis  Insomnia  S/P wrist surgery  History of varicose veins  Bilateral cataracts  History of appendectomy  History of tonsillectomy and adenoidectomy  History of right oophorectomy    FAMILY HISTORY   Family history of diabetes mellitus (DM)      RECENT LABS/IMAGING  CBC Full  -  ( 29 Nov 2019 17:30 )  WBC Count : 8.05 K/uL  RBC Count : 2.64 M/uL  Hemoglobin : 8.3 g/dL  Hematocrit : 26.5 %  Platelet Count - Automated : 330 K/uL  Mean Cell Volume : 100.4 fl  Mean Cell Hemoglobin : 31.4 pg  Mean Cell Hemoglobin Concentration : 31.3 gm/dL  Auto Neutrophil # : x  Auto Lymphocyte # : x  Auto Monocyte # : x  Auto Eosinophil # : x  Auto Basophil # : x  Auto Neutrophil % : x  Auto Lymphocyte % : x  Auto Monocyte % : x  Auto Eosinophil % : x  Auto Basophil % : x    11-29    131<L>  |  99  |  32<H>  ----------------------------<  137<H>  4.5   |  22  |  1.11    Ca    8.5      29 Nov 2019 15:20          VITALS  T(C): 36.4 (12-01-19 @ 08:12), Max: 36.7 (11-30-19 @ 21:17)  HR: 82 (12-01-19 @ 08:12) (81 - 82)  BP: 119/71 (12-01-19 @ 08:12) (115/70 - 119/71)  RR: 14 (12-01-19 @ 08:12) (14 - 14)  SpO2: 93% (12-01-19 @ 08:12) (93% - 94%)  Wt(kg): --    ALLERGIES  No Known Allergies      MEDICATIONS   celecoxib 100 milliGRAM(s) Oral daily  DULoxetine 60 milliGRAM(s) Oral daily  enoxaparin Injectable 40 milliGRAM(s) SubCutaneous every 24 hours  gabapentin 300 milliGRAM(s) Oral three times a day  hydrocortisone 1% Cream 1 Application(s) Topical three times a day PRN  melatonin 3 milliGRAM(s) Oral at bedtime  oxyCODONE    IR 2.5 milliGRAM(s) Oral every 3 hours PRN  pantoprazole    Tablet 40 milliGRAM(s) Oral before breakfast  polyethylene glycol 3350 17 Gram(s) Oral daily  senna 2 Tablet(s) Oral at bedtime PRN      ----------------------------------------------------------------------------------------  PHYSICAL EXAM  Constitutional - NAD, Comfortable  HEENT - NCAT, EOMI  Neck - Supple, No limited ROM  Chest - CTA bilaterally  Cardiovascular - RRR, S1S2, No murmurs  Abdomen - BS+, Soft, NTND  Extremities - + mild LLE swelling, No calf tenderness   Neurologic Exam -                    Cognitive - Awake, Alert, AAO to self, place, date, year, situation     Communication - Fluent, No dysarthria     Cranial Nerves - CN 2-12 intact     Motor -                     LEFT    UE - ShAB 5/5, EF 5/5, EE 5/5, WE 5/5,  5/5                    RIGHT UE - ShAB 5/5, EF 5/5, EE 5/5, WE 5/5,  5/5                    LEFT    LE - HF 3/5, KE 5/5, DF 5/5, PF 5/5                    RIGHT LE - HF 5/5, KE 5/5, DF 5/5, PF 5/5        Sensory - Intact to LT     Balance - WNL Static  Psychiatric - Mood stable, Affect WNL  ----------------------------------------------------------------------------------------  NILSA VILLEDA is a 89yo Female with comminuted LEFT hip intertrochanteric fracture 2/2 mechanical fall s/p left HIP IMN, with functional, gait, and ADL impairments.    rash: hospitalist recs appreciated. rash under b/l breasts, will start antifungal cream, and aquaphor to back per recs    continue current plan  -ambien prn overnight per family request  -patient states she has MOLST at home, will see if family can bring in or complete new form.  -add ensure per nutrition    #LEFT hip intertrochanteric fracture s/p left HIP IMN  - Gait Instability, ADL impairments and Functional impairments: start Comprehensive Rehab Program of PT/OT  - LLE WBAT  - Lovenox x4 weeks (end 12/20)  - Follow up with Dr. Arturo Deleon outpatient in 2-3 weeks.     #post-op Anemia  - 11/27 hgb 8.3  - monitor    #Depression  - cont cymbalta 60    #Pain Mgmt   - Tylenol PRN, Oxycodone PRN  - gabapentin 300 TID    #GI/Bowel Mgmt   - Continent c/w Senna and Miralax      # SOB: O2 sat stable. CXR unchanged from 11/20    #/Bladder Mgmt   - Continent, PVR    #FEN   - Diet - Regular + Thins.  ensure supplement    #Precautions / PROPHYLAXIS:   - Falls  - ortho: Weight bearing status: WBAT   - Lungs: Aspiration, Incentive Spirometer   - Pressure injury/Skin: Turn Q2hrs while in bed, OOB to Chair, PT/OT    - DVT: Lovenox, SCDs, TEDs

## 2019-12-01 NOTE — PROGRESS NOTE ADULT - SUBJECTIVE AND OBJECTIVE BOX
HPI:  NILSA VILLEDA is a 89yo Female with PMH of OA s/p right INÉS, anemia, depression, insomnia, myelodysplastic disorder, neuropathy, hx GBS presented to [facility] on 11/20/19 s/p mechanical fall. Patient states she was rushing to go to the bathroom, tripped and fell on her left side onto the tub. Denies head strike or LOC. Denies preceding HA, lightheadedness dizziness, vision changes, CP, SOB, palpitations. Complained of left hip/groin pain in ED, denied numbness or tingling. Imaging showed an impacted, comminuted left intertrochanteric fracture. Orthopedics was consulted and patient is s/p left hip IMN 11/21/19. Post-op course with anemia, which is stable. Patient deemed medically stable for discharge to IRF on 11/26/19. 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  Anemia  Depression  Osteoarthritis: right hip  Insomnia  S/P wrist surgery: left wrist fracture repair  History of varicose veins: laser removal  Bilateral cataracts: surgery  History of appendectomy  History of tonsillectomy and adenoidectomy  History of right oophorectomy      MedsMEDICATIONS  (STANDING):  celecoxib 100 milliGRAM(s) Oral daily  DULoxetine 60 milliGRAM(s) Oral daily  enoxaparin Injectable 40 milliGRAM(s) SubCutaneous every 24 hours  gabapentin 300 milliGRAM(s) Oral three times a day  melatonin 3 milliGRAM(s) Oral at bedtime  pantoprazole    Tablet 40 milliGRAM(s) Oral before breakfast  polyethylene glycol 3350 17 Gram(s) Oral daily    MEDICATIONS  (PRN):  hydrocortisone 1% Cream 1 Application(s) Topical three times a day PRN Rash and/or Itching  oxyCODONE    IR 2.5 milliGRAM(s) Oral every 3 hours PRN Moderate Pain (4 - 6)  senna 2 Tablet(s) Oral at bedtime PRN Constipation      Vital Signs Last 24 Hrs  T(C): 36.4 (01 Dec 2019 08:12), Max: 36.7 (30 Nov 2019 21:17)  T(F): 97.6 (01 Dec 2019 08:12), Max: 98.1 (30 Nov 2019 21:17)  HR: 82 (01 Dec 2019 08:12) (81 - 82)  BP: 119/71 (01 Dec 2019 08:12) (115/70 - 119/71)  BP(mean): --  RR: 14 (01 Dec 2019 08:12) (14 - 14)  SpO2: 93% (01 Dec 2019 08:12) (93% - 94%)  I&O's Summary    30 Nov 2019 07:01  -  01 Dec 2019 07:00  --------------------------------------------------------  IN: 600 mL / OUT: 0 mL / NET: 600 mL        PHYSICAL EXAM:  GENERAL: NAD  NECK: Supple  NERVOUS SYSTEM:  awake and alert  HEART: S1s2 NL , RRR  CHEST/LUNG: Clear to percussion bilaterally  ABDOMEN: Soft, Nontender, Nondistended; Bowel sounds present  EXTREMITIES:  No edema  SKIN: Left breast fold-well demarcated erythematous region  back-few small excoriated papules    LABS:  |11-29-19 @ 17:30            8.3<L>  8.05>--------------<330            26.5<L>      --  |  --  |  --  --------------------------<--  --  |  --  |  --    Mg -- / Phos--    PT -- / INR --    PTT --    Lipase --  11-29-19 @ 17:30    Imaging Personally Reviewed:  [ ] YES  [ ] NO      HEALTH ISSUES - PROBLEM Dx:    left hip fracture sec to mechanical fall s/p left hip IMN   PT/OT per rehab  Pain control    Post-op anemia  h/h stable. HD stable  Avoid freq blood draws unless clinical changes  Start Iron/Vit C    Rash  Breast-antifungal power  Back-Aquaphor    DVT ppx  Lovenox        Care Discussed with Consultants/Other Providers [ x] YES  [ ] NO

## 2019-12-02 LAB
ALBUMIN SERPL ELPH-MCNC: 2.5 G/DL — LOW (ref 3.3–5)
ALP SERPL-CCNC: 107 U/L — SIGNIFICANT CHANGE UP (ref 40–120)
ALT FLD-CCNC: 17 U/L — SIGNIFICANT CHANGE UP (ref 10–45)
ANION GAP SERPL CALC-SCNC: 7 MMOL/L — SIGNIFICANT CHANGE UP (ref 5–17)
AST SERPL-CCNC: 23 U/L — SIGNIFICANT CHANGE UP (ref 10–40)
BILIRUB SERPL-MCNC: 0.6 MG/DL — SIGNIFICANT CHANGE UP (ref 0.2–1.2)
BUN SERPL-MCNC: 37 MG/DL — HIGH (ref 7–23)
CALCIUM SERPL-MCNC: 8.9 MG/DL — SIGNIFICANT CHANGE UP (ref 8.4–10.5)
CHLORIDE SERPL-SCNC: 102 MMOL/L — SIGNIFICANT CHANGE UP (ref 96–108)
CO2 SERPL-SCNC: 29 MMOL/L — SIGNIFICANT CHANGE UP (ref 22–31)
CREAT SERPL-MCNC: 1.05 MG/DL — SIGNIFICANT CHANGE UP (ref 0.5–1.3)
GLUCOSE SERPL-MCNC: 104 MG/DL — HIGH (ref 70–99)
HCT VFR BLD CALC: 27.5 % — LOW (ref 34.5–45)
HGB BLD-MCNC: 8.5 G/DL — LOW (ref 11.5–15.5)
MCHC RBC-ENTMCNC: 30.9 GM/DL — LOW (ref 32–36)
MCHC RBC-ENTMCNC: 31.4 PG — SIGNIFICANT CHANGE UP (ref 27–34)
MCV RBC AUTO: 101.5 FL — HIGH (ref 80–100)
NRBC # BLD: 0 /100 WBCS — SIGNIFICANT CHANGE UP (ref 0–0)
PLATELET # BLD AUTO: 375 K/UL — SIGNIFICANT CHANGE UP (ref 150–400)
POTASSIUM SERPL-MCNC: 4.6 MMOL/L — SIGNIFICANT CHANGE UP (ref 3.5–5.3)
POTASSIUM SERPL-SCNC: 4.6 MMOL/L — SIGNIFICANT CHANGE UP (ref 3.5–5.3)
PROT SERPL-MCNC: 6.4 G/DL — SIGNIFICANT CHANGE UP (ref 6–8.3)
RBC # BLD: 2.71 M/UL — LOW (ref 3.8–5.2)
RBC # FLD: 15.9 % — HIGH (ref 10.3–14.5)
SODIUM SERPL-SCNC: 138 MMOL/L — SIGNIFICANT CHANGE UP (ref 135–145)
WBC # BLD: 6.54 K/UL — SIGNIFICANT CHANGE UP (ref 3.8–10.5)
WBC # FLD AUTO: 6.54 K/UL — SIGNIFICANT CHANGE UP (ref 3.8–10.5)

## 2019-12-02 PROCEDURE — 99233 SBSQ HOSP IP/OBS HIGH 50: CPT

## 2019-12-02 RX ORDER — CELECOXIB 200 MG/1
200 CAPSULE ORAL DAILY
Refills: 0 | Status: DISCONTINUED | OUTPATIENT
Start: 2019-12-02 | End: 2019-12-02

## 2019-12-02 RX ORDER — OXYCODONE HYDROCHLORIDE 5 MG/1
2.5 TABLET ORAL EVERY 4 HOURS
Refills: 0 | Status: DISCONTINUED | OUTPATIENT
Start: 2019-12-02 | End: 2019-12-09

## 2019-12-02 RX ORDER — CELECOXIB 200 MG/1
200 CAPSULE ORAL DAILY
Refills: 0 | Status: COMPLETED | OUTPATIENT
Start: 2019-12-02 | End: 2019-12-09

## 2019-12-02 RX ORDER — LIDOCAINE 4 G/100G
1 CREAM TOPICAL DAILY
Refills: 0 | Status: DISCONTINUED | OUTPATIENT
Start: 2019-12-02 | End: 2019-12-10

## 2019-12-02 RX ORDER — TRAZODONE HCL 50 MG
25 TABLET ORAL AT BEDTIME
Refills: 0 | Status: DISCONTINUED | OUTPATIENT
Start: 2019-12-02 | End: 2019-12-03

## 2019-12-02 RX ORDER — ACETAMINOPHEN 500 MG
975 TABLET ORAL
Refills: 0 | Status: DISCONTINUED | OUTPATIENT
Start: 2019-12-02 | End: 2019-12-10

## 2019-12-02 RX ADMIN — OXYCODONE HYDROCHLORIDE 2.5 MILLIGRAM(S): 5 TABLET ORAL at 23:00

## 2019-12-02 RX ADMIN — Medication 3 MILLIGRAM(S): at 21:56

## 2019-12-02 RX ADMIN — Medication 975 MILLIGRAM(S): at 20:11

## 2019-12-02 RX ADMIN — DULOXETINE HYDROCHLORIDE 60 MILLIGRAM(S): 30 CAPSULE, DELAYED RELEASE ORAL at 12:21

## 2019-12-02 RX ADMIN — GABAPENTIN 300 MILLIGRAM(S): 400 CAPSULE ORAL at 06:01

## 2019-12-02 RX ADMIN — Medication 1 APPLICATION(S): at 16:43

## 2019-12-02 RX ADMIN — GABAPENTIN 300 MILLIGRAM(S): 400 CAPSULE ORAL at 21:56

## 2019-12-02 RX ADMIN — Medication 325 MILLIGRAM(S): at 16:39

## 2019-12-02 RX ADMIN — CELECOXIB 200 MILLIGRAM(S): 200 CAPSULE ORAL at 15:29

## 2019-12-02 RX ADMIN — OXYCODONE HYDROCHLORIDE 2.5 MILLIGRAM(S): 5 TABLET ORAL at 16:35

## 2019-12-02 RX ADMIN — NYSTATIN CREAM 1 APPLICATION(S): 100000 CREAM TOPICAL at 16:39

## 2019-12-02 RX ADMIN — Medication 975 MILLIGRAM(S): at 21:00

## 2019-12-02 RX ADMIN — Medication 325 MILLIGRAM(S): at 06:01

## 2019-12-02 RX ADMIN — OXYCODONE HYDROCHLORIDE 2.5 MILLIGRAM(S): 5 TABLET ORAL at 14:47

## 2019-12-02 RX ADMIN — GABAPENTIN 300 MILLIGRAM(S): 400 CAPSULE ORAL at 14:48

## 2019-12-02 RX ADMIN — Medication 500 MILLIGRAM(S): at 12:21

## 2019-12-02 RX ADMIN — OXYCODONE HYDROCHLORIDE 2.5 MILLIGRAM(S): 5 TABLET ORAL at 10:29

## 2019-12-02 RX ADMIN — OXYCODONE HYDROCHLORIDE 2.5 MILLIGRAM(S): 5 TABLET ORAL at 19:37

## 2019-12-02 RX ADMIN — PANTOPRAZOLE SODIUM 40 MILLIGRAM(S): 20 TABLET, DELAYED RELEASE ORAL at 06:01

## 2019-12-02 RX ADMIN — Medication 25 MILLIGRAM(S): at 21:56

## 2019-12-02 RX ADMIN — OXYCODONE HYDROCHLORIDE 2.5 MILLIGRAM(S): 5 TABLET ORAL at 21:56

## 2019-12-02 RX ADMIN — NYSTATIN CREAM 1 APPLICATION(S): 100000 CREAM TOPICAL at 06:01

## 2019-12-02 RX ADMIN — OXYCODONE HYDROCHLORIDE 2.5 MILLIGRAM(S): 5 TABLET ORAL at 08:27

## 2019-12-02 RX ADMIN — LIDOCAINE 1 PATCH: 4 CREAM TOPICAL at 12:20

## 2019-12-02 RX ADMIN — OXYCODONE HYDROCHLORIDE 2.5 MILLIGRAM(S): 5 TABLET ORAL at 12:23

## 2019-12-02 RX ADMIN — CELECOXIB 200 MILLIGRAM(S): 200 CAPSULE ORAL at 16:35

## 2019-12-02 RX ADMIN — ENOXAPARIN SODIUM 40 MILLIGRAM(S): 100 INJECTION SUBCUTANEOUS at 21:56

## 2019-12-02 RX ADMIN — Medication 975 MILLIGRAM(S): at 14:48

## 2019-12-02 RX ADMIN — POLYETHYLENE GLYCOL 3350 17 GRAM(S): 17 POWDER, FOR SOLUTION ORAL at 12:21

## 2019-12-02 RX ADMIN — Medication 975 MILLIGRAM(S): at 16:35

## 2019-12-02 RX ADMIN — Medication 1 APPLICATION(S): at 06:02

## 2019-12-02 RX ADMIN — LIDOCAINE 1 PATCH: 4 CREAM TOPICAL at 20:10

## 2019-12-02 NOTE — PROGRESS NOTE ADULT - SUBJECTIVE AND OBJECTIVE BOX
Patient is a 89y old  Female who presents with a chief complaint of Left intertrochanteric fracture s/p left hip IMN (27 Nov 2019 07:45)      HPI:  NILSA VILLEDA is a 89yo Female with PMH of OA s/p right INÉS, anemia, depression, insomnia, myelodysplastic disorder, neuropathy, hx GBS presented to [facility] on 11/20/19 s/p mechanical fall. Patient states she was rushing to go to the bathroom, tripped and fell on her left side onto the tub. Denies head strike or LOC. Denies preceding HA, lightheadness, dizziness, vision changes, CP, SOB, palpitations. Complained of left hip/groin pain in ED, denied numbness or tingling. Imaging showed an impacted, comminuted left intertrochanteric fracture. Orthopedics was consulted and patient is s/p left hip IMN 11/21/19. Post-op course with anemia, which is stable. Patient deemed medically stable for discharge to IRF on 11/26/19. (26 Nov 2019 10:20)    Patient seen and examined today. No acute events overnight. Pain is 7/10. Reports not able to sleep well. Uses ambien 10 mg at home for sleep. H/o fall and fracture. Denies nausea, vomiting.     REVIEW OF SYSTEMS  Constitutional - No fever, No weight loss, No fatigue  HEENT - No eye pain, No visual disturbances, No difficulty hearing, No tinnitus, No vertigo, No neck pain  Respiratory - No cough, No wheezing, No shortness of breath  Cardiovascular - No chest pain, No palpitations  Gastrointestinal - No abdominal pain, No nausea, No vomiting, No diarrhea, No constipation  Genitourinary - No dysuria, No frequency, No hematuria, No incontinence  Neurological - No headaches, No memory loss, + loss of strength, No numbness, No tremors  Skin - No itching, No rashes, No lesions   Endocrine - No temperature intolerance  Musculoskeletal -+ joint pain, No joint swelling, No muscle pain  Psychiatric - No depression, No anxiety    PAST MEDICAL & SURGICAL HISTORY  Myelodysplastic disease  Wrist fracture, left  Neuropathy of both feet  Constipation  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  Anemia  Depression  Osteoarthritis  Insomnia  S/P wrist surgery  History of varicose veins  Bilateral cataracts  History of appendectomy  History of tonsillectomy and adenoidectomy  History of right oophorectomy      FAMILY HISTORY   Family history of diabetes mellitus (DM)      RECENT LABS/IMAGING                         8.5    6.54  )-----------( 375      ( 02 Dec 2019 05:25 )             27.5     12-02    138  |  102  |  37<H>  ----------------------------<  104<H>  4.6   |  29  |  1.05    Ca    8.9      02 Dec 2019 05:25    TPro  6.4  /  Alb  2.5<L>  /  TBili  0.6  /  DBili  x   /  AST  23  /  ALT  17  /  AlkPhos  107  12-02                VITALS    T(C): 36.4 (12-02-19 @ 08:00)  T(F): 97.5 (12-02-19 @ 08:00), Max: 97.5 (12-02-19 @ 08:00)  HR: 90 (12-02-19 @ 08:00) (87 - 90)  BP: 139/84 (12-02-19 @ 08:00) (113/73 - 139/84)  RR:  (14 - 14)  SpO2:  (93% - 94%)  Wt(kg): --    ALLERGIES  No Known Allergies      MEDICATIONS   acetaminophen   Tablet .. 975 milliGRAM(s) Oral every 8 hours  DULoxetine 60 milliGRAM(s) Oral daily  enoxaparin Injectable 40 milliGRAM(s) SubCutaneous every 24 hours  gabapentin 300 milliGRAM(s) Oral three times a day  melatonin 3 milliGRAM(s) Oral at bedtime  oxyCODONE    IR 5 milliGRAM(s) Oral every 4 hours PRN  oxyCODONE    IR 2.5 milliGRAM(s) Oral every 3 hours PRN  pantoprazole    Tablet 40 milliGRAM(s) Oral before breakfast  polyethylene glycol 3350 17 Gram(s) Oral daily  senna 2 Tablet(s) Oral at bedtime PRN  zolpidem 5 milliGRAM(s) Oral at bedtime PRN      ----------------------------------------------------------------------------------------  PHYSICAL EXAM  Constitutional - NAD, Comfortable  HEENT - NCAT, EOMI  Neck - Supple, No limited ROM  Chest - CTA bilaterally  Cardiovascular - RRR, S1S2, No murmurs  Abdomen - BS+, Soft, NTND  Extremities - + mild LLE swelling, No calf tenderness   Neurologic Exam -                    Cognitive - Awake, Alert, AAO to self, place, date, year, situation     Communication - Fluent, No dysarthria     Cranial Nerves - CN 2-12 intact     Motor -                     LEFT    UE - ShAB 5/5, EF 5/5, EE 5/5, WE 5/5,  5/5                    RIGHT UE - ShAB 5/5, EF 5/5, EE 5/5, WE 5/5,  5/5                    LEFT    LE - HF 3/5, KE 5/5, DF 5/5, PF 5/5                    RIGHT LE - HF 5/5, KE 5/5, DF 5/5, PF 5/5        Sensory - Intact to LT     Balance - WNL Static  Psychiatric - Mood stable, Affect WNL  ----------------------------------------------------------------------------------------  NILSA VILLEDA is a 89yo Female with comminuted LEFT hip intertrochanteric fracture 2/2 mechanical fall s/p left HIP IMN, with functional, gait, and ADL impairments.    continue current plan     -patient states she has MOLST at home, will see if family can bring in or complete new form.  -add ensure per nutrition  # insomnia: takes ambien 10 mg at home as per patient. Edcuated patient about side effects and h/o fall, ambien is not safe option. Started on trazodone low dose.     #LEFT hip intertrochanteric fracture s/p left HIP IMN  - Gait Instability, ADL impairments and Functional impairments: start Comprehensive Rehab Program of PT/OT  - LLE WBAT  - Lovenox x4 weeks (end 12/20)  - Follow up with Dr. Arturo Deleon outpatient in 2-3 weeks.     #post-op Anemia  - 11/27 hgb 8.3  - monitor    #Depression  - cont cymbalta 60    #Pain Mgmt   - Tylenol Scheduled, Oxycodone 2.5 mg Q4h PRN  - gabapentin 300 TID  - Started celecoxib for one week, 200 mg.   - lidocaine patch.     #GI/Bowel Mgmt   - Continent c/w Senna and Miralax    # vomiting: likely from pain meds. KUB, labs. work up negative. Resolved without intervention.     # SOB: O2 sat stable. CXR pending.     #/Bladder Mgmt   - Continent, PVR    #FEN   - Diet - Regular + Thins.  ensure supplement    #Precautions / PROPHYLAXIS:   - Falls  - ortho: Weight bearing status: WBAT   - Lungs: Aspiration, Incentive Spirometer   - Pressure injury/Skin: Turn Q2hrs while in bed, OOB to Chair, PT/OT    - DVT: Lovenox, SCDs, TEDs

## 2019-12-02 NOTE — CHART NOTE - NSCHARTNOTEFT_GEN_A_CORE
Nutrition Follow Up Note  Hospital Course   (Per Electronic Medical Record):     Source:   Patient [X]  Medical Record [X]      Diet:   Regular Diet w/ Thin Liquids  Tolerates Diet Well  No Chewing/Swallowing Difficulties  No Recent Vomiting, Diarrhea or Constipation & Some Nausea  Consumes 100% of Meals (as Per Documentation) - States Fair Intake   on Ensure Enlive 8oz PO BID (Provides 700kcal & 40grams of Protein)   Patient Takes Nutrition Supplement   Obtained Food Preferences from Patient   Education Provided on Proper Nutrition     Enteral/Parenteral Nutrition: Not Applicable    Current Weight: 147.2lb on 11/26  Obtain New Weight  Obtain Weights Weekly     Pertinent Medications: MEDICATIONS  (STANDING):  acetaminophen   Tablet .. 975 milliGRAM(s) Oral <User Schedule>  AQUAPHOR (petrolatum Ointment) 1 Application(s) Topical two times a day  ascorbic acid 500 milliGRAM(s) Oral daily  celecoxib 200 milliGRAM(s) Oral daily  DULoxetine 60 milliGRAM(s) Oral daily  enoxaparin Injectable 40 milliGRAM(s) SubCutaneous every 24 hours  ferrous    sulfate 325 milliGRAM(s) Oral two times a day  gabapentin 300 milliGRAM(s) Oral three times a day  lidocaine   Patch 1 Patch Transdermal daily  melatonin 3 milliGRAM(s) Oral at bedtime  nystatin Powder 1 Application(s) Topical two times a day  pantoprazole    Tablet 40 milliGRAM(s) Oral before breakfast  polyethylene glycol 3350 17 Gram(s) Oral daily  traZODone 25 milliGRAM(s) Oral at bedtime    MEDICATIONS  (PRN):  oxyCODONE    IR 2.5 milliGRAM(s) Oral every 4 hours PRN Severe Pain (7 - 10)  senna 2 Tablet(s) Oral at bedtime PRN Constipation    Pertinent Labs:  12-02 Na138 mmol/L Glu 104 mg/dL<H> K+ 4.6 mmol/L Cr  1.05 mg/dL BUN 37 mg/dL<H> 12-02 Alb 2.5 g/dL<L>    Skin: No Pressure Ulcers  Surgical Incision on Left Hip  (as Per Nursing Flow Sheet)     Edema: +1 Edema Noted to Left Hip/Leg  (as Per Documentation)     Last Bowel Movement: on 12/1    Estimated Needs:   [X] No Change Since Previous Assessment    Previous Nutrition Diagnosis:   Moderate Malnutrition    Nutrition Diagnosis is [X] Ongoing - Continues on Nutrition Supplement & Patient Takes Nutrition Supplement     New Nutrition Diagnosis: [X] Not Applicable    Interventions:   1. Education Provided on Proper Nutrition    2. Recommend Continue Nutrition Plan of Care     Monitoring & Evaluation:   [X] Weights   [X] PO Intake   [X] Skin Integrity   [X] Follow Up (Per Protocol)  [X] Tolerance to Diet Prescription   [X] Other: Labs     Registered Dietitian/Nutritionist Remains Available.  Damián Barba RDN    Pager # [phone]  Phone# [phone]

## 2019-12-03 PROCEDURE — 99232 SBSQ HOSP IP/OBS MODERATE 35: CPT | Mod: GC

## 2019-12-03 RX ORDER — TRAZODONE HCL 50 MG
25 TABLET ORAL
Refills: 0 | Status: DISCONTINUED | OUTPATIENT
Start: 2019-12-03 | End: 2019-12-04

## 2019-12-03 RX ADMIN — PANTOPRAZOLE SODIUM 40 MILLIGRAM(S): 20 TABLET, DELAYED RELEASE ORAL at 06:10

## 2019-12-03 RX ADMIN — Medication 3 MILLIGRAM(S): at 20:50

## 2019-12-03 RX ADMIN — Medication 325 MILLIGRAM(S): at 06:10

## 2019-12-03 RX ADMIN — Medication 500 MILLIGRAM(S): at 12:18

## 2019-12-03 RX ADMIN — Medication 25 MILLIGRAM(S): at 20:50

## 2019-12-03 RX ADMIN — Medication 975 MILLIGRAM(S): at 12:46

## 2019-12-03 RX ADMIN — NYSTATIN CREAM 1 APPLICATION(S): 100000 CREAM TOPICAL at 06:11

## 2019-12-03 RX ADMIN — DULOXETINE HYDROCHLORIDE 60 MILLIGRAM(S): 30 CAPSULE, DELAYED RELEASE ORAL at 12:18

## 2019-12-03 RX ADMIN — OXYCODONE HYDROCHLORIDE 2.5 MILLIGRAM(S): 5 TABLET ORAL at 12:45

## 2019-12-03 RX ADMIN — Medication 325 MILLIGRAM(S): at 17:06

## 2019-12-03 RX ADMIN — Medication 975 MILLIGRAM(S): at 22:17

## 2019-12-03 RX ADMIN — Medication 1 APPLICATION(S): at 06:12

## 2019-12-03 RX ADMIN — LIDOCAINE 1 PATCH: 4 CREAM TOPICAL at 00:30

## 2019-12-03 RX ADMIN — CELECOXIB 200 MILLIGRAM(S): 200 CAPSULE ORAL at 14:00

## 2019-12-03 RX ADMIN — ENOXAPARIN SODIUM 40 MILLIGRAM(S): 100 INJECTION SUBCUTANEOUS at 20:51

## 2019-12-03 RX ADMIN — OXYCODONE HYDROCHLORIDE 2.5 MILLIGRAM(S): 5 TABLET ORAL at 23:43

## 2019-12-03 RX ADMIN — OXYCODONE HYDROCHLORIDE 2.5 MILLIGRAM(S): 5 TABLET ORAL at 14:00

## 2019-12-03 RX ADMIN — POLYETHYLENE GLYCOL 3350 17 GRAM(S): 17 POWDER, FOR SOLUTION ORAL at 12:19

## 2019-12-03 RX ADMIN — CELECOXIB 200 MILLIGRAM(S): 200 CAPSULE ORAL at 12:18

## 2019-12-03 RX ADMIN — NYSTATIN CREAM 1 APPLICATION(S): 100000 CREAM TOPICAL at 17:05

## 2019-12-03 RX ADMIN — Medication 975 MILLIGRAM(S): at 14:00

## 2019-12-03 RX ADMIN — Medication 975 MILLIGRAM(S): at 06:53

## 2019-12-03 RX ADMIN — GABAPENTIN 300 MILLIGRAM(S): 400 CAPSULE ORAL at 12:46

## 2019-12-03 RX ADMIN — GABAPENTIN 300 MILLIGRAM(S): 400 CAPSULE ORAL at 20:50

## 2019-12-03 RX ADMIN — Medication 975 MILLIGRAM(S): at 06:10

## 2019-12-03 RX ADMIN — GABAPENTIN 300 MILLIGRAM(S): 400 CAPSULE ORAL at 06:10

## 2019-12-03 RX ADMIN — Medication 1 APPLICATION(S): at 17:06

## 2019-12-03 RX ADMIN — Medication 975 MILLIGRAM(S): at 20:55

## 2019-12-03 NOTE — PROGRESS NOTE ADULT - SUBJECTIVE AND OBJECTIVE BOX
Patient is a 89y old  Female who presents with a chief complaint of Left intertrochanteric fracture s/p left hip IMN (27 Nov 2019 07:45)      HPI:  NILSA VILLEDA is a 89yo Female with PMH of OA s/p right INÉS, anemia, depression, insomnia, myelodysplastic disorder, neuropathy, hx GBS presented to [facility] on 11/20/19 s/p mechanical fall. Patient states she was rushing to go to the bathroom, tripped and fell on her left side onto the tub. Denies head strike or LOC. Denies preceding HA, lightheadness, dizziness, vision changes, CP, SOB, palpitations. Complained of left hip/groin pain in ED, denied numbness or tingling. Imaging showed an impacted, comminuted left intertrochanteric fracture. Orthopedics was consulted and patient is s/p left hip IMN 11/21/19. Post-op course with anemia, which is stable. Patient deemed medically stable for discharge to IRF on 11/26/19. (26 Nov 2019 10:20)    Todays subjective HX:  Patient seen and examined at the bedside. Patient states that she had interrupted sleep, but slept better overall on trazedone. Patient agrees to continue trazedone for sleep for now. Having bowel movements. No other complaitns at this time.        Vital Signs Last 24 Hrs  T(C): 36.6 (03 Dec 2019 08:32), Max: 36.6 (03 Dec 2019 08:32)  T(F): 97.8 (03 Dec 2019 08:32), Max: 97.8 (03 Dec 2019 08:32)  HR: 80 (03 Dec 2019 08:32) (79 - 80)  BP: 134/70 (03 Dec 2019 08:32) (134/70 - 140/72)  BP(mean): --  RR: 14 (03 Dec 2019 08:32) (14 - 14)  SpO2: 94% (03 Dec 2019 08:32) (94% - 94%)    PHYSICAL EXAM  Constitutional - NAD, Comfortable  HEENT - NCAT, EOMI  Neck - Supple, No limited ROM  Chest - CTA bilaterally  Cardiovascular - RRR, S1S2, No murmurs  Abdomen - BS+, Soft, NTND  Extremities - + mild LLE swelling, incision well healing, No calf tenderness   Neurologic Exam -                    Cognitive - Awake, Alert, AAO to self, place, date, year, situation     Communication - Fluent, No dysarthria     Cranial Nerves - CN 2-12 intact     Sensory - Intact to LT  Psychiatric - Mood stable, Affect WNL  ----------------------------------------------------------------------------------------  NILSA VILLEDA is a 89yo Female with comminuted LEFT hip intertrochanteric fracture 2/2 mechanical fall s/p left HIP IMN, with functional, gait, and ADL impairments.     #LEFT hip intertrochanteric fracture s/p left HIP IMN  - Gait Instability, ADL impairments and Functional impairments: start Comprehensive Rehab Program of PT/OT  - LLE WBAT  - Lovenox x4 weeks (end 12/20)  - Follow up with Dr. Arturo Deleon outpatient in 2-3 weeks.     # insomnia: takes ambien 10 mg at home as per patient. Edcuated patient about side effects and h/o fall, ambien is not safe option. Started on trazodone low dose.     #post-op Anemia  - 11/27 hgb 8.3  - monitor    #Depression  - cont cymbalta 60    #Pain Mgmt   - Tylenol Scheduled, Oxycodone 2.5 mg Q4h PRN  - gabapentin 300 TID  - Started celecoxib for one week increased to, 200 mg.   - lidocaine patch.     #GI/Bowel Mgmt   - Continent c/w Senna and Miralax    # vomiting: likely from pain meds. KUB, labs. work up negative. Resolved without intervention.     # SOB: O2 sat stable. patient asymptomatic now CXR with ? left lung density 11/29, hospitalist f/u    #/Bladder Mgmt   - Continent, PVR    #FEN   - Diet - Regular + Thins.  ensure supplement    #Precautions / PROPHYLAXIS:   - Falls  - ortho: Weight bearing status: WBAT   - Lungs: Aspiration, Incentive Spirometer   - Pressure injury/Skin: Turn Q2hrs while in bed, OOB to Chair, PT/OT    - DVT: Lovenox, SCDs, TEDs

## 2019-12-04 PROCEDURE — 99233 SBSQ HOSP IP/OBS HIGH 50: CPT

## 2019-12-04 RX ORDER — ZOLPIDEM TARTRATE 10 MG/1
5 TABLET ORAL AT BEDTIME
Refills: 0 | Status: DISCONTINUED | OUTPATIENT
Start: 2019-12-04 | End: 2019-12-10

## 2019-12-04 RX ADMIN — Medication 1 APPLICATION(S): at 17:28

## 2019-12-04 RX ADMIN — PANTOPRAZOLE SODIUM 40 MILLIGRAM(S): 20 TABLET, DELAYED RELEASE ORAL at 06:22

## 2019-12-04 RX ADMIN — CELECOXIB 200 MILLIGRAM(S): 200 CAPSULE ORAL at 12:38

## 2019-12-04 RX ADMIN — ZOLPIDEM TARTRATE 5 MILLIGRAM(S): 10 TABLET ORAL at 21:02

## 2019-12-04 RX ADMIN — Medication 975 MILLIGRAM(S): at 13:41

## 2019-12-04 RX ADMIN — Medication 975 MILLIGRAM(S): at 20:59

## 2019-12-04 RX ADMIN — DULOXETINE HYDROCHLORIDE 60 MILLIGRAM(S): 30 CAPSULE, DELAYED RELEASE ORAL at 12:38

## 2019-12-04 RX ADMIN — NYSTATIN CREAM 1 APPLICATION(S): 100000 CREAM TOPICAL at 06:24

## 2019-12-04 RX ADMIN — NYSTATIN CREAM 1 APPLICATION(S): 100000 CREAM TOPICAL at 17:28

## 2019-12-04 RX ADMIN — Medication 975 MILLIGRAM(S): at 17:28

## 2019-12-04 RX ADMIN — GABAPENTIN 300 MILLIGRAM(S): 400 CAPSULE ORAL at 21:02

## 2019-12-04 RX ADMIN — Medication 975 MILLIGRAM(S): at 06:52

## 2019-12-04 RX ADMIN — Medication 975 MILLIGRAM(S): at 21:00

## 2019-12-04 RX ADMIN — ENOXAPARIN SODIUM 40 MILLIGRAM(S): 100 INJECTION SUBCUTANEOUS at 21:03

## 2019-12-04 RX ADMIN — Medication 3 MILLIGRAM(S): at 21:02

## 2019-12-04 RX ADMIN — Medication 975 MILLIGRAM(S): at 06:22

## 2019-12-04 RX ADMIN — Medication 325 MILLIGRAM(S): at 06:22

## 2019-12-04 RX ADMIN — GABAPENTIN 300 MILLIGRAM(S): 400 CAPSULE ORAL at 06:22

## 2019-12-04 RX ADMIN — Medication 500 MILLIGRAM(S): at 12:38

## 2019-12-04 RX ADMIN — GABAPENTIN 300 MILLIGRAM(S): 400 CAPSULE ORAL at 13:41

## 2019-12-04 RX ADMIN — Medication 325 MILLIGRAM(S): at 17:28

## 2019-12-04 RX ADMIN — CELECOXIB 200 MILLIGRAM(S): 200 CAPSULE ORAL at 13:42

## 2019-12-04 RX ADMIN — Medication 1 APPLICATION(S): at 06:24

## 2019-12-04 RX ADMIN — OXYCODONE HYDROCHLORIDE 2.5 MILLIGRAM(S): 5 TABLET ORAL at 00:00

## 2019-12-04 NOTE — PROGRESS NOTE ADULT - SUBJECTIVE AND OBJECTIVE BOX
Patient is a 89y old  Female who presents with a chief complaint of Left intertrochanteric fracture s/p left hip IMN (27 Nov 2019 07:45)      HPI:  NILSA VILLEDA is a 89yo Female with PMH of OA s/p right INÉS, anemia, depression, insomnia, myelodysplastic disorder, neuropathy, hx GBS presented to [facility] on 11/20/19 s/p mechanical fall. Patient states she was rushing to go to the bathroom, tripped and fell on her left side onto the tub. Denies head strike or LOC. Denies preceding HA, lightheadness, dizziness, vision changes, CP, SOB, palpitations. Complained of left hip/groin pain in ED, denied numbness or tingling. Imaging showed an impacted, comminuted left intertrochanteric fracture. Orthopedics was consulted and patient is s/p left hip IMN 11/21/19. Post-op course with anemia, which is stable. Patient deemed medically stable for discharge to IRF on 11/26/19. (26 Nov 2019 10:20)    Todays subjective HX:  Patient seen and examined at the bedside.  Reports slept only one hour last night. Feels trazodone is not going to help. Firm on getting ambien 10 mg for sleep. It is being prescribed by her PMD outside. She reports falling at 3 pm and doesn't think ambien has any role in her fall. Doesn't want to take trazodone at all.         Vital Signs Last 24 Hrs   Vital Signs Last 24 Hrs  T(C): 36.7 (03 Dec 2019 22:21), Max: 36.7 (03 Dec 2019 22:21)  T(F): 98 (03 Dec 2019 22:21), Max: 98 (03 Dec 2019 22:21)  HR: 77 (03 Dec 2019 22:21) (77 - 77)  BP: 126/68 (03 Dec 2019 22:21) (126/68 - 126/68)  BP(mean): --  RR: 14 (03 Dec 2019 22:21) (14 - 14)  SpO2: 94% (03 Dec 2019 22:21) (94% - 94%)	    PHYSICAL EXAM  Constitutional - NAD, Comfortable  HEENT - NCAT, EOMI  Neck - Supple, No limited ROM  Chest - CTA bilaterally  Cardiovascular - RRR, S1S2, No murmurs  Abdomen - BS+, Soft, NTND  Extremities - + mild LLE swelling, incision well healing, No calf tenderness   Neurologic Exam -                    Cognitive - Awake, Alert, AAO to self, place, date, year, situation     Communication - Fluent, No dysarthria     Cranial Nerves - CN 2-12 intact     Sensory - Intact to LT  Psychiatric - Mood stable, Affect WNL  ----------------------------------------------------------------------------------------  NILSA VILLEDA is a 89yo Female with comminuted LEFT hip intertrochanteric fracture 2/2 mechanical fall s/p left HIP IMN, with functional, gait, and ADL impairments.     #LEFT hip intertrochanteric fracture s/p left HIP IMN  - Gait Instability, ADL impairments and Functional impairments: start Comprehensive Rehab Program of PT/OT  - LLE WBAT  - Lovenox x4 weeks (end 12/20)  - Follow up with Dr. Arturo Deleon outpatient in 2-3 weeks.     # insomnia: takes ambien 10 mg at home as per patient. Trazodone didn't help. Ordered ambien 10 mg    #post-op Anemia  - 11/27 hgb 8.3  - monitor    #Depression  - cont cymbalta 60    #Pain Mgmt   - Tylenol Scheduled, Oxycodone 2.5 mg Q4h PRN  - gabapentin 300 TID  - Started celecoxib for one week increased to, 200 mg.   - lidocaine patch.     #GI/Bowel Mgmt   - Continent c/w Senna and Miralax    # vomiting: likely from pain meds. KUB, labs. work up negative. Resolved without intervention.     # SOB: O2 sat stable. patient asymptomatic now CXR with ? left lung density 11/29, hospitalist f/u    #/Bladder Mgmt   - Continent, PVR    #FEN   - Diet - Regular + Thins.  ensure supplement    #Precautions / PROPHYLAXIS:   - Falls  - ortho: Weight bearing status: WBAT   - Lungs: Aspiration, Incentive Spirometer   - Pressure injury/Skin: Turn Q2hrs while in bed, OOB to Chair, PT/OT    - DVT: Lovenox, SCDs, TEDs

## 2019-12-05 PROCEDURE — 99233 SBSQ HOSP IP/OBS HIGH 50: CPT

## 2019-12-05 RX ADMIN — CELECOXIB 200 MILLIGRAM(S): 200 CAPSULE ORAL at 11:13

## 2019-12-05 RX ADMIN — Medication 975 MILLIGRAM(S): at 05:50

## 2019-12-05 RX ADMIN — GABAPENTIN 300 MILLIGRAM(S): 400 CAPSULE ORAL at 21:29

## 2019-12-05 RX ADMIN — ZOLPIDEM TARTRATE 5 MILLIGRAM(S): 10 TABLET ORAL at 21:28

## 2019-12-05 RX ADMIN — Medication 3 MILLIGRAM(S): at 21:28

## 2019-12-05 RX ADMIN — Medication 1 APPLICATION(S): at 05:51

## 2019-12-05 RX ADMIN — PANTOPRAZOLE SODIUM 40 MILLIGRAM(S): 20 TABLET, DELAYED RELEASE ORAL at 05:50

## 2019-12-05 RX ADMIN — Medication 975 MILLIGRAM(S): at 15:00

## 2019-12-05 RX ADMIN — Medication 975 MILLIGRAM(S): at 22:00

## 2019-12-05 RX ADMIN — ENOXAPARIN SODIUM 40 MILLIGRAM(S): 100 INJECTION SUBCUTANEOUS at 21:30

## 2019-12-05 RX ADMIN — Medication 975 MILLIGRAM(S): at 21:28

## 2019-12-05 RX ADMIN — Medication 975 MILLIGRAM(S): at 07:30

## 2019-12-05 RX ADMIN — Medication 1 APPLICATION(S): at 17:19

## 2019-12-05 RX ADMIN — NYSTATIN CREAM 1 APPLICATION(S): 100000 CREAM TOPICAL at 05:51

## 2019-12-05 RX ADMIN — DULOXETINE HYDROCHLORIDE 60 MILLIGRAM(S): 30 CAPSULE, DELAYED RELEASE ORAL at 11:14

## 2019-12-05 RX ADMIN — Medication 500 MILLIGRAM(S): at 11:13

## 2019-12-05 RX ADMIN — NYSTATIN CREAM 1 APPLICATION(S): 100000 CREAM TOPICAL at 17:20

## 2019-12-05 RX ADMIN — Medication 325 MILLIGRAM(S): at 17:18

## 2019-12-05 RX ADMIN — CELECOXIB 200 MILLIGRAM(S): 200 CAPSULE ORAL at 12:00

## 2019-12-05 RX ADMIN — GABAPENTIN 300 MILLIGRAM(S): 400 CAPSULE ORAL at 05:50

## 2019-12-05 RX ADMIN — GABAPENTIN 300 MILLIGRAM(S): 400 CAPSULE ORAL at 14:56

## 2019-12-05 RX ADMIN — Medication 325 MILLIGRAM(S): at 05:50

## 2019-12-05 RX ADMIN — Medication 975 MILLIGRAM(S): at 14:57

## 2019-12-05 NOTE — PROGRESS NOTE ADULT - SUBJECTIVE AND OBJECTIVE BOX
Patient is a 89y old  Female who presents with a chief complaint of Left intertrochanteric fracture s/p left hip IMN (27 Nov 2019 07:45)      HPI:  NILSA VILLEDA is a 89yo Female with PMH of OA s/p right INÉS, anemia, depression, insomnia, myelodysplastic disorder, neuropathy, hx GBS presented to [facility] on 11/20/19 s/p mechanical fall. Patient states she was rushing to go to the bathroom, tripped and fell on her left side onto the tub. Denies head strike or LOC. Denies preceding HA, lightheadness, dizziness, vision changes, CP, SOB, palpitations. Complained of left hip/groin pain in ED, denied numbness or tingling. Imaging showed an impacted, comminuted left intertrochanteric fracture. Orthopedics was consulted and patient is s/p left hip IMN 11/21/19. Post-op course with anemia, which is stable. Patient deemed medically stable for discharge to IRF on 11/26/19. (26 Nov 2019 10:20)    Todays subjective HX:  Patient seen and examined at the bedside. Slept very well last night. Denies any pain. Denies any dizziness, drowsiness this morning.         Vital Signs Last 24 Hrs   Vital Signs Last 24 Hrs  T(C): 36.4 (05 Dec 2019 08:13), Max: 36.4 (05 Dec 2019 08:13)  T(F): 97.5 (05 Dec 2019 08:13), Max: 97.5 (05 Dec 2019 08:13)  HR: 90 (05 Dec 2019 08:13) (77 - 96)  BP: 151/81 (05 Dec 2019 08:13) (131/79 - 157/75)  BP(mean): --  RR: 14 (05 Dec 2019 08:13) (14 - 14)  SpO2: 96% (05 Dec 2019 08:13) (94% - 96%)    PHYSICAL EXAM  Constitutional - NAD, Comfortable  HEENT - NCAT, EOMI  Neck - Supple, No limited ROM  Chest - CTA bilaterally  Cardiovascular - RRR, S1S2, No murmurs  Abdomen - BS+, Soft, NTND  Extremities - + mild LLE swelling, incision well healing, No calf tenderness   Neurologic Exam -                    Cognitive - Awake, Alert, AAO to self, place, date, year, situation     Communication - Fluent, No dysarthria     Cranial Nerves - CN 2-12 intact     Sensory - Intact to LT  Psychiatric - Mood stable, Affect WNL  ----------------------------------------------------------------------------------------  NILSA VILLEDA is a 89yo Female with comminuted LEFT hip intertrochanteric fracture 2/2 mechanical fall s/p left HIP IMN, with functional, gait, and ADL impairments.     #LEFT hip intertrochanteric fracture s/p left HIP IMN  - Gait Instability, ADL impairments and Functional impairments: start Comprehensive Rehab Program of PT/OT  - LLE WBAT  - Lovenox x4 weeks (end 12/20)  - Follow up with Dr. Arturo Deleon outpatient in 2-3 weeks.     # insomnia: takes ambien 10 mg at home as per patient. Trazodone didn't help. Ordered ambien 10 mg    #post-op Anemia  - 11/27 hgb 8.3  - monitor    # Hypertension: few episodes, likely due to pain and insomnia. Improving BP. Continue to monitor. Discussed with medicine team.     #Depression  - cont cymbalta 60    #Pain Mgmt   - Tylenol Scheduled, Oxycodone 2.5 mg Q4h PRN  - gabapentin 300 TID  - Started celecoxib for one week increased to, 200 mg.   - lidocaine patch.     #GI/Bowel Mgmt   - Continent c/w Senna and Miralax    # vomiting: likely from pain meds. KUB, labs. work up negative. Resolved without intervention.     # SOB: O2 sat stable. patient asymptomatic now CXR with ? left lung density 11/29, hospitalist f/u    #/Bladder Mgmt   - Continent, PVR    #FEN   - Diet - Regular + Thins.  ensure supplement    #Precautions / PROPHYLAXIS:   - Falls  - ortho: Weight bearing status: WBAT   - Lungs: Aspiration, Incentive Spirometer   - Pressure injury/Skin: Turn Q2hrs while in bed, OOB to Chair, PT/OT    - DVT: Lovenox, SCDs, TEDs

## 2019-12-06 PROCEDURE — 99233 SBSQ HOSP IP/OBS HIGH 50: CPT | Mod: GC

## 2019-12-06 RX ORDER — AMLODIPINE BESYLATE 2.5 MG/1
2.5 TABLET ORAL DAILY
Refills: 0 | Status: DISCONTINUED | OUTPATIENT
Start: 2019-12-06 | End: 2019-12-10

## 2019-12-06 RX ORDER — AMLODIPINE BESYLATE 2.5 MG/1
2.5 TABLET ORAL DAILY
Refills: 0 | Status: DISCONTINUED | OUTPATIENT
Start: 2019-12-06 | End: 2019-12-06

## 2019-12-06 RX ADMIN — Medication 325 MILLIGRAM(S): at 17:43

## 2019-12-06 RX ADMIN — Medication 975 MILLIGRAM(S): at 15:12

## 2019-12-06 RX ADMIN — Medication 325 MILLIGRAM(S): at 05:27

## 2019-12-06 RX ADMIN — OXYCODONE HYDROCHLORIDE 2.5 MILLIGRAM(S): 5 TABLET ORAL at 12:32

## 2019-12-06 RX ADMIN — Medication 975 MILLIGRAM(S): at 05:27

## 2019-12-06 RX ADMIN — Medication 1 APPLICATION(S): at 20:09

## 2019-12-06 RX ADMIN — Medication 1 APPLICATION(S): at 05:29

## 2019-12-06 RX ADMIN — Medication 975 MILLIGRAM(S): at 21:00

## 2019-12-06 RX ADMIN — Medication 3 MILLIGRAM(S): at 20:06

## 2019-12-06 RX ADMIN — CELECOXIB 200 MILLIGRAM(S): 200 CAPSULE ORAL at 12:32

## 2019-12-06 RX ADMIN — ZOLPIDEM TARTRATE 5 MILLIGRAM(S): 10 TABLET ORAL at 00:44

## 2019-12-06 RX ADMIN — ZOLPIDEM TARTRATE 5 MILLIGRAM(S): 10 TABLET ORAL at 20:06

## 2019-12-06 RX ADMIN — PANTOPRAZOLE SODIUM 40 MILLIGRAM(S): 20 TABLET, DELAYED RELEASE ORAL at 05:27

## 2019-12-06 RX ADMIN — Medication 500 MILLIGRAM(S): at 12:31

## 2019-12-06 RX ADMIN — NYSTATIN CREAM 1 APPLICATION(S): 100000 CREAM TOPICAL at 05:29

## 2019-12-06 RX ADMIN — Medication 975 MILLIGRAM(S): at 14:49

## 2019-12-06 RX ADMIN — CELECOXIB 200 MILLIGRAM(S): 200 CAPSULE ORAL at 12:31

## 2019-12-06 RX ADMIN — DULOXETINE HYDROCHLORIDE 60 MILLIGRAM(S): 30 CAPSULE, DELAYED RELEASE ORAL at 12:31

## 2019-12-06 RX ADMIN — Medication 975 MILLIGRAM(S): at 05:55

## 2019-12-06 RX ADMIN — GABAPENTIN 300 MILLIGRAM(S): 400 CAPSULE ORAL at 14:49

## 2019-12-06 RX ADMIN — NYSTATIN CREAM 1 APPLICATION(S): 100000 CREAM TOPICAL at 17:02

## 2019-12-06 RX ADMIN — GABAPENTIN 300 MILLIGRAM(S): 400 CAPSULE ORAL at 20:06

## 2019-12-06 RX ADMIN — GABAPENTIN 300 MILLIGRAM(S): 400 CAPSULE ORAL at 05:27

## 2019-12-06 RX ADMIN — ENOXAPARIN SODIUM 40 MILLIGRAM(S): 100 INJECTION SUBCUTANEOUS at 20:06

## 2019-12-06 RX ADMIN — AMLODIPINE BESYLATE 2.5 MILLIGRAM(S): 2.5 TABLET ORAL at 17:43

## 2019-12-06 RX ADMIN — Medication 975 MILLIGRAM(S): at 20:06

## 2019-12-06 NOTE — PROGRESS NOTE ADULT - SUBJECTIVE AND OBJECTIVE BOX
Patient is a 89y old  Female who presents with a chief complaint of Left intertrochanteric fracture s/p left hip IMN (27 Nov 2019 07:45)      HPI:  NILAS VILLEDA is a 89yo Female with PMH of OA s/p right INÉS, anemia, depression, insomnia, myelodysplastic disorder, neuropathy, hx GBS presented to [facility] on 11/20/19 s/p mechanical fall. Patient states she was rushing to go to the bathroom, tripped and fell on her left side onto the tub. Denies head strike or LOC. Denies preceding HA, lightheadness, dizziness, vision changes, CP, SOB, palpitations. Complained of left hip/groin pain in ED, denied numbness or tingling. Imaging showed an impacted, comminuted left intertrochanteric fracture. Orthopedics was consulted and patient is s/p left hip IMN 11/21/19. Post-op course with anemia, which is stable. Patient deemed medically stable for discharge to IRF on 11/26/19. (26 Nov 2019 10:20)    Todays subjective HX:  Patient seen and examined at the bedside. Slept very well last night. No complaints at this time.      Vital Signs Last 24 Hrs  T(C): 36.3 (06 Dec 2019 08:25), Max: 36.6 (05 Dec 2019 21:34)  T(F): 97.4 (06 Dec 2019 08:25), Max: 97.9 (05 Dec 2019 21:34)  HR: 86 (06 Dec 2019 08:25) (83 - 86)  BP: 160/70 (06 Dec 2019 08:25) (146/76 - 160/70)  BP(mean): --  RR: 14 (06 Dec 2019 08:25) (14 - 14)  SpO2: 98% (06 Dec 2019 08:25) (95% - 98%)    PHYSICAL EXAM  Constitutional - NAD, Comfortable  HEENT - NCAT, EOMI  Neck - Supple, No limited ROM  Chest - CTA bilaterally  Cardiovascular - RRR, S1S2, No murmurs  Abdomen - BS+, Soft, NTND  Extremities - + mild LLE swelling, incision well healing, No calf tenderness   Neurologic Exam -                    Cognitive - Awake, Alert, AAO to self, place, date, year, situation     Communication - Fluent, No dysarthria     Cranial Nerves - CN 2-12 intact     Sensory - Intact to LT  Psychiatric - Mood stable, Affect WNL      Assessment/PLan  NILSA VILLEDA is a 89yo Female with comminuted LEFT hip intertrochanteric fracture 2/2 mechanical fall s/p left HIP IMN, with functional, gait, and ADL impairments.     #LEFT hip intertrochanteric fracture s/p left HIP IMN  - Gait Instability, ADL impairments and Functional impairments: c/w Comprehensive Rehab Program of PT/OT  - LLE WBAT  - Lovenox x4 weeks (end 12/20)  - Follow up with Dr. Arturo Deleon outpatient in 2-3 weeks.     # insomnia: takes ambien 10 mg at home as per patient. Trazodone didn't help. Ordered ambien 10 mg    #post-op Anemia  - 11/27 hgb 8.3  - monitor    # Hypertension: few episodes, likely due to pain and insomnia. Improving BP. Continue to monitor. Discussed with medicine team  -12/6- 160/70 in am - will consider start amlodipine    #Depression  - cont cymbalta 60    #Pain Mgmt   - Tylenol Scheduled, Oxycodone 2.5 mg Q4h PRN  - gabapentin 300 TID  - Started celecoxib for one week increased to, 200 mg.   - lidocaine patch.     #GI/Bowel Mgmt   - Continent c/w Senna and Miralax    # vomiting: likely from pain meds. KUB, labs. work up negative. Resolved without intervention.     # SOB: O2 sat stable. patient asymptomatic now CXR with ? left lung density 11/29, hospitalist f/u    #/Bladder Mgmt   - Continent, PVR    #FEN   - Diet - Regular + Thins.  ensure supplement    #Precautions / PROPHYLAXIS:   - Falls  - ortho: Weight bearing status: WBAT   - Lungs: Aspiration, Incentive Spirometer   - Pressure injury/Skin: Turn Q2hrs while in bed, OOB to Chair, PT/OT    - DVT: Lovenox, SCDs, TEDs

## 2019-12-07 PROCEDURE — 99232 SBSQ HOSP IP/OBS MODERATE 35: CPT

## 2019-12-07 RX ADMIN — Medication 3 MILLIGRAM(S): at 21:11

## 2019-12-07 RX ADMIN — OXYCODONE HYDROCHLORIDE 2.5 MILLIGRAM(S): 5 TABLET ORAL at 13:49

## 2019-12-07 RX ADMIN — ZOLPIDEM TARTRATE 5 MILLIGRAM(S): 10 TABLET ORAL at 00:14

## 2019-12-07 RX ADMIN — GABAPENTIN 300 MILLIGRAM(S): 400 CAPSULE ORAL at 21:11

## 2019-12-07 RX ADMIN — CELECOXIB 200 MILLIGRAM(S): 200 CAPSULE ORAL at 13:49

## 2019-12-07 RX ADMIN — ENOXAPARIN SODIUM 40 MILLIGRAM(S): 100 INJECTION SUBCUTANEOUS at 21:12

## 2019-12-07 RX ADMIN — Medication 500 MILLIGRAM(S): at 12:51

## 2019-12-07 RX ADMIN — NYSTATIN CREAM 1 APPLICATION(S): 100000 CREAM TOPICAL at 06:02

## 2019-12-07 RX ADMIN — Medication 325 MILLIGRAM(S): at 17:58

## 2019-12-07 RX ADMIN — ZOLPIDEM TARTRATE 5 MILLIGRAM(S): 10 TABLET ORAL at 21:11

## 2019-12-07 RX ADMIN — GABAPENTIN 300 MILLIGRAM(S): 400 CAPSULE ORAL at 06:00

## 2019-12-07 RX ADMIN — OXYCODONE HYDROCHLORIDE 2.5 MILLIGRAM(S): 5 TABLET ORAL at 12:52

## 2019-12-07 RX ADMIN — POLYETHYLENE GLYCOL 3350 17 GRAM(S): 17 POWDER, FOR SOLUTION ORAL at 12:52

## 2019-12-07 RX ADMIN — ZOLPIDEM TARTRATE 5 MILLIGRAM(S): 10 TABLET ORAL at 23:30

## 2019-12-07 RX ADMIN — Medication 975 MILLIGRAM(S): at 20:53

## 2019-12-07 RX ADMIN — Medication 975 MILLIGRAM(S): at 21:56

## 2019-12-07 RX ADMIN — Medication 975 MILLIGRAM(S): at 14:54

## 2019-12-07 RX ADMIN — GABAPENTIN 300 MILLIGRAM(S): 400 CAPSULE ORAL at 14:52

## 2019-12-07 RX ADMIN — Medication 1 APPLICATION(S): at 20:53

## 2019-12-07 RX ADMIN — Medication 975 MILLIGRAM(S): at 06:00

## 2019-12-07 RX ADMIN — DULOXETINE HYDROCHLORIDE 60 MILLIGRAM(S): 30 CAPSULE, DELAYED RELEASE ORAL at 12:51

## 2019-12-07 RX ADMIN — Medication 325 MILLIGRAM(S): at 06:01

## 2019-12-07 RX ADMIN — PANTOPRAZOLE SODIUM 40 MILLIGRAM(S): 20 TABLET, DELAYED RELEASE ORAL at 06:00

## 2019-12-07 RX ADMIN — AMLODIPINE BESYLATE 2.5 MILLIGRAM(S): 2.5 TABLET ORAL at 06:00

## 2019-12-07 RX ADMIN — NYSTATIN CREAM 1 APPLICATION(S): 100000 CREAM TOPICAL at 20:54

## 2019-12-07 RX ADMIN — Medication 1 APPLICATION(S): at 06:02

## 2019-12-07 RX ADMIN — CELECOXIB 200 MILLIGRAM(S): 200 CAPSULE ORAL at 12:52

## 2019-12-07 NOTE — PROGRESS NOTE ADULT - SUBJECTIVE AND OBJECTIVE BOX
Patient is a 89y old  Female who presents with a chief complaint of Left intertrochanteric fracture s/p left hip IMN. Without complaints had a good night.      Patient seen and examined at bedside.    ALLERGIES:  No Known Allergies    MEDICATIONS  (STANDING):  acetaminophen   Tablet .. 975 milliGRAM(s) Oral <User Schedule>  amLODIPine   Tablet 2.5 milliGRAM(s) Oral daily  AQUAPHOR (petrolatum Ointment) 1 Application(s) Topical two times a day  ascorbic acid 500 milliGRAM(s) Oral daily  celecoxib 200 milliGRAM(s) Oral daily  DULoxetine 60 milliGRAM(s) Oral daily  enoxaparin Injectable 40 milliGRAM(s) SubCutaneous every 24 hours  ferrous    sulfate 325 milliGRAM(s) Oral two times a day  gabapentin 300 milliGRAM(s) Oral three times a day  lidocaine   Patch 1 Patch Transdermal daily  melatonin 3 milliGRAM(s) Oral at bedtime  nystatin Powder 1 Application(s) Topical two times a day  pantoprazole    Tablet 40 milliGRAM(s) Oral before breakfast  polyethylene glycol 3350 17 Gram(s) Oral daily    MEDICATIONS  (PRN):  oxyCODONE    IR 2.5 milliGRAM(s) Oral every 4 hours PRN Severe Pain (7 - 10)  senna 2 Tablet(s) Oral at bedtime PRN Constipation  zolpidem 5 milliGRAM(s) Oral at bedtime PRN Insomnia  zolpidem 5 milliGRAM(s) Oral at bedtime PRN Insomnia    Vital Signs Last 24 Hrs  T(F): 98.1 (07 Dec 2019 09:02), Max: 98.1 (07 Dec 2019 09:02)  HR: 90 (07 Dec 2019 09:02) (86 - 90)  BP: 152/80 (07 Dec 2019 09:02) (126/77 - 152/80)  RR: 14 (07 Dec 2019 09:02) (14 - 14)  SpO2: 95% (07 Dec 2019 09:02) (95% - 95%)  I&O's Summary    06 Dec 2019 07:01  -  07 Dec 2019 07:00  --------------------------------------------------------  IN: 0 mL / OUT: 3 mL / NET: -3 mL      PHYSICAL EXAM:  General: NAD, A/O x 3  ENT: MMM  Neck: Supple, No JVD  Lungs: Clear to auscultation bilaterally, Non labored breathing   Cardio: RRR, S1/S2, No murmurs  Abdomen: Soft, Nontender, Nondistended; Bowel sounds present  Extremities: No calf tenderness, No pitting edema left hip incision clean nos signs of infection     LABS:                                          RADIOLOGY & ADDITIONAL TESTS:    Care Discussed with Consultants/Other Providers:

## 2019-12-07 NOTE — PROGRESS NOTE ADULT - ASSESSMENT
89F s/p left hip intertrochanteric fracture after a mechanical fall s/p left hip ORIF with IM nail placement now at acute rehab    #Left hip intertrochanteric fracture s/p left hip ORIF and IM nail placement   #Left hip pain secondary to above-  managed with current pain regimen  -PT/OT  -DVT ppx: Lovenox x 4 weeks   -pain med prn     #Acute blood loss anemia, postoperatively   -H/H now stable  -Routine CBC monitoring  labs put in for monday     HTN  - amlodipine2.5 mg   - continue to monitor   may consider increasing amlodipine if bp continue to run high  but may be due to pain episodes     #Depression: c/w Cymbalta

## 2019-12-07 NOTE — PROGRESS NOTE ADULT - SUBJECTIVE AND OBJECTIVE BOX
CC: Gait dysfunction    Subjective: Patient seen and evaluated at the bedside. Asleep but arousable. ain controlled, no chest pain, no nausea, vomitting, no fever, chills. No acute events overnight. Has been tolerating rehabilitation program.    acetaminophen   Tablet .. 975 milliGRAM(s) Oral <User Schedule>  amLODIPine   Tablet 2.5 milliGRAM(s) Oral daily  AQUAPHOR (petrolatum Ointment) 1 Application(s) Topical two times a day  ascorbic acid 500 milliGRAM(s) Oral daily  celecoxib 200 milliGRAM(s) Oral daily  DULoxetine 60 milliGRAM(s) Oral daily  enoxaparin Injectable 40 milliGRAM(s) SubCutaneous every 24 hours  ferrous    sulfate 325 milliGRAM(s) Oral two times a day  gabapentin 300 milliGRAM(s) Oral three times a day  lidocaine   Patch 1 Patch Transdermal daily  melatonin 3 milliGRAM(s) Oral at bedtime  nystatin Powder 1 Application(s) Topical two times a day  oxyCODONE    IR 2.5 milliGRAM(s) Oral every 4 hours PRN  pantoprazole    Tablet 40 milliGRAM(s) Oral before breakfast  polyethylene glycol 3350 17 Gram(s) Oral daily  senna 2 Tablet(s) Oral at bedtime PRN  zolpidem 5 milliGRAM(s) Oral at bedtime PRN  zolpidem 5 milliGRAM(s) Oral at bedtime PRN      T(C): 36.7 (12-07-19 @ 09:02), Max: 36.7 (12-07-19 @ 09:02)  HR: 90 (12-07-19 @ 09:02) (86 - 90)  BP: 152/80 (12-07-19 @ 09:02) (126/77 - 152/80)  RR: 14 (12-07-19 @ 09:02) (14 - 14)  SpO2: 95% (12-07-19 @ 09:02) (95% - 95%)    Exam:  NAD  HEENT: EOMI  Heart: RRR, S1/2  Lungs: CTA bilaterally  Abd: soft ND  Ext: mild LLE swelling  Neuro: exam unchanged    Impression:  Nondisplaced intertrochanteric fracture of unspecified femur, initial encounter for closed fracture  Yes  Family history of diabetes mellitus (DM)  Handoff  MEWS Score  Myelodysplastic disease  Wrist fracture, left  Neuropathy of both feet  Constipation  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    Plan:  - continue medical management as per primary team  - continue PT/OT  - DVT prophylaxis  - CVS stable  - Patient is stable to continue current rehabilitation program

## 2019-12-08 PROCEDURE — 99232 SBSQ HOSP IP/OBS MODERATE 35: CPT

## 2019-12-08 RX ADMIN — PANTOPRAZOLE SODIUM 40 MILLIGRAM(S): 20 TABLET, DELAYED RELEASE ORAL at 07:03

## 2019-12-08 RX ADMIN — Medication 500 MILLIGRAM(S): at 13:17

## 2019-12-08 RX ADMIN — Medication 325 MILLIGRAM(S): at 17:30

## 2019-12-08 RX ADMIN — GABAPENTIN 300 MILLIGRAM(S): 400 CAPSULE ORAL at 21:13

## 2019-12-08 RX ADMIN — ZOLPIDEM TARTRATE 5 MILLIGRAM(S): 10 TABLET ORAL at 21:13

## 2019-12-08 RX ADMIN — Medication 3 MILLIGRAM(S): at 21:13

## 2019-12-08 RX ADMIN — CELECOXIB 200 MILLIGRAM(S): 200 CAPSULE ORAL at 13:17

## 2019-12-08 RX ADMIN — CELECOXIB 200 MILLIGRAM(S): 200 CAPSULE ORAL at 14:27

## 2019-12-08 RX ADMIN — ZOLPIDEM TARTRATE 5 MILLIGRAM(S): 10 TABLET ORAL at 23:58

## 2019-12-08 RX ADMIN — Medication 975 MILLIGRAM(S): at 15:28

## 2019-12-08 RX ADMIN — ENOXAPARIN SODIUM 40 MILLIGRAM(S): 100 INJECTION SUBCUTANEOUS at 21:15

## 2019-12-08 RX ADMIN — GABAPENTIN 300 MILLIGRAM(S): 400 CAPSULE ORAL at 13:17

## 2019-12-08 RX ADMIN — NYSTATIN CREAM 1 APPLICATION(S): 100000 CREAM TOPICAL at 07:04

## 2019-12-08 RX ADMIN — Medication 1 APPLICATION(S): at 07:03

## 2019-12-08 RX ADMIN — OXYCODONE HYDROCHLORIDE 2.5 MILLIGRAM(S): 5 TABLET ORAL at 14:27

## 2019-12-08 RX ADMIN — Medication 975 MILLIGRAM(S): at 21:12

## 2019-12-08 RX ADMIN — SENNA PLUS 2 TABLET(S): 8.6 TABLET ORAL at 21:21

## 2019-12-08 RX ADMIN — AMLODIPINE BESYLATE 2.5 MILLIGRAM(S): 2.5 TABLET ORAL at 07:03

## 2019-12-08 RX ADMIN — DULOXETINE HYDROCHLORIDE 60 MILLIGRAM(S): 30 CAPSULE, DELAYED RELEASE ORAL at 13:16

## 2019-12-08 RX ADMIN — GABAPENTIN 300 MILLIGRAM(S): 400 CAPSULE ORAL at 07:03

## 2019-12-08 RX ADMIN — POLYETHYLENE GLYCOL 3350 17 GRAM(S): 17 POWDER, FOR SOLUTION ORAL at 13:17

## 2019-12-08 RX ADMIN — NYSTATIN CREAM 1 APPLICATION(S): 100000 CREAM TOPICAL at 21:15

## 2019-12-08 RX ADMIN — Medication 325 MILLIGRAM(S): at 07:03

## 2019-12-08 RX ADMIN — Medication 975 MILLIGRAM(S): at 14:44

## 2019-12-08 RX ADMIN — Medication 975 MILLIGRAM(S): at 07:03

## 2019-12-08 RX ADMIN — OXYCODONE HYDROCHLORIDE 2.5 MILLIGRAM(S): 5 TABLET ORAL at 13:24

## 2019-12-08 NOTE — PROGRESS NOTE ADULT - SUBJECTIVE AND OBJECTIVE BOX
CC: Gait dysfunction    Subjective: Patient seen and evaluated at the bedside. Pain controlled, no chest pain, no nausea, vomitting, no fever, chills. No acute events overnight. Has been tolerating rehabilitation program.    acetaminophen   Tablet .. 975 milliGRAM(s) Oral <User Schedule>  amLODIPine   Tablet 2.5 milliGRAM(s) Oral daily  AQUAPHOR (petrolatum Ointment) 1 Application(s) Topical two times a day  ascorbic acid 500 milliGRAM(s) Oral daily  celecoxib 200 milliGRAM(s) Oral daily  DULoxetine 60 milliGRAM(s) Oral daily  enoxaparin Injectable 40 milliGRAM(s) SubCutaneous every 24 hours  ferrous    sulfate 325 milliGRAM(s) Oral two times a day  gabapentin 300 milliGRAM(s) Oral three times a day  lidocaine   Patch 1 Patch Transdermal daily  melatonin 3 milliGRAM(s) Oral at bedtime  nystatin Powder 1 Application(s) Topical two times a day  oxyCODONE    IR 2.5 milliGRAM(s) Oral every 4 hours PRN  pantoprazole    Tablet 40 milliGRAM(s) Oral before breakfast  polyethylene glycol 3350 17 Gram(s) Oral daily  senna 2 Tablet(s) Oral at bedtime PRN  zolpidem 5 milliGRAM(s) Oral at bedtime PRN  zolpidem 5 milliGRAM(s) Oral at bedtime PRN    Vital Signs Last 24 Hrs  T(C): 36.5 (08 Dec 2019 08:42), Max: 36.5 (08 Dec 2019 08:42)  T(F): 97.7 (08 Dec 2019 08:42), Max: 97.7 (08 Dec 2019 08:42)  HR: 95 (08 Dec 2019 08:42) (83 - 95)  BP: 154/84 (08 Dec 2019 08:42) (146/78 - 155/86)  BP(mean): --  RR: 14 (08 Dec 2019 08:42) (14 - 15)  SpO2: 96% (08 Dec 2019 08:42) (96% - 96%)    Exam:  NAD  HEENT: EOMI  Heart: RRR, S1/2  Lungs: CTA bilaterally  Abd: soft ND  Ext: mild LLE swelling  Neuro: exam unchanged    Impression:  Nondisplaced intertrochanteric fracture of unspecified femur, initial encounter for closed fracture  Yes  Family history of diabetes mellitus (DM)  Handoff  MEWS Score  Myelodysplastic disease  Wrist fracture, left  Neuropathy of both feet  Constipation  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    Plan:  - continue medical management as per primary team  - continue PT/OT  - DVT prophylaxis  - CVS stable  - Patient is stable to continue current rehabilitation program

## 2019-12-09 ENCOUNTER — TRANSCRIPTION ENCOUNTER (OUTPATIENT)
Age: 84
End: 2019-12-09

## 2019-12-09 LAB
ALBUMIN SERPL ELPH-MCNC: 3.1 G/DL — LOW (ref 3.3–5)
ALP SERPL-CCNC: 147 U/L — HIGH (ref 40–120)
ALT FLD-CCNC: 16 U/L — SIGNIFICANT CHANGE UP (ref 10–45)
ANION GAP SERPL CALC-SCNC: 9 MMOL/L — SIGNIFICANT CHANGE UP (ref 5–17)
AST SERPL-CCNC: 20 U/L — SIGNIFICANT CHANGE UP (ref 10–40)
BILIRUB SERPL-MCNC: 0.5 MG/DL — SIGNIFICANT CHANGE UP (ref 0.2–1.2)
BUN SERPL-MCNC: 36 MG/DL — HIGH (ref 7–23)
CALCIUM SERPL-MCNC: 9.2 MG/DL — SIGNIFICANT CHANGE UP (ref 8.4–10.5)
CHLORIDE SERPL-SCNC: 103 MMOL/L — SIGNIFICANT CHANGE UP (ref 96–108)
CO2 SERPL-SCNC: 27 MMOL/L — SIGNIFICANT CHANGE UP (ref 22–31)
CREAT SERPL-MCNC: 1.05 MG/DL — SIGNIFICANT CHANGE UP (ref 0.5–1.3)
GLUCOSE SERPL-MCNC: 93 MG/DL — SIGNIFICANT CHANGE UP (ref 70–99)
HCT VFR BLD CALC: 32.9 % — LOW (ref 34.5–45)
HGB BLD-MCNC: 10.2 G/DL — LOW (ref 11.5–15.5)
MCHC RBC-ENTMCNC: 31 GM/DL — LOW (ref 32–36)
MCHC RBC-ENTMCNC: 31.3 PG — SIGNIFICANT CHANGE UP (ref 27–34)
MCV RBC AUTO: 100.9 FL — HIGH (ref 80–100)
NRBC # BLD: 0 /100 WBCS — SIGNIFICANT CHANGE UP (ref 0–0)
PLATELET # BLD AUTO: 368 K/UL — SIGNIFICANT CHANGE UP (ref 150–400)
POTASSIUM SERPL-MCNC: 4.6 MMOL/L — SIGNIFICANT CHANGE UP (ref 3.5–5.3)
POTASSIUM SERPL-SCNC: 4.6 MMOL/L — SIGNIFICANT CHANGE UP (ref 3.5–5.3)
PROT SERPL-MCNC: 7 G/DL — SIGNIFICANT CHANGE UP (ref 6–8.3)
RBC # BLD: 3.26 M/UL — LOW (ref 3.8–5.2)
RBC # FLD: 16 % — HIGH (ref 10.3–14.5)
SODIUM SERPL-SCNC: 139 MMOL/L — SIGNIFICANT CHANGE UP (ref 135–145)
WBC # BLD: 6.21 K/UL — SIGNIFICANT CHANGE UP (ref 3.8–10.5)
WBC # FLD AUTO: 6.21 K/UL — SIGNIFICANT CHANGE UP (ref 3.8–10.5)

## 2019-12-09 PROCEDURE — 99232 SBSQ HOSP IP/OBS MODERATE 35: CPT

## 2019-12-09 RX ORDER — ASCORBIC ACID 60 MG
1 TABLET,CHEWABLE ORAL
Qty: 0 | Refills: 0 | DISCHARGE
Start: 2019-12-09

## 2019-12-09 RX ORDER — DULOXETINE HYDROCHLORIDE 30 MG/1
1 CAPSULE, DELAYED RELEASE ORAL
Qty: 0 | Refills: 0 | DISCHARGE

## 2019-12-09 RX ORDER — AMLODIPINE BESYLATE 2.5 MG/1
1 TABLET ORAL
Qty: 30 | Refills: 0
Start: 2019-12-09 | End: 2020-01-07

## 2019-12-09 RX ORDER — POLYETHYLENE GLYCOL 3350 17 G/17G
17 POWDER, FOR SOLUTION ORAL
Qty: 0 | Refills: 0 | DISCHARGE
Start: 2019-12-09

## 2019-12-09 RX ORDER — DULOXETINE HYDROCHLORIDE 30 MG/1
1 CAPSULE, DELAYED RELEASE ORAL
Qty: 0 | Refills: 0 | DISCHARGE
Start: 2019-12-09

## 2019-12-09 RX ORDER — GABAPENTIN 400 MG/1
1 CAPSULE ORAL
Qty: 0 | Refills: 0 | DISCHARGE
Start: 2019-12-09

## 2019-12-09 RX ORDER — ENOXAPARIN SODIUM 100 MG/ML
40 INJECTION SUBCUTANEOUS
Qty: 400 | Refills: 0
Start: 2019-12-09 | End: 2019-12-18

## 2019-12-09 RX ORDER — FERROUS SULFATE 325(65) MG
1 TABLET ORAL
Qty: 0 | Refills: 0 | DISCHARGE
Start: 2019-12-09

## 2019-12-09 RX ORDER — ACETAMINOPHEN 500 MG
3 TABLET ORAL
Qty: 0 | Refills: 0 | DISCHARGE
Start: 2019-12-09

## 2019-12-09 RX ORDER — PANTOPRAZOLE SODIUM 20 MG/1
1 TABLET, DELAYED RELEASE ORAL
Qty: 14 | Refills: 0
Start: 2019-12-09 | End: 2019-12-22

## 2019-12-09 RX ORDER — SENNA PLUS 8.6 MG/1
2 TABLET ORAL
Qty: 0 | Refills: 0 | DISCHARGE
Start: 2019-12-09

## 2019-12-09 RX ADMIN — Medication 325 MILLIGRAM(S): at 06:27

## 2019-12-09 RX ADMIN — POLYETHYLENE GLYCOL 3350 17 GRAM(S): 17 POWDER, FOR SOLUTION ORAL at 10:23

## 2019-12-09 RX ADMIN — Medication 3 MILLIGRAM(S): at 21:08

## 2019-12-09 RX ADMIN — AMLODIPINE BESYLATE 2.5 MILLIGRAM(S): 2.5 TABLET ORAL at 06:27

## 2019-12-09 RX ADMIN — Medication 975 MILLIGRAM(S): at 00:00

## 2019-12-09 RX ADMIN — NYSTATIN CREAM 1 APPLICATION(S): 100000 CREAM TOPICAL at 06:28

## 2019-12-09 RX ADMIN — CELECOXIB 200 MILLIGRAM(S): 200 CAPSULE ORAL at 13:13

## 2019-12-09 RX ADMIN — GABAPENTIN 300 MILLIGRAM(S): 400 CAPSULE ORAL at 06:27

## 2019-12-09 RX ADMIN — DULOXETINE HYDROCHLORIDE 60 MILLIGRAM(S): 30 CAPSULE, DELAYED RELEASE ORAL at 13:14

## 2019-12-09 RX ADMIN — ZOLPIDEM TARTRATE 5 MILLIGRAM(S): 10 TABLET ORAL at 21:09

## 2019-12-09 RX ADMIN — Medication 975 MILLIGRAM(S): at 20:40

## 2019-12-09 RX ADMIN — Medication 1 APPLICATION(S): at 17:23

## 2019-12-09 RX ADMIN — Medication 975 MILLIGRAM(S): at 16:37

## 2019-12-09 RX ADMIN — GABAPENTIN 300 MILLIGRAM(S): 400 CAPSULE ORAL at 13:14

## 2019-12-09 RX ADMIN — Medication 500 MILLIGRAM(S): at 13:14

## 2019-12-09 RX ADMIN — Medication 975 MILLIGRAM(S): at 19:43

## 2019-12-09 RX ADMIN — NYSTATIN CREAM 1 APPLICATION(S): 100000 CREAM TOPICAL at 17:23

## 2019-12-09 RX ADMIN — Medication 975 MILLIGRAM(S): at 17:29

## 2019-12-09 RX ADMIN — CELECOXIB 200 MILLIGRAM(S): 200 CAPSULE ORAL at 14:14

## 2019-12-09 RX ADMIN — Medication 975 MILLIGRAM(S): at 06:27

## 2019-12-09 RX ADMIN — ENOXAPARIN SODIUM 40 MILLIGRAM(S): 100 INJECTION SUBCUTANEOUS at 21:10

## 2019-12-09 RX ADMIN — PANTOPRAZOLE SODIUM 40 MILLIGRAM(S): 20 TABLET, DELAYED RELEASE ORAL at 06:27

## 2019-12-09 RX ADMIN — Medication 10 MILLIGRAM(S): at 16:36

## 2019-12-09 RX ADMIN — Medication 325 MILLIGRAM(S): at 17:27

## 2019-12-09 RX ADMIN — GABAPENTIN 300 MILLIGRAM(S): 400 CAPSULE ORAL at 21:08

## 2019-12-09 NOTE — DISCHARGE NOTE PROVIDER - HOSPITAL COURSE
NILSA VILLEDA is a 89yo Female with PMH of OA s/p right INÉS, anemia, depression, insomnia, myelodysplastic disorder, neuropathy, hx GBS presented to [facility] on 11/20/19 s/p mechanical fall. Patient states she was rushing to go to the bathroom, tripped and fell on her left side onto the tub. Denies head strike or LOC. Denies preceding HA, lightheadness, dizziness, vision changes, CP, SOB, palpitations. Complained of left hip/groin pain in ED, denied numbness or tingling. Imaging showed an impacted, comminuted left intertrochanteric fracture. Orthopedics was consulted and patient is s/p left hip IMN 11/21/19. Post-op course with anemia, which is stable. Patient deemed medically stable for discharge to IRF on 11/26/19. (26 Nov 2019 10:20)        Acute IPR course:        Assessment/PLan    NILSA VILLEDA is a 89yo Female with comminuted LEFT hip intertrochanteric fracture 2/2 mechanical fall s/p left HIP IMN, with functional, gait, and ADL impairments.         #LEFT hip intertrochanteric fracture s/p left HIP IMN    - Gait Instability, ADL impairments and Functional impairments: c/w Comprehensive Rehab Program of PT/OT    - LLE WBAT    - Lovenox x4 weeks (end 12/20)    - Follow up with Dr. Arturo Deleon outpatient in 2-3 weeks.         # insomnia: takes ambien 10 mg at home as per patient. trial of melatonin and Trazodone failed. Ordered ambien 10 mg        #post-op Anemia    - 12/9 10.2    - stable        # Hypertension:     -12/6- 160/70 in am - startred on norvasc with improvement     in blood pressures            #Depression    - cont cymbalta 60        #Pain Mgmt     - Tylenol Scheduled, Oxycodone 2.5 mg Q4h PRN    - gabapentin 300 TID    - Started celecoxib for one week increased to, 200 mg.     - lidocaine patch.         #GI/Bowel Mgmt     - Continent c/w Senna and Miralax    - suppository Prn.         # vomiting: likely from pain meds. KUB, labs. work up negative. Resolved without intervention.         # SOB: O2 sat stable. patient asymptomatic now CXR with ? left lung density 11/29, hospitalist f/u        #/Bladder Mgmt     - Continent, PVR        #FEN     - Diet - Regular + Thins.  ensure supplement        #Precautions / PROPHYLAXIS:     - Falls    - ortho: Weight bearing status: WBAT     - Lungs: Aspiration, Incentive Spirometer     - Pressure injury/Skin: Turn Q2hrs while in bed, OOB to Chair, PT/OT      - DVT: Lovenox as above, SCDs, TEDs         patient stable for discharge with appropriate follow up. NILSA VILLEDA is a 89yo Female with PMH of OA s/p right INÉS, anemia, depression, insomnia, myelodysplastic disorder, neuropathy, hx GBS presented to [facility] on 11/20/19 s/p mechanical fall. Patient states she was rushing to go to the bathroom, tripped and fell on her left side onto the tub. Denies head strike or LOC. Denies preceding HA, lightheadness, dizziness, vision changes, CP, SOB, palpitations. Complained of left hip/groin pain in ED, denied numbness or tingling. Imaging showed an impacted, comminuted left intertrochanteric fracture. Orthopedics was consulted and patient is s/p left hip IMN 11/21/19. Post-op course with anemia, which is stable. Patient deemed medically stable for discharge to IRF on 11/26/19. (26 Nov 2019 10:20)        Acute IPR course:        vVital Signs Last 24 Hrs    T(C): 36.6 (10 Dec 2019 08:42), Max: 36.8 (09 Dec 2019 21:15)    T(F): 97.9 (10 Dec 2019 08:42), Max: 98.2 (09 Dec 2019 21:15)    HR: 75 (10 Dec 2019 08:42) (75 - 85)    BP: 137/82 (10 Dec 2019 08:42) (128/74 - 137/82)    BP(mean): --    RR: 14 (10 Dec 2019 08:42) (14 - 14)    SpO2: 96% (10 Dec 2019 08:42) (96% - 96%)        PHYSICAL EXAM    Constitutional - NAD, Comfortable    HEENT - NCAT, EOMI    Neck - Supple, No limited ROM    Chest - CTA bilaterally    Cardiovascular - RRR, S1S2, No murmurs    Abdomen - BS+, Soft, NTND    Extremities - + mild LLE swelling, incision well healing, No calf tenderness     Neurologic Exam -                      Cognitive - Awake, Alert, AAO to self, place, date, year, situation       Communication - Fluent, No dysarthria       Cranial Nerves - CN 2-12 intact       Sensory - Intact to LT    Psychiatric - Mood stable, Affect WNL        Assessment/PLan    NILSA VILLEDA is a 89yo Female with comminuted LEFT hip intertrochanteric fracture 2/2 mechanical fall s/p left HIP IMN, with functional, gait, and ADL impairments.         #LEFT hip intertrochanteric fracture s/p left HIP IMN    - Gait Instability, ADL impairments and Functional impairments: c/w Comprehensive Rehab Program of PT/OT    - LLE WBAT    - Lovenox x4 weeks (end 12/20), lovenox injection training done. PPI while on lovenox    - Follow up with Dr. Arturo Deleon outpatient in 2-3 weeks.         # insomnia: takes ambien 10 mg at home as per patient. trial of melatonin and Trazodone failed. Ordered ambien 10 mg        #post-op Anemia    - 12/9 10.2    - stable        # Hypertension:     -12/6- 160/70 in am - startred on norvasc with improvement     in blood pressures            #Depression    - cont cymbalta 60        #Pain Mgmt     - Tylenol Scheduled, Oxycodone 2.5 mg Q4h PRN    - gabapentin 300 TID    - controlled on celebrex    - lidocaine patch.         #GI/Bowel Mgmt     - Continent c/w Senna and Miralax    - suppository Prn.         # vomiting: likely from pain meds. KUB, labs. work up negative. Resolved without intervention.         # SOB: O2 sat stable. patient asymptomatic now         #/Bladder Mgmt     - Continent, PVR negative        #FEN     - Diet - Regular + Thins.  ensure supplement        #Precautions / PROPHYLAXIS:     - Falls    - ortho: Weight bearing status: WBAT     - Lungs: Aspiration, Incentive Spirometer     - Pressure injury/Skin: Turn Q2hrs while in bed, OOB to Chair, PT/OT      - DVT: Lovenox as above, SCDs, TEDs         patient stable for discharge with appropriate follow up. NILSA VILLEDA is a 89yo Female with PMH of OA s/p right INÉS, anemia, depression, insomnia, myelodysplastic disorder, neuropathy, hx GBS presented to [facility] on 11/20/19 s/p mechanical fall. Patient states she was rushing to go to the bathroom, tripped and fell on her left side onto the tub. Denies head strike or LOC. Denies preceding HA, lightheadness, dizziness, vision changes, CP, SOB, palpitations. Complained of left hip/groin pain in ED, denied numbness or tingling. Imaging showed an impacted, comminuted left intertrochanteric fracture. Orthopedics was consulted and patient is s/p left hip IMN 11/21/19. Post-op course with anemia, which is stable. Patient deemed medically stable for discharge to IRF on 11/26/19. (26 Nov 2019 10:20)        Acute IPR course:        vVital Signs Last 24 Hrs    T(C): 36.6 (10 Dec 2019 08:42), Max: 36.8 (09 Dec 2019 21:15)    T(F): 97.9 (10 Dec 2019 08:42), Max: 98.2 (09 Dec 2019 21:15)    HR: 75 (10 Dec 2019 08:42) (75 - 85)    BP: 137/82 (10 Dec 2019 08:42) (128/74 - 137/82)    BP(mean): --    RR: 14 (10 Dec 2019 08:42) (14 - 14)    SpO2: 96% (10 Dec 2019 08:42) (96% - 96%)        PHYSICAL EXAM    Constitutional - NAD, Comfortable    HEENT - NCAT, EOMI    Neck - Supple, No limited ROM    Chest - CTA bilaterally    Cardiovascular - RRR, S1S2, No murmurs    Abdomen - BS+, Soft, NTND    Extremities - + mild LLE swelling, incision well healing, No calf tenderness     Neurologic Exam -                      Cognitive - Awake, Alert, AAO to self, place, date, year, situation       Communication - Fluent, No dysarthria       Cranial Nerves - CN 2-12 intact       Sensory - Intact to LT    Psychiatric - Mood stable, Affect WNL        Assessment/PLan    NILSA VILLEDA is a 89yo Female with comminuted LEFT hip intertrochanteric fracture 2/2 mechanical fall s/p left HIP IMN, with functional, gait, and ADL impairments.         #LEFT hip intertrochanteric fracture s/p left HIP IMN    - Gait Instability, ADL impairments and Functional impairments: c/w Comprehensive Rehab Program of PT/OT    - LLE WBAT    - Lovenox x4 weeks (end 12/20), lovenox injection training done. PPI while on lovenox    - Follow up with Dr. Arturo Deleon outpatient in 2-3 weeks.         # insomnia: takes ambien 10 mg at home as per patient. trial of melatonin and Trazodone failed. Ordered ambien 10 mg        #post-op Anemia    - 12/9 10.2    - stable        # Hypertension:     -12/6- 160/70 in am - startred on norvasc with improvement     in blood pressures            #Depression    - cont cymbalta 60        #Pain Mgmt     - Tylenol Scheduled, Oxycodone 2.5 mg Q4h PRN    - gabapentin 300 TID    - controlled on celebrex    - lidocaine patch.         #GI/Bowel Mgmt     - Continent c/w Senna and Miralax    - suppository Prn.     - Dark color stool, negative FOBT. Likely due to iron.         # vomiting: likely from pain meds. KUB, labs. work up negative. Resolved without intervention.         # SOB: O2 sat stable. patient asymptomatic now         #/Bladder Mgmt     - Continent, PVR negative        #FEN     - Diet - Regular + Thins.  ensure supplement        #Precautions / PROPHYLAXIS:     - Falls    - ortho: Weight bearing status: WBAT     - Lungs: Aspiration, Incentive Spirometer     - Pressure injury/Skin: Turn Q2hrs while in bed, OOB to Chair, PT/OT      - DVT: Lovenox as above, SCDs, TEDs         patient stable for discharge with appropriate follow up.

## 2019-12-09 NOTE — DISCHARGE NOTE PROVIDER - NSDCACTIVITY_GEN_ALL_CORE
No heavy lifting/straining/Do not make important decisions/Do not drive or operate machinery Do not drive or operate machinery/No heavy lifting/straining

## 2019-12-09 NOTE — PROGRESS NOTE ADULT - SUBJECTIVE AND OBJECTIVE BOX
Patient is a 89y old  Female who presents with a chief complaint of Left intertrochanteric fracture s/p left hip IMN (27 Nov 2019 07:45)      HPI:  NILSA VILLEDA is a 89yo Female with PMH of OA s/p right INÉS, anemia, depression, insomnia, myelodysplastic disorder, neuropathy, hx GBS presented to [facility] on 11/20/19 s/p mechanical fall. Patient states she was rushing to go to the bathroom, tripped and fell on her left side onto the tub. Denies head strike or LOC. Denies preceding HA, lightheadness, dizziness, vision changes, CP, SOB, palpitations. Complained of left hip/groin pain in ED, denied numbness or tingling. Imaging showed an impacted, comminuted left intertrochanteric fracture. Orthopedics was consulted and patient is s/p left hip IMN 11/21/19. Post-op course with anemia, which is stable. Patient deemed medically stable for discharge to IRF on 11/26/19. (26 Nov 2019 10:20)    Todays subjective HX:  Patient seen and examined at the bedside. Slept very well last night. Reports being constipated.       Vital Signs Last 24 Hrs   Vital Signs Last 24 Hrs  T(C): 36.5 (09 Dec 2019 09:29), Max: 36.6 (08 Dec 2019 21:16)  T(F): 97.7 (09 Dec 2019 09:29), Max: 97.8 (08 Dec 2019 21:16)  HR: 94 (09 Dec 2019 09:29) (85 - 94)  BP: 118/80 (09 Dec 2019 09:29) (118/80 - 134/80)  BP(mean): --  RR: 14 (09 Dec 2019 09:29) (14 - 14)  SpO2: 97% (09 Dec 2019 09:29) (97% - 97%)    PHYSICAL EXAM  Constitutional - NAD, Comfortable  HEENT - NCAT, EOMI  Neck - Supple, No limited ROM  Chest - CTA bilaterally  Cardiovascular - RRR, S1S2, No murmurs  Abdomen - BS+, Soft, NTND  Extremities - + mild LLE swelling, incision well healing, No calf tenderness   Neurologic Exam -                    Cognitive - Awake, Alert, AAO to self, place, date, year, situation     Communication - Fluent, No dysarthria     Cranial Nerves - CN 2-12 intact     Sensory - Intact to LT  Psychiatric - Mood stable, Affect WNL      Assessment/PLan  NILSA VILLEDA is a 89yo Female with comminuted LEFT hip intertrochanteric fracture 2/2 mechanical fall s/p left HIP IMN, with functional, gait, and ADL impairments.     #LEFT hip intertrochanteric fracture s/p left HIP IMN  - Gait Instability, ADL impairments and Functional impairments: c/w Comprehensive Rehab Program of PT/OT  - LLE WBAT  - Lovenox x4 weeks (end 12/20)  - Follow up with Dr. Arturo Deleon outpatient in 2-3 weeks.     # insomnia: takes ambien 10 mg at home as per patient. Trazodone didn't help. Ordered ambien 10 mg    #post-op Anemia  - 11/27 hgb 8.3  - monitor    # Hypertension: few episodes, likely due to pain and insomnia. Improving BP. Continue to monitor. Discussed with medicine team  -12/6- 160/70 in am - will consider start amlodipine    #Depression  - cont cymbalta 60    #Pain Mgmt   - Tylenol Scheduled, Oxycodone 2.5 mg Q4h PRN  - gabapentin 300 TID  - Started celecoxib for one week increased to, 200 mg.   - lidocaine patch.     #GI/Bowel Mgmt   - Continent c/w Senna and Miralax  - suppository Prn.     # vomiting: likely from pain meds. KUB, labs. work up negative. Resolved without intervention.     # SOB: O2 sat stable. patient asymptomatic now CXR with ? left lung density 11/29, hospitalist f/u    #/Bladder Mgmt   - Continent, PVR    #FEN   - Diet - Regular + Thins.  ensure supplement    #Precautions / PROPHYLAXIS:   - Falls  - ortho: Weight bearing status: WBAT   - Lungs: Aspiration, Incentive Spirometer   - Pressure injury/Skin: Turn Q2hrs while in bed, OOB to Chair, PT/OT    - DVT: Lovenox, SCDs, TEDs

## 2019-12-09 NOTE — DISCHARGE NOTE PROVIDER - NSDCCPCAREPLAN_GEN_ALL_CORE_FT
PRINCIPAL DISCHARGE DIAGNOSIS  Diagnosis: Closed fracture of left hip  Assessment and Plan of Treatment: COmpleted orthopedic surgery. Take lovenox for 10 more days until 12/20. This is a blood thinner to prevent from clot development. Take protonix (pantoprazole) while you are taking this medication.      SECONDARY DISCHARGE DIAGNOSES  Diagnosis: Hypertension  Assessment and Plan of Treatment: You were found to have high blood pressure. You were started on amlodipine for hypertension. COntinue to check your blood pressures at home and keep log of it. Take medication if pressure is > 110 througout day. Follow up with your primary care provider regarding continuation of this medication.

## 2019-12-09 NOTE — DISCHARGE NOTE PROVIDER - NSDCFUADDAPPT_GEN_ALL_CORE_FT
follow up with Dr. Deleon within on week of discharge  follow up with your primary care provider within one week of discharge  follow up with Dr. Estrada in 4-6 weeks for renewal of therapy

## 2019-12-09 NOTE — DISCHARGE NOTE PROVIDER - NSDCFUADDINST_GEN_ALL_CORE_FT
ORTHOPEDICS: Patient is weight bearing as tolerated of the left lower extremity. Patient will take lovenox for 4 weeks duration for DVT prophylaxis. Pt will follow up in office in 2-3 weeks - call office for appointment. Pt will take pain medication as prescribed and titrate according to prescription. Pt may remove dressing on 11/30/19. Patient may shower after 11/24/19. ORTHOPEDICS: Patient is weight bearing as tolerated of the left lower extremity. Patient will take lovenox for 4 weeks duration for DVT prophylaxis (until 12/20). Pt will follow up in office in 2-3 weeks - call office for appointment. Patient may shower after 11/24/19.

## 2019-12-09 NOTE — DISCHARGE NOTE PROVIDER - CARE PROVIDERS DIRECT ADDRESSES
[email or web address]

## 2019-12-09 NOTE — DISCHARGE NOTE PROVIDER - NSDCMRMEDTOKEN_GEN_ALL_CORE_FT
Ambien 10 mg oral tablet: 1 tab(s) orally once a day (at bedtime)  Cymbalta 60 mg oral delayed release capsule: 1 cap(s) orally once a day (at bedtime)  enoxaparin: 40 unit(s) subcutaneous once a day  gabapentin 300 mg oral capsule: 1 cap(s) orally 3 times a day  oxyCODONE 5 mg oral tablet: 1 tab(s) orally every 4 hours, As needed, Severe Pain (7 - 10)  polyethylene glycol 3350 oral powder for reconstitution: 17 gram(s) orally once a day  senna oral tablet: 2 tab(s) orally once a day (at bedtime), As needed, Constipation acetaminophen 325 mg oral tablet: 3 tab(s) orally   Ambien 10 mg oral tablet: 1 tab(s) orally once a day (at bedtime)  amLODIPine 2.5 mg oral tablet: 1 tab(s) orally once a day  ascorbic acid 500 mg oral tablet: 1 tab(s) orally once a day  bisacodyl 10 mg rectal suppository: 1 suppository(ies) rectal once a day, As needed, Constipation  DULoxetine 60 mg oral delayed release capsule: 1 cap(s) orally once a day  enoxaparin 40 mg/0.4 mL injectable solution: 40 milligram(s) subcutaneously once a day   FeroSul 325 mg (65 mg elemental iron) oral tablet: 1 tab(s) orally 3 times a day  gabapentin 300 mg oral capsule: 1 cap(s) orally 3 times a day  lidocaine 4% topical film: Apply topically to affected area once a day  pantoprazole 40 mg oral delayed release tablet: 1 tab(s) orally once a day (before a meal)  polyethylene glycol 3350 oral powder for reconstitution: 17 gram(s) orally once a day  senna oral tablet: 2 tab(s) orally once a day (at bedtime), As needed, Constipation acetaminophen 325 mg oral tablet: 3 tab(s) orally   Ambien 10 mg oral tablet: 1 tab(s) orally once a day (at bedtime)  amLODIPine 2.5 mg oral tablet: 1 tab(s) orally once a day  ascorbic acid 500 mg oral tablet: 1 tab(s) orally once a day  bisacodyl 10 mg rectal suppository: 1 suppository(ies) rectal once a day, As needed, Constipation  DULoxetine 60 mg oral delayed release capsule: 1 cap(s) orally once a day  enoxaparin 40 mg/0.4 mL injectable solution: 40 milligram(s) subcutaneously once a day   FeroSul 325 mg (65 mg elemental iron) oral tablet: 1 tab(s) orally 3 times a day  gabapentin 300 mg oral capsule: 1 cap(s) orally 3 times a day  lidocaine 4% topical film: Apply topically to affected area once a day  oxyCODONE 5 mg oral tablet: 0.5 tab(s) orally 3 times a day as needed for severe pain. MDD:1.5 tablets   pantoprazole 40 mg oral delayed release tablet: 1 tab(s) orally once a day (before a meal)  polyethylene glycol 3350 oral powder for reconstitution: 17 gram(s) orally once a day  senna oral tablet: 2 tab(s) orally once a day (at bedtime), As needed, Constipation

## 2019-12-09 NOTE — CHART NOTE - NSCHARTNOTEFT_GEN_A_CORE
Nutrition Follow Up Note  Hospital Course   (Per Electronic Medical Record):     Source:  Patient [X]  Medical Record [X]      Diet:   Regular Diet w/ Thin Liquids  Tolerates Diet Well  No Chewing/Swallowing Difficulties  No Recent Nausea, Vomiting, Diarrhea & Some Constipation  Education Provided on Need for Increased Fluids/Fiber   Consumes % of Meals (as Per Documentation) - States Good PO Intake/Appetite   on Ensure Enlive 8oz PO BID (Provides 700kcal & 40grams of Protein)  Patient Takes Nutrition Supplement      Enteral/Parenteral Nutrition: Not Applicable    Current Weight: 147.2lb on 11/26  Obtain New Weight  Obtain Weights Weekly     Pertinent Medications: MEDICATIONS  (STANDING):  acetaminophen   Tablet .. 975 milliGRAM(s) Oral <User Schedule>  amLODIPine   Tablet 2.5 milliGRAM(s) Oral daily  AQUAPHOR (petrolatum Ointment) 1 Application(s) Topical two times a day  ascorbic acid 500 milliGRAM(s) Oral daily  celecoxib 200 milliGRAM(s) Oral daily  DULoxetine 60 milliGRAM(s) Oral daily  enoxaparin Injectable 40 milliGRAM(s) SubCutaneous every 24 hours  ferrous    sulfate 325 milliGRAM(s) Oral two times a day  gabapentin 300 milliGRAM(s) Oral three times a day  lidocaine   Patch 1 Patch Transdermal daily  melatonin 3 milliGRAM(s) Oral at bedtime  nystatin Powder 1 Application(s) Topical two times a day  pantoprazole    Tablet 40 milliGRAM(s) Oral before breakfast  polyethylene glycol 3350 17 Gram(s) Oral daily    MEDICATIONS  (PRN):  oxyCODONE    IR 2.5 milliGRAM(s) Oral every 4 hours PRN Severe Pain (7 - 10)  senna 2 Tablet(s) Oral at bedtime PRN Constipation  zolpidem 5 milliGRAM(s) Oral at bedtime PRN Insomnia  zolpidem 5 milliGRAM(s) Oral at bedtime PRN Insomnia    Pertinent Labs:  12-09 Na139 mmol/L Glu 93 mg/dL K+ 4.6 mmol/L Cr  1.05 mg/dL BUN 36 mg/dL<H> 12-09 Alb 3.1 g/dL<L>    Skin: No Pressure Ulcers   Surgical Incision on Left Hip  (as Per Nursing Flow Sheet)    Edema: None Noted     Last Bowel Movement: on 12/8    Estimated Needs:   [X] No Change Since Previous Assessment    Previous Nutrition Diagnosis:   Moderate Malnutrition    Nutrition Diagnosis is [X] Ongoing - Continues on Nutrition Supplement & Patient Takes Nutrition Supplement     New Nutrition Diagnosis: [X] Not Applicable    Interventions:   1. Education Provided on Need for Increased Fluids/Fiber    2. Recommend Continue Nutrition Plan of Care     Monitoring & Evaluation:   [X] Weights   [X] PO Intake   [X] Skin Integrity   [X] Follow Up (Per Protocol)  [X] Tolerance to Diet Prescription   [X] Other: Labs     Registered Dietitian/Nutritionist Remains Available.  Damián Barba RDN    Pager # [phone]  Phone# [phone]

## 2019-12-09 NOTE — DISCHARGE NOTE PROVIDER - CARE PROVIDER_API CALL
Arturo Deleon)  Orthopaedic Surgery  [address] [place] [address]  Phone: [phone]  Fax: [phone]  Follow Up Time:     Philippe Estrada)  PhysicalRehab Medicine  [address] [place] [address]  Phone: [phone]  Fax: [phone]  Follow Up Time:

## 2019-12-10 ENCOUNTER — TRANSCRIPTION ENCOUNTER (OUTPATIENT)
Age: 84
End: 2019-12-10

## 2019-12-10 VITALS
TEMPERATURE: 98 F | HEART RATE: 75 BPM | SYSTOLIC BLOOD PRESSURE: 137 MMHG | RESPIRATION RATE: 14 BRPM | OXYGEN SATURATION: 96 % | DIASTOLIC BLOOD PRESSURE: 82 MMHG

## 2019-12-10 PROCEDURE — 99238 HOSP IP/OBS DSCHRG MGMT 30/<: CPT

## 2019-12-10 RX ORDER — OXYCODONE HYDROCHLORIDE 5 MG/1
0.5 TABLET ORAL
Qty: 10 | Refills: 0
Start: 2019-12-10 | End: 2019-12-16

## 2019-12-10 RX ORDER — ENOXAPARIN SODIUM 100 MG/ML
40 INJECTION SUBCUTANEOUS DAILY
Refills: 0 | Status: DISCONTINUED | OUTPATIENT
Start: 2019-12-10 | End: 2019-12-10

## 2019-12-10 RX ADMIN — DULOXETINE HYDROCHLORIDE 60 MILLIGRAM(S): 30 CAPSULE, DELAYED RELEASE ORAL at 11:18

## 2019-12-10 RX ADMIN — AMLODIPINE BESYLATE 2.5 MILLIGRAM(S): 2.5 TABLET ORAL at 06:38

## 2019-12-10 RX ADMIN — Medication 325 MILLIGRAM(S): at 06:39

## 2019-12-10 RX ADMIN — Medication 975 MILLIGRAM(S): at 06:39

## 2019-12-10 RX ADMIN — Medication 500 MILLIGRAM(S): at 11:18

## 2019-12-10 RX ADMIN — NYSTATIN CREAM 1 APPLICATION(S): 100000 CREAM TOPICAL at 06:42

## 2019-12-10 RX ADMIN — GABAPENTIN 300 MILLIGRAM(S): 400 CAPSULE ORAL at 14:48

## 2019-12-10 RX ADMIN — ENOXAPARIN SODIUM 40 MILLIGRAM(S): 100 INJECTION SUBCUTANEOUS at 11:18

## 2019-12-10 RX ADMIN — Medication 975 MILLIGRAM(S): at 07:12

## 2019-12-10 RX ADMIN — OXYCODONE HYDROCHLORIDE 2.5 MILLIGRAM(S): 5 TABLET ORAL at 00:09

## 2019-12-10 RX ADMIN — OXYCODONE HYDROCHLORIDE 2.5 MILLIGRAM(S): 5 TABLET ORAL at 01:27

## 2019-12-10 RX ADMIN — Medication 975 MILLIGRAM(S): at 14:48

## 2019-12-10 RX ADMIN — GABAPENTIN 300 MILLIGRAM(S): 400 CAPSULE ORAL at 06:39

## 2019-12-10 RX ADMIN — POLYETHYLENE GLYCOL 3350 17 GRAM(S): 17 POWDER, FOR SOLUTION ORAL at 11:18

## 2019-12-10 RX ADMIN — PANTOPRAZOLE SODIUM 40 MILLIGRAM(S): 20 TABLET, DELAYED RELEASE ORAL at 06:39

## 2019-12-10 RX ADMIN — Medication 1 APPLICATION(S): at 06:42

## 2019-12-10 NOTE — PROGRESS NOTE ADULT - SUBJECTIVE AND OBJECTIVE BOX
Patient is a 89y old  Female who presents with a chief complaint of Left intertrochanteric fracture s/p left hip IMN (27 Nov 2019 07:45)      HPI:  NILSA VILLEDA is a 89yo Female with PMH of OA s/p right INÉS, anemia, depression, insomnia, myelodysplastic disorder, neuropathy, hx GBS presented to [facility] on 11/20/19 s/p mechanical fall. Patient states she was rushing to go to the bathroom, tripped and fell on her left side onto the tub. Denies head strike or LOC. Denies preceding HA, lightheadness, dizziness, vision changes, CP, SOB, palpitations. Complained of left hip/groin pain in ED, denied numbness or tingling. Imaging showed an impacted, comminuted left intertrochanteric fracture. Orthopedics was consulted and patient is s/p left hip IMN 11/21/19. Post-op course with anemia, which is stable. Patient deemed medically stable for discharge to IRF on 11/26/19. (26 Nov 2019 10:20)    Todays subjective HX:  Patient seen and examined at the bedside. Patient states that she was able to have bowel mvoement. Has had lovenox injection training. No other issues.    Vital Signs Last 24 Hrs  T(C): 36.6 (10 Dec 2019 08:42), Max: 36.8 (09 Dec 2019 21:15)  T(F): 97.9 (10 Dec 2019 08:42), Max: 98.2 (09 Dec 2019 21:15)  HR: 75 (10 Dec 2019 08:42) (75 - 85)  BP: 137/82 (10 Dec 2019 08:42) (128/74 - 137/82)  BP(mean): --  RR: 14 (10 Dec 2019 08:42) (14 - 14)  SpO2: 96% (10 Dec 2019 08:42) (96% - 96%)    PHYSICAL EXAM  Constitutional - NAD, Comfortable  HEENT - NCAT, EOMI  Neck - Supple, No limited ROM  Chest - CTA bilaterally  Cardiovascular - RRR, S1S2, No murmurs  Abdomen - BS+, Soft, NTND  Extremities - + mild LLE swelling, incision well healing, No calf tenderness   Neurologic Exam -                    Cognitive - Awake, Alert, AAO to self, place, date, year, situation     Communication - Fluent, No dysarthria     Cranial Nerves - CN 2-12 intact     Sensory - Intact to LT  Psychiatric - Mood stable, Affect WNL      Assessment/PLan  NILSA VILLEDA is a 89yo Female with comminuted LEFT hip intertrochanteric fracture 2/2 mechanical fall s/p left HIP IMN, with functional, gait, and ADL impairments.     #LEFT hip intertrochanteric fracture s/p left HIP IMN  - Gait Instability, ADL impairments and Functional impairments: c/w Comprehensive Rehab Program of PT/OT  - LLE WBAT  - Lovenox x4 weeks (end 12/20) -lovenox training done  - Follow up with Dr. Arturo Deleon outpatient in 2-3 weeks.     # insomnia: takes ambien 10 mg at home as per patient. Trazodone didn't help. Ordered ambien 10 mg    #post-op Anemia  - 11/27 hgb 8.3  - monitor    # Hypertension: few episodes, likely due to pain and insomnia. Improving BP. Continue to monitor. Discussed with medicine team  -12/6- 160/70 in am - -bps better on norvasc    #Depression  - cont cymbalta 60    #Pain Mgmt   - Tylenol Scheduled, Oxycodone 2.5 mg Q4h PRN  - gabapentin 300 TID  - Started celecoxib for one week increased to, 200 mg.   - lidocaine patch.     #GI/Bowel Mgmt   - Continent c/w Senna and Miralax  - suppository Prn.     # vomiting: likely from pain meds. KUB, labs. work up negative. Resolved without intervention.     # SOB: O2 sat stable. patient asymptomatic now CXR with ? left lung density 11/29, hospitalist f/u    #/Bladder Mgmt   - Continent, PVR    #FEN   - Diet - Regular + Thins.  ensure supplement    #Precautions / PROPHYLAXIS:   - Falls  - ortho: Weight bearing status: WBAT   - Lungs: Aspiration, Incentive Spirometer   - Pressure injury/Skin: Turn Q2hrs while in bed, OOB to Chair, PT/OT    - DVT: Lovenox, SCDs, TEDs

## 2019-12-10 NOTE — PROGRESS NOTE ADULT - REASON FOR ADMISSION
Left intertrochanteric fracture s/p left hip IMN

## 2019-12-10 NOTE — DISCHARGE NOTE NURSING/CASE MANAGEMENT/SOCIAL WORK - PATIENT PORTAL LINK FT
You can access the FollowMyHealth Patient Portal offered by [facility] by registering at the following website: [email or web address]. By joining [facility]’s FollowMyHealth portal, you will also be able to view your health information using other applications (apps) compatible with our system.

## 2019-12-12 ENCOUNTER — CHART COPY (OUTPATIENT)
Age: 84
End: 2019-12-12

## 2019-12-12 ENCOUNTER — RX RENEWAL (OUTPATIENT)
Age: 84
End: 2019-12-12

## 2019-12-17 ENCOUNTER — OTHER (OUTPATIENT)
Age: 84
End: 2019-12-17

## 2019-12-18 ENCOUNTER — APPOINTMENT (OUTPATIENT)
Dept: ORTHOPEDIC SURGERY | Facility: CLINIC | Age: 84
End: 2019-12-18
Payer: MEDICARE

## 2019-12-18 PROCEDURE — 73502 X-RAY EXAM HIP UNI 2-3 VIEWS: CPT | Mod: LT

## 2019-12-18 PROCEDURE — 99024 POSTOP FOLLOW-UP VISIT: CPT

## 2019-12-18 NOTE — HISTORY OF PRESENT ILLNESS
SUBJECTIVE:   Gosia Henry is a 73 year old female who presents to clinic today for the following health issues:      Hypertension Follow-up      Outpatient blood pressures are being checked at store.  Results are 90 for systolic last time she checked.    Low Salt Diet: no added salt        Amount of exercise or physical activity: None    Problems taking medications regularly: No    Medication side effects: none  Diet: no salt added          Problem list and histories reviewed & adjusted, as indicated.  Additional history: as documented    Patient Active Problem List   Diagnosis     Advanced directives, counseling/discussion     Hypertension goal BP (blood pressure) < 140/90     Tobacco abuse     PVD (peripheral vascular disease) with claudication (H)     Hyperlipidemia LDL goal <70     Obesity     Claudication of left lower extremity (H)     Past Surgical History:   Procedure Laterality Date     APPENDECTOMY       GYN SURGERY       STENT  2013    right leg       Social History   Substance Use Topics     Smoking status: Former Smoker     Packs/day: 0.50     Years: 20.00     Types: Cigarettes     Smokeless tobacco: Former User     Quit date: 1/15/2014     Alcohol use Yes      Comment: occ     Family History   Problem Relation Age of Onset     CEREBROVASCULAR DISEASE Mother 54     DIABETES Father      Hypertension Brother      Prostate Cancer Brother      Thyroid Disease Sister          Current Outpatient Prescriptions   Medication Sig Dispense Refill     losartan (COZAAR) 100 MG tablet TAKE 1 TABLET (100 MG) BY MOUTH DAILY 90 tablet 3     hydrochlorothiazide (HYDRODIURIL) 25 MG tablet Take 1 tablet (25 mg) by mouth daily 90 tablet 3     simvastatin (ZOCOR) 40 MG tablet Take 1 tablet (40 mg) by mouth At Bedtime ++Due for office visit++ 90 tablet 3     aspirin 81 MG EC tablet Take 1 tablet (81 mg) by mouth daily 90 tablet 3     Multiple Vitamins-Minerals (MULTIVITAMIN & MINERAL PO) Take 1 tablet by mouth daily.    "    Allergies   Allergen Reactions     Lisinopril      cough     Recent Labs   Lab Test  09/14/17   1625  04/20/16   0655  03/14/16   0712  12/31/13   0659  08/28/13   0628   A1C   --    --    --    --   5.2   LDL  55   --   48  50  108   HDL   --    --   65  63  63   TRIG   --    --   98  90  141   ALT   --    --   29  23   --    CR  1.38*  1.04  1.11*  0.69  0.71   GFRESTIMATED  37*  52*  48*  84  82   GFRESTBLACK  45*  63  59*  >90  >90   POTASSIUM  4.8  5.0  4.1  4.1   --             Reviewed and updated as needed this visit by clinical staffTobacco  Allergies  Meds  Med Hx  Surg Hx  Fam Hx  Soc Hx      Reviewed and updated as needed this visit by Provider         ROS:  Constitutional, HEENT, cardiovascular, pulmonary, gi and gu systems are negative, except as otherwise noted.      OBJECTIVE:   /56 (BP Location: Right arm, Patient Position: Chair, Cuff Size: Adult Regular)  Pulse 69  Temp 98.5  F (36.9  C) (Oral)  Ht 5' 1.22\" (1.555 m)  Wt 188 lb (85.3 kg)  SpO2 99%  BMI 35.27 kg/m2  Body mass index is 35.27 kg/(m^2).  GENERAL: healthy, alert and no distress  NECK: no adenopathy, no asymmetry, masses, or scars and thyroid normal to palpation  RESP: lungs clear to auscultation - no rales, rhonchi or wheezes  CV: regular rate and rhythm, normal S1 S2, no S3 or S4, no murmur, click or rub, no peripheral edema and peripheral pulses strong  ABDOMEN: soft, nontender, no hepatosplenomegaly, no masses and bowel sounds normal  MS: no gross musculoskeletal defects noted, no edema    Diagnostic Test Results:  Results for orders placed or performed in visit on 09/14/17   BASIC METABOLIC PANEL   Result Value Ref Range    Sodium 135 133 - 144 mmol/L    Potassium 4.8 3.4 - 5.3 mmol/L    Chloride 108 94 - 109 mmol/L    Carbon Dioxide 20 20 - 32 mmol/L    Anion Gap 7 3 - 14 mmol/L    Glucose 86 70 - 99 mg/dL    Urea Nitrogen 22 7 - 30 mg/dL    Creatinine 1.38 (H) 0.52 - 1.04 mg/dL    GFR Estimate 37 (L) >60 " [Clean/Dry/Intact] : clean, dry and intact mL/min/1.7m2    GFR Estimate If Black 45 (L) >60 mL/min/1.7m2    Calcium 8.8 8.5 - 10.1 mg/dL   LDL cholesterol direct   Result Value Ref Range    LDL Cholesterol Direct 55 <100 mg/dL       ASSESSMENT/PLAN:       ICD-10-CM    1. Hypertension goal BP (blood pressure) < 140/90 I10 BASIC METABOLIC PANEL   2. Asymptomatic postmenopausal status Z78.0 DEXA HIP/PELVIS/SPINE - Future   3. Visit for screening mammogram Z12.31 MA SCREENING DIGITAL BILAT - Future  (s+30)   4. Tobacco use disorder F17.200 TOBACCO CESSATION ORDER FOR    5. Hyperlipidemia LDL goal <70 E78.5 LDL cholesterol direct     simvastatin (ZOCOR) 40 MG tablet   6. Need for vaccination with 13-polyvalent pneumococcal conjugate vaccine Z23 Pneumococcal vaccine 13 valent PCV13 IM (Prevnar) [29446]     ADMIN: Vaccine, Initial (37323)   7. Encounter for screening mammogram for breast cancer Z12.31    8. Special screening for malignant neoplasms, colon Z12.11 Fecal colorectal cancer screen (FIT)   9. Essential hypertension with goal blood pressure less than 140/90 I10 losartan (COZAAR) 100 MG tablet     hydrochlorothiazide (HYDRODIURIL) 25 MG tablet       Román Dominguez MD  [facility]   [Healed] : healed [Erythema] : not erythematous [Discharge] : absent of discharge [Dehiscence] : not dehisced [Swelling] : swollen [Vascular Intact] : ~T peripheral vascular exam normal [Neuro Intact] : an unremarkable neurological exam [Doing Well] : is doing well [No Sign of Infection] : is showing no signs of infection [Adequate Pain Control] : has adequate pain control [de-identified] : s/p intramedullary nail, left intertrochanteric hip fracture. 11/21/19 [de-identified] : patient is a pleasant 89-year-old female who presents with her daughter-in-law s/p intramedullary nail, left intertrochanteric hip fracture.  She is about one month post injury. She is ambulating with a walker. Pain is controlled with prescribe medication [de-identified] : Physical Exam:\par General: Well appearing, no acute distress, A&O\par Neurologic: A&Ox3, No focal deficits\par Head: NCAT without abrasions, lacerations, or ecchymosis to head, face, or scalp\par Respiratory: Equal chest wall expansion bilaterally, no accessory muscle use\par Lymphatic: No lymphadenopathy palpated\par Skin: Warm and dry\par Psychiatric: Normal mood and affect\par \par SILT s/s/sp/dp/t\par Fires EHL/FHL/GS/TA\par 2+ DP/PT pulse\par brisk capillary refill [de-identified] : we will continue the standard plan. Weightbearing as tolerated. Physical therapy. Small prescription of oxycodone sent for additional pain control. ollowup in 6 weeks with repeat x-rays.\par \par Osteopenia and osteoporosis are significant risk factors for fragility fractures. Given the type of fracture that has occurred, I would highly recommend that the patient followup with their primary care physician to discuss treatment for low bone mineral density. We discussed the benefits of these medications frequently far outweigh the small risks. Their primary care physician can call the office with any specific questions or concerns.\par \par The patient was given the opportunity to ask questions and all questions were answered to their satisfaction.\par \par Arturo Deleon MD\par Orthopaedic Trauma Surgeon\par [facility]\par [facility]\par \par \par \par  [de-identified] : plain films of the left hip obtained today show a stable intertrochanteric fracture fixation with a short nail and cement augmentation

## 2019-12-19 ENCOUNTER — APPOINTMENT (OUTPATIENT)
Dept: INTERNAL MEDICINE | Facility: CLINIC | Age: 84
End: 2019-12-19
Payer: MEDICARE

## 2019-12-19 VITALS
HEIGHT: 61 IN | WEIGHT: 150 LBS | DIASTOLIC BLOOD PRESSURE: 84 MMHG | SYSTOLIC BLOOD PRESSURE: 134 MMHG | HEART RATE: 72 BPM | BODY MASS INDEX: 28.32 KG/M2 | RESPIRATION RATE: 12 BRPM

## 2019-12-19 DIAGNOSIS — Z87.81 PERSONAL HISTORY OF (HEALED) TRAUMATIC FRACTURE: ICD-10-CM

## 2019-12-19 PROCEDURE — 99214 OFFICE O/P EST MOD 30 MIN: CPT

## 2019-12-19 RX ORDER — LEVOFLOXACIN 500 MG/1
500 TABLET, FILM COATED ORAL DAILY
Qty: 14 | Refills: 0 | Status: DISCONTINUED | COMMUNITY
Start: 2019-05-06 | End: 2019-12-19

## 2019-12-19 NOTE — PHYSICAL EXAM
[No Acute Distress] : no acute distress [Well-Appearing] : well-appearing [Well Developed] : well developed [Well Nourished] : well nourished [Normal Sclera/Conjunctiva] : normal sclera/conjunctiva [PERRL] : pupils equal round and reactive to light [EOMI] : extraocular movements intact [Normal Outer Ear/Nose] : the outer ears and nose were normal in appearance [Normal Oropharynx] : the oropharynx was normal [No Lymphadenopathy] : no lymphadenopathy [No JVD] : no jugular venous distention [Supple] : supple [Thyroid Normal, No Nodules] : the thyroid was normal and there were no nodules present [Clear to Auscultation] : lungs were clear to auscultation bilaterally [No Respiratory Distress] : no respiratory distress  [No Accessory Muscle Use] : no accessory muscle use [Normal Rate] : normal rate  [Regular Rhythm] : with a regular rhythm [No Carotid Bruits] : no carotid bruits [No Murmur] : no murmur heard [Normal S1, S2] : normal S1 and S2 [No Varicosities] : no varicosities [No Abdominal Bruit] : a ~M bruit was not heard ~T in the abdomen [No Edema] : there was no peripheral edema [No Palpable Aorta] : no palpable aorta [Pedal Pulses Present] : the pedal pulses are present [Soft] : abdomen soft [No Extremity Clubbing/Cyanosis] : no extremity clubbing/cyanosis [Non Tender] : non-tender [Non-distended] : non-distended [No Masses] : no abdominal mass palpated [Normal Bowel Sounds] : normal bowel sounds [No HSM] : no HSM [No CVA Tenderness] : no CVA  tenderness [Normal Anterior Cervical Nodes] : no anterior cervical lymphadenopathy [Normal Posterior Cervical Nodes] : no posterior cervical lymphadenopathy [Grossly Normal Strength/Tone] : grossly normal strength/tone [No Joint Swelling] : no joint swelling [No Spinal Tenderness] : no spinal tenderness [No Rash] : no rash [No Focal Deficits] : no focal deficits [Coordination Grossly Intact] : coordination grossly intact [Normal Affect] : the affect was normal [Normal Gait] : normal gait [Deep Tendon Reflexes (DTR)] : deep tendon reflexes were 2+ and symmetric [Normal Insight/Judgement] : insight and judgment were intact

## 2019-12-19 NOTE — HISTORY OF PRESENT ILLNESS
[de-identified] : left hip fracture\par fell at home\par no chest pain nvd or palitatons\par no blood in the urine\par paitent fell at home [FreeTextEntry1] : left hip fracture

## 2019-12-19 NOTE — ASSESSMENT
[FreeTextEntry1] : left hip fracture good movement\par use cane at home\par appears well\par bp stable\par anemia check in one monht\par appears in good spirits\par refuses flu vaccine

## 2019-12-23 ENCOUNTER — MEDICATION RENEWAL (OUTPATIENT)
Age: 84
End: 2019-12-23

## 2019-12-30 ENCOUNTER — EMERGENCY (EMERGENCY)
Facility: HOSPITAL | Age: 84
LOS: 1 days | End: 2019-12-30
Attending: EMERGENCY MEDICINE
Payer: MEDICARE

## 2019-12-30 VITALS
OXYGEN SATURATION: 95 % | DIASTOLIC BLOOD PRESSURE: 98 MMHG | SYSTOLIC BLOOD PRESSURE: 162 MMHG | RESPIRATION RATE: 17 BRPM | TEMPERATURE: 98 F | HEART RATE: 90 BPM

## 2019-12-30 VITALS
TEMPERATURE: 98 F | SYSTOLIC BLOOD PRESSURE: 157 MMHG | HEART RATE: 92 BPM | HEIGHT: 63 IN | DIASTOLIC BLOOD PRESSURE: 90 MMHG | RESPIRATION RATE: 18 BRPM | OXYGEN SATURATION: 95 % | WEIGHT: 149.91 LBS

## 2019-12-30 DIAGNOSIS — Z98.890 OTHER SPECIFIED POSTPROCEDURAL STATES: Chronic | ICD-10-CM

## 2019-12-30 DIAGNOSIS — H26.9 UNSPECIFIED CATARACT: Chronic | ICD-10-CM

## 2019-12-30 DIAGNOSIS — Z90.49 ACQUIRED ABSENCE OF OTHER SPECIFIED PARTS OF DIGESTIVE TRACT: Chronic | ICD-10-CM

## 2019-12-30 DIAGNOSIS — Z96.641 PRESENCE OF RIGHT ARTIFICIAL HIP JOINT: Chronic | ICD-10-CM

## 2019-12-30 DIAGNOSIS — Z87.81 PERSONAL HISTORY OF (HEALED) TRAUMATIC FRACTURE: Chronic | ICD-10-CM

## 2019-12-30 DIAGNOSIS — Z86.79 PERSONAL HISTORY OF OTHER DISEASES OF THE CIRCULATORY SYSTEM: Chronic | ICD-10-CM

## 2019-12-30 DIAGNOSIS — Z90.721 ACQUIRED ABSENCE OF OVARIES, UNILATERAL: Chronic | ICD-10-CM

## 2019-12-30 LAB
ALBUMIN SERPL ELPH-MCNC: 4.1 G/DL — SIGNIFICANT CHANGE UP (ref 3.3–5.2)
ALP SERPL-CCNC: 175 U/L — HIGH (ref 40–120)
ALT FLD-CCNC: 8 U/L — SIGNIFICANT CHANGE UP
ANION GAP SERPL CALC-SCNC: 14 MMOL/L — SIGNIFICANT CHANGE UP (ref 5–17)
APTT BLD: 29.3 SEC — SIGNIFICANT CHANGE UP (ref 27.5–36.3)
AST SERPL-CCNC: 17 U/L — SIGNIFICANT CHANGE UP
BASOPHILS # BLD AUTO: 0.03 K/UL — SIGNIFICANT CHANGE UP (ref 0–0.2)
BASOPHILS NFR BLD AUTO: 0.5 % — SIGNIFICANT CHANGE UP (ref 0–2)
BILIRUB SERPL-MCNC: 0.4 MG/DL — SIGNIFICANT CHANGE UP (ref 0.4–2)
BUN SERPL-MCNC: 27 MG/DL — HIGH (ref 8–20)
CALCIUM SERPL-MCNC: 9.8 MG/DL — SIGNIFICANT CHANGE UP (ref 8.6–10.2)
CHLORIDE SERPL-SCNC: 101 MMOL/L — SIGNIFICANT CHANGE UP (ref 98–107)
CO2 SERPL-SCNC: 25 MMOL/L — SIGNIFICANT CHANGE UP (ref 22–29)
CREAT SERPL-MCNC: 0.91 MG/DL — SIGNIFICANT CHANGE UP (ref 0.5–1.3)
D DIMER BLD IA.RAPID-MCNC: 591 NG/ML DDU — HIGH
EOSINOPHIL # BLD AUTO: 0.37 K/UL — SIGNIFICANT CHANGE UP (ref 0–0.5)
EOSINOPHIL NFR BLD AUTO: 5.6 % — SIGNIFICANT CHANGE UP (ref 0–6)
GLUCOSE SERPL-MCNC: 115 MG/DL — SIGNIFICANT CHANGE UP (ref 70–115)
HCT VFR BLD CALC: 39.8 % — SIGNIFICANT CHANGE UP (ref 34.5–45)
HGB BLD-MCNC: 12.1 G/DL — SIGNIFICANT CHANGE UP (ref 11.5–15.5)
IMM GRANULOCYTES NFR BLD AUTO: 0.3 % — SIGNIFICANT CHANGE UP (ref 0–1.5)
INR BLD: 0.97 RATIO — SIGNIFICANT CHANGE UP (ref 0.88–1.16)
LYMPHOCYTES # BLD AUTO: 1.33 K/UL — SIGNIFICANT CHANGE UP (ref 1–3.3)
LYMPHOCYTES # BLD AUTO: 20.3 % — SIGNIFICANT CHANGE UP (ref 13–44)
MCHC RBC-ENTMCNC: 30.1 PG — SIGNIFICANT CHANGE UP (ref 27–34)
MCHC RBC-ENTMCNC: 30.4 GM/DL — LOW (ref 32–36)
MCV RBC AUTO: 99 FL — SIGNIFICANT CHANGE UP (ref 80–100)
MONOCYTES # BLD AUTO: 0.79 K/UL — SIGNIFICANT CHANGE UP (ref 0–0.9)
MONOCYTES NFR BLD AUTO: 12 % — SIGNIFICANT CHANGE UP (ref 2–14)
NEUTROPHILS # BLD AUTO: 4.02 K/UL — SIGNIFICANT CHANGE UP (ref 1.8–7.4)
NEUTROPHILS NFR BLD AUTO: 61.3 % — SIGNIFICANT CHANGE UP (ref 43–77)
NT-PROBNP SERPL-SCNC: 225 PG/ML — SIGNIFICANT CHANGE UP (ref 0–300)
PLATELET # BLD AUTO: 298 K/UL — SIGNIFICANT CHANGE UP (ref 150–400)
POTASSIUM SERPL-MCNC: 3.8 MMOL/L — SIGNIFICANT CHANGE UP (ref 3.5–5.3)
POTASSIUM SERPL-SCNC: 3.8 MMOL/L — SIGNIFICANT CHANGE UP (ref 3.5–5.3)
PROT SERPL-MCNC: 7.5 G/DL — SIGNIFICANT CHANGE UP (ref 6.6–8.7)
PROTHROM AB SERPL-ACNC: 11.1 SEC — SIGNIFICANT CHANGE UP (ref 10–12.9)
RBC # BLD: 4.02 M/UL — SIGNIFICANT CHANGE UP (ref 3.8–5.2)
RBC # FLD: 13.9 % — SIGNIFICANT CHANGE UP (ref 10.3–14.5)
SODIUM SERPL-SCNC: 140 MMOL/L — SIGNIFICANT CHANGE UP (ref 135–145)
TROPONIN T SERPL-MCNC: <0.01 NG/ML — SIGNIFICANT CHANGE UP (ref 0–0.06)
WBC # BLD: 6.56 K/UL — SIGNIFICANT CHANGE UP (ref 3.8–10.5)
WBC # FLD AUTO: 6.56 K/UL — SIGNIFICANT CHANGE UP (ref 3.8–10.5)

## 2019-12-30 PROCEDURE — 93010 ELECTROCARDIOGRAM REPORT: CPT

## 2019-12-30 PROCEDURE — 73502 X-RAY EXAM HIP UNI 2-3 VIEWS: CPT | Mod: 26,LT

## 2019-12-30 PROCEDURE — 99284 EMERGENCY DEPT VISIT MOD MDM: CPT | Mod: GC

## 2019-12-30 PROCEDURE — 71045 X-RAY EXAM CHEST 1 VIEW: CPT | Mod: 26

## 2019-12-30 PROCEDURE — 73502 X-RAY EXAM HIP UNI 2-3 VIEWS: CPT

## 2019-12-30 PROCEDURE — 93971 EXTREMITY STUDY: CPT

## 2019-12-30 PROCEDURE — 85379 FIBRIN DEGRADATION QUANT: CPT

## 2019-12-30 PROCEDURE — 72192 CT PELVIS W/O DYE: CPT | Mod: 26

## 2019-12-30 PROCEDURE — 96374 THER/PROPH/DIAG INJ IV PUSH: CPT | Mod: XU

## 2019-12-30 PROCEDURE — 73552 X-RAY EXAM OF FEMUR 2/>: CPT

## 2019-12-30 PROCEDURE — 96376 TX/PRO/DX INJ SAME DRUG ADON: CPT | Mod: XU

## 2019-12-30 PROCEDURE — 85610 PROTHROMBIN TIME: CPT

## 2019-12-30 PROCEDURE — 85730 THROMBOPLASTIN TIME PARTIAL: CPT

## 2019-12-30 PROCEDURE — 76377 3D RENDER W/INTRP POSTPROCES: CPT

## 2019-12-30 PROCEDURE — 71275 CT ANGIOGRAPHY CHEST: CPT

## 2019-12-30 PROCEDURE — 93971 EXTREMITY STUDY: CPT | Mod: 26,LT

## 2019-12-30 PROCEDURE — 83880 ASSAY OF NATRIURETIC PEPTIDE: CPT

## 2019-12-30 PROCEDURE — 85027 COMPLETE CBC AUTOMATED: CPT

## 2019-12-30 PROCEDURE — 73552 X-RAY EXAM OF FEMUR 2/>: CPT | Mod: 26,LT

## 2019-12-30 PROCEDURE — 72192 CT PELVIS W/O DYE: CPT

## 2019-12-30 PROCEDURE — 99284 EMERGENCY DEPT VISIT MOD MDM: CPT | Mod: 25

## 2019-12-30 PROCEDURE — 84484 ASSAY OF TROPONIN QUANT: CPT

## 2019-12-30 PROCEDURE — 36415 COLL VENOUS BLD VENIPUNCTURE: CPT

## 2019-12-30 PROCEDURE — 80053 COMPREHEN METABOLIC PANEL: CPT

## 2019-12-30 PROCEDURE — 71045 X-RAY EXAM CHEST 1 VIEW: CPT

## 2019-12-30 PROCEDURE — 93005 ELECTROCARDIOGRAM TRACING: CPT

## 2019-12-30 PROCEDURE — 71275 CT ANGIOGRAPHY CHEST: CPT | Mod: 26

## 2019-12-30 PROCEDURE — 99024 POSTOP FOLLOW-UP VISIT: CPT

## 2019-12-30 RX ORDER — OXYCODONE HYDROCHLORIDE 5 MG/1
1 TABLET ORAL
Qty: 12 | Refills: 0
Start: 2019-12-30 | End: 2020-01-01

## 2019-12-30 RX ORDER — IBUPROFEN 200 MG
1 TABLET ORAL
Qty: 12 | Refills: 0
Start: 2019-12-30

## 2019-12-30 RX ORDER — ACETAMINOPHEN 500 MG
650 TABLET ORAL ONCE
Refills: 0 | Status: COMPLETED | OUTPATIENT
Start: 2019-12-30 | End: 2019-12-30

## 2019-12-30 RX ORDER — KETOROLAC TROMETHAMINE 30 MG/ML
15 SYRINGE (ML) INJECTION ONCE
Refills: 0 | Status: DISCONTINUED | OUTPATIENT
Start: 2019-12-30 | End: 2019-12-30

## 2019-12-30 RX ADMIN — Medication 650 MILLIGRAM(S): at 09:06

## 2019-12-30 RX ADMIN — Medication 15 MILLIGRAM(S): at 09:06

## 2019-12-30 RX ADMIN — Medication 15 MILLIGRAM(S): at 10:52

## 2019-12-30 RX ADMIN — Medication 15 MILLIGRAM(S): at 15:14

## 2019-12-30 RX ADMIN — Medication 650 MILLIGRAM(S): at 07:00

## 2019-12-30 NOTE — ED PROVIDER NOTE - PHYSICAL EXAMINATION
Constitutional: Awake, alert, in no acute distress  Eyes: no scleral icterus  HENT: normocephalic, atraumatic  CV: RRR, no murmur  Pulm: non-labored respirations, CTAB  Abdomen: soft, non-tender, non-distended  Extremities: +tenderness over L medial thigh; no erythema, swelling or ecchymosis noted, +pain elicited with any movement of LLE, no neurovascular deficit, 2+ DP pulses, no peripheral edema.  Skin: no rash, no jaundice  Neuro: AAOx3, moving all extremities Constitutional: Awake, alert, in no acute distress  Eyes: no scleral icterus  HENT: normocephalic, atraumatic  CV: RRR, no murmur, +R anterior chest wall tenderness, no ecchymosis or subQ emphysema  Pulm: non-labored respirations, CTAB  Abdomen: soft, non-tender, non-distended  Extremities: +tenderness over L medial thigh; no erythema, swelling or ecchymosis noted, +pain elicited with any movement of LLE, no neurovascular deficit, 2+ DP pulses, no peripheral edema.  Skin: no rash, no jaundice  Neuro: AAOx3, moving all extremities

## 2019-12-30 NOTE — ED PROVIDER NOTE - OBJECTIVE STATEMENT
89y F w/ hx anemia, myelodysplastic syndrome, neuropathy, recent L hip fx repaired by IMN 11/21, presenting with L leg pain.  Pt was discharged from rehab around 3 weeks ago, and was initially ambulating with a walker.  Over the past 5 days, she has developed worsening L thigh pain, and has been unable to bear weight on the L leg.  Denies any new trauma, fever, chills.  Has been taking Tylenol at home for pain - last around 8 PM.  Completed course of Lovenox 4 days ago. 89y F w/ hx anemia, myelodysplastic syndrome, neuropathy, recent L hip fx repaired by IMN 11/21, presenting with L leg pain.  Pt was discharged from rehab around 3 weeks ago, and was initially ambulating with a walker.  Over the past 5 days, she has developed worsening L thigh pain, and has been unable to bear weight on the L leg.  Denies any new trauma, fever, chills, shortness of breath.  Has been taking Tylenol at home for pain - last around 8 PM.  Completed course of Lovenox 4 days ago.  Pt also endorsing R-sided chest pain for the past 2 weeks, which she attributes to pulling a muscle while getting out of the shower.

## 2019-12-30 NOTE — ED ADULT NURSE NOTE - OBJECTIVE STATEMENT
pt presents to ER for worsening left thigh pain, unable to bear weight on extremity. sx 6weeks ago to left thigh s/p fall completed rehab. pt denies dyspnea and chest pain.

## 2019-12-30 NOTE — PROVIDER CONTACT NOTE (OTHER) - BACKGROUND
Pt is 5+ weeks s/p ORIF of IT fx with Dr. Deleon, was ambulatory with RW at home after 2 weeks at [facility]. Current CT of LLE shows no acute fx, previous surgical site not yet fully healed.

## 2019-12-30 NOTE — ED PROVIDER NOTE - SHIFT CHANGE DETAILS
89 yo female with recent l hip fracture -repair by Dr Deleon  complaint 1 = l thigh pain and can't walk. Complaint 2 = r chest soreness for 2 weeks  Follow up doppler LLE, Labs with a dimer, Xray LLE. If all neg consider CT and ortho and PT

## 2019-12-30 NOTE — CONSULT NOTE ADULT - ATTENDING COMMENTS
Ortho Trauma Attending:  Agree with above PA note.  Note edited where necessary.      Arturo Deleon MD  Orthopaedic Trauma Surgery

## 2019-12-30 NOTE — PROVIDER CONTACT NOTE (OTHER) - RECOMMENDATIONS
Home with RW, Home PT. Family has provided supervision in the past, recommend they continue to supervise for safety. P

## 2019-12-30 NOTE — ED ADULT NURSE REASSESSMENT NOTE - NS ED NURSE REASSESS COMMENT FT1
Pt's reports no pain at this time after getting Toradol. Pt sates that pain occurs with weight bearing. Made aware that PT is coming for and evaluation. Family at the bedside and stevens not want her to have anymore pain medicine because "they want the orthopaedic to see that she is not able to ambulate". Explained to family that patient is not ambulating well secondary to pain and that it should be addressed if patient wishes to do so.

## 2019-12-30 NOTE — PROVIDER CONTACT NOTE (OTHER) - ASSESSMENT
pt in an open episode w/ [facility].  They will see her in the am.
Pt reporting 0/10 pain at rest, 9/10 pain with activity. Pt and family declined pain meds prior to PT. Pt ambulated 10 feet with RW, difficulty weight bearing on LLE 2*2 pain and declining to continue to ambulate.

## 2019-12-30 NOTE — ED ADULT NURSE REASSESSMENT NOTE - NS ED NURSE REASSESS COMMENT FT1
Pt received A&O x's 3 c/o pain to left though. Pt with previous fracture to the area approx 6 weeks ago. Pt reports that she started to develop pain to the area 1 week ago that has become increasingly worse. Pt has been able to ambulate but with pain. Denies any numbness or tingling to the extremity. No noted swelling or redness.

## 2019-12-30 NOTE — ED ADULT NURSE REASSESSMENT NOTE - NS ED NURSE REASSESS COMMENT FT1
Pt feeling better. Case mgt arranged home care to come see her tomorrow. Discharge instructions reviewed with patient and her daughter. Pt verbalizes understanding of medications sent to the pharmacy. Pt placed in wheelchair and escorted to the lobby.

## 2019-12-30 NOTE — ED PROVIDER NOTE - PMH
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

## 2019-12-30 NOTE — PROVIDER CONTACT NOTE (OTHER) - ACTION/TREATMENT ORDERED:
Pt's deficits appear to be pain related. PT anticipates pt's functional status will improve with pain control.

## 2019-12-30 NOTE — ED PROVIDER NOTE - PSH
Bilateral cataracts  surgery  H/O total hip arthroplasty, right    History of appendectomy    History of right oophorectomy    History of tonsillectomy and adenoidectomy    History of varicose veins  laser removal  S/p left hip fracture    S/P wrist surgery  left wrist fracture repair

## 2019-12-30 NOTE — CONSULT NOTE ADULT - SUBJECTIVE AND OBJECTIVE BOX
Pt Name: NILSA VILLEDA    MRN: [id number]      Patient is a 89y Female presenting to the emergency department with a chief complaint of left leg pain x 5 days. Pt is s/p left hip IMN 11/21 with Dr Deleon. She was doing well post operatively and progressing with PT. She ambulates with RW. She denies any new injury or fall. Pt says that about 5 days ago she started noticing left hip/leg pain while weightbearing and now it has worsened so that she is unable to weight bear on her left leg. No numbness or tingling in extremity. No knee pain. Denies back pain or hx of back injury. Pain is generalized lateral side from hip down thigh. No groin pain. No other complaints.    HEALTH ISSUES - PROBLEM Dx:  S/P left hip IMN      REVIEW OF SYSTEMS    General:	No fevers/chills    Skin/Breast:  No rash    Respiratory and Thorax: No SOB  	  Cardiovascular:	No CP    Gastrointestinal:	No abdominal pain    Musculoskeletal:	 See HPI    Neurological:	See HPI    ROS is otherwise negative.    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  Anemia  Depression  Osteoarthritis: right hip  Insomnia  S/p left hip fracture  H/O total hip arthroplasty, right  S/P wrist surgery: left wrist fracture repair  History of varicose veins: laser removal  Bilateral cataracts: surgery  History of appendectomy  History of tonsillectomy and adenoidectomy  History of right oophorectomy      Allergies: No Known Allergies      Medications:     FAMILY HISTORY:  Family history of diabetes mellitus (DM)  : non-contributory    Social History:     Ambulation: Walking independently [ ] With Cane [ ] With Walker [ X]  Bedbound [ ]                           12.1   6.56  )-----------( 298      ( 30 Dec 2019 07:24 )             39.8     12-30    140  |  101  |  27.0<H>  ----------------------------<  115  3.8   |  25.0  |  0.91    Ca    9.8      30 Dec 2019 07:24    TPro  7.5  /  Alb  4.1  /  TBili  0.4  /  DBili  x   /  AST  17  /  ALT  8   /  AlkPhos  175<H>  12-30      PHYSICAL EXAM:    Vital Signs Last 24 Hrs  T(C): 36.5 (30 Dec 2019 07:55), Max: 36.5 (30 Dec 2019 07:55)  T(F): 97.7 (30 Dec 2019 07:55), Max: 97.7 (30 Dec 2019 07:55)  HR: 90 (30 Dec 2019 07:55) (90 - 92)  BP: 166/95 (30 Dec 2019 07:55) (157/90 - 166/95)  BP(mean): --  RR: 17 (30 Dec 2019 07:55) (17 - 18)  SpO2: 95% (30 Dec 2019 07:55) (95% - 95%)  Daily Height in cm: 160.02 (30 Dec 2019 05:41)    Daily     Appearance: Alert, responsive, in no acute distress.    Neurological: Sensation is grossly intact to light touch. 5/5 motor function of all extremities. No focal deficits or weaknesses found.    Skin: no rash on visible skin. Skin is clean, dry and intact. No bleeding. No abrasions. No ulcerations.    Vascular: 2+ distal pulses. Cap refill < 2 sec. No extremity ulcerations. No cyanosis.    Musculoskeletal:       Left Lower Extremity: No deformity. Incisions lateral hip well healed. No erythema. Healing ecchymosis left knee. No effusion. Unable to SLR due to quad weakness and some pain with ROM hip. +ROM knee without pain. Calf soft, NT. +EHL/FHL. +DF/PF. Sensation intact. DP 2+.    Imaging Studies:  < from: Xray Femur 2 Views, Left (12.30.19 @ 07:55) >   EXAM:  XR FEMUR 2 VIEWS LT                          PROCEDURE DATE:  12/30/2019          INTERPRETATION:  CLINICAL INDICATION: Medial thigh pain. Recent ORIF.    EXAM: AP and lateral views of the left femur    COMPARISON: 11/21/2019      IMPRESSION:   Redemonstration of ORIF of left intertrochanteric fracture with avulsion of the lesser trochanter with cement. The distal femoral cortex appears mildly medially displaced with respect to the intertrochanteric region. The superior aspect of the intramedullary mirela appears mildly proud when compared with prior exam. There is moderate to severe left knee trauma tricompartmental arthrosis.    < end of copied text >    < from: CT Pelvis Bony Only No Cont (12.30.19 @ 09:34) >     EXAM:  CT PELVIS BONY ONLY                         EXAM:  CT 3D RECONSTRUCT W MALLORY                          PROCEDURE DATE:  12/30/2019          INTERPRETATION:  HISTORY: Left-sided hip pain for the past 2 days. History of recent fixation.    Helical CT imaging of the bony pelvis was performed without intravenous contrast. Sagittal and coronal reformats were provided. 3-D reformats were performed on a separate workstation.    Patient is made of postoperative radiographs of December 21, 2019.    FINDINGS:    The study is limited by diffuse osseous demineralization. Patient is again noted to be status post open reduction internal fixation of an intertrochanteric left femoral fracture with a femoral neck compression screw and a short femoral intramedullary mirela with distal interlocking screw. Cement material is noted about the tip of the femoral neck compression screw. Fracture lucency remains diffusely evident. There is unchanged avulsion of the lesser trochanter. Nonbridging callus formation is noted about the lesser trochanter. No bridging callus formation is demonstrated. On the current examination the distal fracture fragment appears displaced by medially and anteriorly by one cortex width. This appears new since the prior examination.    Unchanged chronic healed fractures of the bilateral superior and inferior pubic rami are again noted. Patient is status post right total hip arthroplasty with an acetabular augmentation screw and a noncemented femoral component. There isresidual subchondral cystic change seen along the right acetabulum. No definite osteolysis or loosening is noted about the right hip prosthesis. There is moderate pubic symphysis arthrosis. There is bilateral sacroiliac joint arthrosis. There is lower lumbar spondylosis with degenerative grade 1 anterolisthesis of L4 on L5. There is vacuum disc phenomenon at L4/L5 and L5/S1. There is severe spinal canal narrowing at L4/L5.    There is mild soft tissue edema within the intramuscular myofascial planes about the left hip. Postsurgical changes are noted within the lateral soft tissues overlying the left hip. There is no disproportionate fatty atrophy of the musculature. There is atherosclerotic disease. There is colonic diverticulosis.    IMPRESSION:    Status post open reduction and internal fixation of the left hip as outlined above. Fracture lucency remains evident without bridging callus formation. Compared to the prior examination there appears to be one cortex width anterior and medial displacement.    < end of copied text >      A/P:  Pt is a  89y Female with left leg pain s/p left hip IMN 11/21  No new fxs noted, left hip fx not yet healed with mild collapse     PLAN:   -No orthopedic surgical intervention needed at this time  -may continue WBAT  -PT  -pain control  -consider spine eval for stenosis at L4-L5  -f/u with Dr Deleon in the office Pt Name: NILSA VILLEDA    MRN: [id number]      Patient is a 89y Female presenting to the emergency department with a chief complaint of left leg pain x 5 days. Pt is s/p left hip IMN 11/21 with Dr Deleon. She was doing well post operatively and progressing with PT. She ambulates with RW. She denies any new injury or fall. Pt says that about 5 days ago she started noticing left hip/leg pain while weightbearing and now it has worsened so that she is unable to weight bear on her left leg. No numbness or tingling in extremity. No knee pain. Denies back pain or hx of back injury. Pain is generalized lateral side from hip down thigh. No groin pain. No other complaints.    HEALTH ISSUES - PROBLEM Dx:  S/P left hip IMN      REVIEW OF SYSTEMS    General:	No fevers/chills    Skin/Breast:  No rash    Respiratory and Thorax: No SOB  	  Cardiovascular:	No CP    Gastrointestinal:	No abdominal pain    Musculoskeletal:	 See HPI    Neurological:	See HPI    ROS is otherwise negative.    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  Anemia  Depression  Osteoarthritis: right hip  Insomnia  S/p left hip fracture  H/O total hip arthroplasty, right  S/P wrist surgery: left wrist fracture repair  History of varicose veins: laser removal  Bilateral cataracts: surgery  History of appendectomy  History of tonsillectomy and adenoidectomy  History of right oophorectomy      Allergies: No Known Allergies      Medications:     FAMILY HISTORY:  Family history of diabetes mellitus (DM)  : non-contributory    Social History:     Ambulation: Walking independently [ ] With Cane [ ] With Walker [ X]  Bedbound [ ]                           12.1   6.56  )-----------( 298      ( 30 Dec 2019 07:24 )             39.8     12-30    140  |  101  |  27.0<H>  ----------------------------<  115  3.8   |  25.0  |  0.91    Ca    9.8      30 Dec 2019 07:24    TPro  7.5  /  Alb  4.1  /  TBili  0.4  /  DBili  x   /  AST  17  /  ALT  8   /  AlkPhos  175<H>  12-30      PHYSICAL EXAM:    Vital Signs Last 24 Hrs  T(C): 36.5 (30 Dec 2019 07:55), Max: 36.5 (30 Dec 2019 07:55)  T(F): 97.7 (30 Dec 2019 07:55), Max: 97.7 (30 Dec 2019 07:55)  HR: 90 (30 Dec 2019 07:55) (90 - 92)  BP: 166/95 (30 Dec 2019 07:55) (157/90 - 166/95)  BP(mean): --  RR: 17 (30 Dec 2019 07:55) (17 - 18)  SpO2: 95% (30 Dec 2019 07:55) (95% - 95%)  Daily Height in cm: 160.02 (30 Dec 2019 05:41)    Daily     Appearance: Alert, responsive, in no acute distress.    Neurological: Sensation is grossly intact to light touch. 5/5 motor function of all extremities. No focal deficits or weaknesses found.    Skin: no rash on visible skin. Skin is clean, dry and intact. No bleeding. No abrasions. No ulcerations.    Vascular: 2+ distal pulses. Cap refill < 2 sec. No extremity ulcerations. No cyanosis.    Musculoskeletal:       Left Lower Extremity: No deformity. Incisions lateral hip well healed. No erythema. Healing ecchymosis left knee. No effusion. Unable to SLR due to quad weakness and some pain with ROM hip. +ROM knee without pain. Calf soft, NT. +EHL/FHL. +DF/PF. Sensation intact. DP 2+.    Imaging Studies:  < from: Xray Femur 2 Views, Left (12.30.19 @ 07:55) >   EXAM:  XR FEMUR 2 VIEWS LT                          PROCEDURE DATE:  12/30/2019          INTERPRETATION:  CLINICAL INDICATION: Medial thigh pain. Recent ORIF.    EXAM: AP and lateral views of the left femur    COMPARISON: 11/21/2019      IMPRESSION:   Redemonstration of ORIF of left intertrochanteric fracture with avulsion of the lesser trochanter with cement. The distal femoral cortex appears mildly medially displaced with respect to the intertrochanteric region. The superior aspect of the intramedullary mirela appears mildly proud when compared with prior exam. There is moderate to severe left knee trauma tricompartmental arthrosis.    < end of copied text >    < from: CT Pelvis Bony Only No Cont (12.30.19 @ 09:34) >     EXAM:  CT PELVIS BONY ONLY                         EXAM:  CT 3D RECONSTRUCT W MALLORY                          PROCEDURE DATE:  12/30/2019          INTERPRETATION:  HISTORY: Left-sided hip pain for the past 2 days. History of recent fixation.    Helical CT imaging of the bony pelvis was performed without intravenous contrast. Sagittal and coronal reformats were provided. 3-D reformats were performed on a separate workstation.    Patient is made of postoperative radiographs of December 21, 2019.    FINDINGS:    The study is limited by diffuse osseous demineralization. Patient is again noted to be status post open reduction internal fixation of an intertrochanteric left femoral fracture with a femoral neck compression screw and a short femoral intramedullary mirela with distal interlocking screw. Cement material is noted about the tip of the femoral neck compression screw. Fracture lucency remains diffusely evident. There is unchanged avulsion of the lesser trochanter. Nonbridging callus formation is noted about the lesser trochanter. No bridging callus formation is demonstrated. On the current examination the distal fracture fragment appears displaced by medially and anteriorly by one cortex width. This appears new since the prior examination.    Unchanged chronic healed fractures of the bilateral superior and inferior pubic rami are again noted. Patient is status post right total hip arthroplasty with an acetabular augmentation screw and a noncemented femoral component. There isresidual subchondral cystic change seen along the right acetabulum. No definite osteolysis or loosening is noted about the right hip prosthesis. There is moderate pubic symphysis arthrosis. There is bilateral sacroiliac joint arthrosis. There is lower lumbar spondylosis with degenerative grade 1 anterolisthesis of L4 on L5. There is vacuum disc phenomenon at L4/L5 and L5/S1. There is severe spinal canal narrowing at L4/L5.    There is mild soft tissue edema within the intramuscular myofascial planes about the left hip. Postsurgical changes are noted within the lateral soft tissues overlying the left hip. There is no disproportionate fatty atrophy of the musculature. There is atherosclerotic disease. There is colonic diverticulosis.    IMPRESSION:    Status post open reduction and internal fixation of the left hip as outlined above. Fracture lucency remains evident without bridging callus formation. Compared to the prior examination there appears to be one cortex width anterior and medial displacement.    < end of copied text >      A/P:  Pt is a  89y Female with left leg pain s/p left hip IMN 11/21  No new fxs noted, left hip fx not yet healed with mild collapse     PLAN:   -No orthopedic surgical intervention needed at this time  -may continue WBAT  -PT  -pain control  -consider spine eval for stenosis at L4-L5  -f/u with Dr Deleon in the office  D/W and images reviewed with Dr Deleon

## 2019-12-30 NOTE — ED PROVIDER NOTE - PATIENT PORTAL LINK FT
You can access the FollowMyHealth Patient Portal offered by [facility] by registering at the following website: [email or web address]. By joining [facility]’s FollowMyHealth portal, you will also be able to view your health information using other applications (apps) compatible with our system.

## 2019-12-30 NOTE — ED PROVIDER NOTE - ATTENDING CONTRIBUTION TO CARE
HPI: 89yo female PMH L hip fracture s/p IMN 1 month ago p/w L thigh pain x 5 days, no recent falls, unable to bear weight 2/2 pain. Patient states that she has been having right-sided pleuritic chest pain x 2 weeks.     PE:  Gen: NAD  Head: NCAT  HEENT: Oral mucosa moist, normal conjunctiva  CV: RRR no murmurs, normal perfusion  Resp: CTA b/l, no wheezing, rales, rhonchi, no respiratory distress  GI: Abd Soft NTND  Neuro: No focal neuro deficits  MSK: +TTP over L medial thigh, FROM all 4 extremities, no deformity  Skin: No rash, no bruising  Psych: Normal affect    MDM: 89yo F with L thigh pain s/p IMN 1 month ago- will check XR hip/pelvis eval for fracture, ortho to see patient. Patient also with R pleuritic CP- will check CTA r/o PE, LLE duplex r/o deep venous thrombosis. Eri Blair DO         I performed a history and physical exam of the patient and discussed their management with the resident. I reviewed the resident's note and agree with the documented findings and plan of care. My medical decision making and observations are found above.

## 2019-12-30 NOTE — ED PROVIDER NOTE - NS ED ROS FT
Constitutional: no fever, no chills  Eyes: no vision changes  ENT: no nasal congestion  CV: no chest pain  Resp: no cough, no shortness of breath  GI: no abdominal pain, no vomiting, no diarrhea  : no dysuria  MSK: +leg pain  Skin: no rash  Neuro: no headache, no weakness, +neuropathy

## 2019-12-30 NOTE — PHYSICAL THERAPY INITIAL EVALUATION ADULT - RANGE OF MOTION EXAMINATION, REHAB EVAL
left hip flexion not tested 2*2 pain/bilateral upper extremity ROM was WFL (within functional limits)/bilateral lower extremity ROM was WFL (within functional limits)

## 2019-12-30 NOTE — PHYSICAL THERAPY INITIAL EVALUATION ADULT - PERTINENT HX OF CURRENT PROBLEM, REHAB EVAL
89y Female presenting to the emergency department with a chief complaint of left leg pain x 5 days. Pt is s/p left hip IMN 11/21 with Dr Deleon.

## 2019-12-30 NOTE — ED ADULT TRIAGE NOTE - CHIEF COMPLAINT QUOTE
patient states that she has pain to her left leg, states that in november she had surgery to repair a femur head fracture. patient denies any complaints of trauma at this time. pos pulse motor and sensory  to extremity. patient states that she is unable to bare weight to leg

## 2019-12-30 NOTE — PHYSICAL THERAPY INITIAL EVALUATION ADULT - DIAGNOSIS, PT EVAL
functional debility and increased pain in left hip 5 weeks post ORIF of left IT fx with no known cause.

## 2019-12-30 NOTE — ED PROVIDER NOTE - CLINICAL SUMMARY MEDICAL DECISION MAKING FREE TEXT BOX
89y F s/p recent L hip fx repair; now presenting with atraumatic L thigh pain, unable to bear weight.  Pt afebrile, no signs of infection.  Will obtain Xrays, treat with tylenol and reassess.  Will likely need ortho consult. 89y F s/p recent L hip fx repair; now presenting with atraumatic L thigh pain, unable to bear weight.  Pt afebrile, no signs of infection.  Will obtain Xrays, treat with tylenol and reassess.  Chest pain likely musculoskeletal, but will workup with CXR, EKG, labs.  Will likely need ortho consult.

## 2019-12-30 NOTE — PHYSICAL THERAPY INITIAL EVALUATION ADULT - ADDITIONAL COMMENTS
Pt lives alone in a private home. 2 steps to enter with handrails, no steps inside Pt was independent PTA with RW and uses a tub bench for showering. Pt owns RW, tub bench only. Per son at bedside, pt's family was on a rotating schedule to provide supervision at home but recently began cutting back on time spent with pt as she was recovering and more mobile until pain started again.

## 2019-12-31 ENCOUNTER — CHART COPY (OUTPATIENT)
Age: 84
End: 2019-12-31

## 2019-12-31 PROCEDURE — 74018 RADEX ABDOMEN 1 VIEW: CPT

## 2019-12-31 PROCEDURE — 93005 ELECTROCARDIOGRAM TRACING: CPT

## 2019-12-31 PROCEDURE — 97167 OT EVAL HIGH COMPLEX 60 MIN: CPT

## 2019-12-31 PROCEDURE — 80053 COMPREHEN METABOLIC PANEL: CPT

## 2019-12-31 PROCEDURE — 85027 COMPLETE CBC AUTOMATED: CPT

## 2019-12-31 PROCEDURE — 97163 PT EVAL HIGH COMPLEX 45 MIN: CPT

## 2019-12-31 PROCEDURE — 97110 THERAPEUTIC EXERCISES: CPT

## 2019-12-31 PROCEDURE — 71045 X-RAY EXAM CHEST 1 VIEW: CPT

## 2019-12-31 PROCEDURE — 97116 GAIT TRAINING THERAPY: CPT

## 2019-12-31 PROCEDURE — 80048 BASIC METABOLIC PNL TOTAL CA: CPT

## 2019-12-31 PROCEDURE — 97530 THERAPEUTIC ACTIVITIES: CPT

## 2019-12-31 PROCEDURE — 97535 SELF CARE MNGMENT TRAINING: CPT

## 2019-12-31 PROCEDURE — 97112 NEUROMUSCULAR REEDUCATION: CPT

## 2020-01-07 ENCOUNTER — OTHER (OUTPATIENT)
Age: 85
End: 2020-01-07

## 2020-01-29 ENCOUNTER — APPOINTMENT (OUTPATIENT)
Dept: ORTHOPEDIC SURGERY | Facility: CLINIC | Age: 85
End: 2020-01-29
Payer: MEDICARE

## 2020-01-29 PROCEDURE — 99024 POSTOP FOLLOW-UP VISIT: CPT

## 2020-01-29 PROCEDURE — 73502 X-RAY EXAM HIP UNI 2-3 VIEWS: CPT | Mod: LT

## 2020-01-29 NOTE — HISTORY OF PRESENT ILLNESS
[Clean/Dry/Intact] : clean, dry and intact [Erythema] : not erythematous [Discharge] : absent of discharge [Healed] : healed [Swelling] : swollen [Dehiscence] : not dehisced [Doing Well] : is doing well [Neuro Intact] : an unremarkable neurological exam [Vascular Intact] : ~T peripheral vascular exam normal [No Sign of Infection] : is showing no signs of infection [Adequate Pain Control] : has adequate pain control [de-identified] : patient is a pleasant 89-year-old female who presents with her daughter-in-law s/p intramedullary nail, left intertrochanteric hip fracture.  She is about 2.5 months post injury. She is ambulating with a walker. Pain is controlled with prescribe medication [de-identified] : s/p intramedullary nail, left intertrochanteric hip fracture. 11/21/19 [de-identified] : Physical Exam:\par General: Well appearing, no acute distress, A&O\par Neurologic: A&Ox3, No focal deficits\par Head: NCAT without abrasions, lacerations, or ecchymosis to head, face, or scalp\par Respiratory: Equal chest wall expansion bilaterally, no accessory muscle use\par Lymphatic: No lymphadenopathy palpated\par Skin: Warm and dry\par Psychiatric: Normal mood and affect\par \par SILT s/s/sp/dp/t\par Fires EHL/FHL/GS/TA\par 2+ DP/PT pulse\par brisk capillary refill [de-identified] : plain films of the left hip obtained today show a stable intertrochanteric fracture fixation with a short nail and cement augmentation [de-identified] : we will continue the standard plan. Weightbearing as tolerated. Physical therapy ordered. Followup in 6 weeks with repeat x-rays.\par \par Osteopenia and osteoporosis are significant risk factors for fragility fractures. Given the type of fracture that has occurred, I would highly recommend that the patient followup with their primary care physician to discuss treatment for low bone mineral density. We discussed the benefits of these medications frequently far outweigh the small risks. Their primary care physician can call the office with any specific questions or concerns.\par \par The patient was given the opportunity to ask questions and all questions were answered to their satisfaction.\par \par Arturo Deleon MD\par Orthopaedic Trauma Surgeon\par [facility]\par [facility] [place]\par \par \par \par

## 2020-03-09 ENCOUNTER — APPOINTMENT (OUTPATIENT)
Dept: ORTHOPEDIC SURGERY | Facility: CLINIC | Age: 85
End: 2020-03-09
Payer: MEDICARE

## 2020-03-09 VITALS
HEIGHT: 61 IN | SYSTOLIC BLOOD PRESSURE: 120 MMHG | HEART RATE: 90 BPM | BODY MASS INDEX: 28.32 KG/M2 | DIASTOLIC BLOOD PRESSURE: 83 MMHG | WEIGHT: 150 LBS

## 2020-03-09 PROCEDURE — 99214 OFFICE O/P EST MOD 30 MIN: CPT

## 2020-03-09 PROCEDURE — 73502 X-RAY EXAM HIP UNI 2-3 VIEWS: CPT | Mod: LT

## 2020-03-09 NOTE — DISCUSSION/SUMMARY
[de-identified] : 89-year-old female status post left intertrochanteric hip fracture with delayed healing. The fracture appears to show more healing from previously. I would still like the blood work however the patient states she is not going to get it. Will get her additional physical therapy today. As long as she does well, she may followup p.r.n.\par \par The patient was given the opportunity to ask questions and all questions were answered to their satisfaction.\par \par Arturo Deleon MD\par Orthopaedic Trauma Surgeon\par [facility] [place]\par Director Orthopaedic Trauma, [facility]\par \par \par \par \par

## 2020-03-09 NOTE — HISTORY OF PRESENT ILLNESS
[de-identified] : The patient is a pleasant 89-year-old female who presents s/p intramedullary nail, left intertrochanteric hip fracture. She is about 3.5 months post injury. She is ambulating with a cane. Pain is controlled with prescribed medication.  her pain. At previous visit we had recommended blood work duties signs of delayed healing. She has politely refused to continue to get it. [Bending] : worsened by bending [Lifting] : worsened by lifting [Recumbency] : relieved by recumbency [Rest] : relieved by rest

## 2020-03-09 NOTE — PHYSICAL EXAM
[de-identified] : Physical Exam:\par General: Well appearing, no acute distress, A&O\par Neurologic: A&Ox3, No focal deficits\par Head: NCAT without abrasions, lacerations, or ecchymosis to head, face, or scalp\par Respiratory: Equal chest wall expansion bilaterally, no accessory muscle use\par Lymphatic: No lymphadenopathy palpated\par Skin: Warm and dry\par Psychiatric: Normal mood and affect\par \par SILT s/s/sp/dp/t\par Fires EHL/FHL/GS/TA\par 2+ DP/PT pulse\par brisk capillary refill [de-identified] : plain films of the left hip are obtained today. They show continued healing of a left intertrochanteric hip fracture stabilized by intramedullary nail.

## 2020-03-09 NOTE — REASON FOR VISIT
[Follow-Up Visit] : a follow-up visit for [FreeTextEntry2] : s/p intramedullary nail, left intertrochanteric hip fracture. 11/21/19.

## 2020-03-10 ENCOUNTER — APPOINTMENT (OUTPATIENT)
Dept: INTERNAL MEDICINE | Facility: CLINIC | Age: 85
End: 2020-03-10
Payer: MEDICARE

## 2020-03-10 LAB
BILIRUB UR QL STRIP: NEGATIVE
GLUCOSE UR-MCNC: NEGATIVE
HCG UR QL: 0.2 EU/DL
HGB UR QL STRIP.AUTO: NEGATIVE
KETONES UR-MCNC: NEGATIVE
LEUKOCYTE ESTERASE UR QL STRIP: NORMAL
NITRITE UR QL STRIP: NEGATIVE
PH UR STRIP: 6
PROT UR STRIP-MCNC: NEGATIVE
SP GR UR STRIP: 1.01

## 2020-03-10 PROCEDURE — 81003 URINALYSIS AUTO W/O SCOPE: CPT | Mod: QW

## 2020-03-12 LAB — BACTERIA UR CULT: ABNORMAL

## 2020-03-30 NOTE — PATIENT PROFILE ADULT. - IS PATIENT PREGNANT?
----- Message from Wolf Estrella [phone]           no

## 2020-05-15 RX ORDER — HYOSCYAMINE SULFATE 0.12 MG/1
0.12 TABLET ORAL 4 TIMES DAILY
Qty: 30 | Refills: 2 | Status: DISCONTINUED | COMMUNITY
Start: 2018-11-02 | End: 2020-05-15

## 2020-05-15 RX ORDER — CIPROFLOXACIN HYDROCHLORIDE 500 MG/1
500 TABLET, FILM COATED ORAL
Qty: 14 | Refills: 0 | Status: DISCONTINUED | COMMUNITY
Start: 2020-03-10 | End: 2020-05-15

## 2020-05-16 ENCOUNTER — RX RENEWAL (OUTPATIENT)
Age: 85
End: 2020-05-16

## 2020-06-22 ENCOUNTER — APPOINTMENT (OUTPATIENT)
Dept: ORTHOPEDIC SURGERY | Facility: CLINIC | Age: 85
End: 2020-06-22
Payer: MEDICARE

## 2020-06-22 VITALS
HEART RATE: 91 BPM | BODY MASS INDEX: 28.32 KG/M2 | DIASTOLIC BLOOD PRESSURE: 90 MMHG | HEIGHT: 61 IN | WEIGHT: 150 LBS | SYSTOLIC BLOOD PRESSURE: 139 MMHG

## 2020-06-22 VITALS — TEMPERATURE: 97.7 F

## 2020-06-22 DIAGNOSIS — S72.142G: ICD-10-CM

## 2020-06-22 PROCEDURE — 73502 X-RAY EXAM HIP UNI 2-3 VIEWS: CPT | Mod: LT

## 2020-06-22 PROCEDURE — 99214 OFFICE O/P EST MOD 30 MIN: CPT

## 2020-06-22 RX ORDER — DICLOFENAC SODIUM 10 MG/G
1 GEL TOPICAL
Qty: 1 | Refills: 0 | Status: ACTIVE | COMMUNITY
Start: 2020-06-22 | End: 1900-01-01

## 2020-06-22 NOTE — HISTORY OF PRESENT ILLNESS
[de-identified] : The patient is a pleasant 89-year-old female who presents s/p intramedullary nail, left intertrochanteric hip fracture. She is about >6 months post injury. She is ambulating with a cane. Pain is controlled with prescribed medication.  At previous visit we had recommended blood work due to signs of delayed healing. She has politely refused to continue to get it. [3] : a current pain level of 3/10 [Bending] : worsened by bending [Lifting] : worsened by lifting [Recumbency] : relieved by recumbency [Rest] : relieved by rest

## 2020-06-22 NOTE — REASON FOR VISIT
[Follow-Up Visit] : a follow-up visit for [Other: ____] : [unfilled] [FreeTextEntry2] : S/P intramedullary nail, left intertrochanteric hip fracture. 11/21/19. \par

## 2020-06-22 NOTE — DISCUSSION/SUMMARY
[de-identified] : 89-year-old female status post left intertrochanteric hip fracture with delayed healing. The fracture appears to show more healing from previously. I would still like the blood work however the patient states she is not going to get it. Will get her additional physical therapy today. As long as she does well, she may followup p.r.n. Offered Voltaren gel which she accepted.\par \par The patient was given the opportunity to ask questions and all questions were answered to their satisfaction.\par \par Arturo Deleon MD\par Orthopaedic Trauma Surgeon\par [facility] [place]\par Director Orthopaedic Trauma, [facility]\par \par \par \par \par

## 2020-06-22 NOTE — HISTORY OF PRESENT ILLNESS
[de-identified] : The patient is a pleasant 89-year-old female who presents s/p intramedullary nail, left intertrochanteric hip fracture. She is about >6 months post injury. She is ambulating with a cane. Pain is controlled with prescribed medication.  At previous visit we had recommended blood work due to signs of delayed healing. She has politely refused to continue to get it. [3] : a current pain level of 3/10 [Bending] : worsened by bending [Lifting] : worsened by lifting [Recumbency] : relieved by recumbency [Rest] : relieved by rest

## 2020-06-22 NOTE — PHYSICAL EXAM
[de-identified] : Physical Exam:\par General: Well appearing, no acute distress, A&O\par Neurologic: A&Ox3, No focal deficits\par Head: NCAT without abrasions, lacerations, or ecchymosis to head, face, or scalp\par Respiratory: Equal chest wall expansion bilaterally, no accessory muscle use\par Lymphatic: No lymphadenopathy palpated\par Skin: Warm and dry\par Psychiatric: Normal mood and affect\par \par SILT s/s/sp/dp/t\par Fires EHL/FHL/GS/TA\par 2+ DP/PT pulse\par brisk capillary refill\par + TTP GT [de-identified] : plain films of the left hip are obtained today. They show continued healing of a left intertrochanteric hip fracture stabilized by intramedullary nail.

## 2020-06-22 NOTE — DISCUSSION/SUMMARY
[de-identified] : 89-year-old female status post left intertrochanteric hip fracture with delayed healing. The fracture appears to show more healing from previously. I would still like the blood work however the patient states she is not going to get it. Will get her additional physical therapy today. As long as she does well, she may followup p.r.n. Offered Voltaren gel which she accepted.\par \par The patient was given the opportunity to ask questions and all questions were answered to their satisfaction.\par \par Arturo Deleon MD\par Orthopaedic Trauma Surgeon\par [facility] [place]\par Director Orthopaedic Trauma, [facility]\par \par \par \par \par

## 2020-11-11 ENCOUNTER — APPOINTMENT (OUTPATIENT)
Dept: INTERNAL MEDICINE | Facility: CLINIC | Age: 85
End: 2020-11-11
Payer: MEDICARE

## 2020-11-11 VITALS
HEIGHT: 61 IN | DIASTOLIC BLOOD PRESSURE: 78 MMHG | WEIGHT: 150 LBS | SYSTOLIC BLOOD PRESSURE: 130 MMHG | BODY MASS INDEX: 28.32 KG/M2 | RESPIRATION RATE: 16 BRPM | HEART RATE: 78 BPM

## 2020-11-11 DIAGNOSIS — Z23 ENCOUNTER FOR IMMUNIZATION: ICD-10-CM

## 2020-11-11 DIAGNOSIS — L85.3 XEROSIS CUTIS: ICD-10-CM

## 2020-11-11 DIAGNOSIS — L29.9 PRURITUS, UNSPECIFIED: ICD-10-CM

## 2020-11-11 PROCEDURE — G0009: CPT

## 2020-11-11 PROCEDURE — 36415 COLL VENOUS BLD VENIPUNCTURE: CPT

## 2020-11-11 PROCEDURE — 90670 PCV13 VACCINE IM: CPT

## 2020-11-11 PROCEDURE — 99214 OFFICE O/P EST MOD 30 MIN: CPT | Mod: 25

## 2020-11-11 NOTE — HISTORY OF PRESENT ILLNESS
[FreeTextEntry1] : pruritus of the skin [de-identified] : pruritus for years\par not improving\par no cream have helped was on lachydrin it helped a bit but not on it over a year\par has htn, sleep issues, had itp but resloved

## 2020-11-11 NOTE — ASSESSMENT
[FreeTextEntry1] : dry skin\par no clear etiology\par check labs for thyroid\par itp check cbc\par bp stable\par pneuomvax given\par

## 2020-11-12 ENCOUNTER — NON-APPOINTMENT (OUTPATIENT)
Age: 85
End: 2020-11-12

## 2020-11-12 LAB
25(OH)D3 SERPL-MCNC: 13.3 NG/ML
ALBUMIN SERPL ELPH-MCNC: 4.4 G/DL
ALP BLD-CCNC: 123 U/L
ALT SERPL-CCNC: 13 U/L
ANION GAP SERPL CALC-SCNC: 12 MMOL/L
AST SERPL-CCNC: 18 U/L
BASOPHILS # BLD AUTO: 0.05 K/UL
BASOPHILS NFR BLD AUTO: 0.6 %
BILIRUB SERPL-MCNC: 0.5 MG/DL
BUN SERPL-MCNC: 21 MG/DL
CALCIUM SERPL-MCNC: 9.5 MG/DL
CHLORIDE SERPL-SCNC: 101 MMOL/L
CHOLEST SERPL-MCNC: 195 MG/DL
CO2 SERPL-SCNC: 22 MMOL/L
CREAT SERPL-MCNC: 1.07 MG/DL
EOSINOPHIL # BLD AUTO: 0.34 K/UL
EOSINOPHIL NFR BLD AUTO: 4.3 %
ERYTHROCYTE [SEDIMENTATION RATE] IN BLOOD BY WESTERGREN METHOD: 35 MM/HR
ESTIMATED AVERAGE GLUCOSE: 103 MG/DL
GLUCOSE SERPL-MCNC: 102 MG/DL
HBA1C MFR BLD HPLC: 5.2 %
HCT VFR BLD CALC: 45.6 %
HDLC SERPL-MCNC: 75 MG/DL
HGB BLD-MCNC: 14.6 G/DL
IMM GRANULOCYTES NFR BLD AUTO: 0.1 %
LDLC SERPL CALC-MCNC: 87 MG/DL
LYMPHOCYTES # BLD AUTO: 3.32 K/UL
LYMPHOCYTES NFR BLD AUTO: 42.1 %
MAN DIFF?: NORMAL
MCHC RBC-ENTMCNC: 31.5 PG
MCHC RBC-ENTMCNC: 32 GM/DL
MCV RBC AUTO: 98.5 FL
MONOCYTES # BLD AUTO: 0.71 K/UL
MONOCYTES NFR BLD AUTO: 9 %
NEUTROPHILS # BLD AUTO: 3.45 K/UL
NEUTROPHILS NFR BLD AUTO: 43.9 %
NONHDLC SERPL-MCNC: 120 MG/DL
PLATELET # BLD AUTO: 258 K/UL
POTASSIUM SERPL-SCNC: 4.4 MMOL/L
PROT SERPL-MCNC: 7.2 G/DL
RBC # BLD: 4.63 M/UL
RBC # FLD: 13.9 %
SODIUM SERPL-SCNC: 135 MMOL/L
T4 FREE SERPL-MCNC: 0.9 NG/DL
TRIGL SERPL-MCNC: 166 MG/DL
TSH SERPL-ACNC: 2.15 UIU/ML
WBC # FLD AUTO: 7.88 K/UL

## 2020-11-18 ENCOUNTER — NON-APPOINTMENT (OUTPATIENT)
Age: 85
End: 2020-11-18

## 2020-12-02 ENCOUNTER — APPOINTMENT (OUTPATIENT)
Dept: INTERNAL MEDICINE | Facility: CLINIC | Age: 85
End: 2020-12-02

## 2020-12-02 DIAGNOSIS — Z11.59 ENCOUNTER FOR SCREENING FOR OTHER VIRAL DISEASES: ICD-10-CM

## 2020-12-04 LAB — SARS-COV-2 N GENE NPH QL NAA+PROBE: NOT DETECTED

## 2021-02-15 ENCOUNTER — RX RENEWAL (OUTPATIENT)
Age: 86
End: 2021-02-15

## 2021-02-16 ENCOUNTER — RX RENEWAL (OUTPATIENT)
Age: 86
End: 2021-02-16

## 2021-04-20 ENCOUNTER — RX RENEWAL (OUTPATIENT)
Age: 86
End: 2021-04-20

## 2021-08-12 NOTE — PATIENT PROFILE ADULT. - ACCEPTABLE
Los Blount is a 46 y.o. femalewho presents today for her medical conditions/complaints as noted below. Chief Complaint   Patient presents with    Other     pt c/o burning and blood when urinating, pt feels \"sick to her stomach\"         HPI:     HPI  Urine frequency and mild blood with end of urination this morning  Started this am  No fever   Nausea  later this am.   Started cranberry juice this am.   Last UTI 2+ years ago. started a new job and working 50 hrs a week. Drinking water. Has a physical job at [address] [employer]. Takes probiotic, vitamins, tumeric    Current Outpatient Medications   Medication Sig Dispense Refill    amoxicillin (AMOXIL) 500 MG capsule Take 1 capsule by mouth 3 times daily for 3 days 9 capsule 0    ondansetron (ZOFRAN) 4 MG tablet Take 1 tablet by mouth 3 times daily as needed for Nausea or Vomiting 15 tablet 0    levothyroxine (SYNTHROID) 25 MCG tablet TAKE 2 TABLETS BY MOUTH DAILY friday through monday and 1 tab daily tuesday through thursday 46 tablet 5    metFORMIN (GLUCOPHAGE-XR) 500 MG extended release tablet TAKE 1 TABLET BY MOUTH EVERY DAY WITH BREAKFAST 90 tablet 1    citalopram (CELEXA) 20 MG tablet Take 0.5 tablets by mouth every other day 30 tablet 0    PROAIR  (90 Base) MCG/ACT inhaler  (Patient not taking: Reported on 8/12/2021)       No current facility-administered medications for this visit. Allergies   Allergen Reactions    Phentermine Other (See Comments)     Severe headaches       Subjective:     Review of Systems   Constitutional: Negative. Objective:     /82   Pulse 65   Ht 5' 4\" (1.626 m)   Wt 208 lb 3.2 oz (94.4 kg)   SpO2 98%   BMI 35.74 kg/m²   Physical Exam  Vitals and nursing note reviewed. Constitutional:       Appearance: Normal appearance. She is well-developed. HENT:      Head: Normocephalic and atraumatic.    Abdominal:      General: Bowel sounds are normal.      Palpations: Abdomen is soft. Tenderness: There is abdominal tenderness in the right lower quadrant and left lower quadrant. There is no right CVA tenderness, left CVA tenderness, guarding or rebound. Skin:     General: Skin is warm. Neurological:      Mental Status: She is alert and oriented to person, place, and time. Psychiatric:         Mood and Affect: Mood normal.         Behavior: Behavior normal.         Thought Content: Thought content normal.         Assessment:       Diagnosis Orders   1. Dysuria  Urinalysis With Microscopic    Culture, Urine    amoxicillin (AMOXIL) 500 MG capsule    ondansetron (ZOFRAN) 4 MG tablet   2. Acute cystitis with hematuria  amoxicillin (AMOXIL) 500 MG capsule    ondansetron (ZOFRAN) 4 MG tablet        Plan:      No follow-ups on file. Needs note for work. Seen for UTI and needs more frequent bathroom breaks. Orders Placed This Encounter   Procedures    Culture, Urine     Standing Status:   Future     Standing Expiration Date:   8/12/2022     Order Specific Question:   Specify (ex-cath, midstream, cysto, etc)? Answer:   mid stream    Urinalysis With Microscopic     Standing Status:   Future     Standing Expiration Date:   8/12/2022     Order Specific Question:   SPECIFY(EX-CATH,MIDSTREAM,CYSTO,ETC)? Answer:   mid stream     Orders Placed This Encounter   Medications    amoxicillin (AMOXIL) 500 MG capsule     Sig: Take 1 capsule by mouth 3 times daily for 3 days     Dispense:  9 capsule     Refill:  0    ondansetron (ZOFRAN) 4 MG tablet     Sig: Take 1 tablet by mouth 3 times daily as needed for Nausea or Vomiting     Dispense:  15 tablet     Refill:  0      Reviewed medications and possibleside effects.        Electronically signed by Corrine Stern MD on 8/12/2021 at 11:08 AM 0

## 2021-08-16 ENCOUNTER — RX RENEWAL (OUTPATIENT)
Age: 86
End: 2021-08-16

## 2021-08-17 ENCOUNTER — RX RENEWAL (OUTPATIENT)
Age: 86
End: 2021-08-17

## 2021-08-30 ENCOUNTER — APPOINTMENT (OUTPATIENT)
Dept: INTERNAL MEDICINE | Facility: CLINIC | Age: 86
End: 2021-08-30
Payer: MEDICARE

## 2021-08-30 ENCOUNTER — LABORATORY RESULT (OUTPATIENT)
Age: 86
End: 2021-08-30

## 2021-08-30 ENCOUNTER — NON-APPOINTMENT (OUTPATIENT)
Age: 86
End: 2021-08-30

## 2021-08-30 VITALS — BODY MASS INDEX: 28.94 KG/M2 | WEIGHT: 153.19 LBS

## 2021-08-30 DIAGNOSIS — R39.15 URGENCY OF URINATION: ICD-10-CM

## 2021-08-30 PROCEDURE — 36415 COLL VENOUS BLD VENIPUNCTURE: CPT

## 2021-08-30 PROCEDURE — 93000 ELECTROCARDIOGRAM COMPLETE: CPT | Mod: 59

## 2021-08-30 PROCEDURE — G0439: CPT

## 2021-08-30 RX ORDER — CELECOXIB 100 MG/1
100 CAPSULE ORAL DAILY
Qty: 10 | Refills: 0 | Status: DISCONTINUED | COMMUNITY
Start: 2020-03-09 | End: 2021-08-30

## 2021-08-30 RX ORDER — CIPROFLOXACIN HYDROCHLORIDE 500 MG/1
500 TABLET, FILM COATED ORAL
Qty: 14 | Refills: 0 | Status: DISCONTINUED | COMMUNITY
Start: 2018-09-24 | End: 2021-08-30

## 2021-08-30 NOTE — HEALTH RISK ASSESSMENT
[Good] : ~his/her~  mood as  good [] : No [No] : No [No falls in past year] : Patient reported no falls in the past year [0] : 2) Feeling down, depressed, or hopeless: Not at all (0) [HIV test declined] : HIV test declined [Change in mental status noted] : No change in mental status noted [Language] : denies difficulty with language [Behavior] : denies difficulty with behavior [Learning/Retaining New Information] : denies difficulty learning/retaining new information [Handling Complex Tasks] : denies difficulty handling complex tasks [Reasoning] : denies difficulty with reasoning [Spatial Ability and Orientation] : denies difficulty with spatial ability and orientation [None] : None [Alone] : lives alone [With Significant Other] : lives with significant other [Sexually Active] : not sexually active [High Risk Behavior] : no high risk behavior [Feels Safe at Home] : Feels safe at home [Fully functional (using the telephone, shopping, preparing meals, housekeeping, doing laundry, using] : Fully functional and needs no help or supervision to perform IADLs (using the telephone, shopping, preparing meals, housekeeping, doing laundry, using transportation, managing medications and managing finances) [Reports changes in hearing] : Reports no changes in hearing [Reports changes in vision] : Reports no changes in vision [Reports normal functional visual acuity (ie: able to read med bottle)] : Reports poor functional visual acuity.  [Reports changes in dental health] : Reports no changes in dental health [Smoke Detector] : smoke detector [Carbon Monoxide Detector] : carbon monoxide detector [Guns at Home] : no guns at home [Safety elements used in home] : safety elements used in home [Seat Belt] :  uses seat belt [Sunscreen] : does not use sunscreen [Travel to Developing Areas] : does not  travel to developing areas [TB Exposure] : is not being exposed to tuberculosis [Caregiver Concerns] : does not have caregiver concerns [AdvancecareDate] : 8/30/21

## 2021-08-30 NOTE — HISTORY OF PRESENT ILLNESS
[FreeTextEntry1] : For CPE [de-identified] : For CPE\par history of htn\par history of thrombocyopenia\par has been well

## 2021-08-31 ENCOUNTER — NON-APPOINTMENT (OUTPATIENT)
Age: 86
End: 2021-08-31

## 2021-08-31 LAB
25(OH)D3 SERPL-MCNC: 14.5 NG/ML
ALBUMIN SERPL ELPH-MCNC: 4.3 G/DL
ALP BLD-CCNC: 117 U/L
ALT SERPL-CCNC: 9 U/L
ANION GAP SERPL CALC-SCNC: 12 MMOL/L
APPEARANCE: CLEAR
AST SERPL-CCNC: 16 U/L
BASOPHILS # BLD AUTO: 0.04 K/UL
BASOPHILS NFR BLD AUTO: 0.5 %
BILIRUB SERPL-MCNC: 0.4 MG/DL
BILIRUBIN URINE: NEGATIVE
BLOOD URINE: NEGATIVE
BUN SERPL-MCNC: 23 MG/DL
CALCIUM SERPL-MCNC: 9.5 MG/DL
CHLORIDE SERPL-SCNC: 101 MMOL/L
CHOLEST SERPL-MCNC: 189 MG/DL
CO2 SERPL-SCNC: 24 MMOL/L
COLOR: NORMAL
CREAT SERPL-MCNC: 1.08 MG/DL
CREAT SPEC-SCNC: 41 MG/DL
EOSINOPHIL # BLD AUTO: 0.27 K/UL
EOSINOPHIL NFR BLD AUTO: 3.6 %
ESTIMATED AVERAGE GLUCOSE: 108 MG/DL
GLUCOSE QUALITATIVE U: NEGATIVE
GLUCOSE SERPL-MCNC: 97 MG/DL
HBA1C MFR BLD HPLC: 5.4 %
HCT VFR BLD CALC: 45.1 %
HDLC SERPL-MCNC: 70 MG/DL
HGB BLD-MCNC: 14.5 G/DL
IMM GRANULOCYTES NFR BLD AUTO: 0.1 %
KETONES URINE: NEGATIVE
LDLC SERPL CALC-MCNC: 62 MG/DL
LEUKOCYTE ESTERASE URINE: ABNORMAL
LYMPHOCYTES # BLD AUTO: 2.96 K/UL
LYMPHOCYTES NFR BLD AUTO: 39.6 %
MAN DIFF?: NORMAL
MCHC RBC-ENTMCNC: 31.9 PG
MCHC RBC-ENTMCNC: 32.2 GM/DL
MCV RBC AUTO: 99.3 FL
MICROALBUMIN 24H UR DL<=1MG/L-MCNC: 1.6 MG/DL
MICROALBUMIN/CREAT 24H UR-RTO: 40 MG/G
MONOCYTES # BLD AUTO: 0.69 K/UL
MONOCYTES NFR BLD AUTO: 9.2 %
NEUTROPHILS # BLD AUTO: 3.5 K/UL
NEUTROPHILS NFR BLD AUTO: 47 %
NITRITE URINE: NEGATIVE
NONHDLC SERPL-MCNC: 119 MG/DL
PH URINE: 6.5
PLATELET # BLD AUTO: 245 K/UL
POTASSIUM SERPL-SCNC: 4.2 MMOL/L
PROT SERPL-MCNC: 6.9 G/DL
PROTEIN URINE: NEGATIVE
RBC # BLD: 4.54 M/UL
RBC # FLD: 13.2 %
SODIUM SERPL-SCNC: 137 MMOL/L
SPECIFIC GRAVITY URINE: 1.01
T4 FREE SERPL-MCNC: 1 NG/DL
TRIGL SERPL-MCNC: 287 MG/DL
TSH SERPL-ACNC: 2.06 UIU/ML
UROBILINOGEN URINE: NORMAL
WBC # FLD AUTO: 7.47 K/UL

## 2021-09-02 ENCOUNTER — NON-APPOINTMENT (OUTPATIENT)
Age: 86
End: 2021-09-02

## 2021-09-02 LAB — BACTERIA UR CULT: ABNORMAL

## 2021-09-09 ENCOUNTER — NON-APPOINTMENT (OUTPATIENT)
Age: 86
End: 2021-09-09

## 2021-09-09 ENCOUNTER — APPOINTMENT (OUTPATIENT)
Dept: INTERNAL MEDICINE | Facility: CLINIC | Age: 86
End: 2021-09-09
Payer: MEDICARE

## 2021-09-09 PROCEDURE — 0001A: CPT

## 2021-09-15 LAB
APPEARANCE: CLEAR
BILIRUBIN URINE: NEGATIVE
BLOOD URINE: NEGATIVE
COLOR: NORMAL
GLUCOSE QUALITATIVE U: NEGATIVE
KETONES URINE: NEGATIVE
LEUKOCYTE ESTERASE URINE: NEGATIVE
NITRITE URINE: NEGATIVE
PH URINE: 6.5
PROTEIN URINE: NEGATIVE
SPECIFIC GRAVITY URINE: 1.01
UROBILINOGEN URINE: NORMAL

## 2021-09-17 ENCOUNTER — NON-APPOINTMENT (OUTPATIENT)
Age: 86
End: 2021-09-17

## 2021-09-17 LAB — BACTERIA UR CULT: NORMAL

## 2021-09-30 ENCOUNTER — APPOINTMENT (OUTPATIENT)
Dept: INTERNAL MEDICINE | Facility: CLINIC | Age: 86
End: 2021-09-30
Payer: MEDICARE

## 2021-09-30 DIAGNOSIS — Z23 ENCOUNTER FOR IMMUNIZATION: ICD-10-CM

## 2021-09-30 PROCEDURE — 0002A: CPT

## 2021-10-13 ENCOUNTER — APPOINTMENT (OUTPATIENT)
Dept: ORTHOPEDIC SURGERY | Facility: CLINIC | Age: 86
End: 2021-10-13
Payer: MEDICARE

## 2021-10-13 VITALS
WEIGHT: 156 LBS | HEART RATE: 74 BPM | SYSTOLIC BLOOD PRESSURE: 170 MMHG | DIASTOLIC BLOOD PRESSURE: 96 MMHG | HEIGHT: 61 IN | BODY MASS INDEX: 29.45 KG/M2

## 2021-10-13 DIAGNOSIS — Z96.641 AFTERCARE FOLLOWING JOINT REPLACEMENT SURGERY: ICD-10-CM

## 2021-10-13 DIAGNOSIS — Z96.641 PRESENCE OF RIGHT ARTIFICIAL HIP JOINT: ICD-10-CM

## 2021-10-13 DIAGNOSIS — Z47.1 AFTERCARE FOLLOWING JOINT REPLACEMENT SURGERY: ICD-10-CM

## 2021-10-13 PROCEDURE — 73502 X-RAY EXAM HIP UNI 2-3 VIEWS: CPT

## 2021-10-13 PROCEDURE — 99214 OFFICE O/P EST MOD 30 MIN: CPT

## 2021-10-13 NOTE — PHYSICAL EXAM
[Antalgic] : antalgic [Cane] : ambulates with cane [LE] : Sensory: Intact in bilateral lower extremities [ALL] : dorsalis pedis, posterior tibial, femoral, popliteal, and radial 2+ and symmetric bilaterally [Normal] : Alert and in no acute distress [Poor Appearance] : well-appearing [de-identified] : GENERAL APPEARANCE: Well nourished and hydrated, pleasant, alert, and oriented x 3. Appears their stated age. \par HEENT: Normocephalic, extraocular eye motion intact. Nasal septum midline. Oral cavity clear. External auditory canal clear. \par RESPIRATORY: Breath sounds clear and audible in all lobes. No wheezing, No accessory muscle use.\par CARDIOVASCULAR: No apparent abnormalities. No lower leg edema. No varicosities. Pedal pulses are palpable.\par NEUROLOGIC: Sensation is normal, no muscle weakness in the upper or lower extremities.\par DERMATOLOGIC: No apparent skin lesions, moist, warm, no rash.\par SPINE: Cervical spine appears normal and moves freely; thoracic spine appears normal and moves freely; lumbosacral spine appears normal and moves freely, normal, nontender.\par MUSCULOSKELETAL: Hands, wrists, and elbows are normal and move freely, shoulders are normal and move freely.  [de-identified] : Left hip exam shows healed incision with no sign of infection, tenderness lateral side of the hip, severe antalgic gait [de-identified] : 3V xray of the left hip done in the office today and reviewed by Dr. Arturo Baum demonstrates cephalomedullary nail with a healed intratrochanteric femur brace. The blade does extend approximately 2 cm outside the lateral femoral cortex.

## 2021-10-13 NOTE — HISTORY OF PRESENT ILLNESS
[Pain Location] : pain [Worsening] : worsening [8] : a current pain level of 8/10 [4] : a minimum pain level of 4/10 [Walking] : worsened by walking [Rest] : relieved by rest [de-identified] : 89 y/o F is here for f/u of right INÉS done in 8/24/21. She states hip is pretty good and asks about an indentation by the scar. The patient is s/p intramedullary nail, left intertrochanteric hip fracture 11/21/19. She is having a lot of pain.with walking. She is taking Aleve. She is ambulating with a cane. She was in PT but notes that it did not provide relief.

## 2021-10-13 NOTE — END OF VISIT
[FreeTextEntry3] : I, Kevin Jean, acted solely as a scribe for Dr. Arturo aBum on this date 10/13/2021.

## 2021-10-13 NOTE — DISCUSSION/SUMMARY
[Medication Risks Reviewed] : Medication risks reviewed [Surgical risks reviewed] : Surgical risks reviewed [de-identified] : 89 y/o F s/p intramedullary nail, left intertrochanteric hip fracture 11/21/19. The blade is approximately 2 cm outside the lateral femoral cortex and this is most likely causing her pain. We discussed two options with the patient. The first option was conversion to a INÉS. The second option would be trying to maintain her symptoms with cortisone injections. She defers on both treatments. 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 Based upon the patient's continued symptoms and failure to respond to conservative treatment I have recommended a left total hip arthroplasty for this patient. A long discussion took place with the patient describing what a total joint replacement is and what the procedure would entail. A total hip arthroplasty model, similar to the implant that will be used during the operation, was utilized to demonstrate and to discuss the various bearing surfaces of the implants. The hospitalization and post-operative care and rehabilitation were also discussed. The use of perioperative antibiotics and DVT prophylaxis were discussed. The risk, benefits and alternatives to a surgical intervention were discussed at length with the patient. The patient was also advised of risks related to the medical comorbidities, elevated body mass index (BMI), and smoking where applicable. We discussed how to reduce modifiable risk factors and encouraged smoking cessation were applicable.. A lengthy discussion took place to review the most common complications including but not limited to: deep vein thrombosis, pulmonary embolus, heart attack, stroke, infection, wound breakdown, numbness, damage to nerves, tendon, muscles, arteries or other blood vessels, death and other possible complications from anesthesia. The patient was told that we will take steps to minimize these risks by using sterile technique, antibiotics and DVT prophylaxis when appropriate and follow the patient postoperatively in the office setting to monitor progress. The possibility of recurrent pain, no improvement in pain and actual worsening of pain were also discussed with the patient.\par The discharge plan of care focused on the patient going home following surgery. The patient was encouraged to make the necessary arrangements to have someone stay with them when they are discharged home. Following discharge, a home care nurse will visit the patient. The home care nurse will open your home care case and request home physical therapy services. Home physical therapy will commence following discharge provided it is appropriate and covered by the health insurance benefit plan. \par The benefits of surgery were discussed with the patient including the potential for improving her current clinical condition through operative intervention. Alternatives to surgical intervention including continued conservative management were also discussed in detail. All questions were answered to the satisfaction of the patient. The treatment plan of care, as well as a model of a total hip arthroplasty equivalent to the one that will be used for their total joint replacement, was shared with the patient. The patient agreed to the plan of care as well as the use of implants in their total joint replacement.

## 2021-11-01 ENCOUNTER — APPOINTMENT (OUTPATIENT)
Dept: INTERNAL MEDICINE | Facility: CLINIC | Age: 86
End: 2021-11-01
Payer: MEDICARE

## 2021-11-01 VITALS — DIASTOLIC BLOOD PRESSURE: 82 MMHG | RESPIRATION RATE: 12 BRPM | SYSTOLIC BLOOD PRESSURE: 120 MMHG | HEART RATE: 72 BPM

## 2021-11-01 DIAGNOSIS — M54.50 LOW BACK PAIN, UNSPECIFIED: ICD-10-CM

## 2021-11-01 DIAGNOSIS — S72.002A FRACTURE OF UNSPECIFIED PART OF NECK OF LEFT FEMUR, INITIAL ENCOUNTER FOR CLOSED FRACTURE: ICD-10-CM

## 2021-11-01 DIAGNOSIS — M81.0 AGE-RELATED OSTEOPOROSIS W/OUT CURRENT PATHOLOGICAL FRACTURE: ICD-10-CM

## 2021-11-01 DIAGNOSIS — M54.2 CERVICALGIA: ICD-10-CM

## 2021-11-01 DIAGNOSIS — T84.84XA PAIN DUE TO INTERNAL ORTHOPEDIC PROSTHETIC DEVICES, IMPLANTS AND GRAFTS, INITIAL ENCOUNTER: ICD-10-CM

## 2021-11-01 DIAGNOSIS — M79.606 PAIN IN LEG, UNSPECIFIED: ICD-10-CM

## 2021-11-01 PROCEDURE — 99214 OFFICE O/P EST MOD 30 MIN: CPT

## 2021-11-01 NOTE — HISTORY OF PRESENT ILLNESS
[FreeTextEntry1] : rib pain back pain \par hip pain [de-identified] : rib pain back pain \par hip pain \par no fever no cough no sob\par has hardware given her pain

## 2021-11-01 NOTE — PHYSICAL EXAM
[No Acute Distress] : no acute distress [Well Nourished] : well nourished [Well Developed] : well developed [Well-Appearing] : well-appearing [Normal Sclera/Conjunctiva] : normal sclera/conjunctiva [PERRL] : pupils equal round and reactive to light [EOMI] : extraocular movements intact [Normal Outer Ear/Nose] : the outer ears and nose were normal in appearance [Normal Oropharynx] : the oropharynx was normal [No JVD] : no jugular venous distention [No Lymphadenopathy] : no lymphadenopathy [Supple] : supple [Thyroid Normal, No Nodules] : the thyroid was normal and there were no nodules present [No Respiratory Distress] : no respiratory distress  [No Accessory Muscle Use] : no accessory muscle use [Clear to Auscultation] : lungs were clear to auscultation bilaterally [Normal Rate] : normal rate  [Regular Rhythm] : with a regular rhythm [Normal S1, S2] : normal S1 and S2 [No Murmur] : no murmur heard [No Carotid Bruits] : no carotid bruits [No Abdominal Bruit] : a ~M bruit was not heard ~T in the abdomen [No Varicosities] : no varicosities [Pedal Pulses Present] : the pedal pulses are present [No Edema] : there was no peripheral edema [No Palpable Aorta] : no palpable aorta [No Extremity Clubbing/Cyanosis] : no extremity clubbing/cyanosis [Soft] : abdomen soft [Non Tender] : non-tender [Non-distended] : non-distended [No Masses] : no abdominal mass palpated [No HSM] : no HSM [Normal Bowel Sounds] : normal bowel sounds [Normal Posterior Cervical Nodes] : no posterior cervical lymphadenopathy [Normal Anterior Cervical Nodes] : no anterior cervical lymphadenopathy [No CVA Tenderness] : no CVA  tenderness [No Spinal Tenderness] : no spinal tenderness [No Joint Swelling] : no joint swelling [Grossly Normal Strength/Tone] : grossly normal strength/tone [No Rash] : no rash [Coordination Grossly Intact] : coordination grossly intact [No Focal Deficits] : no focal deficits [Normal Gait] : normal gait [Deep Tendon Reflexes (DTR)] : deep tendon reflexes were 2+ and symmetric [Normal Affect] : the affect was normal [Normal Insight/Judgement] : insight and judgment were intact [de-identified] : left hip pain

## 2021-11-01 NOTE — ASSESSMENT
[FreeTextEntry1] : may need full hip replacement as hardware giving her pain\par get ct of chest to see spine\par bp stable\par try tramadol for pain\par continue other meds\par follow up after ct

## 2021-12-10 ENCOUNTER — RX RENEWAL (OUTPATIENT)
Age: 86
End: 2021-12-10

## 2022-01-14 NOTE — H&P ADULT - ASSESSMENT
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 Negative for ischemia/infarct, EF \par 2. Achieved 9 minutes, 10 METS\par 3. BP hypertensive at baseline, normal response \par 4. Hyperdynamic LV, EF 75%\par 5. DTS = 4\par \par ECHO 4/2021:\par 1. NOrmal LV size and function, EF 55-60%\par 2. Moderately Hypo basal inferior wall \par 3. Mild RVE with normal function\par 4. Moderate LAE, mild CARMEN\par 5. Mild to moderate MR\par 6. Severe TR\par \par ECHO 10/2019:\par 1. Normal LV size and function, EF 60%\par 2. Equivocal diastolic parameters\par 3. RVE with  normal function\par 4. Moderate LAE/CARMEN\par 5. Mild to moderate MR\par 6. Moderate to severe TR, RVSP 52mmHg\par 7. RVVO \par \par EX NUCLEAR STRESS TEST 9/2018:\par 1. Negative for ischemia, EF 81%\par 2. PVCs\par 3. Normal BP response  89 year old female PMH anemia, depression, myelodysplastic disorder, OA, neuropathy, hip replacement (july 2018) presents to ED with mechanical fall. Found to have left Hip fracture. Patient states she was trying to get to the bathroom (in a hurry), when she tripped and fell. She did not lose coconsciousness. She denies current chest pain, chest pain prior to the fall, and no recent episodes of chest pain/SOB. No palpitations. Denies fever, chills, nausea, vomiting. Former smoker 30 pack year history, drinks one glass of scotch per night, no drug use. Complains of hip pain. In the ED found to have  impacted comminuted LEFT intertrochanteric fracture for OR in the morning.

## 2022-03-17 ENCOUNTER — RX RENEWAL (OUTPATIENT)
Age: 87
End: 2022-03-17

## 2022-04-11 PROBLEM — Z11.59 SCREENING FOR VIRAL DISEASE: Status: ACTIVE | Noted: 2020-12-02

## 2022-05-24 NOTE — PATIENT PROFILE ADULT - FALL HARM RISK
My Chart message sent to patient for 3 hour scheduling
Patient aware of results. Just waiting to see what day would be best for her to schedule.
Patient is doing well  C/o leg cramps and skin tags  +FM  GTT and 3rd tri labs today  TDAP done today
Please notify and set up 3hr GTT; prob list updated
Scheduled for Thursday (5/26) morning @ 8
age(85 years old or older)

## 2022-07-08 ENCOUNTER — RX RENEWAL (OUTPATIENT)
Age: 87
End: 2022-07-08

## 2022-07-08 RX ORDER — AMMONIUM LACTATE 12 %
12 CREAM (GRAM) TOPICAL TWICE DAILY
Qty: 140 | Refills: 3 | Status: ACTIVE | COMMUNITY
Start: 2019-04-10 | End: 1900-01-01

## 2022-07-20 DIAGNOSIS — R32 UNSPECIFIED URINARY INCONTINENCE: ICD-10-CM

## 2022-07-21 ENCOUNTER — RX RENEWAL (OUTPATIENT)
Age: 87
End: 2022-07-21

## 2022-07-21 RX ORDER — TOLTERODINE TARTRATE 1 MG/1
1 TABLET ORAL
Qty: 30 | Refills: 2 | Status: DISCONTINUED | COMMUNITY
Start: 2022-07-20 | End: 2022-07-21

## 2022-08-16 RX ORDER — MECLIZINE HYDROCHLORIDE 25 MG/1
25 TABLET ORAL 3 TIMES DAILY
Qty: 45 | Refills: 1 | Status: COMPLETED | COMMUNITY
Start: 2022-08-16 | End: 2022-09-15

## 2022-08-17 ENCOUNTER — RX RENEWAL (OUTPATIENT)
Age: 87
End: 2022-08-17

## 2022-08-19 NOTE — ASU PREOP CHECKLIST - HEART RATE (BEATS/MIN)
89 Fluconazole Counseling:  Patient counseled regarding adverse effects of fluconazole including but not limited to headache, diarrhea, nausea, upset stomach, liver function test abnormalities, taste disturbance, and stomach pain.  There is a rare possibility of liver failure that can occur when taking fluconazole.  The patient understands that monitoring of LFTs and kidney function test may be required, especially at baseline. The patient verbalized understanding of the proper use and possible adverse effects of fluconazole.  All of the patient's questions and concerns were addressed.

## 2022-08-24 ENCOUNTER — APPOINTMENT (OUTPATIENT)
Dept: INTERNAL MEDICINE | Facility: CLINIC | Age: 87
End: 2022-08-24

## 2022-08-24 VITALS
HEIGHT: 61 IN | HEART RATE: 82 BPM | SYSTOLIC BLOOD PRESSURE: 120 MMHG | WEIGHT: 158 LBS | DIASTOLIC BLOOD PRESSURE: 76 MMHG | BODY MASS INDEX: 29.83 KG/M2 | RESPIRATION RATE: 16 BRPM

## 2022-08-24 DIAGNOSIS — R58 HEMORRHAGE, NOT ELSEWHERE CLASSIFIED: ICD-10-CM

## 2022-08-24 PROCEDURE — 99214 OFFICE O/P EST MOD 30 MIN: CPT | Mod: 25

## 2022-08-24 PROCEDURE — 36415 COLL VENOUS BLD VENIPUNCTURE: CPT

## 2022-08-24 NOTE — HISTORY OF PRESENT ILLNESS
[FreeTextEntry1] : vertigo has returned [de-identified] : vertigo on and off\par worst left side some nausea\par no ha no blurred vision\par no chest pain sob nvd or palpitatons

## 2022-08-24 NOTE — ASSESSMENT
[FreeTextEntry1] : bpv add hctz\par vestibular exercises\par echymosis check cbc\par bp stable\par followup prn

## 2022-08-24 NOTE — PHYSICAL EXAM
[No Acute Distress] : no acute distress [Well Nourished] : well nourished [Well Developed] : well developed [Well-Appearing] : well-appearing [Normal Sclera/Conjunctiva] : normal sclera/conjunctiva [PERRL] : pupils equal round and reactive to light [EOMI] : extraocular movements intact [Normal Outer Ear/Nose] : the outer ears and nose were normal in appearance [Normal Oropharynx] : the oropharynx was normal [No JVD] : no jugular venous distention [No Lymphadenopathy] : no lymphadenopathy [Supple] : supple [Thyroid Normal, No Nodules] : the thyroid was normal and there were no nodules present [No Respiratory Distress] : no respiratory distress  [No Accessory Muscle Use] : no accessory muscle use [Clear to Auscultation] : lungs were clear to auscultation bilaterally [Normal Rate] : normal rate  [Regular Rhythm] : with a regular rhythm [Normal S1, S2] : normal S1 and S2 [No Murmur] : no murmur heard [No Carotid Bruits] : no carotid bruits [No Abdominal Bruit] : a ~M bruit was not heard ~T in the abdomen [No Varicosities] : no varicosities [Pedal Pulses Present] : the pedal pulses are present [No Edema] : there was no peripheral edema [No Palpable Aorta] : no palpable aorta [No Extremity Clubbing/Cyanosis] : no extremity clubbing/cyanosis [Soft] : abdomen soft [Non Tender] : non-tender [Non-distended] : non-distended [No Masses] : no abdominal mass palpated [No HSM] : no HSM [Normal Bowel Sounds] : normal bowel sounds [Normal Posterior Cervical Nodes] : no posterior cervical lymphadenopathy [Normal Anterior Cervical Nodes] : no anterior cervical lymphadenopathy [No CVA Tenderness] : no CVA  tenderness [No Spinal Tenderness] : no spinal tenderness [No Joint Swelling] : no joint swelling [Grossly Normal Strength/Tone] : grossly normal strength/tone [No Rash] : no rash [Coordination Grossly Intact] : coordination grossly intact [No Focal Deficits] : no focal deficits [Normal Gait] : normal gait [Deep Tendon Reflexes (DTR)] : deep tendon reflexes were 2+ and symmetric [Normal Affect] : the affect was normal [Normal Insight/Judgement] : insight and judgment were intact [de-identified] : vertigo

## 2022-08-24 NOTE — HEALTH RISK ASSESSMENT
[Never] : Never [No] : No [One fall no injury in past year] : Patient reported one fall in the past year without injury [0] : 2) Feeling down, depressed, or hopeless: Not at all (0) [PHQ-2 Negative - No further assessment needed] : PHQ-2 Negative - No further assessment needed [[facility]] : 20 [[id number]] : 0

## 2022-08-25 LAB
BASOPHILS # BLD AUTO: 0.05 K/UL
BASOPHILS NFR BLD AUTO: 0.8 %
EOSINOPHIL # BLD AUTO: 0.35 K/UL
EOSINOPHIL NFR BLD AUTO: 5.3 %
HCT VFR BLD CALC: 43.9 %
HGB BLD-MCNC: 13.7 G/DL
IMM GRANULOCYTES NFR BLD AUTO: 0 %
LYMPHOCYTES # BLD AUTO: 2.67 K/UL
LYMPHOCYTES NFR BLD AUTO: 40.2 %
MAN DIFF?: NORMAL
MCHC RBC-ENTMCNC: 31.2 GM/DL
MCHC RBC-ENTMCNC: 31.4 PG
MCV RBC AUTO: 100.5 FL
MONOCYTES # BLD AUTO: 0.7 K/UL
MONOCYTES NFR BLD AUTO: 10.5 %
NEUTROPHILS # BLD AUTO: 2.87 K/UL
NEUTROPHILS NFR BLD AUTO: 43.2 %
PLATELET # BLD AUTO: 251 K/UL
RBC # BLD: 4.37 M/UL
RBC # FLD: 12.8 %
WBC # FLD AUTO: 6.64 K/UL

## 2022-08-26 ENCOUNTER — NON-APPOINTMENT (OUTPATIENT)
Age: 87
End: 2022-08-26

## 2022-09-15 ENCOUNTER — APPOINTMENT (OUTPATIENT)
Dept: MRI IMAGING | Facility: CLINIC | Age: 87
End: 2022-09-15

## 2022-09-15 ENCOUNTER — OUTPATIENT (OUTPATIENT)
Dept: OUTPATIENT SERVICES | Facility: HOSPITAL | Age: 87
LOS: 1 days | End: 2022-09-15
Payer: MEDICARE

## 2022-09-15 DIAGNOSIS — H81.10 BENIGN PAROXYSMAL VERTIGO, UNSPECIFIED EAR: ICD-10-CM

## 2022-09-15 DIAGNOSIS — Z90.49 ACQUIRED ABSENCE OF OTHER SPECIFIED PARTS OF DIGESTIVE TRACT: Chronic | ICD-10-CM

## 2022-09-15 DIAGNOSIS — Z86.79 PERSONAL HISTORY OF OTHER DISEASES OF THE CIRCULATORY SYSTEM: Chronic | ICD-10-CM

## 2022-09-15 DIAGNOSIS — H26.9 UNSPECIFIED CATARACT: Chronic | ICD-10-CM

## 2022-09-15 DIAGNOSIS — Z96.641 PRESENCE OF RIGHT ARTIFICIAL HIP JOINT: Chronic | ICD-10-CM

## 2022-09-15 DIAGNOSIS — Z90.721 ACQUIRED ABSENCE OF OVARIES, UNILATERAL: Chronic | ICD-10-CM

## 2022-09-15 DIAGNOSIS — Z98.890 OTHER SPECIFIED POSTPROCEDURAL STATES: Chronic | ICD-10-CM

## 2022-09-15 DIAGNOSIS — Z87.81 PERSONAL HISTORY OF (HEALED) TRAUMATIC FRACTURE: Chronic | ICD-10-CM

## 2022-09-15 PROCEDURE — 70551 MRI BRAIN STEM W/O DYE: CPT

## 2022-09-15 PROCEDURE — 70551 MRI BRAIN STEM W/O DYE: CPT | Mod: 26,MH

## 2022-09-19 ENCOUNTER — NON-APPOINTMENT (OUTPATIENT)
Age: 87
End: 2022-09-19

## 2022-09-23 ENCOUNTER — EMERGENCY (EMERGENCY)
Facility: HOSPITAL | Age: 87
LOS: 1 days | Discharge: HOME | End: 2022-09-23
Attending: EMERGENCY MEDICINE
Payer: MEDICARE

## 2022-09-23 VITALS
SYSTOLIC BLOOD PRESSURE: 162 MMHG | TEMPERATURE: 98 F | HEART RATE: 82 BPM | OXYGEN SATURATION: 95 % | RESPIRATION RATE: 18 BRPM | DIASTOLIC BLOOD PRESSURE: 82 MMHG

## 2022-09-23 VITALS
HEART RATE: 95 BPM | WEIGHT: 149.91 LBS | DIASTOLIC BLOOD PRESSURE: 85 MMHG | RESPIRATION RATE: 20 BRPM | HEIGHT: 63 IN | SYSTOLIC BLOOD PRESSURE: 121 MMHG | OXYGEN SATURATION: 97 %

## 2022-09-23 DIAGNOSIS — Z96.641 PRESENCE OF RIGHT ARTIFICIAL HIP JOINT: Chronic | ICD-10-CM

## 2022-09-23 DIAGNOSIS — Z90.49 ACQUIRED ABSENCE OF OTHER SPECIFIED PARTS OF DIGESTIVE TRACT: Chronic | ICD-10-CM

## 2022-09-23 DIAGNOSIS — Z86.79 PERSONAL HISTORY OF OTHER DISEASES OF THE CIRCULATORY SYSTEM: Chronic | ICD-10-CM

## 2022-09-23 DIAGNOSIS — Z98.890 OTHER SPECIFIED POSTPROCEDURAL STATES: Chronic | ICD-10-CM

## 2022-09-23 DIAGNOSIS — Z90.721 ACQUIRED ABSENCE OF OVARIES, UNILATERAL: Chronic | ICD-10-CM

## 2022-09-23 DIAGNOSIS — Z87.81 PERSONAL HISTORY OF (HEALED) TRAUMATIC FRACTURE: Chronic | ICD-10-CM

## 2022-09-23 DIAGNOSIS — H26.9 UNSPECIFIED CATARACT: Chronic | ICD-10-CM

## 2022-09-23 LAB
ALBUMIN SERPL ELPH-MCNC: 4 G/DL — SIGNIFICANT CHANGE UP (ref 3.3–5.2)
ALP SERPL-CCNC: 114 U/L — SIGNIFICANT CHANGE UP (ref 40–120)
ALT FLD-CCNC: 9 U/L — SIGNIFICANT CHANGE UP
ANION GAP SERPL CALC-SCNC: 12 MMOL/L — SIGNIFICANT CHANGE UP (ref 5–17)
AST SERPL-CCNC: 15 U/L — SIGNIFICANT CHANGE UP
BASOPHILS # BLD AUTO: 0.03 K/UL — SIGNIFICANT CHANGE UP (ref 0–0.2)
BASOPHILS NFR BLD AUTO: 0.4 % — SIGNIFICANT CHANGE UP (ref 0–2)
BILIRUB SERPL-MCNC: 1 MG/DL — SIGNIFICANT CHANGE UP (ref 0.4–2)
BUN SERPL-MCNC: 27.9 MG/DL — HIGH (ref 8–20)
CALCIUM SERPL-MCNC: 9.5 MG/DL — SIGNIFICANT CHANGE UP (ref 8.4–10.5)
CHLORIDE SERPL-SCNC: 102 MMOL/L — SIGNIFICANT CHANGE UP (ref 98–107)
CO2 SERPL-SCNC: 23 MMOL/L — SIGNIFICANT CHANGE UP (ref 22–29)
CREAT SERPL-MCNC: 1.11 MG/DL — SIGNIFICANT CHANGE UP (ref 0.5–1.3)
EGFR: 46 ML/MIN/1.73M2 — LOW
EOSINOPHIL # BLD AUTO: 1.22 K/UL — HIGH (ref 0–0.5)
EOSINOPHIL NFR BLD AUTO: 15.6 % — HIGH (ref 0–6)
GLUCOSE SERPL-MCNC: 97 MG/DL — SIGNIFICANT CHANGE UP (ref 70–99)
HCT VFR BLD CALC: 36.8 % — SIGNIFICANT CHANGE UP (ref 34.5–45)
HGB BLD-MCNC: 12.3 G/DL — SIGNIFICANT CHANGE UP (ref 11.5–15.5)
IMM GRANULOCYTES NFR BLD AUTO: 0.3 % — SIGNIFICANT CHANGE UP (ref 0–0.9)
LYMPHOCYTES # BLD AUTO: 0.94 K/UL — LOW (ref 1–3.3)
LYMPHOCYTES # BLD AUTO: 12 % — LOW (ref 13–44)
MCHC RBC-ENTMCNC: 31 PG — SIGNIFICANT CHANGE UP (ref 27–34)
MCHC RBC-ENTMCNC: 33.4 GM/DL — SIGNIFICANT CHANGE UP (ref 32–36)
MCV RBC AUTO: 92.7 FL — SIGNIFICANT CHANGE UP (ref 80–100)
MONOCYTES # BLD AUTO: 0.55 K/UL — SIGNIFICANT CHANGE UP (ref 0–0.9)
MONOCYTES NFR BLD AUTO: 7 % — SIGNIFICANT CHANGE UP (ref 2–14)
NEUTROPHILS # BLD AUTO: 5.06 K/UL — SIGNIFICANT CHANGE UP (ref 1.8–7.4)
NEUTROPHILS NFR BLD AUTO: 64.7 % — SIGNIFICANT CHANGE UP (ref 43–77)
PLATELET # BLD AUTO: 230 K/UL — SIGNIFICANT CHANGE UP (ref 150–400)
POTASSIUM SERPL-MCNC: 4.3 MMOL/L — SIGNIFICANT CHANGE UP (ref 3.5–5.3)
POTASSIUM SERPL-SCNC: 4.3 MMOL/L — SIGNIFICANT CHANGE UP (ref 3.5–5.3)
PROT SERPL-MCNC: 7.1 G/DL — SIGNIFICANT CHANGE UP (ref 6.6–8.7)
RBC # BLD: 3.97 M/UL — SIGNIFICANT CHANGE UP (ref 3.8–5.2)
RBC # FLD: 12.4 % — SIGNIFICANT CHANGE UP (ref 10.3–14.5)
SODIUM SERPL-SCNC: 137 MMOL/L — SIGNIFICANT CHANGE UP (ref 135–145)
TROPONIN T SERPL-MCNC: <0.01 NG/ML — SIGNIFICANT CHANGE UP (ref 0–0.06)
WBC # BLD: 7.82 K/UL — SIGNIFICANT CHANGE UP (ref 3.8–10.5)
WBC # FLD AUTO: 7.82 K/UL — SIGNIFICANT CHANGE UP (ref 3.8–10.5)

## 2022-09-23 PROCEDURE — 73110 X-RAY EXAM OF WRIST: CPT | Mod: 26,RT,76

## 2022-09-23 PROCEDURE — 25605 CLTX DST RDL FX/EPHYS SEP W/: CPT | Mod: RT

## 2022-09-23 PROCEDURE — 84484 ASSAY OF TROPONIN QUANT: CPT

## 2022-09-23 PROCEDURE — 73090 X-RAY EXAM OF FOREARM: CPT

## 2022-09-23 PROCEDURE — 71046 X-RAY EXAM CHEST 2 VIEWS: CPT

## 2022-09-23 PROCEDURE — 96374 THER/PROPH/DIAG INJ IV PUSH: CPT | Mod: XU

## 2022-09-23 PROCEDURE — 80053 COMPREHEN METABOLIC PANEL: CPT

## 2022-09-23 PROCEDURE — 82962 GLUCOSE BLOOD TEST: CPT

## 2022-09-23 PROCEDURE — 99283 EMERGENCY DEPT VISIT LOW MDM: CPT

## 2022-09-23 PROCEDURE — 73100 X-RAY EXAM OF WRIST: CPT | Mod: 26,RT,59

## 2022-09-23 PROCEDURE — 99285 EMERGENCY DEPT VISIT HI MDM: CPT | Mod: 25

## 2022-09-23 PROCEDURE — 71046 X-RAY EXAM CHEST 2 VIEWS: CPT | Mod: 26

## 2022-09-23 PROCEDURE — 85025 COMPLETE CBC W/AUTO DIFF WBC: CPT

## 2022-09-23 PROCEDURE — 93010 ELECTROCARDIOGRAM REPORT: CPT

## 2022-09-23 PROCEDURE — 93005 ELECTROCARDIOGRAM TRACING: CPT

## 2022-09-23 PROCEDURE — 99285 EMERGENCY DEPT VISIT HI MDM: CPT

## 2022-09-23 PROCEDURE — 73110 X-RAY EXAM OF WRIST: CPT

## 2022-09-23 PROCEDURE — 36415 COLL VENOUS BLD VENIPUNCTURE: CPT

## 2022-09-23 PROCEDURE — 73090 X-RAY EXAM OF FOREARM: CPT | Mod: 26,LT

## 2022-09-23 PROCEDURE — 73100 X-RAY EXAM OF WRIST: CPT

## 2022-09-23 RX ORDER — ACETAMINOPHEN 500 MG
975 TABLET ORAL ONCE
Refills: 0 | Status: COMPLETED | OUTPATIENT
Start: 2022-09-23 | End: 2022-09-23

## 2022-09-23 RX ORDER — MORPHINE SULFATE 50 MG/1
4 CAPSULE, EXTENDED RELEASE ORAL ONCE
Refills: 0 | Status: DISCONTINUED | OUTPATIENT
Start: 2022-09-23 | End: 2022-09-23

## 2022-09-23 RX ADMIN — Medication 975 MILLIGRAM(S): at 10:57

## 2022-09-23 RX ADMIN — MORPHINE SULFATE 4 MILLIGRAM(S): 50 CAPSULE, EXTENDED RELEASE ORAL at 12:19

## 2022-09-23 NOTE — ED PROVIDER NOTE - CLINICAL SUMMARY MEDICAL DECISION MAKING FREE TEXT BOX
89 y/o F presents for right wrist deformity s/p fall last night, no head trauma or LOC, no blood thinners, neuro-vascularly intact. Will xray wrist, check labs, EKG non-ischemic, already in progress is a safer living plan/environment and patient had MRI for frequent vertigo spells. Family and patient comfortable with discharge s/p wrist reduction.

## 2022-09-23 NOTE — CHART NOTE - NSCHARTNOTEFT_GEN_A_CORE
POST DISCHARGE SOCIAL WORK NOTE:  PLACED CALL TO ED MD AFTER REVIEWING THE CHART. XRAY RESULTED AS PATIENT HAS A RIGHT DISTAL RADIUS FRACTURE. ORTHO FOLLOWED AND RECOMMEND NWB STATUS AND OUTPATIENT FOLLOW UP. AS PER ER MD, SW/CM NOT CONSULTED AS PATIENT HAD MULTIPLE FAMILY MEMBERS AT HER BEDSIDE WHOM ARE ABLE AND WILLING TO TAKE PATIENT HOME AND CARE FOR THE PATIENT.  FAMILY DECLINED NEEDS.

## 2022-09-23 NOTE — ED ADULT TRIAGE NOTE - CHIEF COMPLAINT QUOTE
" At about 0830 this morning I felt lightheaded and dizzy and fell on my right arm, I think I broke my right wrist" pt denies LOC, hitting her head, not on any blood thinners

## 2022-09-23 NOTE — CONSULT NOTE ADULT - SUBJECTIVE AND OBJECTIVE BOX
Pt Name: NILSA VILLEDA    MRN: [id number]      Patient is a 89y Female presenting to the emergency department with a chief complaint of right wrist pain. Patient states that she fell earlier today and immediately started having pain. Patient states she has trouble ranging her wrist secondary to the pain. Patient denies numbness, tingling, CP, SOB. No other orthopedic complaints at this time.       REVIEW OF SYSTEMS    General: Alert, responsive, in NAD    Skin/Breast: No rashes, no pruritis     Respiratory and Thorax: No difficulty breathing. No cough.  	   Cardiovascular:	No chest pain. No palpitations.    Gastrointestinal:	 No abdominal pain. No diarrhea.     Musculoskeletal: SEE HPI.    Neurological: No sensory changes.     Hematology/Lymphatics: Mild swelling to right wrist.    ROS is otherwise negative.      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      Constipation      Neuropathy of both feet      Wrist fracture, left      Myelodysplastic disease      History of right oophorectomy      History of tonsillectomy and adenoidectomy      History of appendectomy      Bilateral cataracts  surgery      History of varicose veins  laser removal      S/P wrist surgery  left wrist fracture repair      H/O total hip arthroplasty, right      S/p left hip fracture          Allergies: No Known Allergies      Medications:     FAMILY HISTORY:  Family history of diabetes mellitus (DM)    : non-contributory    Social History:                         12.3   7.82  )-----------( 230      ( 23 Sep 2022 09:45 )             36.8       09-23    137  |  102  |  27.9<H>  ----------------------------<  97  4.3   |  23.0  |  1.11    Ca    9.5      23 Sep 2022 09:45    TPro  7.1  /  Alb  4.0  /  TBili  1.0  /  DBili  x   /  AST  15  /  ALT  9   /  AlkPhos  114  09-23      Vital Signs Last 24 Hrs  T(C): 36.7 (23 Sep 2022 11:04), Max: 36.7 (23 Sep 2022 11:04)  T(F): 98 (23 Sep 2022 11:04), Max: 98 (23 Sep 2022 11:04)  HR: 82 (23 Sep 2022 11:04) (82 - 95)  BP: 162/82 (23 Sep 2022 11:04) (121/85 - 162/82)  BP(mean): --  RR: 18 (23 Sep 2022 11:04) (18 - 20)  SpO2: 95% (23 Sep 2022 11:04) (95% - 97%)    Parameters below as of 23 Sep 2022 11:04  Patient On (Oxygen Delivery Method): room air        Daily Height in cm: 160.02 (23 Sep 2022 09:02)    Daily       PHYSICAL EXAM:    Appearance: Alert, responsive, in no acute distress.    Musculoskeletal:       Right Upper Extremity: + Skin clean, dry, intact with no bleeding or open wounds noted. Sensation grossly intact to light touch distally. +ROM of fingers. Brisk capillary refill. + radial pulse. Unable to range wrist.         Imaging Studies:  Right wrist xrays - right distal radius fracture     FRACTURE REDUCTION  PROCEDURE NOTE: Fracture reduction     Performed by:  Mykel Pérez PA-C    Indication: Acute fracture with displacement, requiring fracture reduction.    Consent: The risks and benefits of the procedure including incomplete reduction, nerve damage and bleeding were explained and the patient verbalized their understanding and wished to proceed with the procedure. Written consent was obtained following the discussion.    Universal Protocol: a time out was performed and the correct patient and site were verified     Procedure: Neurovascular exam intact prior to fracture reduction.  Skin exam : No bleeding or lacerations at the fracture site. Anesthesia/pain control, using aseptic technique, was administered using a hematoma block of [  ] ml of 1% lidocaine. Reduction of the [left/right] [fracture site] was accomplished via axial traction and careful manipulation. Following adequate reduction and alignment of the fractured bone, the fracture was immobilized with a  plaster splint. Distally, the extremity was neurovascular intact following the procedure.  The patient tolerated the procedure well.    Post reduction films obtained and demonstrated an adequate reduction.    Complications: None      A/P:  Pt is a  89y Female with right distal radius fracture     PLAN:    Pt Name: NILSA VILLEDA    MRN: [id number]      Patient is a 89y Female presenting to the emergency department with a chief complaint of right wrist pain. Patient states that she fell earlier today and immediately started having pain. Patient states she has trouble ranging her wrist secondary to the pain. Patient denies numbness, tingling, CP, SOB. No other orthopedic complaints at this time.       REVIEW OF SYSTEMS    General: Alert, responsive, in NAD    Skin/Breast: No rashes, no pruritis     Respiratory and Thorax: No difficulty breathing. No cough.  	   Cardiovascular:	No chest pain. No palpitations.    Gastrointestinal:	 No abdominal pain. No diarrhea.     Musculoskeletal: SEE HPI.    Neurological: No sensory changes.     Hematology/Lymphatics: Mild swelling to right wrist.    ROS is otherwise negative.      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      Constipation      Neuropathy of both feet      Wrist fracture, left      Myelodysplastic disease      History of right oophorectomy      History of tonsillectomy and adenoidectomy      History of appendectomy      Bilateral cataracts  surgery      History of varicose veins  laser removal      S/P wrist surgery  left wrist fracture repair      H/O total hip arthroplasty, right      S/p left hip fracture          Allergies: No Known Allergies      Medications:     FAMILY HISTORY:  Family history of diabetes mellitus (DM)    : non-contributory    Social History:                         12.3   7.82  )-----------( 230      ( 23 Sep 2022 09:45 )             36.8       09-23    137  |  102  |  27.9<H>  ----------------------------<  97  4.3   |  23.0  |  1.11    Ca    9.5      23 Sep 2022 09:45    TPro  7.1  /  Alb  4.0  /  TBili  1.0  /  DBili  x   /  AST  15  /  ALT  9   /  AlkPhos  114  09-23      Vital Signs Last 24 Hrs  T(C): 36.7 (23 Sep 2022 11:04), Max: 36.7 (23 Sep 2022 11:04)  T(F): 98 (23 Sep 2022 11:04), Max: 98 (23 Sep 2022 11:04)  HR: 82 (23 Sep 2022 11:04) (82 - 95)  BP: 162/82 (23 Sep 2022 11:04) (121/85 - 162/82)  BP(mean): --  RR: 18 (23 Sep 2022 11:04) (18 - 20)  SpO2: 95% (23 Sep 2022 11:04) (95% - 97%)    Parameters below as of 23 Sep 2022 11:04  Patient On (Oxygen Delivery Method): room air        Daily Height in cm: 160.02 (23 Sep 2022 09:02)    Daily       PHYSICAL EXAM:    Appearance: Alert, responsive, in no acute distress.    Musculoskeletal:       Right Upper Extremity: + Skin clean, dry, intact with no bleeding or open wounds noted. Sensation grossly intact to light touch distally. +ROM of fingers. Brisk capillary refill. + radial pulse. Unable to range wrist.         Imaging Studies:  Right wrist xrays - right distal radius fracture     FRACTURE REDUCTION  PROCEDURE NOTE: Fracture reduction     Performed by:  Mykel Pérez PA-C    Indication: Acute fracture with displacement, requiring fracture reduction.    Consent: The risks and benefits of the procedure including incomplete reduction, nerve damage and bleeding were explained and the patient verbalized their understanding and wished to proceed with the procedure. Written consent was obtained following the discussion.    Universal Protocol: a time out was performed and the correct patient and site were verified     Procedure: Neurovascular exam intact prior to fracture reduction.  Skin exam : No bleeding or lacerations at the fracture site. Anesthesia/pain control, using aseptic technique, was administered using a hematoma block of 10 ml of 1% lidocaine. Reduction of the right distal radius fracture was attempted via axial traction and careful manipulation. Following reduction  of the fractured bone, the fracture was immobilized with a  plaster splint. Distally, the extremity was neurovascular intact following the procedure.  The patient tolerated the procedure well.    Post reduction films obtained and demonstrated a reduction.    Complications: None      A/P:  Pt is a  89y Female with right distal radius fracture     PLAN:   - Case and films reviewed with Dr. Gonzalez   - Continue splint as applied  - Do not wet splint   - NWB of RUE  - Elevate RUE   - Follow up in office with Dr. Gonzalez as soon as possible for further management

## 2022-09-23 NOTE — ED PROVIDER NOTE - PATIENT PORTAL LINK FT
You can access the FollowMyHealth Patient Portal offered by [facility] by registering at the following website: [email or web address]. By joining [email or web address]’s FollowMyHealth portal, you will also be able to view your health information using other applications (apps) compatible with our system.

## 2022-09-23 NOTE — ED PROVIDER NOTE - NSFOLLOWUPINSTRUCTIONS_ED_ALL_ED_FT
Closed Reduction for Wrist or Forearm       A closed reduction for the wrist or forearm is a procedure to move the bones back into place after they are broken (fractured) or moved out of their normal position (dislocated). In this procedure, the health care provider moves the bones back into position by hand. This procedure is done without an incision or surgery.    Your forearm is made up of two long bones called the radius and the ulna. These bones connect your forearm to your wrist. If a forearm bone is fractured on the end closest to the wrist joint, sometimes the bones do not move very far out of place (stable fracture). You may have a closed reduction if the fractured or displaced bones of your wrist or forearm will stay stable with a cast or splint after they are moved back into their normal positions.      Tell a health care provider about:    •Any allergies you have.      •All medicines you are taking, including vitamins, herbs, eye drops, creams, and over-the-counter medicines.      •Any problems you or family members have had with anesthetic medicines.      •Any blood disorders you have.      •Any surgeries you have had.      •Any medical conditions you have.      •Whether you are pregnant or may be pregnant.        What are the risks?    Generally, this is a safe procedure. However, problems may occur, including:  •Infection.      •Bleeding.      •Allergic reactions to medicines.      •Damage to nerves or blood vessels.      •Failure to heal.      •Continued pain or stiffness in the wrist.        What happens before the procedure?    Staying hydrated     Follow instructions from your health care provider about hydration, which may include:  •Up to 2 hours before the procedure – you may continue to drink clear liquids, such as water, clear fruit juice, black coffee, and plain tea.      Eating and drinking restrictions    Follow instructions from your health care provider about eating and drinking, which may include:  •8 hours before the procedure – stop eating heavy meals or foods, such as meat, fried foods, or fatty foods.      •6 hours before the procedure – stop eating light meals or foods, such as toast or cereal.      •6 hours before the procedure – stop drinking milk or drinks that contain milk.      •2 hours before the procedure – stop drinking clear liquids.      Medicines    Ask your health care provider about:  •Changing or stopping your regular medicines. This is especially important if you are taking diabetes medicines or blood thinners.      •Taking medicines such as aspirin and ibuprofen. These medicines can thin your blood. Do not take these medicines unless your health care provider tells you to take them.      •Taking over-the-counter medicines, vitamins, herbs, and supplements.      General instructions    •You may have a physical exam. The exam may include X-rays to find out more about your condition.      •Plan to have someone take you home from the hospital or clinic.      •If you will be going home right after the procedure, plan to have someone with you for 24 hours.      •Ask your health care provider what steps will be taken to help prevent infection. These may include washing your skin with a germ-killing soap.        What happens during the procedure?    •An IV may be inserted into one of your veins.    •You may be given one or more of the following:  •A medicine to help you relax (sedative).      •A medicine to make you fall asleep (general anesthetic).      •A medicine that is injected into an area of your body to numb everything below the injection site (regional anesthetic).        •Your health care provider will move the broken bones back into their normal position.      •You will have more X-rays to make sure the bones are in the right position.      •You will have to wear a cast or splint to hold the bones in place while they heal.      The procedure may vary among health care providers and hospitals.      What happens after the procedure?     •Your blood pressure, heart rate, breathing rate, and blood oxygen level will be monitored until you leave the hospital or clinic.      •Your arm and hand will be raised (elevated).      •You will be given medicine for pain as needed.      • Do not drive for 24 hours if you were given a sedative during your procedure.        Summary    •A closed reduction for your wrist or forearm is used when you have broken or dislocated one or more bones in your arm.      •A closed reduction puts the bones back in their normal position without an incision or surgery.      •After the closed reduction procedure, your wrist or forearm will be placed in a splint or cast.      This information is not intended to replace advice given to you by your health care provider. Make sure you discuss any questions you have with your health care provider.

## 2022-09-23 NOTE — ED ADULT NURSE NOTE - OBJECTIVE STATEMENT
Patient presents to ER C/O right wrist pain, reports fall at home last night, states she became dizzy and fell over, denies LOC/head injury, no blood thinners, resp even/unlabored.

## 2022-09-23 NOTE — ED PROVIDER NOTE - NSICDXPASTMEDICALHX_GEN_ALL_CORE_FT
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

## 2022-09-23 NOTE — ED PROVIDER NOTE - NSICDXPASTSURGICALHX_GEN_ALL_CORE_FT
PAST SURGICAL HISTORY:  Bilateral cataracts surgery    H/O total hip arthroplasty, right     History of appendectomy     History of right oophorectomy     History of tonsillectomy and adenoidectomy     History of varicose veins laser removal    S/p left hip fracture     S/P wrist surgery left wrist fracture repair

## 2022-09-23 NOTE — ED ADULT NURSE NOTE - NSICDXPASTMEDICALHX_GEN_ALL_CORE_FT
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

## 2022-09-23 NOTE — ED PROVIDER NOTE - OBJECTIVE STATEMENT
89 y/o F with PMH anemia, depression, insomnia (on Ambien), neuropathy, MDS, OA presents s/p a fall that occurred last night while she was putting on her night gown. She states that she felt dizzy and fell onto her right wrist, did not hit her head or lose consciousness. She was able to get up on her own and was walking around all night because of the discomfort in her right wrist which is only present when moving it. She iced all night, but took no medications. She takes ambien every night for sleep, notes frequent episodes of the room spinning for which she just had an MRI last week. She uses a walker and lives alone, is surrounded by family and already is looking into a fulltime live in help or assisted living. She denies allergies to medications.

## 2022-09-23 NOTE — ED PROVIDER NOTE - NS ED ROS FT
Const: Denies fever, chills  HEENT: Denies blurry vision, sore throat  Neck: Denies neck pain/stiffness  Resp: Denies coughing, SOB  Cardiovascular: Denies CP, palpitations, LE edema  GI: Denies nausea, vomiting, abdominal pain, diarrhea, constipation, blood in stool  : Denies urinary frequency/urgency/dysuria, hematuria  MSK: + right wrist pain/deformity. Denies back pain  Neuro: + dizziness (resolved). Denies HA, numbness, weakness  Skin: Denies rashes.

## 2022-09-23 NOTE — ED PROVIDER NOTE - PHYSICAL EXAMINATION
Const: Awake, alert and oriented. In no acute distress. Well appearing.  HEENT: NC/AT. Moist mucous membranes.  Eyes: No scleral icterus. EOMI.  Neck:. Soft and supple. Full ROM without pain.  Cardiac: Regular rate and regular rhythm. +S1/S2. No murmurs. Peripheral pulses 2+ and symmetric. No LE edema.  Resp: Speaking in full sentences. No evidence of respiratory distress. No wheezes, rales or rhonchi.  Abd: Soft, non-tender, non-distended. Normal bowel sounds in all 4 quadrants. No guarding or rebound.  Back: Spine midline and non-tender. No CVAT.  Skin: No rashes, abrasions or lacerations.  MSK: Right wrist deformity, full ROM right shoulder, elbow, fingers. Sensation and movement intact, cap refill < 2 seconds. Chest wall stable, pelvis stable, full ROM bilateral LE.  Neuro: Awake, alert & oriented x 3. No focal neuro deficits. Moves all extremities symmetrically.

## 2022-09-25 ENCOUNTER — RX RENEWAL (OUTPATIENT)
Age: 87
End: 2022-09-25

## 2022-09-27 ENCOUNTER — APPOINTMENT (OUTPATIENT)
Dept: INTERNAL MEDICINE | Facility: CLINIC | Age: 87
End: 2022-09-27

## 2022-09-29 ENCOUNTER — APPOINTMENT (OUTPATIENT)
Age: 87
End: 2022-09-29
Payer: MEDICARE

## 2022-09-29 VITALS
SYSTOLIC BLOOD PRESSURE: 152 MMHG | WEIGHT: 158 LBS | DIASTOLIC BLOOD PRESSURE: 102 MMHG | HEIGHT: 61 IN | HEART RATE: 88 BPM | BODY MASS INDEX: 29.83 KG/M2

## 2022-09-29 PROCEDURE — 99204 OFFICE O/P NEW MOD 45 MIN: CPT | Mod: 57

## 2022-09-29 PROCEDURE — 99214 OFFICE O/P EST MOD 30 MIN: CPT | Mod: 57

## 2022-09-29 PROCEDURE — 25600 CLTX DST RDL FX/EPHYS SEP WO: CPT | Mod: LT

## 2022-09-29 RX ORDER — MELOXICAM 15 MG/1
15 TABLET ORAL DAILY
Qty: 15 | Refills: 0 | Status: ACTIVE | COMMUNITY
Start: 2022-09-29 | End: 1900-01-01

## 2022-09-29 NOTE — REVIEW OF SYSTEMS
[Right] : right [Joint Pain] : joint pain [Joint Swelling] : joint swelling [Negative] : Allergic/Immunologic

## 2022-09-29 NOTE — ASSESSMENT
[FreeTextEntry1] : ASSESSMENT:\par \par [She was accompanied today and was assisted with explaining their complaints today.]\par The patient comes in today with acute complaints of left wrist pain complicated by the fact that she has a significant left distal radius fracture with displacement.  She now necessitates immobilization.\par \par [This is likely to diminish bodily function for the extremity.] \par \par [I was able to review other tests or imaging results in the form of x-rays in the emergency room the fracture appears to be in the same position of reduction\par \par She was adequately and thoroughly informed of my assessment of their current condition(s). \par [I prescribed formulary medications today as well as the associated risk factors]\par A prescription for meloxicam was given today. The patient was instructed to take this with food and discontinue other NSAIDs while taking this. The risks, benefits and black box warnings were discussed. The patient was instructed to discontinue the medication if it began hurting her stomach.\par \par Today we discussed distal radius fractures.  She is 89 years old we discussed nonoperative versus operative modalities.  I am confident that at this point nonoperative treatment for her condition would likely yield a satisfactory outcome.  This discussion was had with both the patient and her daughter and they both elected to proceed with nonoperative treatment.  We discussed the possibility of further displacement.  It is unlikely however that she will require surgery.\par \par I placed a short arm cast well-padded with good support.  We talked about the risks inherent to wearing a cast as well as the impact to her daily activities.  We talked about the risk of displacement and the possibility of surgery.\par \par

## 2022-09-29 NOTE — DISCUSSION/SUMMARY
[FreeTextEntry1] : I am hopeful that the cast will be of significant benefit for her.  I will see them back in 1 week's time.  At that point we will remove the cast and we will obtain new x-rays of her left wrist.

## 2022-09-29 NOTE — CONSULT LETTER
[Dear  ___] : Dear  [unfilled], [Consult Letter:] : I had the pleasure of evaluating your patient, [unfilled]. [Please see my note below.] : Please see my note below. [Consult Closing:] : Thank you very much for allowing me to participate in the care of this patient.  If you have any questions, please do not hesitate to contact me. [Sincerely,] : Sincerely, [FreeTextEntry2] : OSIRIS MCDERMOTT  [FreeTextEntry3] : Justice Gonzalez MD\par [facility] [place]\par [facility]\par [address]\[place] [address]\par Tel [phone]\par Fax [phone] \par \par For same day and next day orthopedic appointments contact:\par [email or web address] |[phone])\par Appointments available nights and weekends!  \par \par [facility] [place]\par Visit us at [email or web address]\par

## 2022-09-29 NOTE — HISTORY OF PRESENT ILLNESS
[FreeTextEntry1] : Anna is a very pleasant 89-year-old female coming today with her daughter who helps with history taking.  She unfortunately suffered a fall a week ago and went to our emergency room with left wrist pain.  At that point our orthopedic service saw her and placed her in a splint.

## 2022-10-06 ENCOUNTER — APPOINTMENT (OUTPATIENT)
Dept: ORTHOPEDIC SURGERY | Facility: CLINIC | Age: 87
End: 2022-10-06
Payer: MEDICARE

## 2022-10-06 DIAGNOSIS — S62.102A FRACTURE OF UNSPECIFIED CARPAL BONE, LEFT WRIST, INITIAL ENCOUNTER FOR CLOSED FRACTURE: ICD-10-CM

## 2022-10-06 DIAGNOSIS — S62.102D FRACTURE OF UNSPECIFIED CARPAL BONE, LEFT WRIST, SUBSEQUENT ENCOUNTER FOR FRACTURE WITH ROUTINE HEALING: ICD-10-CM

## 2022-10-06 DIAGNOSIS — M25.532 PAIN IN LEFT WRIST: ICD-10-CM

## 2022-10-06 PROCEDURE — 99213 OFFICE O/P EST LOW 20 MIN: CPT | Mod: 24

## 2022-10-06 PROCEDURE — 73110 X-RAY EXAM OF WRIST: CPT | Mod: RT

## 2022-10-06 NOTE — DISCUSSION/SUMMARY
[FreeTextEntry1] : I would like to see them back in 2 weeks.  At that point we will remove the cast and we will obtain x-rays without the cast.

## 2022-10-06 NOTE — ASSESSMENT
[FreeTextEntry1] : Anna is doing well clinically from her right distal radius fracture.  Her cast is fitting well..\par \par We talked about mild displacement compared to prior imaging.  We talked about the benefits of surgery versus no surgery.  At this point they have elected to continue the nonoperative route.  Although she will not have the same level of function, her desire is to be able to sign checks and do activities that require minimal wrist effort.

## 2022-10-06 NOTE — HISTORY OF PRESENT ILLNESS
[FreeTextEntry1] : Anna returns with her daughter today.  She has been wearing her right short arm cast.  She states that the pain is improved significantly.  She has been taking meloxicam.  She did talk about some lightheadedness.  I have advised to stop the medication.

## 2022-10-06 NOTE — PHYSICAL EXAM
[de-identified] : Physical examination of her right wrist reveals supple fingers, no swelling.  [de-identified] : 3 views of right wrist were obtained today in my office and were seen by me and discussed with the patient. \par These show a right-sided distal radius extra-articular fracture.  This continues to have displacement as known before.  There is mild worsening of the displacement compared to prior imaging.  We discussed this.\par

## 2022-10-20 ENCOUNTER — APPOINTMENT (OUTPATIENT)
Dept: ORTHOPEDIC SURGERY | Facility: CLINIC | Age: 87
End: 2022-10-20

## 2022-10-20 ENCOUNTER — APPOINTMENT (OUTPATIENT)
Dept: INTERNAL MEDICINE | Facility: CLINIC | Age: 87
End: 2022-10-20

## 2022-10-20 VITALS
BODY MASS INDEX: 29.83 KG/M2 | HEIGHT: 61 IN | HEART RATE: 72 BPM | SYSTOLIC BLOOD PRESSURE: 116 MMHG | WEIGHT: 158 LBS | DIASTOLIC BLOOD PRESSURE: 75 MMHG

## 2022-10-20 PROCEDURE — 73110 X-RAY EXAM OF WRIST: CPT | Mod: RT

## 2022-10-20 PROCEDURE — 99213 OFFICE O/P EST LOW 20 MIN: CPT | Mod: 24

## 2022-10-20 NOTE — HISTORY OF PRESENT ILLNESS
[FreeTextEntry1] : Normal returns for evaluation of her right wrist.  She is here today with her son.  She appears to be significantly more comfortable than on her prior visit.  The swelling has improved she states.  She is sleeping better she states.

## 2022-10-20 NOTE — ASSESSMENT
[FreeTextEntry1] : Anna is doing very well as compared to prior visits.  Her swelling is improved as well as the pain control.\par I once again reminded her about the deformity in her right wrist.  We did talk about this the first time around.  The deformity will not go away entirely.  She will have improvement of her swelling throughout the months.\par \par Her son is here today home agrees and understands our discussion.\par \par Overall she is doing much better and I am happy about this.

## 2022-10-20 NOTE — PHYSICAL EXAM
[de-identified] : She is well-appearing.  She is on a wheelchair again today.\par \par Examination of her right wrist reveals significant deformity however improvement from the last visit.\par She still has some swelling about the distal radius.  Her ulna is prominent as per prior visits.\par Her range of motion is only limited by some discomfort at the fracture site.\par She has full digital range of motion. [de-identified] : 3 views of right wrist were obtained today in my office and were seen by me and discussed with the patient. \par These show findings consistent with interval healing of her right distal radius fracture.\par \par

## 2022-10-20 NOTE — DISCUSSION/SUMMARY
[FreeTextEntry1] : 1.  She will wear a removable right wrist brace that she will utilize for the next 4 weeks.\par \par #2 I would like to see her back in 4 weeks time for 1 more checkup.

## 2022-10-26 ENCOUNTER — APPOINTMENT (OUTPATIENT)
Dept: INTERNAL MEDICINE | Facility: CLINIC | Age: 87
End: 2022-10-26

## 2022-10-26 DIAGNOSIS — Z23 ENCOUNTER FOR IMMUNIZATION: ICD-10-CM

## 2022-10-26 PROCEDURE — 86580 TB INTRADERMAL TEST: CPT

## 2022-10-26 RX ORDER — ALBUTEROL SULFATE 90 UG/1
108 (90 BASE) AEROSOL, METERED RESPIRATORY (INHALATION)
Qty: 1 | Refills: 5 | Status: ACTIVE | COMMUNITY
Start: 2019-03-26 | End: 1900-01-01

## 2022-10-27 ENCOUNTER — NON-APPOINTMENT (OUTPATIENT)
Age: 87
End: 2022-10-27

## 2022-11-07 ENCOUNTER — RX RENEWAL (OUTPATIENT)
Age: 87
End: 2022-11-07

## 2022-11-17 ENCOUNTER — APPOINTMENT (OUTPATIENT)
Dept: INTERNAL MEDICINE | Facility: CLINIC | Age: 87
End: 2022-11-17

## 2022-11-17 ENCOUNTER — NON-APPOINTMENT (OUTPATIENT)
Age: 87
End: 2022-11-17

## 2022-11-17 VITALS
WEIGHT: 168 LBS | RESPIRATION RATE: 12 BRPM | BODY MASS INDEX: 31.72 KG/M2 | DIASTOLIC BLOOD PRESSURE: 84 MMHG | HEART RATE: 78 BPM | HEIGHT: 61 IN | SYSTOLIC BLOOD PRESSURE: 124 MMHG

## 2022-11-17 DIAGNOSIS — H81.10 BENIGN PAROXYSMAL VERTIGO, UNSPECIFIED EAR: ICD-10-CM

## 2022-11-17 DIAGNOSIS — D47.3 ESSENTIAL (HEMORRHAGIC) THROMBOCYTHEMIA: ICD-10-CM

## 2022-11-17 DIAGNOSIS — Z87.891 PERSONAL HISTORY OF NICOTINE DEPENDENCE: ICD-10-CM

## 2022-11-17 PROCEDURE — 93000 ELECTROCARDIOGRAM COMPLETE: CPT

## 2022-11-17 PROCEDURE — 36415 COLL VENOUS BLD VENIPUNCTURE: CPT

## 2022-11-17 PROCEDURE — G0439: CPT

## 2022-11-17 NOTE — HEALTH RISK ASSESSMENT
[Good] : ~his/her~  mood as  good [Former] : Former [Yes] : Yes [Monthly or less (1 pt)] : Monthly or less (1 point) [Any fall with injury in past year] : Patient reported fall with injury in the past year [0] : 2) Feeling down, depressed, or hopeless: Not at all (0) [PHQ-2 Negative - No further assessment needed] : PHQ-2 Negative - No further assessment needed [[id number]] : 0 [HIV test declined] : HIV test declined [Hepatitis C test declined] : Hepatitis C test declined [Change in mental status noted] : No change in mental status noted [Language] : denies difficulty with language [Behavior] : denies difficulty with behavior [Learning/Retaining New Information] : denies difficulty learning/retaining new information [Handling Complex Tasks] : denies difficulty handling complex tasks [Reasoning] : denies difficulty with reasoning [Spatial Ability and Orientation] : denies difficulty with spatial ability and orientation [None] : None [Alone] : lives alone [College] : College [] :  [Sexually Active] : not sexually active [High Risk Behavior] : no high risk behavior [Feels Safe at Home] : Feels safe at home [Fully functional (bathing, dressing, toileting, transferring, walking, feeding)] : Fully functional (bathing, dressing, toileting, transferring, walking, feeding) [Fully functional (using the telephone, shopping, preparing meals, housekeeping, doing laundry, using] : Fully functional and needs no help or supervision to perform IADLs (using the telephone, shopping, preparing meals, housekeeping, doing laundry, using transportation, managing medications and managing finances) [Reports changes in hearing] : Reports no changes in hearing [Reports changes in vision] : Reports no changes in vision [Reports normal functional visual acuity (ie: able to read med bottle)] : Reports poor functional visual acuity.  [Reports changes in dental health] : Reports no changes in dental health [Smoke Detector] : smoke detector [Carbon Monoxide Detector] : carbon monoxide detector [Guns at Home] : no guns at home [Safety elements used in home] : safety elements used in home [Seat Belt] :  uses seat belt [Sunscreen] : does not use sunscreen [Travel to Developing Areas] : does not  travel to developing areas [TB Exposure] : is not being exposed to tuberculosis [Caregiver Concerns] : does not have caregiver concerns [de-identified] : mild assistance needed [de-identified] : mild assistance needed [AdvancecareDate] : 11/17/22

## 2022-11-17 NOTE — ASSESSMENT
[FreeTextEntry1] : medically stable\par fall precautions\par unable to take flu vaccine\par ekg unchnged\par ckd stable\par itp stable\par needed meds renewed

## 2022-11-17 NOTE — HISTORY OF PRESENT ILLNESS
[FreeTextEntry1] : for cpe [de-identified] : for cpe\par has been living in [facility] but prefers to live at home and is moving back with [facility] \par she has htn, namrata, vertigo, history of ITP in remission, has been otherwise well\par fell and fractured her right wrist

## 2022-11-18 LAB
ALBUMIN SERPL ELPH-MCNC: 4.1 G/DL
ALP BLD-CCNC: 144 U/L
ALT SERPL-CCNC: 8 U/L
ANION GAP SERPL CALC-SCNC: 10 MMOL/L
APPEARANCE: CLEAR
AST SERPL-CCNC: 14 U/L
BASOPHILS # BLD AUTO: 0.05 K/UL
BASOPHILS NFR BLD AUTO: 0.7 %
BILIRUB SERPL-MCNC: 0.2 MG/DL
BILIRUBIN URINE: NEGATIVE
BLOOD URINE: NEGATIVE
BUN SERPL-MCNC: 24 MG/DL
CALCIUM SERPL-MCNC: 9.2 MG/DL
CHLORIDE SERPL-SCNC: 100 MMOL/L
CHOLEST SERPL-MCNC: 156 MG/DL
CO2 SERPL-SCNC: 27 MMOL/L
COLOR: NORMAL
CREAT SERPL-MCNC: 0.96 MG/DL
CREAT SPEC-SCNC: 53 MG/DL
EGFR: 55 ML/MIN/1.73M2
EOSINOPHIL # BLD AUTO: 0.85 K/UL
EOSINOPHIL NFR BLD AUTO: 11.9 %
ESTIMATED AVERAGE GLUCOSE: 85 MG/DL
GLUCOSE QUALITATIVE U: NEGATIVE
GLUCOSE SERPL-MCNC: 71 MG/DL
HBA1C MFR BLD HPLC: 4.6 %
HCT VFR BLD CALC: 38.2 %
HDLC SERPL-MCNC: 61 MG/DL
HGB BLD-MCNC: 12.1 G/DL
IMM GRANULOCYTES NFR BLD AUTO: 0.3 %
KETONES URINE: NEGATIVE
LDLC SERPL CALC-MCNC: 59 MG/DL
LEUKOCYTE ESTERASE URINE: NEGATIVE
LYMPHOCYTES # BLD AUTO: 2.09 K/UL
LYMPHOCYTES NFR BLD AUTO: 29.1 %
MAN DIFF?: NORMAL
MCHC RBC-ENTMCNC: 31.3 PG
MCHC RBC-ENTMCNC: 31.7 GM/DL
MCV RBC AUTO: 98.7 FL
MICROALBUMIN 24H UR DL<=1MG/L-MCNC: <1.2 MG/DL
MICROALBUMIN/CREAT 24H UR-RTO: NORMAL MG/G
MONOCYTES # BLD AUTO: 0.65 K/UL
MONOCYTES NFR BLD AUTO: 9.1 %
NEUTROPHILS # BLD AUTO: 3.51 K/UL
NEUTROPHILS NFR BLD AUTO: 48.9 %
NITRITE URINE: NEGATIVE
NONHDLC SERPL-MCNC: 95 MG/DL
PH URINE: 7
PLATELET # BLD AUTO: 293 K/UL
POTASSIUM SERPL-SCNC: 4.4 MMOL/L
PROT SERPL-MCNC: 7.2 G/DL
PROTEIN URINE: NEGATIVE
RBC # BLD: 3.87 M/UL
RBC # FLD: 13.7 %
SODIUM SERPL-SCNC: 137 MMOL/L
SPECIFIC GRAVITY URINE: 1.01
T4 FREE SERPL-MCNC: 1.1 NG/DL
TRIGL SERPL-MCNC: 178 MG/DL
TSH SERPL-ACNC: 2.11 UIU/ML
UROBILINOGEN URINE: NORMAL
WBC # FLD AUTO: 7.17 K/UL

## 2022-11-20 LAB — BACTERIA UR CULT: ABNORMAL

## 2022-11-21 ENCOUNTER — APPOINTMENT (OUTPATIENT)
Dept: ORTHOPEDIC SURGERY | Facility: CLINIC | Age: 87
End: 2022-11-21

## 2022-11-27 ENCOUNTER — TRANSCRIPTION ENCOUNTER (OUTPATIENT)
Age: 87
End: 2022-11-27

## 2022-12-14 RX ORDER — OXYBUTYNIN CHLORIDE 5 MG/1
5 TABLET ORAL
Qty: 90 | Refills: 3 | Status: ACTIVE | COMMUNITY
Start: 2022-07-21 | End: 1900-01-01

## 2022-12-17 NOTE — ED PROVIDER NOTE - CARE PROVIDER_API CALL
Lucy Penn(Attending) Justice Obrien)  Orthopedics  [address] [place] [id number]  Phone: [phone]  Fax: [phone]  Follow Up Time: Urgent

## 2023-01-06 ENCOUNTER — APPOINTMENT (OUTPATIENT)
Dept: ORTHOPEDIC SURGERY | Facility: CLINIC | Age: 88
End: 2023-01-06
Payer: MEDICARE

## 2023-01-06 VITALS
HEART RATE: 86 BPM | DIASTOLIC BLOOD PRESSURE: 71 MMHG | SYSTOLIC BLOOD PRESSURE: 118 MMHG | BODY MASS INDEX: 31.72 KG/M2 | HEIGHT: 61 IN | WEIGHT: 168 LBS

## 2023-01-06 DIAGNOSIS — M25.531 PAIN IN RIGHT WRIST: ICD-10-CM

## 2023-01-06 PROCEDURE — 99213 OFFICE O/P EST LOW 20 MIN: CPT

## 2023-01-06 NOTE — ASSESSMENT
[FreeTextEntry1] : ASSESSMENT:\par \par [She was accompanied today and was assisted with explaining their complaints today.]\par The patient comes in today with known injury to the right wrist in the form of a distal radius fracture.  She has expected deformity as we had talked about before.  She is back to grossly full functionality as expected.  Her pain is grossly controlled.  She feels weakness at this point in time which therapy would be of benefit for her.\par [This is likely to diminish bodily function for the extremity.] \par \par She was adequately and thoroughly informed of my assessment of their current condition(s). \par \par \par \par

## 2023-01-06 NOTE — HISTORY OF PRESENT ILLNESS
Resp. At the bedside for breathing treatment.    [FreeTextEntry1] : Anna is here for follow-up with her grandson who help with history.  Overall, she tells me that she has been improving.

## 2023-01-06 NOTE — PHYSICAL EXAM
[de-identified] : She is well-appearing on examination\par \par Examination of the right wrist reveals significant improvement in level of swelling.  There is minimal to no tenderness at the level of the distal radius.  The ulna is prominent as expected from her injury.  Overall she is able to mobilize all her fingers without any stiffness as well. [de-identified] : .Image

## 2023-01-06 NOTE — DISCUSSION/SUMMARY
[FreeTextEntry1] : 1.  At this point I would like for her to commence physical therapy to work on motion and strengthening.\par \par #2 I would like to see her back again in 6 to 8 weeks as needed

## 2023-01-16 NOTE — ED ADULT TRIAGE NOTE - HEART RATE (BEATS/MIN)
Juana Armas (:  1962) is a 61 y.o. female,Established patient, here for evaluation of the following chief complaint(s):  Abdominal Pain (Patient states everything is ok) and Referral - General (Requesting an Ophthalmology, and Podiatry referral  )         ASSESSMENT/PLAN:  1. Type 2 diabetes mellitus without complication, without long-term current use of insulin (HCC)  -     POCT glycosylated hemoglobin (Hb A1C) is 6.5  -Continue current management with metformin  -     [facility]  -     [facility] - Meme Kaur MD, Ophthalmology, [place]  2. RLQ abdominal pain  Resolved    No follow-ups on file. Subjective   SUBJECTIVE/OBJECTIVE:  27years old female patient with past med history of diabetes, hypertension came to the office for follow-up. Patient came to the office last time complaining of abdominal pain. Patient was given Cipro and Flagyl for diverticulitis empiric treatment. Patient said that the pain resolved. A1c last time was 6.7. Does not check her sugar at home. She takes metformin at home without issues. Review of Systems   Constitutional:  Negative for chills, diaphoresis, fatigue and fever. Respiratory:  Negative for cough, shortness of breath and wheezing. Cardiovascular:  Negative for chest pain, palpitations and leg swelling. Gastrointestinal:  Negative for abdominal pain, constipation, diarrhea, nausea and vomiting. Endocrine: Negative for polydipsia, polyphagia and polyuria. Genitourinary: Negative. Neurological:  Negative for dizziness, seizures, syncope, light-headedness and numbness. Objective   Physical Exam  Constitutional:       General: She is not in acute distress. Appearance: She is obese. HENT:      Head: Normocephalic and atraumatic. Cardiovascular:      Rate and Rhythm: Normal rate and regular rhythm. Pulses: Normal pulses. Heart sounds: Normal heart sounds.  No murmur heard.  Pulmonary:      Effort: Pulmonary effort is normal.      Breath sounds: Normal breath sounds. No wheezing, rhonchi or rales.   Musculoskeletal:      Right lower leg: No edema.      Left lower leg: No edema.   Neurological:      Mental Status: She is alert.          On this date 1/16/2023 I have spent 25 minutes reviewing previous notes, test results and face to face with the patient discussing the diagnosis and importance of compliance with the treatment plan as well as documenting on the day of the visit.      An electronic signature was used to authenticate this note.    --Delia Kumari MD    95

## 2023-01-24 DIAGNOSIS — J31.0 CHRONIC RHINITIS: ICD-10-CM

## 2023-01-26 RX ORDER — AZELASTINE HYDROCHLORIDE 137 UG/1
0.1 SPRAY, METERED NASAL TWICE DAILY
Qty: 1 | Refills: 3 | Status: ACTIVE | COMMUNITY
Start: 2023-01-24 | End: 1900-01-01

## 2023-02-28 NOTE — DIETITIAN INITIAL EVALUATION ADULT. - PATIENT PROFILE REVIEWED
yes Consent (Ear)/Introductory Paragraph: The rationale for Mohs was explained to the patient and consent was obtained. The risks, benefits and alternatives to therapy were discussed in detail. Specifically, the risks of ear deformity, infection, scarring, bleeding, prolonged wound healing, incomplete removal, allergy to anesthesia, nerve injury and recurrence were addressed. Prior to the procedure, the treatment site was clearly identified and confirmed by the patient. All components of Universal Protocol/PAUSE Rule completed.

## 2023-03-22 NOTE — ED ADULT NURSE NOTE - EXTENSIONS OF SELF_ADULT
"    Occupational Therapy Daily Treatment Note   Date: 3/22/2023  Name: Reji Baer  Clinic Number: [id number]  Age: 4 y.o. 7 m.o.    Therapy Diagnosis:   Encounter Diagnoses   Name Primary?    Gross motor delay Yes    Fine motor delay      Physician: Ajay Guadarrama MD    Physician Orders: Evaluate and Treat  Medical Diagnosis: Motor Delays, Attention difficulties  Evaluation Date: 9/7/22  Insurance Authorization Period Expiration: 5/15/23   Plan of Care Certification Period: 2/20/2023 - 5/15/2023    Visit # / Visits authorized: 4 / 24  Time In:0100  Time Out: 0130  Total Billable Time: 30 minutes    Precautions:  Standard  Subjective     Pt / caregiver reports: Mother brought Reji to therapy today. Mother reported he is feeling better    Pain: Child too young to understand and rate pain levels. No pain behaviors or report of pain.   Objective     Reji participated in dynamic functional therapeutic activities to improve functional performance for 30 minutes, including:   Engaged in food cutting activity with good imaginative play and sharing noted. Good B hand use and problem solving to make "juice" and "salad"  B hand skills/FM/symbolic play: on floor, engaged with toy cars, good exchanges with therapist and back and forth play  Handwriting- practiced writing his first and last name. Difficulty with orientation of the "z", line placement, and sizing.  Good session.      Home Exercises and Education Provided     Education provided:   - Caregiver educated on current performance and POC. Caregiver verbalized understanding.    Assessment     Pt was seen for an occupational therapy follow-up session. Pt with good tolerance to session with min/mod cues for redirection. Good engagement today.  Reji is progressing well towards his goals and there are no updates to goals at this time. Pt will continue to benefit from skilled outpatient occupational therapy to address the deficits listed in the problem list on initial " evaluation to maximize pt's potential level of independence and progress toward age appropriate skills.    Pt prognosis is Good.  Anticipated barriers to occupational therapy: attention, participation, and language  Pt's spiritual, cultural and educational needs considered and pt agreeable to plan of care and goals.    Goals:  In order to improve FM skills, pt will complete fastening 3 medium sized buttons with minimal assistance. Continue; max support provided, difficulty with problem solving, completed 1 during this reporting period of with mod A  In order to improve GM skills and participation, pt will catch and throw ball with therapist 5x within 1 session without complaint. Progressing, difficulty with focusing on GM task- tends to jump and throw ball at the same time or will throw ball behind himself. Attention interferes.   Explore sensory activities for home use to promote improved behaviors and regulation.  Discussed movement activities to promote regulation and calming.  In order to improved FM skills, pt will copy simple shapes ([place], cross, square) x3 attempts with visual demo only using tripod grasp. Progressing, continue to require cues to adjust grasp on writing utensil to tripod or quad grasp. Practicing formation of shapes and the letters of his name. Poor pressure to pencil.   NEW GOAL: Pt will engage in 1, 30 minute session without becoming dysregulated (crying/screaming) when frustrated.       Plan   Continue POC.    Occupational therapy services will be provided 2x/week through direct intervention, parent education and home programming. Therapy will be discontinued when child has met all goals, is not making progress, parent discontinues therapy, and/or for any other applicable reasons    ELIZABETH Kerr  3/22/2023                                                                              Occupational Therapy Daily Treatment Note   Date: 3/22/2023  Name: Reji Baer  Clinic Number:  [id number]  Age: 4 y.o. 7 m.o.    Therapy Diagnosis:   Encounter Diagnoses   Name Primary?    Gross motor delay Yes    Fine motor delay      Physician: Ajay Guadarrama MD    Physician Orders: Evaluate and Treat  Medical Diagnosis: Motor Delays, Attention difficulties  Evaluation Date: 9/7/22  Insurance Authorization Period Expiration: 5/15/23   Plan of Care Certification Period: 2/20/2023 - 5/15/2023    Visit # / Visits authorized: 7 / 24  Time In:0100  Time Out: 0130  Total Billable Time: 30 minutes    Precautions:  Standard  Subjective     Pt / caregiver reports: Mother brought Reji to therapy today.     Pain: Child too young to understand and rate pain levels. No pain behaviors or report of pain.   Objective     Reji participated in dynamic functional therapeutic activities to improve functional performance for 30 minutes, including:   Sensory play/FM/gross grasping: retrieved items from kinetic sand with B hands. Good engagement. Occasional difficulty tolerating sand on hands but recalled strategy for removing sand by rubbing hands together.   FM: engaged in following story to place items into container. Mod cues for counting and to decrease impulsivity. Pt very excited about activity.  Provided time at end of session for movement.  Good session.      Home Exercises and Education Provided     Education provided:   - Caregiver educated on current performance and POC. Caregiver verbalized understanding.    Assessment     Pt was seen for an occupational therapy follow-up session. Pt with good tolerance to session with min/mod cues for redirection. Good engagement today.  Reji is progressing well towards his goals and there are no updates to goals at this time. Pt will continue to benefit from skilled outpatient occupational therapy to address the deficits listed in the problem list on initial evaluation to maximize pt's potential level of independence and progress toward age appropriate skills.    Pt prognosis  is Good.  Anticipated barriers to occupational therapy: attention, participation, and language  Pt's spiritual, cultural and educational needs considered and pt agreeable to plan of care and goals.    Goals:  In order to improve FM skills, pt will complete fastening 3 medium sized buttons with minimal assistance. Continue; max support provided, difficulty with problem solving, completed 1 during this reporting period of with mod A  In order to improve GM skills and participation, pt will catch and throw ball with therapist 5x within 1 session without complaint. Progressing, difficulty with focusing on GM task- tends to jump and throw ball at the same time or will throw ball behind himself. Attention interferes.   Explore sensory activities for home use to promote improved behaviors and regulation.  Discussed movement activities to promote regulation and calming.  In order to improved FM skills, pt will copy simple shapes ([place], cross, square) x3 attempts with visual demo only using tripod grasp. Progressing, continue to require cues to adjust grasp on writing utensil to tripod or quad grasp. Practicing formation of shapes and the letters of his name. Poor pressure to pencil.   NEW GOAL: Pt will engage in 1, 30 minute session without becoming dysregulated (crying/screaming) when frustrated.       Plan   Continue POC.    Occupational therapy services will be provided 2x/week through direct intervention, parent education and home programming. Therapy will be discontinued when child has met all goals, is not making progress, parent discontinues therapy, and/or for any other applicable reasons    ELIZABETH Kerr  3/22/2023           None

## 2023-05-02 ENCOUNTER — APPOINTMENT (OUTPATIENT)
Dept: INTERNAL MEDICINE | Facility: CLINIC | Age: 88
End: 2023-05-02
Payer: MEDICARE

## 2023-05-02 ENCOUNTER — EMERGENCY (EMERGENCY)
Facility: HOSPITAL | Age: 88
LOS: 1 days | Discharge: HOME | End: 2023-05-02
Attending: EMERGENCY MEDICINE
Payer: MEDICARE

## 2023-05-02 VITALS
DIASTOLIC BLOOD PRESSURE: 82 MMHG | SYSTOLIC BLOOD PRESSURE: 120 MMHG | BODY MASS INDEX: 25.49 KG/M2 | WEIGHT: 135 LBS | RESPIRATION RATE: 20 BRPM | HEIGHT: 61 IN | HEART RATE: 100 BPM

## 2023-05-02 VITALS
RESPIRATION RATE: 18 BRPM | DIASTOLIC BLOOD PRESSURE: 77 MMHG | SYSTOLIC BLOOD PRESSURE: 130 MMHG | HEART RATE: 80 BPM | OXYGEN SATURATION: 97 % | TEMPERATURE: 98 F

## 2023-05-02 VITALS
RESPIRATION RATE: 18 BRPM | WEIGHT: 145.06 LBS | HEART RATE: 76 BPM | OXYGEN SATURATION: 96 % | TEMPERATURE: 98 F | DIASTOLIC BLOOD PRESSURE: 78 MMHG | HEIGHT: 64 IN | SYSTOLIC BLOOD PRESSURE: 130 MMHG

## 2023-05-02 DIAGNOSIS — H26.9 UNSPECIFIED CATARACT: Chronic | ICD-10-CM

## 2023-05-02 DIAGNOSIS — Z96.641 PRESENCE OF RIGHT ARTIFICIAL HIP JOINT: Chronic | ICD-10-CM

## 2023-05-02 DIAGNOSIS — Z98.890 OTHER SPECIFIED POSTPROCEDURAL STATES: Chronic | ICD-10-CM

## 2023-05-02 DIAGNOSIS — Z90.49 ACQUIRED ABSENCE OF OTHER SPECIFIED PARTS OF DIGESTIVE TRACT: Chronic | ICD-10-CM

## 2023-05-02 DIAGNOSIS — Z90.721 ACQUIRED ABSENCE OF OVARIES, UNILATERAL: Chronic | ICD-10-CM

## 2023-05-02 DIAGNOSIS — Z87.81 PERSONAL HISTORY OF (HEALED) TRAUMATIC FRACTURE: Chronic | ICD-10-CM

## 2023-05-02 DIAGNOSIS — Z86.79 PERSONAL HISTORY OF OTHER DISEASES OF THE CIRCULATORY SYSTEM: Chronic | ICD-10-CM

## 2023-05-02 LAB
ALBUMIN SERPL ELPH-MCNC: 3.8 G/DL — SIGNIFICANT CHANGE UP (ref 3.3–5.2)
ALP SERPL-CCNC: 96 U/L — SIGNIFICANT CHANGE UP (ref 40–120)
ALT FLD-CCNC: 7 U/L — SIGNIFICANT CHANGE UP
ANION GAP SERPL CALC-SCNC: 11 MMOL/L — SIGNIFICANT CHANGE UP (ref 5–17)
ANISOCYTOSIS BLD QL: SLIGHT — SIGNIFICANT CHANGE UP
AST SERPL-CCNC: 14 U/L — SIGNIFICANT CHANGE UP
BASE EXCESS BLDA CALC-SCNC: 1.8 MMOL/L — SIGNIFICANT CHANGE UP (ref -2–3)
BASOPHILS # BLD AUTO: 0 K/UL — SIGNIFICANT CHANGE UP (ref 0–0.2)
BASOPHILS NFR BLD AUTO: 0 % — SIGNIFICANT CHANGE UP (ref 0–2)
BILIRUB SERPL-MCNC: 1.1 MG/DL — SIGNIFICANT CHANGE UP (ref 0.4–2)
BLOOD GAS COMMENTS ARTERIAL: SIGNIFICANT CHANGE UP
BUN SERPL-MCNC: 32.8 MG/DL — HIGH (ref 8–20)
CALCIUM SERPL-MCNC: 9.4 MG/DL — SIGNIFICANT CHANGE UP (ref 8.4–10.5)
CHLORIDE SERPL-SCNC: 100 MMOL/L — SIGNIFICANT CHANGE UP (ref 96–108)
CO2 SERPL-SCNC: 24 MMOL/L — SIGNIFICANT CHANGE UP (ref 22–29)
CREAT SERPL-MCNC: 1.21 MG/DL — SIGNIFICANT CHANGE UP (ref 0.5–1.3)
EGFR: 42 ML/MIN/1.73M2 — LOW
EOSINOPHIL # BLD AUTO: 2.74 K/UL — HIGH (ref 0–0.5)
EOSINOPHIL NFR BLD AUTO: 26.3 % — HIGH (ref 0–6)
GAS PNL BLDA: SIGNIFICANT CHANGE UP
GLUCOSE SERPL-MCNC: 96 MG/DL — SIGNIFICANT CHANGE UP (ref 70–99)
HCO3 BLDA-SCNC: 25 MMOL/L — SIGNIFICANT CHANGE UP (ref 21–28)
HCT VFR BLD CALC: 26.1 % — LOW (ref 34.5–45)
HGB BLD-MCNC: 9 G/DL — LOW (ref 11.5–15.5)
LACTATE BLDV-MCNC: 1.2 MMOL/L — SIGNIFICANT CHANGE UP (ref 0.5–2)
LYMPHOCYTES # BLD AUTO: 1.46 K/UL — SIGNIFICANT CHANGE UP (ref 1–3.3)
LYMPHOCYTES # BLD AUTO: 14 % — SIGNIFICANT CHANGE UP (ref 13–44)
MACROCYTES BLD QL: SLIGHT — SIGNIFICANT CHANGE UP
MANUAL SMEAR VERIFICATION: SIGNIFICANT CHANGE UP
MCHC RBC-ENTMCNC: 34 PG — SIGNIFICANT CHANGE UP (ref 27–34)
MCHC RBC-ENTMCNC: 34.5 GM/DL — SIGNIFICANT CHANGE UP (ref 32–36)
MCV RBC AUTO: 98.5 FL — SIGNIFICANT CHANGE UP (ref 80–100)
METAMYELOCYTES # FLD: 0.9 % — HIGH (ref 0–0)
MICROCYTES BLD QL: SLIGHT — SIGNIFICANT CHANGE UP
MONOCYTES # BLD AUTO: 0.64 K/UL — SIGNIFICANT CHANGE UP (ref 0–0.9)
MONOCYTES NFR BLD AUTO: 6.1 % — SIGNIFICANT CHANGE UP (ref 2–14)
MYELOCYTES NFR BLD: 1.8 % — HIGH (ref 0–0)
NEUTROPHILS # BLD AUTO: 5.11 K/UL — SIGNIFICANT CHANGE UP (ref 1.8–7.4)
NEUTROPHILS NFR BLD AUTO: 49.1 % — SIGNIFICANT CHANGE UP (ref 43–77)
NT-PROBNP SERPL-SCNC: 286 PG/ML — SIGNIFICANT CHANGE UP (ref 0–300)
PCO2 BLDA: 33 MMHG — SIGNIFICANT CHANGE UP (ref 32–45)
PH BLDA: 7.49 — HIGH (ref 7.35–7.45)
PLAT MORPH BLD: NORMAL — SIGNIFICANT CHANGE UP
PLATELET # BLD AUTO: 359 K/UL — SIGNIFICANT CHANGE UP (ref 150–400)
PO2 BLDA: 86 MMHG — SIGNIFICANT CHANGE UP (ref 83–108)
POLYCHROMASIA BLD QL SMEAR: SLIGHT — SIGNIFICANT CHANGE UP
POTASSIUM SERPL-MCNC: 4.4 MMOL/L — SIGNIFICANT CHANGE UP (ref 3.5–5.3)
POTASSIUM SERPL-SCNC: 4.4 MMOL/L — SIGNIFICANT CHANGE UP (ref 3.5–5.3)
PROT SERPL-MCNC: 7.6 G/DL — SIGNIFICANT CHANGE UP (ref 6.6–8.7)
RAPID RVP RESULT: SIGNIFICANT CHANGE UP
RBC # BLD: 2.65 M/UL — LOW (ref 3.8–5.2)
RBC # FLD: 19.3 % — HIGH (ref 10.3–14.5)
RBC BLD AUTO: ABNORMAL
SAO2 % BLDA: 97.5 % — SIGNIFICANT CHANGE UP (ref 94–98)
SARS-COV-2 RNA SPEC QL NAA+PROBE: SIGNIFICANT CHANGE UP
SODIUM SERPL-SCNC: 135 MMOL/L — SIGNIFICANT CHANGE UP (ref 135–145)
TARGETS BLD QL SMEAR: SLIGHT — SIGNIFICANT CHANGE UP
TROPONIN T SERPL-MCNC: <0.01 NG/ML — SIGNIFICANT CHANGE UP (ref 0–0.06)
VARIANT LYMPHS # BLD: 1.8 % — SIGNIFICANT CHANGE UP (ref 0–6)
WBC # BLD: 10.41 K/UL — SIGNIFICANT CHANGE UP (ref 3.8–10.5)
WBC # FLD AUTO: 10.41 K/UL — SIGNIFICANT CHANGE UP (ref 3.8–10.5)

## 2023-05-02 PROCEDURE — 99284 EMERGENCY DEPT VISIT MOD MDM: CPT | Mod: 25

## 2023-05-02 PROCEDURE — 71275 CT ANGIOGRAPHY CHEST: CPT | Mod: MA

## 2023-05-02 PROCEDURE — 83605 ASSAY OF LACTIC ACID: CPT

## 2023-05-02 PROCEDURE — 74177 CT ABD & PELVIS W/CONTRAST: CPT | Mod: 26,MA

## 2023-05-02 PROCEDURE — 99215 OFFICE O/P EST HI 40 MIN: CPT

## 2023-05-02 PROCEDURE — 71275 CT ANGIOGRAPHY CHEST: CPT | Mod: 26,MA

## 2023-05-02 PROCEDURE — 85025 COMPLETE CBC W/AUTO DIFF WBC: CPT

## 2023-05-02 PROCEDURE — 0225U NFCT DS DNA&RNA 21 SARSCOV2: CPT

## 2023-05-02 PROCEDURE — 96375 TX/PRO/DX INJ NEW DRUG ADDON: CPT | Mod: XU

## 2023-05-02 PROCEDURE — 83880 ASSAY OF NATRIURETIC PEPTIDE: CPT

## 2023-05-02 PROCEDURE — 99284 EMERGENCY DEPT VISIT MOD MDM: CPT | Mod: CS

## 2023-05-02 PROCEDURE — 36415 COLL VENOUS BLD VENIPUNCTURE: CPT

## 2023-05-02 PROCEDURE — 82803 BLOOD GASES ANY COMBINATION: CPT

## 2023-05-02 PROCEDURE — 84484 ASSAY OF TROPONIN QUANT: CPT

## 2023-05-02 PROCEDURE — 71045 X-RAY EXAM CHEST 1 VIEW: CPT | Mod: 26

## 2023-05-02 PROCEDURE — 74177 CT ABD & PELVIS W/CONTRAST: CPT | Mod: MA

## 2023-05-02 PROCEDURE — 87040 BLOOD CULTURE FOR BACTERIA: CPT

## 2023-05-02 PROCEDURE — 71045 X-RAY EXAM CHEST 1 VIEW: CPT

## 2023-05-02 PROCEDURE — 96374 THER/PROPH/DIAG INJ IV PUSH: CPT | Mod: XU

## 2023-05-02 PROCEDURE — 80053 COMPREHEN METABOLIC PANEL: CPT

## 2023-05-02 RX ORDER — CEFTRIAXONE 500 MG/1
1000 INJECTION, POWDER, FOR SOLUTION INTRAMUSCULAR; INTRAVENOUS ONCE
Refills: 0 | Status: COMPLETED | OUTPATIENT
Start: 2023-05-02 | End: 2023-05-02

## 2023-05-02 RX ORDER — ONDANSETRON 8 MG/1
4 TABLET, FILM COATED ORAL ONCE
Refills: 0 | Status: COMPLETED | OUTPATIENT
Start: 2023-05-02 | End: 2023-05-02

## 2023-05-02 RX ORDER — CEFTRIAXONE 500 MG/1
1000 INJECTION, POWDER, FOR SOLUTION INTRAMUSCULAR; INTRAVENOUS ONCE
Refills: 0 | Status: DISCONTINUED | OUTPATIENT
Start: 2023-05-02 | End: 2023-05-02

## 2023-05-02 RX ORDER — AZITHROMYCIN 500 MG/1
500 TABLET, FILM COATED ORAL ONCE
Refills: 0 | Status: COMPLETED | OUTPATIENT
Start: 2023-05-02 | End: 2023-05-02

## 2023-05-02 RX ORDER — FAMOTIDINE 10 MG/ML
20 INJECTION INTRAVENOUS ONCE
Refills: 0 | Status: COMPLETED | OUTPATIENT
Start: 2023-05-02 | End: 2023-05-02

## 2023-05-02 RX ORDER — CEFPODOXIME PROXETIL 100 MG
1 TABLET ORAL
Qty: 20 | Refills: 0
Start: 2023-05-02 | End: 2023-05-11

## 2023-05-02 RX ADMIN — CEFTRIAXONE 1000 MILLIGRAM(S): 500 INJECTION, POWDER, FOR SOLUTION INTRAMUSCULAR; INTRAVENOUS at 19:06

## 2023-05-02 RX ADMIN — FAMOTIDINE 20 MILLIGRAM(S): 10 INJECTION INTRAVENOUS at 14:27

## 2023-05-02 RX ADMIN — AZITHROMYCIN 255 MILLIGRAM(S): 500 TABLET, FILM COATED ORAL at 19:06

## 2023-05-02 RX ADMIN — ONDANSETRON 4 MILLIGRAM(S): 8 TABLET, FILM COATED ORAL at 14:24

## 2023-05-02 NOTE — ED PROVIDER NOTE - OBJECTIVE STATEMENT
89 yo female pmh copd, comes to ed with shortness of breath x 1 week and 10 lb weight loss over the last 3 months; pt noted txed for MSD in the past; pt denies fever, chills, nausea or vomitintg

## 2023-05-02 NOTE — ED ADULT NURSE REASSESSMENT NOTE - NS ED NURSE REASSESS COMMENT FT1
Pt updated on the plan of care, pt educated about tests performed, to be performed, pt states understanding, Pt not in any pain or distress at this time, pt education deemed successful after teach back shows proficiency.

## 2023-05-02 NOTE — ED PROCEDURE NOTE - NS ED PROCEDURE ASSISTED BY
Supervision was available/Assistance was available Assistance was available/The procedure was performed independently

## 2023-05-02 NOTE — ED ADULT TRIAGE NOTE - CHIEF COMPLAINT QUOTE
Pt A&Ox4 states "I been feeling weak for a few weeks."  BIBA c/o SOB / worsening THOMAS sent from MD office. Patient on 2L NC.

## 2023-05-02 NOTE — ED PROVIDER NOTE - PATIENT PORTAL LINK FT
You can access the FollowMyHealth Patient Portal offered by [facility] by registering at the following website: [email or web address]. By joining [employer]’s FollowMyHealth portal, you will also be able to view your health information using other applications (apps) compatible with our system.

## 2023-05-02 NOTE — ED PROCEDURE NOTE - CPROC ED INFORMED CONSENT1
Pt to ED from home w/ c/o generalized body aches x 1 week. Denies SI/HI, hx of panic attacks.  
Benefits, risks, and possible complications of procedure explained to patient/caregiver who verbalized understanding and gave verbal consent.

## 2023-05-02 NOTE — ED PROVIDER NOTE - NSFOLLOWUPINSTRUCTIONS_ED_ALL_ED_FT
vantin 200mg by mouth 2 times a day for 10 day  tylenol 975mg by mouth every 6 hours  follow up with doctor in 3 - 5 days

## 2023-05-02 NOTE — ED ADULT NURSE NOTE - OBJECTIVE STATEMENT
Pt is a 89YOF who is here for shortness of breath that has been ongoing for a few months, but today it got worse, pt is not in any pain or distress, pt denies any fevers, chills, nausea, vomiting, chest pain, headache, pt states that she has just been feeling weak for the last few days and it has gotten worse, but pt states she was told her oxygen is low and has been going lower, pt denies any hx of any lung/respiratory/cardiac illnesses. Pt is A&O4, does not appear short of breath.

## 2023-05-03 NOTE — ASSESSMENT
[FreeTextEntry1] : hypoxiz unclear etiology\par refer to the ER will need pulm, cardio work up\par rule out anemia patient with prior ITP\par currently around 90 percent on 4 literts\par \par \par Addendum, \par patient remains hypoxic, has bilateral PNA on cxr but not sent home with oxygen\par she needs oxygen for at least 6 months as she was saturating at 70 percent while ambulating\par and also while in the chair.  Patient is at home recovering and will see me again in one week\par \par LJ Kim MD

## 2023-05-03 NOTE — HISTORY OF PRESENT ILLNESS
[FreeTextEntry1] : for follow up  [de-identified] : for follow up\par has been feeling very sob for the past few months\par at home yesterday pulse ox was 70 and  extremely sob\par has some cough, no fever no leg swelling

## 2023-05-08 DIAGNOSIS — K31.84 GASTROPARESIS: ICD-10-CM

## 2023-05-08 LAB
CULTURE RESULTS: SIGNIFICANT CHANGE UP
CULTURE RESULTS: SIGNIFICANT CHANGE UP
SPECIMEN SOURCE: SIGNIFICANT CHANGE UP
SPECIMEN SOURCE: SIGNIFICANT CHANGE UP

## 2023-05-08 RX ORDER — METOCLOPRAMIDE 5 MG/1
5 TABLET ORAL 4 TIMES DAILY
Qty: 90 | Refills: 2 | Status: ACTIVE | COMMUNITY
Start: 2023-05-08 | End: 1900-01-01

## 2023-05-12 ENCOUNTER — APPOINTMENT (OUTPATIENT)
Dept: INTERNAL MEDICINE | Facility: CLINIC | Age: 88
End: 2023-05-12
Payer: MEDICARE

## 2023-05-12 VITALS
RESPIRATION RATE: 23 BRPM | SYSTOLIC BLOOD PRESSURE: 118 MMHG | HEIGHT: 61 IN | DIASTOLIC BLOOD PRESSURE: 78 MMHG | HEART RATE: 82 BPM | WEIGHT: 136 LBS | OXYGEN SATURATION: 97 % | BODY MASS INDEX: 25.68 KG/M2

## 2023-05-12 DIAGNOSIS — D50.9 IRON DEFICIENCY ANEMIA, UNSPECIFIED: ICD-10-CM

## 2023-05-12 DIAGNOSIS — J18.9 PNEUMONIA, UNSPECIFIED ORGANISM: ICD-10-CM

## 2023-05-12 DIAGNOSIS — R06.02 SHORTNESS OF BREATH: ICD-10-CM

## 2023-05-12 PROCEDURE — 99214 OFFICE O/P EST MOD 30 MIN: CPT

## 2023-05-12 NOTE — HISTORY OF PRESENT ILLNESS
[FreeTextEntry1] : follow up hypoxia [de-identified] : follow up hypoxia\par bilateral PNA \par requiring home 02 does not have portable concentrator\par has persistent sob not sure of etiology\par a bit anemic in testing hgb 9\par has no chest pain, nausea resolved on reglan

## 2023-05-12 NOTE — ASSESSMENT
[FreeTextEntry1] : sob, see pulm and cardio\par continue oxygen\par needs portable concentrator\par see heme for iron infusion\par pna see pulm for pft when resolveds complete abx\par needs echo to find a reason for subjective sob\par hypoxia on 4 liters of 02 not sure if will need permanently\par

## 2023-05-15 ENCOUNTER — OUTPATIENT (OUTPATIENT)
Dept: OUTPATIENT SERVICES | Facility: HOSPITAL | Age: 88
LOS: 1 days | Discharge: HOME | End: 2023-05-15

## 2023-05-15 DIAGNOSIS — D64.9 ANEMIA, UNSPECIFIED: ICD-10-CM

## 2023-05-15 DIAGNOSIS — Z86.79 PERSONAL HISTORY OF OTHER DISEASES OF THE CIRCULATORY SYSTEM: Chronic | ICD-10-CM

## 2023-05-15 DIAGNOSIS — Z87.81 PERSONAL HISTORY OF (HEALED) TRAUMATIC FRACTURE: Chronic | ICD-10-CM

## 2023-05-15 DIAGNOSIS — Z98.890 OTHER SPECIFIED POSTPROCEDURAL STATES: Chronic | ICD-10-CM

## 2023-05-15 DIAGNOSIS — H26.9 UNSPECIFIED CATARACT: Chronic | ICD-10-CM

## 2023-05-15 DIAGNOSIS — Z90.49 ACQUIRED ABSENCE OF OTHER SPECIFIED PARTS OF DIGESTIVE TRACT: Chronic | ICD-10-CM

## 2023-05-15 DIAGNOSIS — Z90.721 ACQUIRED ABSENCE OF OVARIES, UNILATERAL: Chronic | ICD-10-CM

## 2023-05-15 DIAGNOSIS — Z96.641 PRESENCE OF RIGHT ARTIFICIAL HIP JOINT: Chronic | ICD-10-CM

## 2023-05-16 ENCOUNTER — NON-APPOINTMENT (OUTPATIENT)
Age: 88
End: 2023-05-16

## 2023-05-16 ENCOUNTER — RESULT REVIEW (OUTPATIENT)
Age: 88
End: 2023-05-16

## 2023-05-16 ENCOUNTER — APPOINTMENT (OUTPATIENT)
Dept: HEMATOLOGY ONCOLOGY | Facility: CLINIC | Age: 88
End: 2023-05-16
Payer: MEDICARE

## 2023-05-16 ENCOUNTER — OUTPATIENT (OUTPATIENT)
Dept: OUTPATIENT SERVICES | Facility: HOSPITAL | Age: 88
LOS: 1 days | End: 2023-05-16
Payer: MEDICARE

## 2023-05-16 ENCOUNTER — LABORATORY RESULT (OUTPATIENT)
Age: 88
End: 2023-05-16

## 2023-05-16 VITALS
DIASTOLIC BLOOD PRESSURE: 77 MMHG | HEART RATE: 80 BPM | SYSTOLIC BLOOD PRESSURE: 137 MMHG | BODY MASS INDEX: 24.06 KG/M2 | TEMPERATURE: 97.7 F | HEIGHT: 61 IN | WEIGHT: 127.44 LBS | OXYGEN SATURATION: 100 %

## 2023-05-16 DIAGNOSIS — Z82.5 FAMILY HISTORY OF ASTHMA AND OTHER CHRONIC LOWER RESPIRATORY DISEASES: ICD-10-CM

## 2023-05-16 DIAGNOSIS — D64.9 ANEMIA, UNSPECIFIED: ICD-10-CM

## 2023-05-16 DIAGNOSIS — S62.101A FRACTURE OF UNSPECIFIED CARPAL BONE, RIGHT WRIST, INITIAL ENCOUNTER FOR CLOSED FRACTURE: ICD-10-CM

## 2023-05-16 DIAGNOSIS — Z96.641 PRESENCE OF RIGHT ARTIFICIAL HIP JOINT: Chronic | ICD-10-CM

## 2023-05-16 DIAGNOSIS — Z86.69 PERSONAL HISTORY OF OTHER DISEASES OF THE NERVOUS SYSTEM AND SENSE ORGANS: ICD-10-CM

## 2023-05-16 DIAGNOSIS — Z82.49 FAMILY HISTORY OF ISCHEMIC HEART DISEASE AND OTHER DISEASES OF THE CIRCULATORY SYSTEM: ICD-10-CM

## 2023-05-16 DIAGNOSIS — Z98.890 OTHER SPECIFIED POSTPROCEDURAL STATES: Chronic | ICD-10-CM

## 2023-05-16 DIAGNOSIS — Z86.2 PERSONAL HISTORY OF DISEASES OF THE BLOOD AND BLOOD-FORMING ORGANS AND CERTAIN DISORDERS INVOLVING THE IMMUNE MECHANISM: ICD-10-CM

## 2023-05-16 DIAGNOSIS — Z90.49 ACQUIRED ABSENCE OF OTHER SPECIFIED PARTS OF DIGESTIVE TRACT: Chronic | ICD-10-CM

## 2023-05-16 DIAGNOSIS — Z86.79 PERSONAL HISTORY OF OTHER DISEASES OF THE CIRCULATORY SYSTEM: Chronic | ICD-10-CM

## 2023-05-16 DIAGNOSIS — Z87.81 PERSONAL HISTORY OF (HEALED) TRAUMATIC FRACTURE: Chronic | ICD-10-CM

## 2023-05-16 DIAGNOSIS — Z86.19 PERSONAL HISTORY OF OTHER INFECTIOUS AND PARASITIC DISEASES: ICD-10-CM

## 2023-05-16 DIAGNOSIS — Z90.721 ACQUIRED ABSENCE OF OVARIES, UNILATERAL: Chronic | ICD-10-CM

## 2023-05-16 LAB
ALBUMIN SERPL ELPH-MCNC: 3.7 G/DL
ALLERGY+IMMUNOLOGY DIAG STUDY NOTE: SIGNIFICANT CHANGE UP
ALP BLD-CCNC: 98 U/L
ALT SERPL-CCNC: 6 U/L
ANION GAP SERPL CALC-SCNC: 11 MMOL/L
ANISOCYTOSIS BLD QL: SLIGHT — SIGNIFICANT CHANGE UP
AST SERPL-CCNC: 16 U/L
BASOPHILS # BLD AUTO: 0.1 K/UL — SIGNIFICANT CHANGE UP (ref 0–0.2)
BASOPHILS # BLD AUTO: 0.1 K/UL — SIGNIFICANT CHANGE UP (ref 0–0.2)
BASOPHILS NFR BLD AUTO: 1.4 % — SIGNIFICANT CHANGE UP (ref 0–2)
BASOPHILS NFR BLD AUTO: 3 % — HIGH (ref 0–2)
BILIRUB SERPL-MCNC: 0.9 MG/DL
BLD GP AB SCN SERPL QL: SIGNIFICANT CHANGE UP
BUN SERPL-MCNC: 33 MG/DL
CALCIUM SERPL-MCNC: 9 MG/DL
CHLORIDE SERPL-SCNC: 102 MMOL/L
CO2 SERPL-SCNC: 23 MMOL/L
CREAT SERPL-MCNC: 1.18 MG/DL
DAT IGG-SP REAG RBC-IMP: ABNORMAL
DIR ANTIGLOB POLYSPECIFIC INTERPRETATION: ABNORMAL
EGFR: 43 ML/MIN/1.73M2
EOSINOPHIL # BLD AUTO: 0.9 K/UL — HIGH (ref 0–0.5)
EOSINOPHIL # BLD AUTO: 0.9 K/UL — HIGH (ref 0–0.5)
EOSINOPHIL NFR BLD AUTO: 16.3 % — HIGH (ref 0–6)
EOSINOPHIL NFR BLD AUTO: 17 % — HIGH (ref 0–6)
FERRITIN SERPL-MCNC: 255 NG/ML
FOLATE SERPL-MCNC: 7.1 NG/ML
GLUCOSE SERPL-MCNC: 87 MG/DL
HCT VFR BLD CALC: 20.1 % — CRITICAL LOW (ref 34.5–45)
HCT VFR BLD CALC: 20.2 % — CRITICAL LOW (ref 34.5–45)
HGB BLD-MCNC: 7.3 G/DL — LOW (ref 11.5–15.5)
HGB BLD-MCNC: 7.4 G/DL — LOW (ref 11.5–15.5)
IAT COMP-SP REAG SERPL QL: ABNORMAL
LG PLATELETS BLD QL AUTO: SLIGHT — SIGNIFICANT CHANGE UP
LYMPHOCYTES # BLD AUTO: 1.7 K/UL — SIGNIFICANT CHANGE UP (ref 1–3.3)
LYMPHOCYTES # BLD AUTO: 1.8 K/UL — SIGNIFICANT CHANGE UP (ref 1–3.3)
LYMPHOCYTES # BLD AUTO: 25 % — SIGNIFICANT CHANGE UP (ref 13–44)
LYMPHOCYTES # BLD AUTO: 30.4 % — SIGNIFICANT CHANGE UP (ref 13–44)
MACROCYTES BLD QL: SLIGHT — SIGNIFICANT CHANGE UP
MCHC RBC-ENTMCNC: 35 PG — HIGH (ref 27–34)
MCHC RBC-ENTMCNC: 35.2 PG — HIGH (ref 27–34)
MCHC RBC-ENTMCNC: 36.5 G/DL — HIGH (ref 32–36)
MCHC RBC-ENTMCNC: 36.7 G/DL — HIGH (ref 32–36)
MCV RBC AUTO: 95.4 FL — SIGNIFICANT CHANGE UP (ref 80–100)
MCV RBC AUTO: 96.6 FL — SIGNIFICANT CHANGE UP (ref 80–100)
MICROCYTES BLD QL: SLIGHT — SIGNIFICANT CHANGE UP
MONOCYTES # BLD AUTO: 0.4 K/UL — SIGNIFICANT CHANGE UP (ref 0–0.9)
MONOCYTES # BLD AUTO: 0.5 K/UL — SIGNIFICANT CHANGE UP (ref 0–0.9)
MONOCYTES NFR BLD AUTO: 11 % — SIGNIFICANT CHANGE UP (ref 2–14)
MONOCYTES NFR BLD AUTO: 8.5 % — SIGNIFICANT CHANGE UP (ref 2–14)
MYELOCYTES NFR BLD: 1 % — HIGH (ref 0–0)
NEUTROPHILS # BLD AUTO: 2.4 K/UL — SIGNIFICANT CHANGE UP (ref 1.8–7.4)
NEUTROPHILS # BLD AUTO: 2.4 K/UL — SIGNIFICANT CHANGE UP (ref 1.8–7.4)
NEUTROPHILS NFR BLD AUTO: 42 % — LOW (ref 43–77)
NEUTROPHILS NFR BLD AUTO: 43.5 % — SIGNIFICANT CHANGE UP (ref 43–77)
NEUTS HYPERSEG # BLD: PRESENT — SIGNIFICANT CHANGE UP
NEUTS VAC BLD QL SMEAR: PRESENT — SIGNIFICANT CHANGE UP
OVALOCYTES BLD QL SMEAR: SLIGHT — SIGNIFICANT CHANGE UP
PLAT MORPH BLD: NORMAL — SIGNIFICANT CHANGE UP
PLATELET # BLD AUTO: 330 K/UL — SIGNIFICANT CHANGE UP (ref 150–400)
PLATELET # BLD AUTO: 333 K/UL — SIGNIFICANT CHANGE UP (ref 150–400)
POIKILOCYTOSIS BLD QL AUTO: SLIGHT — SIGNIFICANT CHANGE UP
POLYCHROMASIA BLD QL SMEAR: SLIGHT — SIGNIFICANT CHANGE UP
POTASSIUM SERPL-SCNC: 4.4 MMOL/L
PROT SERPL-MCNC: 7.1 G/DL
RBC # BLD: 2.08 M/UL — LOW (ref 3.8–5.2)
RBC # BLD: 2.11 M/UL — LOW (ref 3.8–5.2)
RBC # FLD: 16.2 % — HIGH (ref 10.3–14.5)
RBC # FLD: 16.3 % — HIGH (ref 10.3–14.5)
RBC BLD AUTO: SIGNIFICANT CHANGE UP
SODIUM SERPL-SCNC: 136 MMOL/L
STOMATOCYTES BLD QL SMEAR: PRESENT — SIGNIFICANT CHANGE UP
VARIANT LYMPHS # BLD: 1 % — SIGNIFICANT CHANGE UP (ref 0–6)
VIT B12 SERPL-MCNC: 245 PG/ML
WBC # BLD: 5.5 K/UL — SIGNIFICANT CHANGE UP (ref 3.8–10.5)
WBC # BLD: 5.6 K/UL — SIGNIFICANT CHANGE UP (ref 3.8–10.5)
WBC # FLD AUTO: 5.5 K/UL — SIGNIFICANT CHANGE UP (ref 3.8–10.5)
WBC # FLD AUTO: 5.6 K/UL — SIGNIFICANT CHANGE UP (ref 3.8–10.5)

## 2023-05-16 PROCEDURE — 99203 OFFICE O/P NEW LOW 30 MIN: CPT

## 2023-05-16 PROCEDURE — 86077 PHYS BLOOD BANK SERV XMATCH: CPT

## 2023-05-17 ENCOUNTER — NON-APPOINTMENT (OUTPATIENT)
Age: 88
End: 2023-05-17

## 2023-05-17 ENCOUNTER — OUTPATIENT (OUTPATIENT)
Dept: OUTPATIENT SERVICES | Facility: HOSPITAL | Age: 88
LOS: 1 days | End: 2023-05-17
Payer: MEDICARE

## 2023-05-17 ENCOUNTER — APPOINTMENT (OUTPATIENT)
Dept: HEMATOLOGY ONCOLOGY | Facility: CLINIC | Age: 88
End: 2023-05-17

## 2023-05-17 ENCOUNTER — APPOINTMENT (OUTPATIENT)
Age: 88
End: 2023-05-17

## 2023-05-17 DIAGNOSIS — Z98.890 OTHER SPECIFIED POSTPROCEDURAL STATES: Chronic | ICD-10-CM

## 2023-05-17 DIAGNOSIS — Z90.49 ACQUIRED ABSENCE OF OTHER SPECIFIED PARTS OF DIGESTIVE TRACT: Chronic | ICD-10-CM

## 2023-05-17 DIAGNOSIS — Z90.721 ACQUIRED ABSENCE OF OVARIES, UNILATERAL: Chronic | ICD-10-CM

## 2023-05-17 DIAGNOSIS — D64.9 ANEMIA, UNSPECIFIED: ICD-10-CM

## 2023-05-17 DIAGNOSIS — Z86.79 PERSONAL HISTORY OF OTHER DISEASES OF THE CIRCULATORY SYSTEM: Chronic | ICD-10-CM

## 2023-05-17 DIAGNOSIS — Z96.641 PRESENCE OF RIGHT ARTIFICIAL HIP JOINT: Chronic | ICD-10-CM

## 2023-05-17 DIAGNOSIS — H26.9 UNSPECIFIED CATARACT: Chronic | ICD-10-CM

## 2023-05-17 DIAGNOSIS — Z87.81 PERSONAL HISTORY OF (HEALED) TRAUMATIC FRACTURE: Chronic | ICD-10-CM

## 2023-05-18 PROBLEM — Z86.2 HISTORY OF MYELODYSPLASTIC SYNDROME: Status: RESOLVED | Noted: 2019-04-01 | Resolved: 2023-05-18

## 2023-05-18 PROBLEM — S62.101A RIGHT WRIST FRACTURE: Status: RESOLVED | Noted: 2023-05-18 | Resolved: 2023-05-18

## 2023-05-18 PROBLEM — Z86.19: Status: RESOLVED | Noted: 2023-05-18 | Resolved: 2023-05-18

## 2023-05-18 PROBLEM — Z82.49 FAMILY HISTORY OF RHEUMATIC HEART DISEASE: Status: ACTIVE | Noted: 2023-05-18

## 2023-05-18 PROBLEM — Z82.5 FAMILY HISTORY OF CHRONIC OBSTRUCTIVE PULMONARY DISEASE: Status: ACTIVE | Noted: 2023-05-18

## 2023-05-18 PROBLEM — Z82.5 FAMILY HISTORY OF ASTHMA: Status: ACTIVE | Noted: 2023-05-18

## 2023-05-18 PROBLEM — Z86.69 H/O GUILLAIN-BARRE SYNDROME: Status: RESOLVED | Noted: 2023-05-18 | Resolved: 2023-05-18

## 2023-05-18 RX ORDER — HYDROCHLOROTHIAZIDE 25 MG/1
25 TABLET ORAL DAILY
Qty: 90 | Refills: 3 | Status: DISCONTINUED | COMMUNITY
Start: 2022-08-24 | End: 2023-05-18

## 2023-05-18 RX ORDER — CIPROFLOXACIN HYDROCHLORIDE 250 MG/1
250 TABLET, FILM COATED ORAL
Qty: 20 | Refills: 0 | Status: DISCONTINUED | COMMUNITY
Start: 2021-09-02 | End: 2023-05-18

## 2023-05-18 RX ORDER — ONDANSETRON 4 MG/1
4 TABLET ORAL EVERY 8 HOURS
Qty: 21 | Refills: 3 | Status: DISCONTINUED | COMMUNITY
Start: 2023-05-05 | End: 2023-05-18

## 2023-05-18 RX ORDER — OXYCODONE 5 MG/1
5 TABLET ORAL
Qty: 10 | Refills: 0 | Status: DISCONTINUED | COMMUNITY
Start: 2019-12-18 | End: 2023-05-18

## 2023-05-18 RX ORDER — TRAMADOL HYDROCHLORIDE 50 MG/1
50 TABLET, COATED ORAL
Qty: 60 | Refills: 0 | Status: DISCONTINUED | COMMUNITY
Start: 2021-11-01 | End: 2023-05-18

## 2023-05-18 RX ORDER — AMLODIPINE BESYLATE 5 MG/1
5 TABLET ORAL DAILY
Qty: 90 | Refills: 2 | Status: DISCONTINUED | COMMUNITY
Start: 2019-12-19 | End: 2023-05-18

## 2023-05-18 RX ORDER — CLOTRIMAZOLE 10 MG/G
1 CREAM TOPICAL 3 TIMES DAILY
Qty: 45 | Refills: 6 | Status: DISCONTINUED | COMMUNITY
Start: 2019-08-23 | End: 2023-05-18

## 2023-05-18 NOTE — HISTORY OF PRESENT ILLNESS
[de-identified] : Mrs. Laguerre is a 89 yo WF referred by  for anemia.\par She was recently in the hospital with SOB and fatigue and was found to have bilateral multifocal pneumonia. she was treated with antibiotics and placed on O2.\par CT scans done of abdomen and pelvis showed no PE, multifocal bilateral pneumonia reactive mediastinal and hilar lymphadenopathy, no specific pulmonary nodules.During that admission her HGb was 9 gms.She also has a history of almost daily nausea and vomiting and food getting stuck in  her esophagus, She has been refusing an  endoscopy, They have tried Ondansetron and Reglan, which she stopped because she said it did not help.\par Medical record states and patient confirms that she has a history of MDS, diagnosed 12 years ago. She states that she was treated by Dr. Mejias with weekly IV chemotherapy, but she does not remember the name, nor do I have records to confirm that.\par Her other co morbidities include HTN, Gerd, Gastroporesis, osteoporosis, spinal stenosis. [de-identified] : She does have increased fatigue, No evidence of GI or  bleeding. She has stopped the Ondansetron and Reglan for the nausea and vomiting, but has had no vomiting for 3 days.\par Is still on O2, gets SOB with exertion.

## 2023-05-18 NOTE — ASSESSMENT
[FreeTextEntry1] : Mrs. Laguerre is a 89 yo WF referred by  for anemia.\par She was recently in the hospital with SOB and fatigue and was found to have bilateral multifocal pneumonia. she was treated with antibiotics and placed on O2.\par CT scans done of abdomen and pelvis showed no PE, multifocal bilateral pneumonia reactive mediastinal and hilar lymphadenopathy, no specific pulmonary nodules.During that admission her HGb was 9 gms.She also has a history of almost daily nausea and vomiting and food getting stuck in  her esophagus, She has been refusing an  endoscopy, They have tried Ondansetron and Reglan, which she stopped because she said it did not help.\par Medical record states and patient confirms that she has a history of MDS, diagnosed 12 years ago. She states that she was treated by Dr. Mejias with weekly IV chemotherapy, but she does not remember the name, nor do I have records to confirm that.\par Her other co morbidities include HTN, Gerd, Gastroporesis, osteoporosis, spinal stenosis.\par Today her CBC shows WBC-5.6, HGB-7.4, HCT-20.2, plat-333,000, MCV-95.4\par Review of peripheral smear shows 1% myelocytes but no Blasts, does show Hyperpigmented neutrophils.\par I discussed with patient and her daughter that her HGb has dropped to 7 gms and at this point, suggest a transfusion.We do have an appt for one unit of packed cells for tomorrow.\par I am sending off an anemia panel and will notify them of results and further treatment.\par We did discuss the  fact that she had a history of MDS and this may  be playing a role in the anemia.We may  be able to treat her with an STEPHAN, but need to check her anemia panel first.\par I will discuss the case with Dr. Mejias.\par For now, she will need at least weekly CBCs to follow H/H, transfuse as needed.

## 2023-05-18 NOTE — PHYSICAL EXAM
[Restricted in physically strenuous activity but ambulatory and able to carry out work of a light or sedentary nature] : Status 1- Restricted in physically strenuous activity but ambulatory and able to carry out work of a light or sedentary nature, e.g., light house work, office work [Normal] : affect appropriate [de-identified] : clear to auscultaion bilaterally, on O2

## 2023-05-18 NOTE — REVIEW OF SYSTEMS
[Fatigue] : fatigue [Vision Problems] : vision problems [SOB on Exertion] : shortness of breath during exertion [Diarrhea: Grade 0] : Diarrhea: Grade 0 [Negative] : Allergic/Immunologic [FreeTextEntry7] : food gets stuck in Esophagus, vo vomiting for 3 days [de-identified] : tingling in fingers

## 2023-05-18 NOTE — CONSULT LETTER
[Dear  ___] : Dear  [unfilled], [Consult Letter:] : I had the pleasure of evaluating your patient, [unfilled]. [Please see my note below.] : Please see my note below. [Consult Closing:] : Thank you very much for allowing me to participate in the care of this patient.  If you have any questions, please do not hesitate to contact me. [Sincerely,] : Sincerely, [FreeTextEntry2] : Dr. Kim [FreeTextEntry1] : Please find attached our consultation on your patient, Ms. NILSA VILLEDA . \par We take a multidisciplinary team approach to patient care and consider you, the referring physician, an extension of our team. \par It is our commitment to provide your patient with the highest quality of advanced therapeutic options. We thank you for allowing us to participate in the care of your patient.\par Please do not hesitate to contact our team with any questions or concerns at [phone].\par \par \par  [FreeTextEntry3] : Jennifer SCHAEFER

## 2023-05-19 ENCOUNTER — APPOINTMENT (OUTPATIENT)
Age: 88
End: 2023-05-19

## 2023-05-19 LAB — ALLERGY+IMMUNOLOGY DIAG STUDY NOTE: SIGNIFICANT CHANGE UP

## 2023-05-19 PROCEDURE — 86900 BLOOD TYPING SEROLOGIC ABO: CPT

## 2023-05-19 PROCEDURE — 0001U RBC DNA HEA 35 AG 11 BLD GRP: CPT

## 2023-05-19 PROCEDURE — 86870 RBC ANTIBODY IDENTIFICATION: CPT

## 2023-05-19 PROCEDURE — 86922 COMPATIBILITY TEST ANTIGLOB: CPT

## 2023-05-19 PROCEDURE — 86880 COOMBS TEST DIRECT: CPT

## 2023-05-19 PROCEDURE — P9040: CPT

## 2023-05-19 PROCEDURE — 86901 BLOOD TYPING SEROLOGIC RH(D): CPT

## 2023-05-19 PROCEDURE — 86860 RBC ANTIBODY ELUTION: CPT

## 2023-05-19 PROCEDURE — 36415 COLL VENOUS BLD VENIPUNCTURE: CPT

## 2023-05-19 PROCEDURE — 86850 RBC ANTIBODY SCREEN: CPT

## 2023-05-22 ENCOUNTER — APPOINTMENT (OUTPATIENT)
Dept: HEMATOLOGY ONCOLOGY | Facility: CLINIC | Age: 88
End: 2023-05-22

## 2023-05-22 ENCOUNTER — RESULT REVIEW (OUTPATIENT)
Age: 88
End: 2023-05-22

## 2023-05-22 DIAGNOSIS — Z51.89 ENCOUNTER FOR OTHER SPECIFIED AFTERCARE: ICD-10-CM

## 2023-05-22 DIAGNOSIS — R11.2 NAUSEA WITH VOMITING, UNSPECIFIED: ICD-10-CM

## 2023-05-22 LAB
BASOPHILS # BLD AUTO: 0.1 K/UL — SIGNIFICANT CHANGE UP (ref 0–0.2)
BASOPHILS NFR BLD AUTO: 0.9 % — SIGNIFICANT CHANGE UP (ref 0–2)
EOSINOPHIL # BLD AUTO: 0.9 K/UL — HIGH (ref 0–0.5)
EOSINOPHIL NFR BLD AUTO: 12.6 % — HIGH (ref 0–6)
HCT VFR BLD CALC: 27.6 % — LOW (ref 34.5–45)
HGB BLD-MCNC: 10.2 G/DL — LOW (ref 11.5–15.5)
LYMPHOCYTES # BLD AUTO: 2.4 K/UL — SIGNIFICANT CHANGE UP (ref 1–3.3)
LYMPHOCYTES # BLD AUTO: 35.1 % — SIGNIFICANT CHANGE UP (ref 13–44)
MCHC RBC-ENTMCNC: 34.3 PG — HIGH (ref 27–34)
MCHC RBC-ENTMCNC: 36.8 G/DL — HIGH (ref 32–36)
MCV RBC AUTO: 93.2 FL — SIGNIFICANT CHANGE UP (ref 80–100)
MONOCYTES # BLD AUTO: 0.6 K/UL — SIGNIFICANT CHANGE UP (ref 0–0.9)
MONOCYTES NFR BLD AUTO: 9.3 % — SIGNIFICANT CHANGE UP (ref 2–14)
NEUTROPHILS # BLD AUTO: 2.9 K/UL — SIGNIFICANT CHANGE UP (ref 1.8–7.4)
NEUTROPHILS NFR BLD AUTO: 42 % — LOW (ref 43–77)
PLATELET # BLD AUTO: 285 K/UL — SIGNIFICANT CHANGE UP (ref 150–400)
RBC # BLD: 2.96 M/UL — LOW (ref 3.8–5.2)
RBC # FLD: 16 % — HIGH (ref 10.3–14.5)
WBC # BLD: 6.9 K/UL — SIGNIFICANT CHANGE UP (ref 3.8–10.5)
WBC # FLD AUTO: 6.9 K/UL — SIGNIFICANT CHANGE UP (ref 3.8–10.5)

## 2023-05-23 ENCOUNTER — NON-APPOINTMENT (OUTPATIENT)
Age: 88
End: 2023-05-23

## 2023-05-24 ENCOUNTER — NON-APPOINTMENT (OUTPATIENT)
Age: 88
End: 2023-05-24

## 2023-05-24 LAB
ALBUMIN MFR SERPL ELPH: 51.6 %
ALBUMIN SERPL-MCNC: 3.5 G/DL
ALBUMIN/GLOB SERPL: 1.1 RATIO
ALPHA1 GLOB MFR SERPL ELPH: 5.2 %
ALPHA1 GLOB SERPL ELPH-MCNC: 0.4 G/DL
ALPHA2 GLOB MFR SERPL ELPH: 6.9 %
ALPHA2 GLOB SERPL ELPH-MCNC: 0.5 G/DL
B-GLOBULIN MFR SERPL ELPH: 10.8 %
B-GLOBULIN SERPL ELPH-MCNC: 0.7 G/DL
DEPRECATED KAPPA LC FREE/LAMBDA SER: 1.2 RATIO
GAMMA GLOB FLD ELPH-MCNC: 1.7 G/DL
GAMMA GLOB MFR SERPL ELPH: 25.5 %
IGA SER QL IEP: 232 MG/DL
IGG SER QL IEP: 1809 MG/DL
IGM SER QL IEP: 210 MG/DL
INTERPRETATION SERPL IEP-IMP: NORMAL
KAPPA LC CSF-MCNC: 4.87 MG/DL
KAPPA LC SERPL-MCNC: 5.83 MG/DL
M PROTEIN SPEC IFE-MCNC: NORMAL
PROT SERPL-MCNC: 6.8 G/DL
PROT SERPL-MCNC: 6.8 G/DL

## 2023-05-25 ENCOUNTER — APPOINTMENT (OUTPATIENT)
Age: 88
End: 2023-05-25

## 2023-05-26 DIAGNOSIS — E53.8 DEFICIENCY OF OTHER SPECIFIED B GROUP VITAMINS: ICD-10-CM

## 2023-06-01 ENCOUNTER — APPOINTMENT (OUTPATIENT)
Age: 88
End: 2023-06-01

## 2023-06-02 ENCOUNTER — APPOINTMENT (OUTPATIENT)
Dept: PULMONOLOGY | Facility: CLINIC | Age: 88
End: 2023-06-02
Payer: MEDICARE

## 2023-06-02 VITALS
WEIGHT: 134 LBS | DIASTOLIC BLOOD PRESSURE: 72 MMHG | OXYGEN SATURATION: 97 % | BODY MASS INDEX: 25.3 KG/M2 | RESPIRATION RATE: 16 BRPM | HEIGHT: 61 IN | HEART RATE: 80 BPM | SYSTOLIC BLOOD PRESSURE: 114 MMHG

## 2023-06-02 DIAGNOSIS — R91.8 OTHER NONSPECIFIC ABNORMAL FINDING OF LUNG FIELD: ICD-10-CM

## 2023-06-02 DIAGNOSIS — Z87.891 PERSONAL HISTORY OF NICOTINE DEPENDENCE: ICD-10-CM

## 2023-06-02 PROCEDURE — 99204 OFFICE O/P NEW MOD 45 MIN: CPT

## 2023-06-02 NOTE — DISCUSSION/SUMMARY
[FreeTextEntry1] : \par #1. Will schedule PFTs in near future to assess lung function \par #2. The patient does not appear to require chronic BD therapy at this time\par #3. Continue ambulation to improve exercise tolerance\par #4. F/u chest CT in 2 months for possible resolution of previously seen infiltrates and WARD; consider f/u w/u if desired by pt if findings persist\par #5. Consider inhaler therapy pending PFTs but no known h/o asthma or COPD and with remote smoking of 1 ppw x 20 years though quit 1983\par #6. F/u in 2-3 months with chest CT and PFTs\par #7. Continue home O2 for now but may not require long term; will monitor oximetry and need for home O2\par \par The patient expressed understanding and agreement with the above recommendations/plan and accepts responsibility to be compliant with recommended testing, therapies, and f/u visits.\par All relevant questions and concerns were addressed. \par Discussed above with patient and daughter who was also present.

## 2023-06-02 NOTE — CONSULT LETTER
[Dear  ___] : Dear  [unfilled], [Consult Letter:] : I had the pleasure of evaluating your patient, [unfilled]. [Please see my note below.] : Please see my note below. [Consult Closing:] : Thank you very much for allowing me to participate in the care of this patient.  If you have any questions, please do not hesitate to contact me. [Sincerely,] : Sincerely, [FreeTextEntry3] : Mitch Montejo MD, FCCP, D. ABSM\par Pulmonary and Sleep Medicine\par [facility] at [place]

## 2023-06-02 NOTE — HISTORY OF PRESENT ILLNESS
[TextBox_4] : Former smoker of 1 ppw x 20 years, quit 1983\par Patient c/o SOBOE but is otherwise without associated respiratory complaints. \par H/o GBS but no longer following with neurology.\par Pt was in [facility] ER for sob and cough. Chest CT with possible multifocal pneumonia and WARD and was d/c'd on abx and home O2. Overall, pt has improved but not back to baseline. [ESS] : 3 [TextBox_11] : 2

## 2023-06-02 NOTE — REASON FOR VISIT
[Initial] : an initial visit [Abnormal CXR/ Chest CT] : an abnormal CXR/ chest CT [Pneumonia] : pneumonia [Shortness of Breath] : shortness of breath

## 2023-06-08 ENCOUNTER — APPOINTMENT (OUTPATIENT)
Age: 88
End: 2023-06-08

## 2023-06-13 ENCOUNTER — APPOINTMENT (OUTPATIENT)
Dept: INTERNAL MEDICINE | Facility: CLINIC | Age: 88
End: 2023-06-13
Payer: MEDICARE

## 2023-06-13 VITALS
OXYGEN SATURATION: 99 % | BODY MASS INDEX: 25.3 KG/M2 | SYSTOLIC BLOOD PRESSURE: 116 MMHG | RESPIRATION RATE: 12 BRPM | HEART RATE: 72 BPM | HEIGHT: 61 IN | DIASTOLIC BLOOD PRESSURE: 72 MMHG | WEIGHT: 134 LBS

## 2023-06-13 DIAGNOSIS — J18.9 PNEUMONIA, UNSPECIFIED ORGANISM: ICD-10-CM

## 2023-06-13 DIAGNOSIS — I10 ESSENTIAL (PRIMARY) HYPERTENSION: ICD-10-CM

## 2023-06-13 PROCEDURE — 99214 OFFICE O/P EST MOD 30 MIN: CPT

## 2023-06-13 NOTE — HEALTH RISK ASSESSMENT
[No] : No [No falls in past year] : Patient reported no falls in the past year [0] : 2) Feeling down, depressed, or hopeless: Not at all (0) [PHQ-2 Negative - No further assessment needed] : PHQ-2 Negative - No further assessment needed [[id number]] : 0 [Former] : Former [> 15 Years] : > 15 Years

## 2023-06-13 NOTE — HISTORY OF PRESENT ILLNESS
[FreeTextEntry1] : follow up hypoxiz, pna, anemia [de-identified] : follow up hypoxia, anemia pna\par due for ct of the chest\par has not need oxygen as often\par at home sat at 99 on room air\par has less \par anemia better now hgb at 10.2\par has no chest pain and feels better

## 2023-06-13 NOTE — ASSESSMENT
[FreeTextEntry1] : anemia better cont with infusions\par bp stable\par hypoxia for the most resolved\par needs PFT and follow up ct for prior PNA

## 2023-07-06 ENCOUNTER — APPOINTMENT (OUTPATIENT)
Age: 88
End: 2023-07-06

## 2023-07-06 ENCOUNTER — RESULT REVIEW (OUTPATIENT)
Age: 88
End: 2023-07-06

## 2023-07-06 LAB
BASOPHILS # BLD AUTO: 0.1 K/UL — SIGNIFICANT CHANGE UP (ref 0–0.2)
BASOPHILS NFR BLD AUTO: 1 % — SIGNIFICANT CHANGE UP (ref 0–2)
EOSINOPHIL # BLD AUTO: 0.6 K/UL — HIGH (ref 0–0.5)
EOSINOPHIL NFR BLD AUTO: 9.4 % — HIGH (ref 0–6)
HCT VFR BLD CALC: 28 % — LOW (ref 34.5–45)
HGB BLD-MCNC: 10.5 G/DL — LOW (ref 11.5–15.5)
LYMPHOCYTES # BLD AUTO: 2.7 K/UL — SIGNIFICANT CHANGE UP (ref 1–3.3)
LYMPHOCYTES # BLD AUTO: 38.9 % — SIGNIFICANT CHANGE UP (ref 13–44)
MCHC RBC-ENTMCNC: 34.2 PG — HIGH (ref 27–34)
MCHC RBC-ENTMCNC: 37.4 G/DL — HIGH (ref 32–36)
MCV RBC AUTO: 91.6 FL — SIGNIFICANT CHANGE UP (ref 80–100)
MONOCYTES # BLD AUTO: 0.6 K/UL — SIGNIFICANT CHANGE UP (ref 0–0.9)
MONOCYTES NFR BLD AUTO: 9.2 % — SIGNIFICANT CHANGE UP (ref 2–14)
NEUTROPHILS # BLD AUTO: 2.8 K/UL — SIGNIFICANT CHANGE UP (ref 1.8–7.4)
NEUTROPHILS NFR BLD AUTO: 41.4 % — LOW (ref 43–77)
PLATELET # BLD AUTO: 250 K/UL — SIGNIFICANT CHANGE UP (ref 150–400)
RBC # BLD: 3.06 M/UL — LOW (ref 3.8–5.2)
RBC # FLD: 12 % — SIGNIFICANT CHANGE UP (ref 10.3–14.5)
WBC # BLD: 6.8 K/UL — SIGNIFICANT CHANGE UP (ref 3.8–10.5)
WBC # FLD AUTO: 6.8 K/UL — SIGNIFICANT CHANGE UP (ref 3.8–10.5)

## 2023-07-14 ENCOUNTER — OUTPATIENT (OUTPATIENT)
Dept: OUTPATIENT SERVICES | Facility: HOSPITAL | Age: 88
LOS: 1 days | Discharge: HOME | End: 2023-07-14

## 2023-07-14 DIAGNOSIS — Z98.890 OTHER SPECIFIED POSTPROCEDURAL STATES: Chronic | ICD-10-CM

## 2023-07-14 DIAGNOSIS — Z96.641 PRESENCE OF RIGHT ARTIFICIAL HIP JOINT: Chronic | ICD-10-CM

## 2023-07-14 DIAGNOSIS — D64.9 ANEMIA, UNSPECIFIED: ICD-10-CM

## 2023-07-14 DIAGNOSIS — Z90.49 ACQUIRED ABSENCE OF OTHER SPECIFIED PARTS OF DIGESTIVE TRACT: Chronic | ICD-10-CM

## 2023-07-14 DIAGNOSIS — Z86.79 PERSONAL HISTORY OF OTHER DISEASES OF THE CIRCULATORY SYSTEM: Chronic | ICD-10-CM

## 2023-07-14 DIAGNOSIS — H26.9 UNSPECIFIED CATARACT: Chronic | ICD-10-CM

## 2023-07-14 DIAGNOSIS — Z90.721 ACQUIRED ABSENCE OF OVARIES, UNILATERAL: Chronic | ICD-10-CM

## 2023-07-19 ENCOUNTER — APPOINTMENT (OUTPATIENT)
Dept: HEMATOLOGY ONCOLOGY | Facility: CLINIC | Age: 88
End: 2023-07-19
Payer: MEDICARE

## 2023-07-19 ENCOUNTER — RESULT REVIEW (OUTPATIENT)
Age: 88
End: 2023-07-19

## 2023-07-19 VITALS
HEART RATE: 70 BPM | SYSTOLIC BLOOD PRESSURE: 150 MMHG | OXYGEN SATURATION: 100 % | BODY MASS INDEX: 25.58 KG/M2 | DIASTOLIC BLOOD PRESSURE: 78 MMHG | WEIGHT: 135.47 LBS | HEIGHT: 61 IN

## 2023-07-19 LAB
BASOPHILS # BLD AUTO: 0 K/UL — SIGNIFICANT CHANGE UP (ref 0–0.2)
BASOPHILS NFR BLD AUTO: 0.7 % — SIGNIFICANT CHANGE UP (ref 0–2)
EOSINOPHIL # BLD AUTO: 0.6 K/UL — HIGH (ref 0–0.5)
EOSINOPHIL NFR BLD AUTO: 11.9 % — HIGH (ref 0–6)
HCT VFR BLD CALC: 25.5 % — LOW (ref 34.5–45)
HGB BLD-MCNC: 9.1 G/DL — LOW (ref 11.5–15.5)
LYMPHOCYTES # BLD AUTO: 1.6 K/UL — SIGNIFICANT CHANGE UP (ref 1–3.3)
LYMPHOCYTES # BLD AUTO: 29.3 % — SIGNIFICANT CHANGE UP (ref 13–44)
MCHC RBC-ENTMCNC: 33.8 PG — SIGNIFICANT CHANGE UP (ref 27–34)
MCHC RBC-ENTMCNC: 35.5 G/DL — SIGNIFICANT CHANGE UP (ref 32–36)
MCV RBC AUTO: 95.3 FL — SIGNIFICANT CHANGE UP (ref 80–100)
MONOCYTES # BLD AUTO: 0.5 K/UL — SIGNIFICANT CHANGE UP (ref 0–0.9)
MONOCYTES NFR BLD AUTO: 8.9 % — SIGNIFICANT CHANGE UP (ref 2–14)
NEUTROPHILS # BLD AUTO: 2.7 K/UL — SIGNIFICANT CHANGE UP (ref 1.8–7.4)
NEUTROPHILS NFR BLD AUTO: 49.2 % — SIGNIFICANT CHANGE UP (ref 43–77)
PLATELET # BLD AUTO: 313 K/UL — SIGNIFICANT CHANGE UP (ref 150–400)
RBC # BLD: 2.68 M/UL — LOW (ref 3.8–5.2)
RBC # FLD: 14.2 % — SIGNIFICANT CHANGE UP (ref 10.3–14.5)
WBC # BLD: 5.4 K/UL — SIGNIFICANT CHANGE UP (ref 3.8–10.5)
WBC # FLD AUTO: 5.4 K/UL — SIGNIFICANT CHANGE UP (ref 3.8–10.5)

## 2023-07-19 PROCEDURE — 99213 OFFICE O/P EST LOW 20 MIN: CPT

## 2023-07-20 LAB
ALBUMIN SERPL ELPH-MCNC: 4.1 G/DL
ALP BLD-CCNC: 82 U/L
ALT SERPL-CCNC: 7 U/L
ANION GAP SERPL CALC-SCNC: 11 MMOL/L
AST SERPL-CCNC: 14 U/L
BILIRUB SERPL-MCNC: 0.7 MG/DL
BUN SERPL-MCNC: 25 MG/DL
CALCIUM SERPL-MCNC: 9.2 MG/DL
CHLORIDE SERPL-SCNC: 103 MMOL/L
CO2 SERPL-SCNC: 25 MMOL/L
CREAT SERPL-MCNC: 1.06 MG/DL
EGFR: 49 ML/MIN/1.73M2
FERRITIN SERPL-MCNC: 300 NG/ML
FOLATE SERPL-MCNC: 7 NG/ML
GLUCOSE SERPL-MCNC: 98 MG/DL
IRON SATN MFR SERPL: 22 %
IRON SERPL-MCNC: 60 UG/DL
POTASSIUM SERPL-SCNC: 4.1 MMOL/L
PROT SERPL-MCNC: 6.6 G/DL
SODIUM SERPL-SCNC: 138 MMOL/L
TIBC SERPL-MCNC: 277 UG/DL
UIBC SERPL-MCNC: 217 UG/DL
VIT B12 SERPL-MCNC: 600 PG/ML

## 2023-07-20 NOTE — ASSESSMENT
[FreeTextEntry1] : 89-year-old woman with prior history of myelodysplasia, has been treated recently for a low normal B12 level but her hemoglobin is continuing to trend downwards.\par We will attempt therapy with Procrit, and continue supportive transfusions as needed.

## 2023-07-20 NOTE — HISTORY OF PRESENT ILLNESS
[de-identified] : \par Mrs. Laguerre is a 89 yo WF referred by  for anemia.\par She was recently in the hospital with SOB and fatigue and was found to have bilateral multifocal pneumonia. she was treated with antibiotics and placed on O2.\par CT scans done of abdomen and pelvis showed no PE, multifocal bilateral pneumonia reactive mediastinal and hilar lymphadenopathy, no specific pulmonary nodules.During that admission her HGb was 9 gms.She also has a history of almost daily nausea and vomiting and food getting stuck in her esophagus, She has been refusing an endoscopy, They have tried Ondansetron and Reglan, which she stopped because she said it did not help.\par Medical record states and patient confirms that she has a history of MDS, diagnosed 12 years ago.  We are attempting to obtain records from my former office where she was treated.\par Her other co morbidities include HTN, Gerd, Gastroporesis, osteoporosis, spinal stenosis. \par \par \par  She does have increased fatigue, No evidence of GI or  bleeding. She has stopped the Ondansetron and Reglan for the nausea and vomiting, but has had no vomiting for 3 days.\par Is still on O2, gets SOB with exertion. \par  [de-identified] : Remains fatigued, current B12 level now back up to 600 after parenteral therapy, but hemoglobin is trending back down to 9.1 g today, hematocrit 25.5.

## 2023-07-27 ENCOUNTER — OUTPATIENT (OUTPATIENT)
Dept: OUTPATIENT SERVICES | Facility: HOSPITAL | Age: 88
LOS: 1 days | End: 2023-07-27
Payer: MEDICARE

## 2023-07-27 ENCOUNTER — OUTPATIENT (OUTPATIENT)
Dept: OUTPATIENT SERVICES | Facility: HOSPITAL | Age: 88
LOS: 1 days | End: 2023-07-27

## 2023-07-27 ENCOUNTER — APPOINTMENT (OUTPATIENT)
Dept: CT IMAGING | Facility: CLINIC | Age: 88
End: 2023-07-27
Payer: MEDICARE

## 2023-07-27 DIAGNOSIS — Z90.721 ACQUIRED ABSENCE OF OVARIES, UNILATERAL: Chronic | ICD-10-CM

## 2023-07-27 DIAGNOSIS — Z98.890 OTHER SPECIFIED POSTPROCEDURAL STATES: Chronic | ICD-10-CM

## 2023-07-27 DIAGNOSIS — R91.8 OTHER NONSPECIFIC ABNORMAL FINDING OF LUNG FIELD: ICD-10-CM

## 2023-07-27 DIAGNOSIS — Z86.79 PERSONAL HISTORY OF OTHER DISEASES OF THE CIRCULATORY SYSTEM: Chronic | ICD-10-CM

## 2023-07-27 DIAGNOSIS — Z96.641 PRESENCE OF RIGHT ARTIFICIAL HIP JOINT: Chronic | ICD-10-CM

## 2023-07-27 DIAGNOSIS — Z90.49 ACQUIRED ABSENCE OF OTHER SPECIFIED PARTS OF DIGESTIVE TRACT: Chronic | ICD-10-CM

## 2023-07-27 DIAGNOSIS — A43.0 PULMONARY NOCARDIOSIS: ICD-10-CM

## 2023-07-27 DIAGNOSIS — Z87.81 PERSONAL HISTORY OF (HEALED) TRAUMATIC FRACTURE: Chronic | ICD-10-CM

## 2023-07-27 DIAGNOSIS — R76.12 NONSPECIFIC REACTION TO CELL MEDIATED IMMUNITY MEASUREMENT OF GAMMA INTERFERON ANTIGEN RESPONSE WITHOUT ACTIVE TUBERCULOSIS: ICD-10-CM

## 2023-07-27 PROCEDURE — 71250 CT THORAX DX C-: CPT

## 2023-07-27 PROCEDURE — 71250 CT THORAX DX C-: CPT | Mod: 26,MH

## 2023-08-02 LAB — HUMAN ERYTHROCYTE ANTIGEN PANEL RESULT: SIGNIFICANT CHANGE UP

## 2023-08-03 ENCOUNTER — RESULT REVIEW (OUTPATIENT)
Age: 88
End: 2023-08-03

## 2023-08-03 ENCOUNTER — APPOINTMENT (OUTPATIENT)
Age: 88
End: 2023-08-03

## 2023-08-03 LAB
BASOPHILS # BLD AUTO: 0.1 K/UL — SIGNIFICANT CHANGE UP (ref 0–0.2)
BASOPHILS NFR BLD AUTO: 1 % — SIGNIFICANT CHANGE UP (ref 0–2)
EOSINOPHIL # BLD AUTO: 1.3 K/UL — HIGH (ref 0–0.5)
EOSINOPHIL NFR BLD AUTO: 16 % — HIGH (ref 0–6)
HCT VFR BLD CALC: 21.4 % — LOW (ref 34.5–45)
HGB BLD-MCNC: 7.5 G/DL — LOW (ref 11.5–15.5)
LYMPHOCYTES # BLD AUTO: 1.6 K/UL — SIGNIFICANT CHANGE UP (ref 1–3.3)
LYMPHOCYTES # BLD AUTO: 19.6 % — SIGNIFICANT CHANGE UP (ref 13–44)
MCHC RBC-ENTMCNC: 35 G/DL — SIGNIFICANT CHANGE UP (ref 32–36)
MCHC RBC-ENTMCNC: 35.1 PG — HIGH (ref 27–34)
MCV RBC AUTO: 100.2 FL — HIGH (ref 80–100)
MONOCYTES # BLD AUTO: 0.6 K/UL — SIGNIFICANT CHANGE UP (ref 0–0.9)
MONOCYTES NFR BLD AUTO: 7.3 % — SIGNIFICANT CHANGE UP (ref 2–14)
NEUTROPHILS # BLD AUTO: 4.5 K/UL — SIGNIFICANT CHANGE UP (ref 1.8–7.4)
NEUTROPHILS NFR BLD AUTO: 56 % — SIGNIFICANT CHANGE UP (ref 43–77)
PLATELET # BLD AUTO: 355 K/UL — SIGNIFICANT CHANGE UP (ref 150–400)
RBC # BLD: 2.14 M/UL — LOW (ref 3.8–5.2)
RBC # FLD: 15.1 % — HIGH (ref 10.3–14.5)
WBC # BLD: 8 K/UL — SIGNIFICANT CHANGE UP (ref 3.8–10.5)
WBC # FLD AUTO: 8 K/UL — SIGNIFICANT CHANGE UP (ref 3.8–10.5)

## 2023-08-04 DIAGNOSIS — E53.8 DEFICIENCY OF OTHER SPECIFIED B GROUP VITAMINS: ICD-10-CM

## 2023-08-13 ENCOUNTER — EMERGENCY (EMERGENCY)
Facility: HOSPITAL | Age: 88
LOS: 1 days | Discharge: HOME | End: 2023-08-13
Attending: EMERGENCY MEDICINE
Payer: MEDICARE

## 2023-08-13 VITALS
SYSTOLIC BLOOD PRESSURE: 116 MMHG | WEIGHT: 175.05 LBS | TEMPERATURE: 98 F | HEART RATE: 95 BPM | DIASTOLIC BLOOD PRESSURE: 71 MMHG | OXYGEN SATURATION: 95 % | RESPIRATION RATE: 16 BRPM

## 2023-08-13 DIAGNOSIS — Z96.641 PRESENCE OF RIGHT ARTIFICIAL HIP JOINT: Chronic | ICD-10-CM

## 2023-08-13 DIAGNOSIS — Z90.721 ACQUIRED ABSENCE OF OVARIES, UNILATERAL: Chronic | ICD-10-CM

## 2023-08-13 DIAGNOSIS — Z98.890 OTHER SPECIFIED POSTPROCEDURAL STATES: Chronic | ICD-10-CM

## 2023-08-13 DIAGNOSIS — Z86.79 PERSONAL HISTORY OF OTHER DISEASES OF THE CIRCULATORY SYSTEM: Chronic | ICD-10-CM

## 2023-08-13 DIAGNOSIS — Z90.49 ACQUIRED ABSENCE OF OTHER SPECIFIED PARTS OF DIGESTIVE TRACT: Chronic | ICD-10-CM

## 2023-08-13 DIAGNOSIS — Z87.81 PERSONAL HISTORY OF (HEALED) TRAUMATIC FRACTURE: Chronic | ICD-10-CM

## 2023-08-13 DIAGNOSIS — H26.9 UNSPECIFIED CATARACT: Chronic | ICD-10-CM

## 2023-08-13 LAB
ALBUMIN SERPL ELPH-MCNC: 3.5 G/DL — SIGNIFICANT CHANGE UP (ref 3.3–5.2)
ALLERGY+IMMUNOLOGY DIAG STUDY NOTE: SIGNIFICANT CHANGE UP
ALP SERPL-CCNC: 115 U/L — SIGNIFICANT CHANGE UP (ref 40–120)
ALT FLD-CCNC: 6 U/L — SIGNIFICANT CHANGE UP
ANION GAP SERPL CALC-SCNC: 13 MMOL/L — SIGNIFICANT CHANGE UP (ref 5–17)
ANISOCYTOSIS BLD QL: SLIGHT — SIGNIFICANT CHANGE UP
ANTIBODY INTERPRETATION 2: SIGNIFICANT CHANGE UP
APTT BLD: 26.8 SEC — SIGNIFICANT CHANGE UP (ref 24.5–35.6)
AST SERPL-CCNC: 20 U/L — SIGNIFICANT CHANGE UP
BASOPHILS # BLD AUTO: 0.3 K/UL — HIGH (ref 0–0.2)
BASOPHILS NFR BLD AUTO: 2.6 % — HIGH (ref 0–2)
BILIRUB SERPL-MCNC: 2.8 MG/DL — HIGH (ref 0.4–2)
BLD GP AB SCN SERPL QL: SIGNIFICANT CHANGE UP
BUN SERPL-MCNC: 35.8 MG/DL — HIGH (ref 8–20)
CALCIUM SERPL-MCNC: 9.2 MG/DL — SIGNIFICANT CHANGE UP (ref 8.4–10.5)
CHLORIDE SERPL-SCNC: 100 MMOL/L — SIGNIFICANT CHANGE UP (ref 96–108)
CO2 SERPL-SCNC: 21 MMOL/L — LOW (ref 22–29)
CREAT SERPL-MCNC: 1.43 MG/DL — HIGH (ref 0.5–1.3)
DAT IGG-SP REAG RBC-IMP: ABNORMAL
DIR ANTIGLOB POLYSPECIFIC INTERPRETATION: ABNORMAL
EGFR: 34 ML/MIN/1.73M2 — LOW
EOSINOPHIL # BLD AUTO: 1.41 K/UL — HIGH (ref 0–0.5)
EOSINOPHIL NFR BLD AUTO: 12.4 % — HIGH (ref 0–6)
GLUCOSE SERPL-MCNC: 105 MG/DL — HIGH (ref 70–99)
HCT VFR BLD CALC: 17.9 % — CRITICAL LOW (ref 34.5–45)
HGB BLD-MCNC: 6 G/DL — CRITICAL LOW (ref 11.5–15.5)
IAT COMP-SP REAG SERPL QL: ABNORMAL
INR BLD: 1.05 RATIO — SIGNIFICANT CHANGE UP (ref 0.85–1.18)
LIDOCAIN IGE QN: 25 U/L — SIGNIFICANT CHANGE UP (ref 22–51)
LYMPHOCYTES # BLD AUTO: 0.91 K/UL — LOW (ref 1–3.3)
LYMPHOCYTES # BLD AUTO: 8 % — LOW (ref 13–44)
MACROCYTES BLD QL: SLIGHT — SIGNIFICANT CHANGE UP
MAGNESIUM SERPL-MCNC: 2.1 MG/DL — SIGNIFICANT CHANGE UP (ref 1.8–2.6)
MANUAL SMEAR VERIFICATION: SIGNIFICANT CHANGE UP
MCHC RBC-ENTMCNC: 33.5 GM/DL — SIGNIFICANT CHANGE UP (ref 32–36)
MCHC RBC-ENTMCNC: 37.7 PG — HIGH (ref 27–34)
MCV RBC AUTO: 112.6 FL — HIGH (ref 80–100)
MICROCYTES BLD QL: SLIGHT — SIGNIFICANT CHANGE UP
MONOCYTES # BLD AUTO: 0.6 K/UL — SIGNIFICANT CHANGE UP (ref 0–0.9)
MONOCYTES NFR BLD AUTO: 5.3 % — SIGNIFICANT CHANGE UP (ref 2–14)
MYELOCYTES NFR BLD: 0.9 % — HIGH (ref 0–0)
NEUTROPHILS # BLD AUTO: 7.85 K/UL — HIGH (ref 1.8–7.4)
NEUTROPHILS NFR BLD AUTO: 69 % — SIGNIFICANT CHANGE UP (ref 43–77)
PLAT MORPH BLD: NORMAL — SIGNIFICANT CHANGE UP
PLATELET # BLD AUTO: 367 K/UL — SIGNIFICANT CHANGE UP (ref 150–400)
POLYCHROMASIA BLD QL SMEAR: SIGNIFICANT CHANGE UP
POTASSIUM SERPL-MCNC: 4.5 MMOL/L — SIGNIFICANT CHANGE UP (ref 3.5–5.3)
POTASSIUM SERPL-SCNC: 4.5 MMOL/L — SIGNIFICANT CHANGE UP (ref 3.5–5.3)
PROT SERPL-MCNC: 7.1 G/DL — SIGNIFICANT CHANGE UP (ref 6.6–8.7)
PROTHROM AB SERPL-ACNC: 11.6 SEC — SIGNIFICANT CHANGE UP (ref 9.5–13)
RAPID RVP RESULT: SIGNIFICANT CHANGE UP
RBC # BLD: 1.59 M/UL — LOW (ref 3.8–5.2)
RBC # FLD: 22.6 % — HIGH (ref 10.3–14.5)
RBC BLD AUTO: ABNORMAL
SARS-COV-2 RNA SPEC QL NAA+PROBE: SIGNIFICANT CHANGE UP
SODIUM SERPL-SCNC: 134 MMOL/L — LOW (ref 135–145)
TROPONIN T SERPL-MCNC: <0.01 NG/ML — SIGNIFICANT CHANGE UP (ref 0–0.06)
VARIANT LYMPHS # BLD: 1.8 % — SIGNIFICANT CHANGE UP (ref 0–6)
WBC # BLD: 11.38 K/UL — HIGH (ref 3.8–10.5)
WBC # FLD AUTO: 11.38 K/UL — HIGH (ref 3.8–10.5)

## 2023-08-13 PROCEDURE — 86079 PHYS BLOOD BANK SERV AUTHRJ: CPT

## 2023-08-13 PROCEDURE — 76705 ECHO EXAM OF ABDOMEN: CPT | Mod: 26

## 2023-08-13 PROCEDURE — 86077 PHYS BLOOD BANK SERV XMATCH: CPT

## 2023-08-13 PROCEDURE — 71045 X-RAY EXAM CHEST 1 VIEW: CPT | Mod: 26

## 2023-08-13 PROCEDURE — 74176 CT ABD & PELVIS W/O CONTRAST: CPT | Mod: 26,MA

## 2023-08-13 PROCEDURE — 99223 1ST HOSP IP/OBS HIGH 75: CPT | Mod: FS

## 2023-08-13 RX ORDER — OXYBUTYNIN CHLORIDE 5 MG
5 TABLET ORAL AT BEDTIME
Refills: 0 | Status: DISCONTINUED | OUTPATIENT
Start: 2023-08-13 | End: 2023-08-21

## 2023-08-13 RX ORDER — ZOLPIDEM TARTRATE 10 MG/1
5 TABLET ORAL AT BEDTIME
Refills: 0 | Status: DISCONTINUED | OUTPATIENT
Start: 2023-08-13 | End: 2023-08-13

## 2023-08-13 RX ORDER — ZOLPIDEM TARTRATE 10 MG/1
1 TABLET ORAL
Qty: 0 | Refills: 0 | DISCHARGE

## 2023-08-13 RX ORDER — DULOXETINE HYDROCHLORIDE 30 MG/1
60 CAPSULE, DELAYED RELEASE ORAL DAILY
Refills: 0 | Status: DISCONTINUED | OUTPATIENT
Start: 2023-08-13 | End: 2023-08-21

## 2023-08-13 RX ORDER — LIDOCAINE 4 G/100G
1 CREAM TOPICAL
Qty: 0 | Refills: 0 | DISCHARGE

## 2023-08-13 RX ORDER — SODIUM CHLORIDE 9 MG/ML
1000 INJECTION INTRAMUSCULAR; INTRAVENOUS; SUBCUTANEOUS ONCE
Refills: 0 | Status: COMPLETED | OUTPATIENT
Start: 2023-08-13 | End: 2023-08-13

## 2023-08-13 RX ORDER — ONDANSETRON 8 MG/1
4 TABLET, FILM COATED ORAL ONCE
Refills: 0 | Status: COMPLETED | OUTPATIENT
Start: 2023-08-13 | End: 2023-08-13

## 2023-08-13 RX ADMIN — Medication 5 MILLIGRAM(S): at 21:20

## 2023-08-13 RX ADMIN — ZOLPIDEM TARTRATE 5 MILLIGRAM(S): 10 TABLET ORAL at 21:20

## 2023-08-13 RX ADMIN — SODIUM CHLORIDE 1000 MILLILITER(S): 9 INJECTION INTRAMUSCULAR; INTRAVENOUS; SUBCUTANEOUS at 15:04

## 2023-08-13 RX ADMIN — ONDANSETRON 4 MILLIGRAM(S): 8 TABLET, FILM COATED ORAL at 15:35

## 2023-08-13 RX ADMIN — DULOXETINE HYDROCHLORIDE 60 MILLIGRAM(S): 30 CAPSULE, DELAYED RELEASE ORAL at 21:20

## 2023-08-13 NOTE — ED CDU PROVIDER INITIAL DAY NOTE - CLINICAL SUMMARY MEDICAL DECISION MAKING FREE TEXT BOX
89 yo female PMHx MDS presents to ED c/o progressive nauesa/vomiting and fatigue. Hemoglobin 6. Slightly elevated bili, CT negative. ED attending spoke with on call hematologist. Plan for transfuse at discharge. Son at bedside. Patient and son agree with morning discharge.

## 2023-08-13 NOTE — ED ADULT NURSE REASSESSMENT NOTE - NS ED NURSE REASSESS COMMENT FT1
pt moved to observation. Report given to VERONICA Mcghee. Pt aware of POC. awaiting transfusion of blood. Blood bank called, pt + for antibodies and will notify RN assigned to pt when blood is ready. Pt a&ox3, RR wnl, bed locked in lowest position.

## 2023-08-13 NOTE — ED CDU PROVIDER INITIAL DAY NOTE - OBJECTIVE STATEMENT
89 yo female PMHx MDS presents to ED c/o progressive nauesa/vomiting and fatigue. Denies bleeding. Has received procrit/b12 injections. No abd pain. Admits to normal PO intake. No fever/chills/night sweats. Has had some wt loss. Has baseline pain on rt side from old rib fracture. No further complaints at this time.   Erin: Dr. Mejias

## 2023-08-13 NOTE — ED PROVIDER NOTE - OBJECTIVE STATEMENT
89yo F with MDS being seen by heme/onc Uche Mejias progressive nauesa/vomiting and fatigue. denies bleeding. has received procrit/b12 injections. no abd pain. admits to normal PO intake. no fever/chills/night sweats. has had some wt loss. has baseline pain on rt side from old rib fracture.

## 2023-08-13 NOTE — ED CDU PROVIDER INITIAL DAY NOTE - NSICDXPASTMEDICALHX_GEN_ALL_CORE_FT
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

## 2023-08-13 NOTE — ED ADULT NURSE REASSESSMENT NOTE - NS ED NURSE REASSESS COMMENT FT1
assumed care of pt at 1930 from VERONICA Mcghee , charting as noted. pt receiving blood transfusion with no adverse s/s. son came to bedside and helped pt ambulate to the bathroom. pt ambulated with a steady gait. pt has no complaints at this time. son informed that pt will be ready for d/c in the morning and to come back to pick her up. CRISPIN Doshi came to bedside to explain POC to pt and son who are in agreement with POC. respirations even and unlabored. pt shows no s/s of acute distress at this time. safety precautions maintained.

## 2023-08-13 NOTE — ED ADULT NURSE NOTE - OBJECTIVE STATEMENT
Pt reports to the ED c/o of Nausea and vomiting starting today. Pt a&ox3 states after bfast this morning she became nauseas and vomited once. Pt states she has noticed she has become more fatigue in the past week than usual. Pt states she has anemia and has recently started procrit injections with the first one last week and reports she slept the whole next day. denies abdominal pain, cp, sob, fever chills.

## 2023-08-13 NOTE — ED PROVIDER NOTE - PHYSICAL EXAMINATION
Gen: NAD, AOx3  Head: NCAT  HEENT: EOMI, oral mucosa moist, pale conjunctiva, neck supple  Lung: patient rhonchi rt base that clears with cough, no respiratory distress  CV: rrr, no murmur, Normal perfusion  Abd: soft, NTND  MSK: No edema, no visible deformities  Neuro: No focal neurologic deficits  Skin: No rash   Psych: normal affect

## 2023-08-13 NOTE — ED PROVIDER NOTE - CLINICAL SUMMARY MEDICAL DECISION MAKING FREE TEXT BOX
Pt with MDS presenting with anemia, plan to transfuse discussed with on call hematologist. Also with recurrent nausea has had this issue before but has not followed up. mild elevation Tbili no acute findings on sono/CT. will obs for transfusion

## 2023-08-13 NOTE — ED ADULT NURSE REASSESSMENT NOTE - NS ED NURSE REASSESS COMMENT FT1
Received pt from TAY Verdin RN. Pt. A&Ox4, no acute distress noted at this time.  Pt. denies pain/discomfort. Denies chest pain. On room air, no respiratory distress.  Safety maintained, call bell in reach

## 2023-08-14 ENCOUNTER — APPOINTMENT (OUTPATIENT)
Dept: INTERNAL MEDICINE | Facility: CLINIC | Age: 88
End: 2023-08-14

## 2023-08-14 VITALS
DIASTOLIC BLOOD PRESSURE: 80 MMHG | OXYGEN SATURATION: 96 % | SYSTOLIC BLOOD PRESSURE: 152 MMHG | TEMPERATURE: 98 F | HEART RATE: 94 BPM | RESPIRATION RATE: 18 BRPM

## 2023-08-14 LAB
APPEARANCE UR: CLEAR — SIGNIFICANT CHANGE UP
BACTERIA # UR AUTO: ABNORMAL
BILIRUB UR-MCNC: NEGATIVE — SIGNIFICANT CHANGE UP
COLOR SPEC: YELLOW — SIGNIFICANT CHANGE UP
COMMENT - URINE: SIGNIFICANT CHANGE UP
DIFF PNL FLD: ABNORMAL
EPI CELLS # UR: SIGNIFICANT CHANGE UP
GLUCOSE UR QL: NEGATIVE MG/DL — SIGNIFICANT CHANGE UP
HYALINE CASTS # UR AUTO: ABNORMAL /LPF
KETONES UR-MCNC: NEGATIVE — SIGNIFICANT CHANGE UP
LEUKOCYTE ESTERASE UR-ACNC: ABNORMAL
NITRITE UR-MCNC: NEGATIVE — SIGNIFICANT CHANGE UP
PH UR: 6 — SIGNIFICANT CHANGE UP (ref 5–8)
PROT UR-MCNC: 15
RBC CASTS # UR COMP ASSIST: ABNORMAL /HPF (ref 0–4)
SP GR SPEC: 1.01 — SIGNIFICANT CHANGE UP (ref 1.01–1.02)
UROBILINOGEN FLD QL: NEGATIVE MG/DL — SIGNIFICANT CHANGE UP
WBC UR QL: >50 /HPF (ref 0–5)

## 2023-08-14 PROCEDURE — 96375 TX/PRO/DX INJ NEW DRUG ADDON: CPT

## 2023-08-14 PROCEDURE — 86901 BLOOD TYPING SEROLOGIC RH(D): CPT

## 2023-08-14 PROCEDURE — 99285 EMERGENCY DEPT VISIT HI MDM: CPT | Mod: 25

## 2023-08-14 PROCEDURE — 85025 COMPLETE CBC W/AUTO DIFF WBC: CPT

## 2023-08-14 PROCEDURE — G0378: CPT

## 2023-08-14 PROCEDURE — 86922 COMPATIBILITY TEST ANTIGLOB: CPT

## 2023-08-14 PROCEDURE — 86850 RBC ANTIBODY SCREEN: CPT

## 2023-08-14 PROCEDURE — 81001 URINALYSIS AUTO W/SCOPE: CPT

## 2023-08-14 PROCEDURE — 84484 ASSAY OF TROPONIN QUANT: CPT

## 2023-08-14 PROCEDURE — 86900 BLOOD TYPING SEROLOGIC ABO: CPT

## 2023-08-14 PROCEDURE — 36415 COLL VENOUS BLD VENIPUNCTURE: CPT

## 2023-08-14 PROCEDURE — 76705 ECHO EXAM OF ABDOMEN: CPT

## 2023-08-14 PROCEDURE — 71045 X-RAY EXAM CHEST 1 VIEW: CPT

## 2023-08-14 PROCEDURE — 80053 COMPREHEN METABOLIC PANEL: CPT

## 2023-08-14 PROCEDURE — P9040: CPT

## 2023-08-14 PROCEDURE — 85610 PROTHROMBIN TIME: CPT

## 2023-08-14 PROCEDURE — 85730 THROMBOPLASTIN TIME PARTIAL: CPT

## 2023-08-14 PROCEDURE — 83690 ASSAY OF LIPASE: CPT

## 2023-08-14 PROCEDURE — 99238 HOSP IP/OBS DSCHRG MGMT 30/<: CPT

## 2023-08-14 PROCEDURE — 36430 TRANSFUSION BLD/BLD COMPNT: CPT

## 2023-08-14 PROCEDURE — 86880 COOMBS TEST DIRECT: CPT

## 2023-08-14 PROCEDURE — 96374 THER/PROPH/DIAG INJ IV PUSH: CPT

## 2023-08-14 PROCEDURE — 86870 RBC ANTIBODY IDENTIFICATION: CPT

## 2023-08-14 PROCEDURE — 74176 CT ABD & PELVIS W/O CONTRAST: CPT | Mod: MA

## 2023-08-14 PROCEDURE — 83735 ASSAY OF MAGNESIUM: CPT

## 2023-08-14 PROCEDURE — 0225U NFCT DS DNA&RNA 21 SARSCOV2: CPT

## 2023-08-14 PROCEDURE — 86902 BLOOD TYPE ANTIGEN DONOR EA: CPT

## 2023-08-14 PROCEDURE — 86860 RBC ANTIBODY ELUTION: CPT

## 2023-08-14 RX ORDER — ACETAMINOPHEN 500 MG
650 TABLET ORAL ONCE
Refills: 0 | Status: COMPLETED | OUTPATIENT
Start: 2023-08-14 | End: 2023-08-14

## 2023-08-14 RX ORDER — CEPHALEXIN 500 MG
1 CAPSULE ORAL
Qty: 28 | Refills: 0
Start: 2023-08-14 | End: 2023-08-20

## 2023-08-14 RX ORDER — CEFTRIAXONE 500 MG/1
1000 INJECTION, POWDER, FOR SOLUTION INTRAMUSCULAR; INTRAVENOUS EVERY 24 HOURS
Refills: 0 | Status: DISCONTINUED | OUTPATIENT
Start: 2023-08-14 | End: 2023-08-21

## 2023-08-14 RX ADMIN — CEFTRIAXONE 1000 MILLIGRAM(S): 500 INJECTION, POWDER, FOR SOLUTION INTRAMUSCULAR; INTRAVENOUS at 05:12

## 2023-08-14 RX ADMIN — Medication 650 MILLIGRAM(S): at 07:42

## 2023-08-14 NOTE — ED ADULT NURSE REASSESSMENT NOTE - NS ED NURSE REASSESS COMMENT FT1
Report received from VERONICA Rich.  Pt resting comfortably on stretcher.  No complaints of pain.  Respirations even and unlabored.  Awaiting d/c to home when ride arrives.  PIV wnl; flushing without difficulty.  In NAD, will continue to monitor.

## 2023-08-14 NOTE — ED CDU PROVIDER SUBSEQUENT DAY NOTE - HISTORY
No events. Transfusion completed without issues. Vital signs remained stable overnight. Received no calls by RN overnight.

## 2023-08-14 NOTE — ED CDU PROVIDER SUBSEQUENT DAY NOTE - CLINICAL SUMMARY MEDICAL DECISION MAKING FREE TEXT BOX
89 yo female PMHx MDS presents to ED c/o progressive nausea/vomiting and fatigue. Hemoglobin 6. Slightly elevated bili, CT negative. ED attending spoke with on call hematologist; transfuse and discharge. Transfusion completed without issue. Morning discharge.

## 2023-08-14 NOTE — ED CDU PROVIDER DISPOSITION NOTE - PATIENT PORTAL LINK FT
You can access the FollowMyHealth Patient Portal offered by [facility] by registering at the following website: [email or web address]. By joining [email or web address]’s FollowMyHealth portal, you will also be able to view your health information using other applications (apps) compatible with our system.

## 2023-08-14 NOTE — ED CDU PROVIDER SUBSEQUENT DAY NOTE - NSICDXPASTMEDICALHX_GEN_ALL_CORE_FT
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

## 2023-08-14 NOTE — ED CDU PROVIDER DISPOSITION NOTE - CLINICAL COURSE
89 yo female PMHx MDS presents to ED c/o progressive nausea/vomiting and fatigue. Hemoglobin 6. Slightly elevated bili, CT negative. ED attending spoke with on call hematologist- advised blood transfusion. Pt received 2 units PRBCs and tolerated well. Pt was also noted to have a UTI - given dose of IV ceftriaxone and will dc on keflex. f/u with PCP and hem/onc.

## 2023-08-14 NOTE — ED CDU PROVIDER DISPOSITION NOTE - NSFOLLOWUPINSTRUCTIONS_ED_ALL_ED_FT
Please take antibiotics as prescribed for UTI  Please follow up with primary care doctor in 2-3 days  Follow up with your hematologist/oncologist  Return to ER for any new or worsening symptoms    Urinary Tract Infection    A urinary tract infection (UTI) is an infection of any part of the urinary tract, which includes the kidneys, ureters, bladder, and urethra. Risk factors include ignoring your need to urinate, wiping back to front if female, being an uncircumcised male, and having diabetes or a weak immune system. Symptoms include frequent urination, pain or burning with urination, foul smelling urine, cloudy urine, pain in the lower abdomen, blood in the urine, and fever. If you were prescribed an antibiotic medicine, take it as told by your health care provider. Do not stop taking the antibiotic even if you start to feel better.    SEEK IMMEDIATE MEDICAL CARE IF YOU HAVE ANY OF THE FOLLOWING SYMPTOMS: severe back or abdominal pain, fever, inability to keep fluids or medicine down, dizziness/lightheadedness, or a change in mental status.    Anemia    Anemia is a condition in which the concentration of red blood cells or hemoglobin in the blood is below normal. Hemoglobin is a substance in red blood cells that carries oxygen to the tissues of the body. Anemia results in not enough oxygen reaching these tissues which can cause symptoms such as weakness, dizziness/lightheadedness, shortness of breath, chest pain, paleness, or nausea. The cause of your anemia may or may not be determined immediately. If your hemoglobin was dangerously low, you may have received a blood transfusion. Usually reactions to transfusions occur immediately but monitor yourself for any fevers, rash, or shortness of breath.    SEEK IMMEDIATE MEDICAL CARE IF YOU HAVE ANY OF THE FOLLOWING SYMPTOMS: extreme weakness/chest pain/shortness of breath, black or bloody stools, vomiting blood, fainting, fever, or any signs of dehydration.

## 2023-08-14 NOTE — ED CDU PROVIDER SUBSEQUENT DAY NOTE - NS ED ATTENDING STATEMENT MOD
This was a shared visit with the KAYA. I reviewed and verified the documentation and independently performed the documented:

## 2023-08-14 NOTE — ED CDU PROVIDER DISPOSITION NOTE - ATTENDING CONTRIBUTION TO CARE
I personally saw the patient with the PA and completed the key components of the history and physical exam. I then discussed the management plan with the PA

## 2023-08-14 NOTE — ED ADULT NURSE REASSESSMENT NOTE - NS ED NURSE REASSESS COMMENT FT1
pt ambulatory to bathroom with standby assist. respirations even and unlabored. pt shows no s/s of acute distress at this time. safety precautions maintained. General Sunscreen Counseling: I recommended a broad spectrum sunscreen with a SPF of 30 or higher. I discussed my preference for Physical blocking/mineral sunblocks including zinc oxide or titanium dioxide. I explained that SPF 30 sunscreens block approximately 97 percent of the sun's harmful rays. Sunscreens should be applied at least 15 minutes prior to expected sun exposure and then every 2 hours after that as long as sun exposure continues. If swimming or exercising sunscreen should be reapplied every 45 minutes to an hour after getting wet or sweating. One ounce, or the equivalent of a shot glass full of sunscreen, is adequate to protect the skin not covered by a bathing suit. I also recommended a lip balm with a sunscreen as well. Sun protective clothing can be used in lieu of sunscreen but must be worn the entire time you are exposed to the sun's rays. Detail Level: Detailed

## 2023-08-24 ENCOUNTER — APPOINTMENT (OUTPATIENT)
Dept: PULMONOLOGY | Facility: CLINIC | Age: 88
End: 2023-08-24

## 2023-08-30 ENCOUNTER — OUTPATIENT (OUTPATIENT)
Dept: OUTPATIENT SERVICES | Facility: HOSPITAL | Age: 88
LOS: 1 days | End: 2023-08-30
Payer: MEDICARE

## 2023-08-30 ENCOUNTER — RESULT REVIEW (OUTPATIENT)
Age: 88
End: 2023-08-30

## 2023-08-30 ENCOUNTER — NON-APPOINTMENT (OUTPATIENT)
Age: 88
End: 2023-08-30

## 2023-08-30 ENCOUNTER — APPOINTMENT (OUTPATIENT)
Dept: HEMATOLOGY ONCOLOGY | Facility: CLINIC | Age: 88
End: 2023-08-30
Payer: MEDICARE

## 2023-08-30 ENCOUNTER — APPOINTMENT (OUTPATIENT)
Age: 88
End: 2023-08-30

## 2023-08-30 VITALS
WEIGHT: 135 LBS | SYSTOLIC BLOOD PRESSURE: 135 MMHG | TEMPERATURE: 96.7 F | HEIGHT: 64 IN | BODY MASS INDEX: 23.05 KG/M2 | DIASTOLIC BLOOD PRESSURE: 78 MMHG | HEART RATE: 76 BPM | OXYGEN SATURATION: 98 %

## 2023-08-30 DIAGNOSIS — Z86.79 PERSONAL HISTORY OF OTHER DISEASES OF THE CIRCULATORY SYSTEM: Chronic | ICD-10-CM

## 2023-08-30 DIAGNOSIS — Z96.641 PRESENCE OF RIGHT ARTIFICIAL HIP JOINT: Chronic | ICD-10-CM

## 2023-08-30 DIAGNOSIS — D64.9 ANEMIA, UNSPECIFIED: ICD-10-CM

## 2023-08-30 DIAGNOSIS — Z98.890 OTHER SPECIFIED POSTPROCEDURAL STATES: Chronic | ICD-10-CM

## 2023-08-30 DIAGNOSIS — Z87.81 PERSONAL HISTORY OF (HEALED) TRAUMATIC FRACTURE: Chronic | ICD-10-CM

## 2023-08-30 LAB
BASOPHILS # BLD AUTO: 0.1 K/UL — SIGNIFICANT CHANGE UP (ref 0–0.2)
BASOPHILS NFR BLD AUTO: 1 % — SIGNIFICANT CHANGE UP (ref 0–2)
BLD GP AB SCN SERPL QL: SIGNIFICANT CHANGE UP
DAT IGG-SP REAG RBC-IMP: ABNORMAL
DIR ANTIGLOB POLYSPECIFIC INTERPRETATION: ABNORMAL
EOSINOPHIL # BLD AUTO: 0.9 K/UL — HIGH (ref 0–0.5)
EOSINOPHIL NFR BLD AUTO: 12.2 % — HIGH (ref 0–6)
HCT VFR BLD CALC: 19.1 % — CRITICAL LOW (ref 34.5–45)
HGB BLD-MCNC: 7.1 G/DL — LOW (ref 11.5–15.5)
LYMPHOCYTES # BLD AUTO: 2.2 K/UL — SIGNIFICANT CHANGE UP (ref 1–3.3)
LYMPHOCYTES # BLD AUTO: 30.7 % — SIGNIFICANT CHANGE UP (ref 13–44)
MCHC RBC-ENTMCNC: 37.3 G/DL — HIGH (ref 32–36)
MCHC RBC-ENTMCNC: 37.4 PG — HIGH (ref 27–34)
MCV RBC AUTO: 100.1 FL — HIGH (ref 80–100)
MONOCYTES # BLD AUTO: 0.6 K/UL — SIGNIFICANT CHANGE UP (ref 0–0.9)
MONOCYTES NFR BLD AUTO: 7.7 % — SIGNIFICANT CHANGE UP (ref 2–14)
NEUTROPHILS # BLD AUTO: 3.3 K/UL — SIGNIFICANT CHANGE UP (ref 1.8–7.4)
NEUTROPHILS NFR BLD AUTO: 45.2 % — SIGNIFICANT CHANGE UP (ref 43–77)
PLATELET # BLD AUTO: 330 K/UL — SIGNIFICANT CHANGE UP (ref 150–400)
RBC # BLD: 1.91 M/UL — LOW (ref 3.8–5.2)
RBC # FLD: 17.3 % — HIGH (ref 10.3–14.5)
WBC # BLD: 7.3 K/UL — SIGNIFICANT CHANGE UP (ref 3.8–10.5)
WBC # FLD AUTO: 7.3 K/UL — SIGNIFICANT CHANGE UP (ref 3.8–10.5)

## 2023-08-30 PROCEDURE — 99213 OFFICE O/P EST LOW 20 MIN: CPT

## 2023-08-30 PROCEDURE — 86079 PHYS BLOOD BANK SERV AUTHRJ: CPT

## 2023-08-31 ENCOUNTER — APPOINTMENT (OUTPATIENT)
Age: 88
End: 2023-08-31

## 2023-08-31 PROCEDURE — 86900 BLOOD TYPING SEROLOGIC ABO: CPT

## 2023-08-31 PROCEDURE — 86922 COMPATIBILITY TEST ANTIGLOB: CPT

## 2023-08-31 PROCEDURE — 36415 COLL VENOUS BLD VENIPUNCTURE: CPT

## 2023-08-31 PROCEDURE — P9040: CPT

## 2023-08-31 PROCEDURE — 86902 BLOOD TYPE ANTIGEN DONOR EA: CPT

## 2023-08-31 PROCEDURE — 86901 BLOOD TYPING SEROLOGIC RH(D): CPT

## 2023-08-31 PROCEDURE — 86880 COOMBS TEST DIRECT: CPT

## 2023-08-31 PROCEDURE — 86850 RBC ANTIBODY SCREEN: CPT

## 2023-08-31 NOTE — HISTORY OF PRESENT ILLNESS
[de-identified] : \par  Mrs. Laguerre is a 89 yo WF referred by  for anemia.\par  She was recently in the hospital with SOB and fatigue and was found to have bilateral multifocal pneumonia. she was treated with antibiotics and placed on O2.\par  CT scans done of abdomen and pelvis showed no PE, multifocal bilateral pneumonia reactive mediastinal and hilar lymphadenopathy, no specific pulmonary nodules.During that admission her HGb was 9 gms.She also has a history of almost daily nausea and vomiting and food getting stuck in her esophagus, She has been refusing an endoscopy, They have tried Ondansetron and Reglan, which she stopped because she said it did not help.\par  Medical record states and patient confirms that she has a history of MDS, diagnosed 12 years ago.  We are attempting to obtain records from my former office where she was treated.\par  Her other co morbidities include HTN, Gerd, Gastroporesis, osteoporosis, spinal stenosis. \par  \par  \par   She does have increased fatigue, No evidence of GI or  bleeding. She has stopped the Ondansetron and Reglan for the nausea and vomiting, but has had no vomiting for 3 days.\par  Is still on O2, gets SOB with exertion. \par   [de-identified] :  Presents with family for follow up and for Procrit and B12 today  She is experiencing significant fatigued and THOMAS,  Denies palpitations

## 2023-08-31 NOTE — ASSESSMENT
[FreeTextEntry1] : 89-year-old woman with prior history of myelodysplasia, has been treated recently for a low normal B12 level but her hemoglobin is continuing to trend downwards. We haveattempted therapy with Procrit, and continue supportive transfusions as needed. She is on monthly Procrit, we will increase frequency of Procrit injections to q2w. If refractory to Procrit, we would use Luspatercept for MDS.   Today Hg 7.1 g HCT 19.2% Proceed with Procrit + B12 injection today

## 2023-08-31 NOTE — ADDENDUM
[FreeTextEntry1] : Documented by Katie Lynn  acting as scribe for Dr. Mejias on  08/30/2023.  All Medical record entries made by the Scribe were at my, Dr. Mejias's, direction and personally dictated by me on  08/30/2023. I have reviewed the chart and agree that the record accurately reflects my personal performance of the history, physical exam, assessment and plan. I have also personally directed, reviewed, and agreed with the discharge instructions.

## 2023-09-01 DIAGNOSIS — R11.2 NAUSEA WITH VOMITING, UNSPECIFIED: ICD-10-CM

## 2023-09-01 DIAGNOSIS — Z51.89 ENCOUNTER FOR OTHER SPECIFIED AFTERCARE: ICD-10-CM

## 2023-09-14 ENCOUNTER — OUTPATIENT (OUTPATIENT)
Dept: OUTPATIENT SERVICES | Facility: HOSPITAL | Age: 88
LOS: 1 days | Discharge: HOME | End: 2023-09-14

## 2023-09-14 DIAGNOSIS — Z98.890 OTHER SPECIFIED POSTPROCEDURAL STATES: Chronic | ICD-10-CM

## 2023-09-14 DIAGNOSIS — Z86.79 PERSONAL HISTORY OF OTHER DISEASES OF THE CIRCULATORY SYSTEM: Chronic | ICD-10-CM

## 2023-09-14 DIAGNOSIS — Z96.641 PRESENCE OF RIGHT ARTIFICIAL HIP JOINT: Chronic | ICD-10-CM

## 2023-09-14 DIAGNOSIS — H26.9 UNSPECIFIED CATARACT: Chronic | ICD-10-CM

## 2023-09-14 DIAGNOSIS — Z90.721 ACQUIRED ABSENCE OF OVARIES, UNILATERAL: Chronic | ICD-10-CM

## 2023-09-14 DIAGNOSIS — Z87.81 PERSONAL HISTORY OF (HEALED) TRAUMATIC FRACTURE: Chronic | ICD-10-CM

## 2023-09-14 DIAGNOSIS — Z90.49 ACQUIRED ABSENCE OF OTHER SPECIFIED PARTS OF DIGESTIVE TRACT: Chronic | ICD-10-CM

## 2023-09-14 DIAGNOSIS — D64.9 ANEMIA, UNSPECIFIED: ICD-10-CM

## 2023-09-15 ENCOUNTER — RESULT REVIEW (OUTPATIENT)
Age: 88
End: 2023-09-15

## 2023-09-15 ENCOUNTER — APPOINTMENT (OUTPATIENT)
Age: 88
End: 2023-09-15

## 2023-09-15 LAB
ANISOCYTOSIS BLD QL: SLIGHT — SIGNIFICANT CHANGE UP
BASOPHILS # BLD AUTO: 0 K/UL — SIGNIFICANT CHANGE UP (ref 0–0.2)
BASOPHILS NFR BLD AUTO: 1 % — SIGNIFICANT CHANGE UP (ref 0–2)
EOSINOPHIL # BLD AUTO: 0.5 K/UL — SIGNIFICANT CHANGE UP (ref 0–0.5)
EOSINOPHIL NFR BLD AUTO: 9 % — HIGH (ref 0–6)
HCT VFR BLD CALC: 30.6 % — LOW (ref 34.5–45)
HGB BLD-MCNC: 11.6 G/DL — SIGNIFICANT CHANGE UP (ref 11.5–15.5)
LYMPHOCYTES # BLD AUTO: 1.8 K/UL — SIGNIFICANT CHANGE UP (ref 1–3.3)
LYMPHOCYTES # BLD AUTO: 28 % — SIGNIFICANT CHANGE UP (ref 13–44)
MACROCYTES BLD QL: SLIGHT — SIGNIFICANT CHANGE UP
MCHC RBC-ENTMCNC: 33 PG — SIGNIFICANT CHANGE UP (ref 27–34)
MCHC RBC-ENTMCNC: 37.7 G/DL — HIGH (ref 32–36)
MCV RBC AUTO: 87.5 FL — SIGNIFICANT CHANGE UP (ref 80–100)
MICROCYTES BLD QL: SLIGHT — SIGNIFICANT CHANGE UP
MONOCYTES # BLD AUTO: 0.5 K/UL — SIGNIFICANT CHANGE UP (ref 0–0.9)
MONOCYTES NFR BLD AUTO: 7 % — SIGNIFICANT CHANGE UP (ref 2–14)
NEUTROPHILS # BLD AUTO: 2.9 K/UL — SIGNIFICANT CHANGE UP (ref 1.8–7.4)
NEUTROPHILS NFR BLD AUTO: 53 % — SIGNIFICANT CHANGE UP (ref 43–77)
PLAT MORPH BLD: NORMAL — SIGNIFICANT CHANGE UP
PLATELET # BLD AUTO: 251 K/UL — SIGNIFICANT CHANGE UP (ref 150–400)
POIKILOCYTOSIS BLD QL AUTO: SLIGHT — SIGNIFICANT CHANGE UP
POLYCHROMASIA BLD QL SMEAR: SLIGHT — SIGNIFICANT CHANGE UP
RBC # BLD: 3.5 M/UL — LOW (ref 3.8–5.2)
RBC # FLD: 15.6 % — HIGH (ref 10.3–14.5)
RBC BLD AUTO: ABNORMAL
VARIANT LYMPHS # BLD: 2 % — SIGNIFICANT CHANGE UP (ref 0–6)
WBC # BLD: 6 K/UL — SIGNIFICANT CHANGE UP (ref 3.8–10.5)
WBC # FLD AUTO: 6 K/UL — SIGNIFICANT CHANGE UP (ref 3.8–10.5)

## 2023-09-28 ENCOUNTER — APPOINTMENT (OUTPATIENT)
Age: 88
End: 2023-09-28

## 2023-09-29 ENCOUNTER — RESULT REVIEW (OUTPATIENT)
Age: 88
End: 2023-09-29

## 2023-09-29 ENCOUNTER — APPOINTMENT (OUTPATIENT)
Age: 88
End: 2023-09-29

## 2023-09-29 LAB
ANISOCYTOSIS BLD QL: SLIGHT — SIGNIFICANT CHANGE UP
BASOPHILS # BLD AUTO: 0 K/UL — SIGNIFICANT CHANGE UP (ref 0–0.2)
BASOPHILS NFR BLD AUTO: 0.3 % — SIGNIFICANT CHANGE UP (ref 0–2)
EOSINOPHIL # BLD AUTO: 0.4 K/UL — SIGNIFICANT CHANGE UP (ref 0–0.5)
EOSINOPHIL NFR BLD AUTO: 6.9 % — HIGH (ref 0–6)
HCT VFR BLD CALC: 32.9 % — LOW (ref 34.5–45)
HGB BLD-MCNC: 11.1 G/DL — LOW (ref 11.5–15.5)
LG PLATELETS BLD QL AUTO: SLIGHT — SIGNIFICANT CHANGE UP
LYMPHOCYTES # BLD AUTO: 2 K/UL — SIGNIFICANT CHANGE UP (ref 1–3.3)
LYMPHOCYTES # BLD AUTO: 34.4 % — SIGNIFICANT CHANGE UP (ref 13–44)
MACROCYTES BLD QL: SLIGHT — SIGNIFICANT CHANGE UP
MCHC RBC-ENTMCNC: 31.3 PG — SIGNIFICANT CHANGE UP (ref 27–34)
MCHC RBC-ENTMCNC: 33.7 G/DL — SIGNIFICANT CHANGE UP (ref 32–36)
MCV RBC AUTO: 93 FL — SIGNIFICANT CHANGE UP (ref 80–100)
MICROCYTES BLD QL: SLIGHT — SIGNIFICANT CHANGE UP
MONOCYTES # BLD AUTO: 0.5 K/UL — SIGNIFICANT CHANGE UP (ref 0–0.9)
MONOCYTES NFR BLD AUTO: 8.8 % — SIGNIFICANT CHANGE UP (ref 2–14)
NEUTROPHILS # BLD AUTO: 3 K/UL — SIGNIFICANT CHANGE UP (ref 1.8–7.4)
NEUTROPHILS NFR BLD AUTO: 49.6 % — SIGNIFICANT CHANGE UP (ref 43–77)
PLAT MORPH BLD: NORMAL — SIGNIFICANT CHANGE UP
PLATELET # BLD AUTO: 287 K/UL — SIGNIFICANT CHANGE UP (ref 150–400)
POIKILOCYTOSIS BLD QL AUTO: SLIGHT — SIGNIFICANT CHANGE UP
RBC # BLD: 3.54 M/UL — LOW (ref 3.8–5.2)
RBC # FLD: 14.2 % — SIGNIFICANT CHANGE UP (ref 10.3–14.5)
RBC BLD AUTO: SIGNIFICANT CHANGE UP
WBC # BLD: 6 K/UL — SIGNIFICANT CHANGE UP (ref 3.8–10.5)
WBC # FLD AUTO: 6 K/UL — SIGNIFICANT CHANGE UP (ref 3.8–10.5)

## 2023-10-20 ENCOUNTER — RESULT REVIEW (OUTPATIENT)
Age: 88
End: 2023-10-20

## 2023-10-20 ENCOUNTER — APPOINTMENT (OUTPATIENT)
Age: 88
End: 2023-10-20

## 2023-10-20 DIAGNOSIS — Z74.09 OTHER REDUCED MOBILITY: ICD-10-CM

## 2023-10-20 LAB
BASOPHILS # BLD AUTO: 0.1 K/UL — SIGNIFICANT CHANGE UP (ref 0–0.2)
BASOPHILS # BLD AUTO: 0.1 K/UL — SIGNIFICANT CHANGE UP (ref 0–0.2)
BASOPHILS NFR BLD AUTO: 1.2 % — SIGNIFICANT CHANGE UP (ref 0–2)
BASOPHILS NFR BLD AUTO: 1.2 % — SIGNIFICANT CHANGE UP (ref 0–2)
EOSINOPHIL # BLD AUTO: 0.6 K/UL — HIGH (ref 0–0.5)
EOSINOPHIL # BLD AUTO: 0.6 K/UL — HIGH (ref 0–0.5)
EOSINOPHIL NFR BLD AUTO: 11.2 % — HIGH (ref 0–6)
EOSINOPHIL NFR BLD AUTO: 11.2 % — HIGH (ref 0–6)
HCT VFR BLD CALC: 31.1 % — LOW (ref 34.5–45)
HCT VFR BLD CALC: 31.1 % — LOW (ref 34.5–45)
HGB BLD-MCNC: 11.3 G/DL — LOW (ref 11.5–15.5)
HGB BLD-MCNC: 11.3 G/DL — LOW (ref 11.5–15.5)
LYMPHOCYTES # BLD AUTO: 1.9 K/UL — SIGNIFICANT CHANGE UP (ref 1–3.3)
LYMPHOCYTES # BLD AUTO: 1.9 K/UL — SIGNIFICANT CHANGE UP (ref 1–3.3)
LYMPHOCYTES # BLD AUTO: 34.6 % — SIGNIFICANT CHANGE UP (ref 13–44)
LYMPHOCYTES # BLD AUTO: 34.6 % — SIGNIFICANT CHANGE UP (ref 13–44)
MCHC RBC-ENTMCNC: 33.3 PG — SIGNIFICANT CHANGE UP (ref 27–34)
MCHC RBC-ENTMCNC: 33.3 PG — SIGNIFICANT CHANGE UP (ref 27–34)
MCHC RBC-ENTMCNC: 36.4 G/DL — HIGH (ref 32–36)
MCHC RBC-ENTMCNC: 36.4 G/DL — HIGH (ref 32–36)
MCV RBC AUTO: 91.5 FL — SIGNIFICANT CHANGE UP (ref 80–100)
MCV RBC AUTO: 91.5 FL — SIGNIFICANT CHANGE UP (ref 80–100)
MONOCYTES # BLD AUTO: 0.5 K/UL — SIGNIFICANT CHANGE UP (ref 0–0.9)
MONOCYTES # BLD AUTO: 0.5 K/UL — SIGNIFICANT CHANGE UP (ref 0–0.9)
MONOCYTES NFR BLD AUTO: 8.7 % — SIGNIFICANT CHANGE UP (ref 2–14)
MONOCYTES NFR BLD AUTO: 8.7 % — SIGNIFICANT CHANGE UP (ref 2–14)
NEUTROPHILS # BLD AUTO: 2.4 K/UL — SIGNIFICANT CHANGE UP (ref 1.8–7.4)
NEUTROPHILS # BLD AUTO: 2.4 K/UL — SIGNIFICANT CHANGE UP (ref 1.8–7.4)
NEUTROPHILS NFR BLD AUTO: 44.3 % — SIGNIFICANT CHANGE UP (ref 43–77)
NEUTROPHILS NFR BLD AUTO: 44.3 % — SIGNIFICANT CHANGE UP (ref 43–77)
PLATELET # BLD AUTO: 277 K/UL — SIGNIFICANT CHANGE UP (ref 150–400)
PLATELET # BLD AUTO: 277 K/UL — SIGNIFICANT CHANGE UP (ref 150–400)
RBC # BLD: 3.4 M/UL — LOW (ref 3.8–5.2)
RBC # BLD: 3.4 M/UL — LOW (ref 3.8–5.2)
RBC # FLD: 14.6 % — HIGH (ref 10.3–14.5)
RBC # FLD: 14.6 % — HIGH (ref 10.3–14.5)
WBC # BLD: 5.4 K/UL — SIGNIFICANT CHANGE UP (ref 3.8–10.5)
WBC # BLD: 5.4 K/UL — SIGNIFICANT CHANGE UP (ref 3.8–10.5)
WBC # FLD AUTO: 5.4 K/UL — SIGNIFICANT CHANGE UP (ref 3.8–10.5)
WBC # FLD AUTO: 5.4 K/UL — SIGNIFICANT CHANGE UP (ref 3.8–10.5)

## 2023-11-09 ENCOUNTER — OFFICE (OUTPATIENT)
Dept: URBAN - METROPOLITAN AREA CLINIC 104 | Facility: CLINIC | Age: 88
Setting detail: OPHTHALMOLOGY
End: 2023-11-09
Payer: MEDICARE

## 2023-11-09 DIAGNOSIS — H26.491: ICD-10-CM

## 2023-11-09 DIAGNOSIS — H35.3211: ICD-10-CM

## 2023-11-09 DIAGNOSIS — H01.004: ICD-10-CM

## 2023-11-09 DIAGNOSIS — H43.813: ICD-10-CM

## 2023-11-09 DIAGNOSIS — H01.001: ICD-10-CM

## 2023-11-09 DIAGNOSIS — H04.122: ICD-10-CM

## 2023-11-09 DIAGNOSIS — H04.123: ICD-10-CM

## 2023-11-09 DIAGNOSIS — H35.033: ICD-10-CM

## 2023-11-09 DIAGNOSIS — H01.005: ICD-10-CM

## 2023-11-09 DIAGNOSIS — H01.002: ICD-10-CM

## 2023-11-09 DIAGNOSIS — H04.121: ICD-10-CM

## 2023-11-09 DIAGNOSIS — H35.3122: ICD-10-CM

## 2023-11-09 PROCEDURE — 92134 CPTRZ OPH DX IMG PST SGM RTA: CPT | Performed by: OPTOMETRIST

## 2023-11-09 PROCEDURE — 92014 COMPRE OPH EXAM EST PT 1/>: CPT | Performed by: OPTOMETRIST

## 2023-11-09 ASSESSMENT — REFRACTION_MANIFEST
OD_VA1: 20/60
OS_AXIS: 085
OS_SPHERE: PLANO
OD_CYLINDER: -2.25
OS_VA1: 20/125
OS_CYLINDER: -1.25
OD_SPHERE: -0.25
OD_AXIS: 075

## 2023-11-09 ASSESSMENT — LID EXAM ASSESSMENTS
OS_BLEPHARITIS: LLL LUL 3+
OD_BLEPHARITIS: RLL RUL 3+

## 2023-11-09 ASSESSMENT — REFRACTION_AUTOREFRACTION
OS_AXIS: 084
OD_SPHERE: -0.75
OD_CYLINDER: -2.25
OS_SPHERE: +1.25
OS_CYLINDER: -1.50
OD_AXIS: 072

## 2023-11-09 ASSESSMENT — CONFRONTATIONAL VISUAL FIELD TEST (CVF)
OD_FINDINGS: FULL
OS_FINDINGS: FULL

## 2023-11-09 ASSESSMENT — SPHEQUIV_DERIVED
OS_SPHEQUIV: 0.5
OD_SPHEQUIV: -1.375
OD_SPHEQUIV: -1.875

## 2023-11-09 ASSESSMENT — TEAR BREAK UP TIME (TBUT)
OS_TBUT: 2+
OD_TBUT: 2+

## 2023-11-10 ENCOUNTER — APPOINTMENT (OUTPATIENT)
Age: 88
End: 2023-11-10

## 2023-11-10 ENCOUNTER — RESULT REVIEW (OUTPATIENT)
Age: 88
End: 2023-11-10

## 2023-11-10 LAB
BASOPHILS # BLD AUTO: 0.1 K/UL — SIGNIFICANT CHANGE UP (ref 0–0.2)
BASOPHILS # BLD AUTO: 0.1 K/UL — SIGNIFICANT CHANGE UP (ref 0–0.2)
BASOPHILS NFR BLD AUTO: 1.2 % — SIGNIFICANT CHANGE UP (ref 0–2)
BASOPHILS NFR BLD AUTO: 1.2 % — SIGNIFICANT CHANGE UP (ref 0–2)
EOSINOPHIL # BLD AUTO: 1.1 K/UL — HIGH (ref 0–0.5)
EOSINOPHIL # BLD AUTO: 1.1 K/UL — HIGH (ref 0–0.5)
EOSINOPHIL NFR BLD AUTO: 18.2 % — HIGH (ref 0–6)
EOSINOPHIL NFR BLD AUTO: 18.2 % — HIGH (ref 0–6)
HCT VFR BLD CALC: 36.9 % — SIGNIFICANT CHANGE UP (ref 34.5–45)
HCT VFR BLD CALC: 36.9 % — SIGNIFICANT CHANGE UP (ref 34.5–45)
HGB BLD-MCNC: 12.7 G/DL — SIGNIFICANT CHANGE UP (ref 11.5–15.5)
HGB BLD-MCNC: 12.7 G/DL — SIGNIFICANT CHANGE UP (ref 11.5–15.5)
LYMPHOCYTES # BLD AUTO: 2.4 K/UL — SIGNIFICANT CHANGE UP (ref 1–3.3)
LYMPHOCYTES # BLD AUTO: 2.4 K/UL — SIGNIFICANT CHANGE UP (ref 1–3.3)
LYMPHOCYTES # BLD AUTO: 38.1 % — SIGNIFICANT CHANGE UP (ref 13–44)
LYMPHOCYTES # BLD AUTO: 38.1 % — SIGNIFICANT CHANGE UP (ref 13–44)
MCHC RBC-ENTMCNC: 32.8 PG — SIGNIFICANT CHANGE UP (ref 27–34)
MCHC RBC-ENTMCNC: 32.8 PG — SIGNIFICANT CHANGE UP (ref 27–34)
MCHC RBC-ENTMCNC: 34.5 G/DL — SIGNIFICANT CHANGE UP (ref 32–36)
MCHC RBC-ENTMCNC: 34.5 G/DL — SIGNIFICANT CHANGE UP (ref 32–36)
MCV RBC AUTO: 95 FL — SIGNIFICANT CHANGE UP (ref 80–100)
MCV RBC AUTO: 95 FL — SIGNIFICANT CHANGE UP (ref 80–100)
MONOCYTES # BLD AUTO: 0.6 K/UL — SIGNIFICANT CHANGE UP (ref 0–0.9)
MONOCYTES # BLD AUTO: 0.6 K/UL — SIGNIFICANT CHANGE UP (ref 0–0.9)
MONOCYTES NFR BLD AUTO: 10 % — SIGNIFICANT CHANGE UP (ref 2–14)
MONOCYTES NFR BLD AUTO: 10 % — SIGNIFICANT CHANGE UP (ref 2–14)
NEUTROPHILS # BLD AUTO: 2 K/UL — SIGNIFICANT CHANGE UP (ref 1.8–7.4)
NEUTROPHILS # BLD AUTO: 2 K/UL — SIGNIFICANT CHANGE UP (ref 1.8–7.4)
NEUTROPHILS NFR BLD AUTO: 32.5 % — LOW (ref 43–77)
NEUTROPHILS NFR BLD AUTO: 32.5 % — LOW (ref 43–77)
PLATELET # BLD AUTO: 303 K/UL — SIGNIFICANT CHANGE UP (ref 150–400)
PLATELET # BLD AUTO: 303 K/UL — SIGNIFICANT CHANGE UP (ref 150–400)
RBC # BLD: 3.89 M/UL — SIGNIFICANT CHANGE UP (ref 3.8–5.2)
RBC # BLD: 3.89 M/UL — SIGNIFICANT CHANGE UP (ref 3.8–5.2)
RBC # FLD: 12.7 % — SIGNIFICANT CHANGE UP (ref 10.3–14.5)
RBC # FLD: 12.7 % — SIGNIFICANT CHANGE UP (ref 10.3–14.5)
WBC # BLD: 6.2 K/UL — SIGNIFICANT CHANGE UP (ref 3.8–10.5)
WBC # BLD: 6.2 K/UL — SIGNIFICANT CHANGE UP (ref 3.8–10.5)
WBC # FLD AUTO: 6.2 K/UL — SIGNIFICANT CHANGE UP (ref 3.8–10.5)
WBC # FLD AUTO: 6.2 K/UL — SIGNIFICANT CHANGE UP (ref 3.8–10.5)

## 2023-11-14 ENCOUNTER — OUTPATIENT (OUTPATIENT)
Dept: OUTPATIENT SERVICES | Facility: HOSPITAL | Age: 88
LOS: 1 days | Discharge: HOME | End: 2023-11-14

## 2023-11-14 DIAGNOSIS — D64.9 ANEMIA, UNSPECIFIED: ICD-10-CM

## 2023-11-14 DIAGNOSIS — Z98.890 OTHER SPECIFIED POSTPROCEDURAL STATES: Chronic | ICD-10-CM

## 2023-11-14 DIAGNOSIS — Z96.641 PRESENCE OF RIGHT ARTIFICIAL HIP JOINT: Chronic | ICD-10-CM

## 2023-11-14 DIAGNOSIS — Z90.49 ACQUIRED ABSENCE OF OTHER SPECIFIED PARTS OF DIGESTIVE TRACT: Chronic | ICD-10-CM

## 2023-11-14 DIAGNOSIS — Z90.721 ACQUIRED ABSENCE OF OVARIES, UNILATERAL: Chronic | ICD-10-CM

## 2023-11-14 DIAGNOSIS — Z87.81 PERSONAL HISTORY OF (HEALED) TRAUMATIC FRACTURE: Chronic | ICD-10-CM

## 2023-11-14 DIAGNOSIS — Z86.79 PERSONAL HISTORY OF OTHER DISEASES OF THE CIRCULATORY SYSTEM: Chronic | ICD-10-CM

## 2023-11-14 DIAGNOSIS — H26.9 UNSPECIFIED CATARACT: Chronic | ICD-10-CM

## 2023-11-15 ENCOUNTER — OFFICE (OUTPATIENT)
Dept: URBAN - METROPOLITAN AREA CLINIC 63 | Facility: CLINIC | Age: 88
Setting detail: OPHTHALMOLOGY
End: 2023-11-15
Payer: MEDICARE

## 2023-11-15 DIAGNOSIS — H35.3112: ICD-10-CM

## 2023-11-15 DIAGNOSIS — H35.3221: ICD-10-CM

## 2023-11-15 PROCEDURE — 92134 CPTRZ OPH DX IMG PST SGM RTA: CPT | Performed by: SPECIALIST

## 2023-11-15 PROCEDURE — 92235 FLUORESCEIN ANGRPH MLTIFRAME: CPT | Performed by: SPECIALIST

## 2023-11-15 PROCEDURE — 92012 INTRM OPH EXAM EST PATIENT: CPT | Performed by: SPECIALIST

## 2023-11-15 ASSESSMENT — REFRACTION_AUTOREFRACTION
OD_AXIS: 072
OD_SPHERE: -0.75
OS_SPHERE: +1.25
OS_CYLINDER: -1.50
OS_AXIS: 084
OD_CYLINDER: -2.25

## 2023-11-15 ASSESSMENT — SPHEQUIV_DERIVED
OD_SPHEQUIV: -1.875
OS_SPHEQUIV: 0.5

## 2023-11-15 ASSESSMENT — LID EXAM ASSESSMENTS
OD_BLEPHARITIS: RLL RUL 3+
OS_BLEPHARITIS: LLL LUL 3+

## 2023-11-15 ASSESSMENT — TEAR BREAK UP TIME (TBUT)
OD_TBUT: 2+
OS_TBUT: 2+

## 2023-11-15 ASSESSMENT — CONFRONTATIONAL VISUAL FIELD TEST (CVF)
OS_FINDINGS: FULL
OD_FINDINGS: FULL

## 2023-11-20 ENCOUNTER — APPOINTMENT (OUTPATIENT)
Dept: INTERNAL MEDICINE | Facility: CLINIC | Age: 88
End: 2023-11-20
Payer: MEDICARE

## 2023-11-20 ENCOUNTER — NON-APPOINTMENT (OUTPATIENT)
Age: 88
End: 2023-11-20

## 2023-11-20 VITALS
RESPIRATION RATE: 12 BRPM | HEIGHT: 64 IN | BODY MASS INDEX: 22.02 KG/M2 | HEART RATE: 82 BPM | DIASTOLIC BLOOD PRESSURE: 78 MMHG | WEIGHT: 129 LBS | SYSTOLIC BLOOD PRESSURE: 120 MMHG

## 2023-11-20 DIAGNOSIS — J20.9 ACUTE BRONCHITIS, UNSPECIFIED: ICD-10-CM

## 2023-11-20 DIAGNOSIS — Z00.00 ENCOUNTER FOR GENERAL ADULT MEDICAL EXAMINATION W/OUT ABNORMAL FINDINGS: ICD-10-CM

## 2023-11-20 PROCEDURE — 36415 COLL VENOUS BLD VENIPUNCTURE: CPT

## 2023-11-20 PROCEDURE — 93000 ELECTROCARDIOGRAM COMPLETE: CPT

## 2023-11-20 PROCEDURE — G0439: CPT

## 2023-11-20 PROCEDURE — 99213 OFFICE O/P EST LOW 20 MIN: CPT | Mod: 25

## 2023-11-20 RX ORDER — AMOXICILLIN 500 MG/1
500 TABLET, FILM COATED ORAL
Qty: 8 | Refills: 2 | Status: ACTIVE | COMMUNITY
Start: 2018-09-18 | End: 1900-01-01

## 2023-11-20 RX ORDER — DOXYCYCLINE HYCLATE 100 MG/1
100 TABLET ORAL
Qty: 14 | Refills: 0 | Status: ACTIVE | COMMUNITY
Start: 2023-11-20 | End: 1900-01-01

## 2023-11-21 LAB
ALBUMIN SERPL ELPH-MCNC: 4 G/DL
ALP BLD-CCNC: 115 U/L
ALT SERPL-CCNC: 14 U/L
ANION GAP SERPL CALC-SCNC: 7 MMOL/L
AST SERPL-CCNC: 20 U/L
BILIRUB SERPL-MCNC: 0.5 MG/DL
BUN SERPL-MCNC: 19 MG/DL
CALCIUM SERPL-MCNC: 8.8 MG/DL
CHLORIDE SERPL-SCNC: 97 MMOL/L
CHOLEST SERPL-MCNC: 140 MG/DL
CO2 SERPL-SCNC: 25 MMOL/L
CREAT SERPL-MCNC: 0.99 MG/DL
EGFR: 53 ML/MIN/1.73M2
FOLATE SERPL-MCNC: 5.2 NG/ML
GLUCOSE SERPL-MCNC: 68 MG/DL
HDLC SERPL-MCNC: 62 MG/DL
LDLC SERPL CALC-MCNC: 59 MG/DL
NONHDLC SERPL-MCNC: 78 MG/DL
POTASSIUM SERPL-SCNC: 4.8 MMOL/L
PROT SERPL-MCNC: 7.1 G/DL
SODIUM SERPL-SCNC: 129 MMOL/L
T4 FREE SERPL-MCNC: 0.9 NG/DL
TRIGL SERPL-MCNC: 104 MG/DL
TSH SERPL-ACNC: 2.66 UIU/ML
VIT B12 SERPL-MCNC: 511 PG/ML

## 2023-11-22 ENCOUNTER — APPOINTMENT (OUTPATIENT)
Dept: HEMATOLOGY ONCOLOGY | Facility: CLINIC | Age: 88
End: 2023-11-22
Payer: MEDICARE

## 2023-11-22 VITALS
TEMPERATURE: 97.5 F | BODY MASS INDEX: 22.3 KG/M2 | HEART RATE: 65 BPM | OXYGEN SATURATION: 95 % | DIASTOLIC BLOOD PRESSURE: 80 MMHG | SYSTOLIC BLOOD PRESSURE: 164 MMHG | HEIGHT: 64 IN | WEIGHT: 130.62 LBS

## 2023-11-22 PROCEDURE — 99214 OFFICE O/P EST MOD 30 MIN: CPT

## 2023-11-27 ENCOUNTER — NON-APPOINTMENT (OUTPATIENT)
Age: 88
End: 2023-11-27

## 2023-11-29 NOTE — ED STATDOCS - NS ED MD EM SELECTION
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  - DC amio infusion on 11/26, transition to 400mg PO BID  - Maintain Mg > 2.5 and K > 4.0    Acute Right Ventricular Failure: RV function decreased on intra-op ASHLEIGH during VV ECMO. 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 Plan for trach to follow in order to allow R pleural leak to recover. - VV ECMO stable   - High sweep to decrease respiratory drive, intentional hypocapnia  - Vent settings minimal to prevent further trauma to lungs  - Cont Nebs and acetylcysteine (MUCOMYST)  -  Fluid management aided by nephrology. - Spoke with Dr. Sunni Andrews, [facility] pulmonologist for lung transplant - d/t his current smoking they would not accept him as a lung tx candidate. She evaluated his previous CT from 10 years ago and the note written by the pulmonologist he saw at [facility], and is hopeful we will be able to wean him off ECMO. She reports his underlying pathology evolves so slowly that much of the respiratory collapse he is experiencing right now is likely acute and can be reversed. She reports [facility] would likely not consider him but we could try [facility]. Discussed with Dr. Nataly Wells. - Per Kathrine - formal lung transplant evaluation - currently on hold.      Anticoagulation on VV ECMO:  - Required multiple transfusions with bleeding from mouth and ETT which is improving   - Bival and ASA held since 11/22  - LD stable 571, no current plan to resume anticoagulation  - per Dr. Andrea George, does not currently need anticoagulation with high [id number] Comprehensive

## 2023-12-01 ENCOUNTER — OFFICE (OUTPATIENT)
Dept: URBAN - METROPOLITAN AREA CLINIC 63 | Facility: CLINIC | Age: 88
Setting detail: OPHTHALMOLOGY
End: 2023-12-01
Payer: MEDICARE

## 2023-12-01 DIAGNOSIS — H35.3112: ICD-10-CM

## 2023-12-01 DIAGNOSIS — H35.3221: ICD-10-CM

## 2023-12-01 PROCEDURE — 67028 INJECTION EYE DRUG: CPT | Mod: LT | Performed by: SPECIALIST

## 2023-12-01 PROCEDURE — 92134 CPTRZ OPH DX IMG PST SGM RTA: CPT | Performed by: SPECIALIST

## 2023-12-01 ASSESSMENT — SPHEQUIV_DERIVED
OD_SPHEQUIV: -1.875
OS_SPHEQUIV: 0.5

## 2023-12-01 ASSESSMENT — LID EXAM ASSESSMENTS
OD_BLEPHARITIS: RLL RUL 3+
OS_BLEPHARITIS: LLL LUL 3+

## 2023-12-01 ASSESSMENT — REFRACTION_AUTOREFRACTION
OS_SPHERE: +1.25
OD_AXIS: 072
OD_CYLINDER: -2.25
OS_AXIS: 084
OD_SPHERE: -0.75
OS_CYLINDER: -1.50

## 2023-12-01 ASSESSMENT — TEAR BREAK UP TIME (TBUT)
OD_TBUT: 2+
OS_TBUT: 2+

## 2023-12-07 NOTE — ED ADULT TRIAGE NOTE - PRO INTERPRETER NEED 2
Chief Complaint   Patient presents with    Office Visit     Diabetes f/u,  Pt was at ER on 12/4/23-   pt was told she had food poisoning.  Pt still has not had bowel movement since 12/3/23    Imm/Inj     Declines all reccomend vaccines       HPI     Reviewed ED note. Feeling better but no BM since Sunday.    Pt was lost to diabetes follow up the last few months.  A1c today  Has not been taking metformin    Sumatriptan prn works well for migraines.      Review of Systems    Vitals:    12/07/23 0726   BP: 125/79   Pulse: 89   Resp: 16   Temp: 97.8 °F (36.6 °C)   TempSrc: Temporal   SpO2: 96%   Weight: 89 kg (196 lb 5.1 oz)   Height: 5' 4.5\" (1.638 m)     Physical Exam  General: NAD  Cardiovascular: RRR. No m/r/g.   Respiratory: CTAB. Normal WOB.    ASSESSMENT & PLAN:  Diagnoses and all orders for this visit:  Type 2 diabetes mellitus with hyperglycemia, without long-term current use of insulin (CMD)  -     POCT Glycohemoglobin Analyzer  -     Lipid Panel Without Reflex; Future  -     Comprehensive Metabolic Panel; Future  -     SERVICE TO PHARMACIST CHRONIC DISEASE MANAGEMENT ([place])  -     SERVICE TO DIABETES EDUCATION  -     SERVICE TO NUTRITION COUNSELING  Migraine without aura and without status migrainosus, not intractable  -     SUMAtriptan (IMITREX) 50 MG tablet; Take 1 tablet by mouth at onset of migraine. May repeat after 2 hours if needed.  Screening mammogram, encounter for  -     MAMMO SCREENING BILATERAL W OFELIA; Future  Acquired hypothyroidism  -     Thyroid Stimulating Hormone Reflex; Future  Constipation, unspecified constipation type  Type 2 diabetes mellitus without complication, without long-term current use of insulin (CMD)  -     metFORMIN (GLUCOPHAGE-XR) 500 MG 24 hr tablet; Take 1 tablet by mouth daily (with breakfast).  Other orders  -     insulin glargine 100 UNIT/ML pen-injector; Inject 10 Units into the skin nightly. Prime 2 units before each dose.      T2DM - uncontrolled. A1c 11. Pt lost to  follow up last few months.   - Chronic disease pharmacist to help manage  - Start lantus 10u nightly once met with pharmacist  - DM education/nutrition classes    Migraine - stable, cont meds     Hypothyroid - check TSH    Constipation - use miralax. Other GI symptoms' better       Return in about 6 weeks (around 1/18/2024) for Diabetes Follow-up.     English

## 2023-12-15 ENCOUNTER — OFFICE (OUTPATIENT)
Dept: URBAN - METROPOLITAN AREA CLINIC 63 | Facility: CLINIC | Age: 88
Setting detail: OPHTHALMOLOGY
End: 2023-12-15
Payer: MEDICARE

## 2023-12-15 DIAGNOSIS — H35.3112: ICD-10-CM

## 2023-12-15 DIAGNOSIS — H35.3221: ICD-10-CM

## 2023-12-15 PROCEDURE — 92014 COMPRE OPH EXAM EST PT 1/>: CPT | Performed by: SPECIALIST

## 2023-12-15 PROCEDURE — 92134 CPTRZ OPH DX IMG PST SGM RTA: CPT | Performed by: SPECIALIST

## 2023-12-15 ASSESSMENT — REFRACTION_AUTOREFRACTION
OD_CYLINDER: -2.25
OS_CYLINDER: -1.50
OS_AXIS: 084
OS_SPHERE: +1.25
OD_AXIS: 072
OD_SPHERE: -0.75

## 2023-12-15 ASSESSMENT — TEAR BREAK UP TIME (TBUT)
OD_TBUT: 2+
OS_TBUT: 2+

## 2023-12-15 ASSESSMENT — LID EXAM ASSESSMENTS
OS_BLEPHARITIS: LLL LUL 3+
OD_BLEPHARITIS: RLL RUL 3+

## 2023-12-15 ASSESSMENT — SPHEQUIV_DERIVED
OS_SPHEQUIV: 0.5
OD_SPHEQUIV: -1.875

## 2023-12-15 ASSESSMENT — CONFRONTATIONAL VISUAL FIELD TEST (CVF)
OD_FINDINGS: FULL
OS_FINDINGS: FULL

## 2024-01-05 ENCOUNTER — APPOINTMENT (OUTPATIENT)
Age: 89
End: 2024-01-05

## 2024-01-05 ENCOUNTER — RESULT REVIEW (OUTPATIENT)
Age: 89
End: 2024-01-05

## 2024-01-05 ENCOUNTER — APPOINTMENT (OUTPATIENT)
Dept: HEMATOLOGY ONCOLOGY | Facility: CLINIC | Age: 89
End: 2024-01-05

## 2024-01-05 LAB
BASOPHILS # BLD AUTO: 0.1 K/UL — SIGNIFICANT CHANGE UP (ref 0–0.2)
BASOPHILS # BLD AUTO: 0.1 K/UL — SIGNIFICANT CHANGE UP (ref 0–0.2)
BASOPHILS NFR BLD AUTO: 1.2 % — SIGNIFICANT CHANGE UP (ref 0–2)
BASOPHILS NFR BLD AUTO: 1.2 % — SIGNIFICANT CHANGE UP (ref 0–2)
EOSINOPHIL # BLD AUTO: 1 K/UL — HIGH (ref 0–0.5)
EOSINOPHIL # BLD AUTO: 1 K/UL — HIGH (ref 0–0.5)
EOSINOPHIL NFR BLD AUTO: 13.5 % — HIGH (ref 0–6)
EOSINOPHIL NFR BLD AUTO: 13.5 % — HIGH (ref 0–6)
HCT VFR BLD CALC: 37.8 % — SIGNIFICANT CHANGE UP (ref 34.5–45)
HCT VFR BLD CALC: 37.8 % — SIGNIFICANT CHANGE UP (ref 34.5–45)
HGB BLD-MCNC: 12.7 G/DL — SIGNIFICANT CHANGE UP (ref 11.5–15.5)
HGB BLD-MCNC: 12.7 G/DL — SIGNIFICANT CHANGE UP (ref 11.5–15.5)
LYMPHOCYTES # BLD AUTO: 2.6 K/UL — SIGNIFICANT CHANGE UP (ref 1–3.3)
LYMPHOCYTES # BLD AUTO: 2.6 K/UL — SIGNIFICANT CHANGE UP (ref 1–3.3)
LYMPHOCYTES # BLD AUTO: 33.6 % — SIGNIFICANT CHANGE UP (ref 13–44)
LYMPHOCYTES # BLD AUTO: 33.6 % — SIGNIFICANT CHANGE UP (ref 13–44)
MCHC RBC-ENTMCNC: 32.5 PG — SIGNIFICANT CHANGE UP (ref 27–34)
MCHC RBC-ENTMCNC: 32.5 PG — SIGNIFICANT CHANGE UP (ref 27–34)
MCHC RBC-ENTMCNC: 33.5 G/DL — SIGNIFICANT CHANGE UP (ref 32–36)
MCHC RBC-ENTMCNC: 33.5 G/DL — SIGNIFICANT CHANGE UP (ref 32–36)
MCV RBC AUTO: 97.1 FL — SIGNIFICANT CHANGE UP (ref 80–100)
MCV RBC AUTO: 97.1 FL — SIGNIFICANT CHANGE UP (ref 80–100)
MONOCYTES # BLD AUTO: 0.7 K/UL — SIGNIFICANT CHANGE UP (ref 0–0.9)
MONOCYTES # BLD AUTO: 0.7 K/UL — SIGNIFICANT CHANGE UP (ref 0–0.9)
MONOCYTES NFR BLD AUTO: 8.6 % — SIGNIFICANT CHANGE UP (ref 2–14)
MONOCYTES NFR BLD AUTO: 8.6 % — SIGNIFICANT CHANGE UP (ref 2–14)
NEUTROPHILS # BLD AUTO: 3.3 K/UL — SIGNIFICANT CHANGE UP (ref 1.8–7.4)
NEUTROPHILS # BLD AUTO: 3.3 K/UL — SIGNIFICANT CHANGE UP (ref 1.8–7.4)
NEUTROPHILS NFR BLD AUTO: 43.1 % — SIGNIFICANT CHANGE UP (ref 43–77)
NEUTROPHILS NFR BLD AUTO: 43.1 % — SIGNIFICANT CHANGE UP (ref 43–77)
PLATELET # BLD AUTO: 307 K/UL — SIGNIFICANT CHANGE UP (ref 150–400)
PLATELET # BLD AUTO: 307 K/UL — SIGNIFICANT CHANGE UP (ref 150–400)
RBC # BLD: 3.9 M/UL — SIGNIFICANT CHANGE UP (ref 3.8–5.2)
RBC # BLD: 3.9 M/UL — SIGNIFICANT CHANGE UP (ref 3.8–5.2)
RBC # FLD: 12.9 % — SIGNIFICANT CHANGE UP (ref 10.3–14.5)
RBC # FLD: 12.9 % — SIGNIFICANT CHANGE UP (ref 10.3–14.5)
WBC # BLD: 7.7 K/UL — SIGNIFICANT CHANGE UP (ref 3.8–10.5)
WBC # BLD: 7.7 K/UL — SIGNIFICANT CHANGE UP (ref 3.8–10.5)
WBC # FLD AUTO: 7.7 K/UL — SIGNIFICANT CHANGE UP (ref 3.8–10.5)
WBC # FLD AUTO: 7.7 K/UL — SIGNIFICANT CHANGE UP (ref 3.8–10.5)

## 2024-01-10 ENCOUNTER — OFFICE (OUTPATIENT)
Dept: URBAN - METROPOLITAN AREA CLINIC 104 | Facility: CLINIC | Age: 89
Setting detail: OPHTHALMOLOGY
End: 2024-01-10
Payer: MEDICARE

## 2024-01-10 DIAGNOSIS — H52.4: ICD-10-CM

## 2024-01-10 PROCEDURE — 92015 DETERMINE REFRACTIVE STATE: CPT | Performed by: OPTOMETRIST

## 2024-01-10 ASSESSMENT — REFRACTION_AUTOREFRACTION
OD_SPHERE: PLANO
OD_CYLINDER: -2.50
OS_CYLINDER: -1.00
OS_SPHERE: +0.50
OD_AXIS: 079
OS_AXIS: 100

## 2024-01-10 ASSESSMENT — CONFRONTATIONAL VISUAL FIELD TEST (CVF)
OD_FINDINGS: FULL
OS_FINDINGS: FULL

## 2024-01-10 ASSESSMENT — REFRACTION_MANIFEST
OS_SPHERE: -0.50
OS_VA1: 20/150
OS_CYLINDER: -1.00
OD_AXIS: 085
OD_SPHERE: PLANO
OS_ADD: +3.00
OS_AXIS: 100
OD_ADD: +3.00
OD_VA1: 20/50+2
OD_CYLINDER: -2.75

## 2024-01-10 ASSESSMENT — SPHEQUIV_DERIVED
OS_SPHEQUIV: 0
OS_SPHEQUIV: -1

## 2024-01-10 ASSESSMENT — LID EXAM ASSESSMENTS
OD_BLEPHARITIS: RLL RUL 3+
OS_BLEPHARITIS: LLL LUL 3+

## 2024-01-10 ASSESSMENT — TEAR BREAK UP TIME (TBUT)
OD_TBUT: 2+
OS_TBUT: 2+

## 2024-01-19 ENCOUNTER — OFFICE (OUTPATIENT)
Dept: URBAN - METROPOLITAN AREA CLINIC 63 | Facility: CLINIC | Age: 89
Setting detail: OPHTHALMOLOGY
End: 2024-01-19
Payer: MEDICARE

## 2024-01-19 DIAGNOSIS — H35.3112: ICD-10-CM

## 2024-01-19 DIAGNOSIS — H35.3221: ICD-10-CM

## 2024-01-19 PROCEDURE — 92134 CPTRZ OPH DX IMG PST SGM RTA: CPT | Performed by: SPECIALIST

## 2024-01-19 PROCEDURE — 92012 INTRM OPH EXAM EST PATIENT: CPT | Performed by: SPECIALIST

## 2024-01-19 ASSESSMENT — SPHEQUIV_DERIVED: OS_SPHEQUIV: 0

## 2024-01-19 ASSESSMENT — REFRACTION_AUTOREFRACTION
OS_SPHERE: +0.50
OD_SPHERE: PLANO
OD_AXIS: 079
OS_CYLINDER: -1.00
OD_CYLINDER: -2.50
OS_AXIS: 100

## 2024-01-19 ASSESSMENT — TEAR BREAK UP TIME (TBUT)
OS_TBUT: 2+
OD_TBUT: 2+

## 2024-01-19 ASSESSMENT — CONFRONTATIONAL VISUAL FIELD TEST (CVF)
OD_FINDINGS: FULL
OS_FINDINGS: FULL

## 2024-01-19 ASSESSMENT — LID EXAM ASSESSMENTS
OS_BLEPHARITIS: LLL LUL 3+
OD_BLEPHARITIS: RLL RUL 3+

## 2024-01-26 ENCOUNTER — OUTPATIENT (OUTPATIENT)
Dept: OUTPATIENT SERVICES | Facility: HOSPITAL | Age: 89
LOS: 1 days | Discharge: HOME | End: 2024-01-26

## 2024-01-26 DIAGNOSIS — Z86.79 PERSONAL HISTORY OF OTHER DISEASES OF THE CIRCULATORY SYSTEM: Chronic | ICD-10-CM

## 2024-01-26 DIAGNOSIS — Z90.721 ACQUIRED ABSENCE OF OVARIES, UNILATERAL: Chronic | ICD-10-CM

## 2024-01-26 DIAGNOSIS — Z87.81 PERSONAL HISTORY OF (HEALED) TRAUMATIC FRACTURE: Chronic | ICD-10-CM

## 2024-01-26 DIAGNOSIS — Z96.641 PRESENCE OF RIGHT ARTIFICIAL HIP JOINT: Chronic | ICD-10-CM

## 2024-01-26 DIAGNOSIS — Z90.49 ACQUIRED ABSENCE OF OTHER SPECIFIED PARTS OF DIGESTIVE TRACT: Chronic | ICD-10-CM

## 2024-01-26 DIAGNOSIS — H26.9 UNSPECIFIED CATARACT: Chronic | ICD-10-CM

## 2024-01-26 DIAGNOSIS — Z98.890 OTHER SPECIFIED POSTPROCEDURAL STATES: Chronic | ICD-10-CM

## 2024-01-26 DIAGNOSIS — E53.8 DEFICIENCY OF OTHER SPECIFIED B GROUP VITAMINS: ICD-10-CM

## 2024-01-26 DIAGNOSIS — D64.9 ANEMIA, UNSPECIFIED: ICD-10-CM

## 2024-01-29 DIAGNOSIS — N30.00 ACUTE CYSTITIS W/OUT HEMATURIA: ICD-10-CM

## 2024-02-02 ENCOUNTER — RESULT REVIEW (OUTPATIENT)
Age: 89
End: 2024-02-02

## 2024-02-02 ENCOUNTER — APPOINTMENT (OUTPATIENT)
Age: 89
End: 2024-02-02

## 2024-02-02 LAB
ANISOCYTOSIS BLD QL: SLIGHT — SIGNIFICANT CHANGE UP
BASOPHILS # BLD AUTO: 0.1 K/UL — SIGNIFICANT CHANGE UP (ref 0–0.2)
BASOPHILS NFR BLD AUTO: 0.7 % — SIGNIFICANT CHANGE UP (ref 0–2)
EOSINOPHIL # BLD AUTO: 2.4 K/UL — HIGH (ref 0–0.5)
EOSINOPHIL NFR BLD AUTO: 28.6 % — HIGH (ref 0–6)
HCT VFR BLD CALC: 30 % — LOW (ref 34.5–45)
HGB BLD-MCNC: 10.5 G/DL — LOW (ref 11.5–15.5)
LYMPHOCYTES # BLD AUTO: 1.8 K/UL — SIGNIFICANT CHANGE UP (ref 1–3.3)
LYMPHOCYTES # BLD AUTO: 22 % — SIGNIFICANT CHANGE UP (ref 13–44)
MACROCYTES BLD QL: SLIGHT — SIGNIFICANT CHANGE UP
MCHC RBC-ENTMCNC: 34.2 PG — HIGH (ref 27–34)
MCHC RBC-ENTMCNC: 35.2 G/DL — SIGNIFICANT CHANGE UP (ref 32–36)
MCV RBC AUTO: 97.3 FL — SIGNIFICANT CHANGE UP (ref 80–100)
MICROCYTES BLD QL: SLIGHT — SIGNIFICANT CHANGE UP
MONOCYTES # BLD AUTO: 0.7 K/UL — SIGNIFICANT CHANGE UP (ref 0–0.9)
MONOCYTES NFR BLD AUTO: 8.5 % — SIGNIFICANT CHANGE UP (ref 2–14)
NEUTROPHILS # BLD AUTO: 3.4 K/UL — SIGNIFICANT CHANGE UP (ref 1.8–7.4)
NEUTROPHILS NFR BLD AUTO: 40.1 % — LOW (ref 43–77)
PLAT MORPH BLD: NORMAL — SIGNIFICANT CHANGE UP
PLATELET # BLD AUTO: 319 K/UL — SIGNIFICANT CHANGE UP (ref 150–400)
POIKILOCYTOSIS BLD QL AUTO: SLIGHT — SIGNIFICANT CHANGE UP
RBC # BLD: 3.08 M/UL — LOW (ref 3.8–5.2)
RBC # FLD: 14 % — SIGNIFICANT CHANGE UP (ref 10.3–14.5)
RBC BLD AUTO: SIGNIFICANT CHANGE UP
WBC # BLD: 8.4 K/UL — SIGNIFICANT CHANGE UP (ref 3.8–10.5)
WBC # FLD AUTO: 8.4 K/UL — SIGNIFICANT CHANGE UP (ref 3.8–10.5)

## 2024-02-05 DIAGNOSIS — D46.9 MYELODYSPLASTIC SYNDROME, UNSPECIFIED: ICD-10-CM

## 2024-02-23 ENCOUNTER — LABORATORY RESULT (OUTPATIENT)
Age: 89
End: 2024-02-23

## 2024-02-23 ENCOUNTER — APPOINTMENT (OUTPATIENT)
Dept: INTERNAL MEDICINE | Facility: CLINIC | Age: 89
End: 2024-02-23
Payer: MEDICARE

## 2024-02-23 PROCEDURE — ZZZZZ: CPT

## 2024-02-24 LAB
APPEARANCE: CLEAR
BILIRUBIN URINE: NEGATIVE
BLOOD URINE: NEGATIVE
COLOR: YELLOW
GLUCOSE QUALITATIVE U: NEGATIVE MG/DL
KETONES URINE: NEGATIVE MG/DL
LEUKOCYTE ESTERASE URINE: ABNORMAL
NITRITE URINE: NEGATIVE
PH URINE: 6
PROTEIN URINE: NORMAL MG/DL
SPECIFIC GRAVITY URINE: 1.02
UROBILINOGEN URINE: 0.2 MG/DL

## 2024-02-26 ENCOUNTER — NON-APPOINTMENT (OUTPATIENT)
Age: 89
End: 2024-02-26

## 2024-03-01 ENCOUNTER — OFFICE (OUTPATIENT)
Dept: URBAN - METROPOLITAN AREA CLINIC 63 | Facility: CLINIC | Age: 89
Setting detail: OPHTHALMOLOGY
End: 2024-03-01
Payer: MEDICARE

## 2024-03-01 DIAGNOSIS — H35.3112: ICD-10-CM

## 2024-03-01 DIAGNOSIS — H35.3221: ICD-10-CM

## 2024-03-01 PROCEDURE — 92012 INTRM OPH EXAM EST PATIENT: CPT | Performed by: SPECIALIST

## 2024-03-01 PROCEDURE — 92134 CPTRZ OPH DX IMG PST SGM RTA: CPT | Performed by: SPECIALIST

## 2024-03-01 ASSESSMENT — LID EXAM ASSESSMENTS
OD_BLEPHARITIS: RLL RUL 3+
OS_BLEPHARITIS: LLL LUL 3+

## 2024-03-06 ENCOUNTER — RESULT REVIEW (OUTPATIENT)
Age: 89
End: 2024-03-06

## 2024-03-06 ENCOUNTER — APPOINTMENT (OUTPATIENT)
Dept: HEMATOLOGY ONCOLOGY | Facility: CLINIC | Age: 89
End: 2024-03-06
Payer: MEDICARE

## 2024-03-06 VITALS
OXYGEN SATURATION: 98 % | SYSTOLIC BLOOD PRESSURE: 125 MMHG | TEMPERATURE: 97.1 F | BODY MASS INDEX: 22.63 KG/M2 | HEART RATE: 87 BPM | DIASTOLIC BLOOD PRESSURE: 81 MMHG | WEIGHT: 132.56 LBS | HEIGHT: 64 IN

## 2024-03-06 LAB
BASOPHILS # BLD AUTO: 0.1 K/UL — SIGNIFICANT CHANGE UP (ref 0–0.2)
BASOPHILS NFR BLD AUTO: 1.5 % — SIGNIFICANT CHANGE UP (ref 0–2)
EOSINOPHIL # BLD AUTO: 0.6 K/UL — HIGH (ref 0–0.5)
EOSINOPHIL NFR BLD AUTO: 8 % — HIGH (ref 0–6)
HCT VFR BLD CALC: 38 % — SIGNIFICANT CHANGE UP (ref 34.5–45)
HGB BLD-MCNC: 13.5 G/DL — SIGNIFICANT CHANGE UP (ref 11.5–15.5)
LYMPHOCYTES # BLD AUTO: 3.1 K/UL — SIGNIFICANT CHANGE UP (ref 1–3.3)
LYMPHOCYTES # BLD AUTO: 43.7 % — SIGNIFICANT CHANGE UP (ref 13–44)
MCHC RBC-ENTMCNC: 35.3 PG — HIGH (ref 27–34)
MCHC RBC-ENTMCNC: 35.4 G/DL — SIGNIFICANT CHANGE UP (ref 32–36)
MCV RBC AUTO: 99.7 FL — SIGNIFICANT CHANGE UP (ref 80–100)
MONOCYTES # BLD AUTO: 0.5 K/UL — SIGNIFICANT CHANGE UP (ref 0–0.9)
MONOCYTES NFR BLD AUTO: 7.3 % — SIGNIFICANT CHANGE UP (ref 2–14)
NEUTROPHILS # BLD AUTO: 2.8 K/UL — SIGNIFICANT CHANGE UP (ref 1.8–7.4)
NEUTROPHILS NFR BLD AUTO: 39.4 % — LOW (ref 43–77)
PLATELET # BLD AUTO: 292 K/UL — SIGNIFICANT CHANGE UP (ref 150–400)
RBC # BLD: 3.81 M/UL — SIGNIFICANT CHANGE UP (ref 3.8–5.2)
RBC # FLD: 12.6 % — SIGNIFICANT CHANGE UP (ref 10.3–14.5)
WBC # BLD: 7.2 K/UL — SIGNIFICANT CHANGE UP (ref 3.8–10.5)
WBC # FLD AUTO: 7.2 K/UL — SIGNIFICANT CHANGE UP (ref 3.8–10.5)

## 2024-03-06 PROCEDURE — 99214 OFFICE O/P EST MOD 30 MIN: CPT

## 2024-03-07 LAB
ALBUMIN SERPL ELPH-MCNC: 3.9 G/DL
ALP BLD-CCNC: 147 U/L
ALT SERPL-CCNC: 14 U/L
ANION GAP SERPL CALC-SCNC: 10 MMOL/L
AST SERPL-CCNC: 21 U/L
BILIRUB SERPL-MCNC: 0.4 MG/DL
BUN SERPL-MCNC: 25 MG/DL
CALCIUM SERPL-MCNC: 9 MG/DL
CHLORIDE SERPL-SCNC: 100 MMOL/L
CO2 SERPL-SCNC: 24 MMOL/L
CREAT SERPL-MCNC: 1.19 MG/DL
EGFR: 42 ML/MIN/1.73M2
GLUCOSE SERPL-MCNC: 90 MG/DL
POTASSIUM SERPL-SCNC: 4.9 MMOL/L
PROT SERPL-MCNC: 6.9 G/DL
SODIUM SERPL-SCNC: 133 MMOL/L

## 2024-03-07 NOTE — ASSESSMENT
[FreeTextEntry1] : 89-year-old woman with prior history of myelodysplasia, HCT 19.1 %, Hb 7.1 g while on procrit/b12, last on 8/30/23, receive 1 unit prbc 8/31/23, transitioned to Luspatercept on 9/15/23. Robust response to luspatercept, which is currently being held with hgb. up to 12.7 grams. Will continue to monitor H/H and adjust reblozyl dose downward at time of resumption. 11/22/2023 CBC: WBC 6.2 K, HGB 12.7 g, HCT 36.9 %,  K  3/6/24 CBC: WBC 7.2 K, HGB 13.5 g, HCT 38.0 %,  K, EOS 8%  Will prescribe Zyrtec 10mg and order echocardiogram. (THOMAS). Hold off on future Luspatercept injections and monitor with blood work in a couple of months.  RTO in 2 months.

## 2024-03-07 NOTE — ADDENDUM
[FreeTextEntry1] : Documented by Renzo Dodson acting as scribe for Dr. Mejias on  03/06/2024.   All Medical record entries made by the Scribe were at my, Dr. Mejias's, direction and personally dictated by me on  03/06/2024. I have reviewed the chart and agree that the record accurately reflects my personal performance of the history, physical exam, assessment and plan. I have also personally directed, reviewed, and agreed with the discharge instructions.

## 2024-03-07 NOTE — REASON FOR VISIT
[Follow-Up Visit] : a follow-up visit for [Myelodysplastic syndrome] : myelodysplastic syndrome [FreeTextEntry2] : Anemia/MDS

## 2024-03-07 NOTE — HISTORY OF PRESENT ILLNESS
[de-identified] : Mrs. Laguerre is a 89 yo WF referred by  for anemia. She was recently in the hospital with SOB and fatigue and was found to have bilateral multifocal pneumonia. she was treated with antibiotics and placed on O2. CT scans done of abdomen and pelvis showed no PE, multifocal bilateral pneumonia reactive mediastinal and hilar lymphadenopathy, no specific pulmonary nodules.During that admission her HGb was 9 gms.She also has a history of almost daily nausea and vomiting and food getting stuck in her esophagus, She has been refusing an endoscopy, They have tried Ondansetron and Reglan, which she stopped because she said it did not help. Medical record states and patient confirms that she has a history of MDS, diagnosed 12 years ago.  We are attempting to obtain records from my former office where she was treated. Her other co morbidities include HTN, Gerd, Gastroporesis, osteoporosis, spinal stenosis.     She does have increased fatigue, No evidence of GI or  bleeding. She has stopped the Ondansetron and Reglan for the nausea and vomiting, but has had no vomiting for 3 days. Is still on O2, gets SOB with exertion.   [de-identified] : Presents with her eldest daughter for follow up.  Patient continues to live on her own. She is self-sufficient.  She reports a new rash to her left forearm. Using cortisone and Benadryl for relief.  She c/o SOB with exertion. Denies light headed. No recent falls. No known Hx of Cardiac Disease.     3/6/24 CBC: WBC 7.2 K, HGB 13.5 g, HCT 38.0 %,  K, EOS 8%

## 2024-03-07 NOTE — REVIEW OF SYSTEMS
[Fatigue] : fatigue [SOB on Exertion] : shortness of breath during exertion [Diarrhea: Grade 0] : Diarrhea: Grade 0 [Negative] : Allergic/Immunologic [FreeTextEntry2] : SOB with exertion

## 2024-03-08 ENCOUNTER — APPOINTMENT (OUTPATIENT)
Age: 89
End: 2024-03-08

## 2024-04-03 RX ORDER — AMMONIUM LACTATE 12 %
12 CREAM (GRAM) TOPICAL TWICE DAILY
Qty: 1 | Refills: 4 | Status: ACTIVE | COMMUNITY
Start: 2023-11-20 | End: 1900-01-01

## 2024-04-04 ENCOUNTER — APPOINTMENT (OUTPATIENT)
Dept: CARDIOLOGY | Facility: CLINIC | Age: 89
End: 2024-04-04
Payer: MEDICARE

## 2024-04-04 PROCEDURE — 93306 TTE W/DOPPLER COMPLETE: CPT

## 2024-04-05 ENCOUNTER — APPOINTMENT (OUTPATIENT)
Age: 89
End: 2024-04-05

## 2024-04-08 ENCOUNTER — APPOINTMENT (OUTPATIENT)
Dept: INTERNAL MEDICINE | Facility: CLINIC | Age: 89
End: 2024-04-08
Payer: MEDICARE

## 2024-04-08 VITALS — HEIGHT: 64 IN | WEIGHT: 135 LBS | BODY MASS INDEX: 23.05 KG/M2

## 2024-04-08 VITALS — SYSTOLIC BLOOD PRESSURE: 130 MMHG | DIASTOLIC BLOOD PRESSURE: 78 MMHG | RESPIRATION RATE: 16 BRPM | HEART RATE: 87 BPM

## 2024-04-08 DIAGNOSIS — R09.02 HYPOXEMIA: ICD-10-CM

## 2024-04-08 DIAGNOSIS — D64.9 ANEMIA, UNSPECIFIED: ICD-10-CM

## 2024-04-08 DIAGNOSIS — I70.219 ATHEROSCLEROSIS OF NATIVE ARTERIES OF EXTREMITIES WITH INTERMITTENT CLAUDICATION, UNSPECIFIED EXTREMITY: ICD-10-CM

## 2024-04-08 DIAGNOSIS — R06.02 SHORTNESS OF BREATH: ICD-10-CM

## 2024-04-08 PROCEDURE — 99214 OFFICE O/P EST MOD 30 MIN: CPT

## 2024-04-08 PROCEDURE — G2211 COMPLEX E/M VISIT ADD ON: CPT

## 2024-04-08 PROCEDURE — G0444 DEPRESSION SCREEN ANNUAL: CPT | Mod: 59

## 2024-04-08 RX ORDER — CEPHALEXIN 500 MG/1
500 CAPSULE ORAL 3 TIMES DAILY
Qty: 21 | Refills: 0 | Status: ACTIVE | COMMUNITY
Start: 2023-08-14 | End: 1900-01-01

## 2024-04-08 RX ORDER — CEFPODOXIME PROXETIL 200 MG/1
200 TABLET, FILM COATED ORAL
Qty: 20 | Refills: 0 | Status: DISCONTINUED | COMMUNITY
Start: 2024-01-29 | End: 2024-04-08

## 2024-04-08 NOTE — HEALTH RISK ASSESSMENT
[No] : No [Any fall with injury in past year] : Patient reported fall with injury in the past year [Little interest or pleasure doing things] : 1) Little interest or pleasure doing things [Feeling down, depressed, or hopeless] : 2) Feeling down, depressed, or hopeless [0] : 2) Feeling down, depressed, or hopeless: Not at all (0) [PHQ-2 Negative - No further assessment needed] : PHQ-2 Negative - No further assessment needed [[id number]] : 0

## 2024-04-08 NOTE — HISTORY OF PRESENT ILLNESS
[FreeTextEntry1] : follow up [de-identified] : follow up has leg circulation issue anemia better saw heme nothing new saw cardiology echo results pending

## 2024-04-15 NOTE — ED ADULT NURSE NOTE - NSFALLRSKOUTCOME_ED_ALL_ED
April 15, 2024      [facility] [place]  [address] [place] [address]  Phone: [phone]  Fax: [phone]       Patient: Joseph Millard   YOB: 2012  Date of Visit: 04/15/2024    To Whom It May Concern:    Rosa Millard  was at [facility] on 04/15/2024. The patient may return to work/school on 04/16/24 with no restrictions. If you have any questions or concerns, or if I can be of further assistance, please do not hesitate to contact me.    Sincerely,    Marie Lopez PA-C          Fall Risk

## 2024-04-19 ENCOUNTER — OFFICE (OUTPATIENT)
Dept: URBAN - METROPOLITAN AREA CLINIC 63 | Facility: CLINIC | Age: 89
Setting detail: OPHTHALMOLOGY
End: 2024-04-19
Payer: MEDICARE

## 2024-04-19 DIAGNOSIS — H35.3112: ICD-10-CM

## 2024-04-19 DIAGNOSIS — H35.3221: ICD-10-CM

## 2024-04-19 PROCEDURE — 92134 CPTRZ OPH DX IMG PST SGM RTA: CPT | Performed by: SPECIALIST

## 2024-04-19 PROCEDURE — 67028 INJECTION EYE DRUG: CPT | Mod: LT | Performed by: SPECIALIST

## 2024-04-19 ASSESSMENT — LID EXAM ASSESSMENTS
OD_BLEPHARITIS: RLL RUL 3+
OS_BLEPHARITIS: LLL LUL 3+

## 2024-05-03 ENCOUNTER — OUTPATIENT (OUTPATIENT)
Dept: OUTPATIENT SERVICES | Facility: HOSPITAL | Age: 89
LOS: 1 days | Discharge: HOME | End: 2024-05-03

## 2024-05-03 ENCOUNTER — OFFICE (OUTPATIENT)
Dept: URBAN - METROPOLITAN AREA CLINIC 63 | Facility: CLINIC | Age: 89
Setting detail: OPHTHALMOLOGY
End: 2024-05-03
Payer: MEDICARE

## 2024-05-03 DIAGNOSIS — Z90.49 ACQUIRED ABSENCE OF OTHER SPECIFIED PARTS OF DIGESTIVE TRACT: Chronic | ICD-10-CM

## 2024-05-03 DIAGNOSIS — D46.9 MYELODYSPLASTIC SYNDROME, UNSPECIFIED: ICD-10-CM

## 2024-05-03 DIAGNOSIS — D64.9 ANEMIA, UNSPECIFIED: ICD-10-CM

## 2024-05-03 DIAGNOSIS — Z90.721 ACQUIRED ABSENCE OF OVARIES, UNILATERAL: Chronic | ICD-10-CM

## 2024-05-03 DIAGNOSIS — H35.3112: ICD-10-CM

## 2024-05-03 DIAGNOSIS — H35.3221: ICD-10-CM

## 2024-05-03 DIAGNOSIS — Z98.890 OTHER SPECIFIED POSTPROCEDURAL STATES: Chronic | ICD-10-CM

## 2024-05-03 DIAGNOSIS — Z87.81 PERSONAL HISTORY OF (HEALED) TRAUMATIC FRACTURE: Chronic | ICD-10-CM

## 2024-05-03 DIAGNOSIS — H26.9 UNSPECIFIED CATARACT: Chronic | ICD-10-CM

## 2024-05-03 DIAGNOSIS — Z96.641 PRESENCE OF RIGHT ARTIFICIAL HIP JOINT: Chronic | ICD-10-CM

## 2024-05-03 DIAGNOSIS — Z86.79 PERSONAL HISTORY OF OTHER DISEASES OF THE CIRCULATORY SYSTEM: Chronic | ICD-10-CM

## 2024-05-03 PROCEDURE — 92012 INTRM OPH EXAM EST PATIENT: CPT | Performed by: SPECIALIST

## 2024-05-03 PROCEDURE — 92134 CPTRZ OPH DX IMG PST SGM RTA: CPT | Performed by: SPECIALIST

## 2024-05-03 PROCEDURE — 92235 FLUORESCEIN ANGRPH MLTIFRAME: CPT | Performed by: SPECIALIST

## 2024-05-03 ASSESSMENT — LID EXAM ASSESSMENTS
OS_BLEPHARITIS: LLL LUL 3+
OD_BLEPHARITIS: RLL RUL 3+

## 2024-05-03 ASSESSMENT — CONFRONTATIONAL VISUAL FIELD TEST (CVF)
OD_FINDINGS: FULL
OS_FINDINGS: FULL

## 2024-05-08 ENCOUNTER — APPOINTMENT (OUTPATIENT)
Dept: HEMATOLOGY ONCOLOGY | Facility: CLINIC | Age: 89
End: 2024-05-08
Payer: MEDICARE

## 2024-05-08 ENCOUNTER — RESULT REVIEW (OUTPATIENT)
Age: 89
End: 2024-05-08

## 2024-05-08 VITALS
DIASTOLIC BLOOD PRESSURE: 86 MMHG | WEIGHT: 140 LBS | BODY MASS INDEX: 23.9 KG/M2 | OXYGEN SATURATION: 95 % | HEIGHT: 64 IN | SYSTOLIC BLOOD PRESSURE: 154 MMHG | HEART RATE: 70 BPM

## 2024-05-08 DIAGNOSIS — D46.9 MYELODYSPLASTIC SYNDROME, UNSPECIFIED: ICD-10-CM

## 2024-05-08 LAB
BASOPHILS # BLD AUTO: 0.1 K/UL — SIGNIFICANT CHANGE UP (ref 0–0.2)
BASOPHILS NFR BLD AUTO: 0.9 % — SIGNIFICANT CHANGE UP (ref 0–2)
EOSINOPHIL # BLD AUTO: 1.2 K/UL — HIGH (ref 0–0.5)
EOSINOPHIL NFR BLD AUTO: 20.4 % — HIGH (ref 0–6)
HCT VFR BLD CALC: 34.4 % — LOW (ref 34.5–45)
HGB BLD-MCNC: 12.1 G/DL — SIGNIFICANT CHANGE UP (ref 11.5–15.5)
LYMPHOCYTES # BLD AUTO: 1.9 K/UL — SIGNIFICANT CHANGE UP (ref 1–3.3)
LYMPHOCYTES # BLD AUTO: 31.9 % — SIGNIFICANT CHANGE UP (ref 13–44)
MCHC RBC-ENTMCNC: 33.7 PG — SIGNIFICANT CHANGE UP (ref 27–34)
MCHC RBC-ENTMCNC: 35.2 G/DL — SIGNIFICANT CHANGE UP (ref 32–36)
MCV RBC AUTO: 95.6 FL — SIGNIFICANT CHANGE UP (ref 80–100)
MONOCYTES # BLD AUTO: 0.4 K/UL — SIGNIFICANT CHANGE UP (ref 0–0.9)
MONOCYTES NFR BLD AUTO: 7.6 % — SIGNIFICANT CHANGE UP (ref 2–14)
NEUTROPHILS # BLD AUTO: 2.3 K/UL — SIGNIFICANT CHANGE UP (ref 1.8–7.4)
NEUTROPHILS NFR BLD AUTO: 39.2 % — LOW (ref 43–77)
PLATELET # BLD AUTO: 234 K/UL — SIGNIFICANT CHANGE UP (ref 150–400)
RBC # BLD: 3.6 M/UL — LOW (ref 3.8–5.2)
RBC # FLD: 12.1 % — SIGNIFICANT CHANGE UP (ref 10.3–14.5)
WBC # BLD: 5.9 K/UL — SIGNIFICANT CHANGE UP (ref 3.8–10.5)
WBC # FLD AUTO: 5.9 K/UL — SIGNIFICANT CHANGE UP (ref 3.8–10.5)

## 2024-05-08 PROCEDURE — 99213 OFFICE O/P EST LOW 20 MIN: CPT

## 2024-05-08 NOTE — ADDENDUM
[FreeTextEntry1] : Documented by Ashley Pinedo acting as scribe for Dr. Mejias on  05/08/2024.       All Medical record entries made by the Scribe were at my, Dr. Mejias's, direction and personally dictated by me on  05/08/2024. I have reviewed the chart and agree that the record accurately reflects my personal performance of the history, physical exam, assessment and plan. I have also personally directed, reviewed, and agreed with the discharge instructions.

## 2024-05-08 NOTE — HISTORY OF PRESENT ILLNESS
[de-identified] : Mrs. Laguerre is a 89 yo WF referred by  for anemia. She was recently in the hospital with SOB and fatigue and was found to have bilateral multifocal pneumonia. she was treated with antibiotics and placed on O2. CT scans done of abdomen and pelvis showed no PE, multifocal bilateral pneumonia reactive mediastinal and hilar lymphadenopathy, no specific pulmonary nodules.During that admission her HGb was 9 gms.She also has a history of almost daily nausea and vomiting and food getting stuck in her esophagus, She has been refusing an endoscopy, They have tried Ondansetron and Reglan, which she stopped because she said it did not help. Medical record states and patient confirms that she has a history of MDS, diagnosed 12 years ago.  We are attempting to obtain records from my former office where she was treated. Her other co morbidities include HTN, Gerd, Gastroporesis, osteoporosis, spinal stenosis.     She does have increased fatigue, No evidence of GI or  bleeding. She has stopped the Ondansetron and Reglan for the nausea and vomiting, but has had no vomiting for 3 days. Is still on O2, gets SOB with exertion.   [de-identified] : Presents with her eldest daughter for follow up.  Patient continues to live on her own. She is self-sufficient.   Reports some fatigue, 2/2 to age      3/6/24 CBC: WBC 7.2 K, HGB 13.5 g, HCT 38.0 %,  K, EOS 8%

## 2024-05-08 NOTE — ASSESSMENT
[FreeTextEntry1] : 89-year-old woman with prior history of myelodysplasia, HCT 19.1 %, Hb 7.1 g while on procrit/b12, last on 8/30/23, receive 1 unit prbc 8/31/23, transitioned to Luspatercept on 9/15/23. Robust response to luspatercept, which is currently being held with hgb. up to 12.7 grams. Will continue to monitor H/H and adjust reblozyl dose downward at time of resumption. 11/22/2023 CBC: WBC 6.2 K, HGB 12.7 g, HCT 36.9 %,  K  3/6/24 CBC: WBC 7.2 K, HGB 13.5 g, HCT 38.0 %,  K, EOS 8% 5/8/24 CBC:  WBC 5.9 K, HGB 12.1 g, HCT 34.4 %,  K  Hold off on future Luspatercept injections and monitor with blood work in a couple of months.  RTO in 3 months.

## 2024-06-07 ENCOUNTER — APPOINTMENT (OUTPATIENT)
Dept: INTERNAL MEDICINE | Facility: CLINIC | Age: 89
End: 2024-06-07

## 2024-06-21 ENCOUNTER — OFFICE (OUTPATIENT)
Dept: URBAN - METROPOLITAN AREA CLINIC 63 | Facility: CLINIC | Age: 89
Setting detail: OPHTHALMOLOGY
End: 2024-06-21
Payer: MEDICARE

## 2024-06-21 DIAGNOSIS — H35.3112: ICD-10-CM

## 2024-06-21 DIAGNOSIS — I65.23: ICD-10-CM

## 2024-06-21 DIAGNOSIS — H35.3221: ICD-10-CM

## 2024-06-21 PROCEDURE — 92012 INTRM OPH EXAM EST PATIENT: CPT | Performed by: SPECIALIST

## 2024-06-21 PROCEDURE — 92134 CPTRZ OPH DX IMG PST SGM RTA: CPT | Performed by: SPECIALIST

## 2024-06-21 ASSESSMENT — CONFRONTATIONAL VISUAL FIELD TEST (CVF)
OS_FINDINGS: FULL
OD_FINDINGS: FULL

## 2024-06-21 ASSESSMENT — LID EXAM ASSESSMENTS
OD_BLEPHARITIS: RLL RUL 3+
OS_BLEPHARITIS: LLL LUL 3+

## 2024-06-24 DIAGNOSIS — I65.23 OCCLUSION AND STENOSIS OF BILATERAL CAROTID ARTERIES: ICD-10-CM

## 2024-06-26 NOTE — PHYSICAL EXAM
How Many Mls Were Removed From The 40 Mg/Ml (10ml) Vial When Preparing The Injectable Solution?: 0 Include Z78.9 (Other Specified Conditions Influencing Health Status) As An Associated Diagnosis?: No Kenalog Preparation: Kenalog [de-identified] : Physical Exam:\par General: Well appearing, no acute distress, A&O\par Neurologic: A&Ox3, No focal deficits\par Head: NCAT without abrasions, lacerations, or ecchymosis to head, face, or scalp\par Respiratory: Equal chest wall expansion bilaterally, no accessory muscle use\par Lymphatic: No lymphadenopathy palpated\par Skin: Warm and dry\par Psychiatric: Normal mood and affect\par \par SILT s/s/sp/dp/t\par Fires EHL/FHL/GS/TA\par 2+ DP/PT pulse\par brisk capillary refill\par + TTP GT Concentration Of Kenalog Solution Injected (Mg/Ml): 2.5 [de-identified] : plain films of the left hip are obtained today. They show continued healing of a left intertrochanteric hip fracture stabilized by intramedullary nail. Kenalog Type Of Vial: Multiple Dose Detail Level: Zone Consent: The risks of atrophy were reviewed with the patient. Total Volume (Ccs): 0.15 Validate Note Data When Using Inventory: Yes Medical Necessity Clause: This procedure was medically necessary because the lesions that were treated were: Administered By (Optional): NARESH

## 2024-06-27 NOTE — DISCHARGE NOTE ADULT - CASE MANAGER'S NAME
Delphine Currie, [facility] 6/26/2024 1:42 PM CDT      ----- Message -----  From: Olimpia Medina  Sent: 6/26/2024 1:35 PM CDT  To: [facility] Clinical Staff  Subject: Dermatologist     You mentioned a referral for a dermatologist but I don’t see it here.  
Byron Villalpando RN

## 2024-07-15 ENCOUNTER — OUTPATIENT (OUTPATIENT)
Dept: OUTPATIENT SERVICES | Facility: HOSPITAL | Age: 89
LOS: 1 days | End: 2024-07-15
Payer: MEDICARE

## 2024-07-15 ENCOUNTER — APPOINTMENT (OUTPATIENT)
Dept: ULTRASOUND IMAGING | Facility: CLINIC | Age: 89
End: 2024-07-15

## 2024-07-15 DIAGNOSIS — Z96.641 PRESENCE OF RIGHT ARTIFICIAL HIP JOINT: Chronic | ICD-10-CM

## 2024-07-15 DIAGNOSIS — I65.23 OCCLUSION AND STENOSIS OF BILATERAL CAROTID ARTERIES: ICD-10-CM

## 2024-07-15 DIAGNOSIS — H26.9 UNSPECIFIED CATARACT: Chronic | ICD-10-CM

## 2024-07-15 DIAGNOSIS — Z86.79 PERSONAL HISTORY OF OTHER DISEASES OF THE CIRCULATORY SYSTEM: Chronic | ICD-10-CM

## 2024-07-15 DIAGNOSIS — Z87.81 PERSONAL HISTORY OF (HEALED) TRAUMATIC FRACTURE: Chronic | ICD-10-CM

## 2024-07-15 DIAGNOSIS — Z98.890 OTHER SPECIFIED POSTPROCEDURAL STATES: Chronic | ICD-10-CM

## 2024-07-15 DIAGNOSIS — Z90.49 ACQUIRED ABSENCE OF OTHER SPECIFIED PARTS OF DIGESTIVE TRACT: Chronic | ICD-10-CM

## 2024-07-15 DIAGNOSIS — Z90.721 ACQUIRED ABSENCE OF OVARIES, UNILATERAL: Chronic | ICD-10-CM

## 2024-07-15 PROCEDURE — 93880 EXTRACRANIAL BILAT STUDY: CPT | Mod: 26

## 2024-07-15 PROCEDURE — 93880 EXTRACRANIAL BILAT STUDY: CPT

## 2024-07-30 ENCOUNTER — OUTPATIENT (OUTPATIENT)
Dept: OUTPATIENT SERVICES | Facility: HOSPITAL | Age: 89
LOS: 1 days | Discharge: HOME | End: 2024-07-30

## 2024-07-30 DIAGNOSIS — D46.9 MYELODYSPLASTIC SYNDROME, UNSPECIFIED: ICD-10-CM

## 2024-07-30 DIAGNOSIS — Z90.49 ACQUIRED ABSENCE OF OTHER SPECIFIED PARTS OF DIGESTIVE TRACT: Chronic | ICD-10-CM

## 2024-07-30 DIAGNOSIS — Z98.890 OTHER SPECIFIED POSTPROCEDURAL STATES: Chronic | ICD-10-CM

## 2024-07-30 DIAGNOSIS — Z96.641 PRESENCE OF RIGHT ARTIFICIAL HIP JOINT: Chronic | ICD-10-CM

## 2024-07-30 DIAGNOSIS — Z86.79 PERSONAL HISTORY OF OTHER DISEASES OF THE CIRCULATORY SYSTEM: Chronic | ICD-10-CM

## 2024-07-30 DIAGNOSIS — H26.9 UNSPECIFIED CATARACT: Chronic | ICD-10-CM

## 2024-07-30 DIAGNOSIS — Z87.81 PERSONAL HISTORY OF (HEALED) TRAUMATIC FRACTURE: Chronic | ICD-10-CM

## 2024-07-30 DIAGNOSIS — E53.8 DEFICIENCY OF OTHER SPECIFIED B GROUP VITAMINS: ICD-10-CM

## 2024-07-30 DIAGNOSIS — Z90.721 ACQUIRED ABSENCE OF OVARIES, UNILATERAL: Chronic | ICD-10-CM

## 2024-08-07 ENCOUNTER — RESULT REVIEW (OUTPATIENT)
Age: 89
End: 2024-08-07

## 2024-08-07 ENCOUNTER — APPOINTMENT (OUTPATIENT)
Dept: HEMATOLOGY ONCOLOGY | Facility: CLINIC | Age: 89
End: 2024-08-07

## 2024-08-07 LAB
BASOPHILS # BLD AUTO: 0.1 K/UL — SIGNIFICANT CHANGE UP (ref 0–0.2)
BASOPHILS NFR BLD AUTO: 0.9 % — SIGNIFICANT CHANGE UP (ref 0–2)
EOSINOPHIL # BLD AUTO: 1.1 K/UL — HIGH (ref 0–0.5)
EOSINOPHIL NFR BLD AUTO: 17.1 % — HIGH (ref 0–6)
HCT VFR BLD CALC: 35.9 % — SIGNIFICANT CHANGE UP (ref 34.5–45)
HGB BLD-MCNC: 12.2 G/DL — SIGNIFICANT CHANGE UP (ref 11.5–15.5)
LYMPHOCYTES # BLD AUTO: 2.3 K/UL — SIGNIFICANT CHANGE UP (ref 1–3.3)
LYMPHOCYTES # BLD AUTO: 36.8 % — SIGNIFICANT CHANGE UP (ref 13–44)
MCHC RBC-ENTMCNC: 33.8 PG — SIGNIFICANT CHANGE UP (ref 27–34)
MCHC RBC-ENTMCNC: 33.9 G/DL — SIGNIFICANT CHANGE UP (ref 32–36)
MCV RBC AUTO: 99.6 FL — SIGNIFICANT CHANGE UP (ref 80–100)
MONOCYTES # BLD AUTO: 0.6 K/UL — SIGNIFICANT CHANGE UP (ref 0–0.9)
MONOCYTES NFR BLD AUTO: 8.8 % — SIGNIFICANT CHANGE UP (ref 2–14)
NEUTROPHILS # BLD AUTO: 2.3 K/UL — SIGNIFICANT CHANGE UP (ref 1.8–7.4)
NEUTROPHILS NFR BLD AUTO: 36.4 % — LOW (ref 43–77)
PLATELET # BLD AUTO: 250 K/UL — SIGNIFICANT CHANGE UP (ref 150–400)
RBC # BLD: 3.6 M/UL — LOW (ref 3.8–5.2)
RBC # FLD: 11.1 % — SIGNIFICANT CHANGE UP (ref 10.3–14.5)
WBC # BLD: 6.3 K/UL — SIGNIFICANT CHANGE UP (ref 3.8–10.5)
WBC # FLD AUTO: 6.3 K/UL — SIGNIFICANT CHANGE UP (ref 3.8–10.5)

## 2024-08-07 PROCEDURE — 99213 OFFICE O/P EST LOW 20 MIN: CPT

## 2024-08-07 NOTE — HISTORY OF PRESENT ILLNESS
[de-identified] : Mrs. Laguerre is a 89 yo WF referred by  for anemia. She was recently in the hospital with SOB and fatigue and was found to have bilateral multifocal pneumonia. she was treated with antibiotics and placed on O2. CT scans done of abdomen and pelvis showed no PE, multifocal bilateral pneumonia reactive mediastinal and hilar lymphadenopathy, no specific pulmonary nodules.During that admission her HGb was 9 gms.She also has a history of almost daily nausea and vomiting and food getting stuck in her esophagus, She has been refusing an endoscopy, They have tried Ondansetron and Reglan, which she stopped because she said it did not help. Medical record states and patient confirms that she has a history of MDS, diagnosed 12 years ago.  We are attempting to obtain records from my former office where she was treated. Her other co morbidities include HTN, Gerd, Gastroporesis, osteoporosis, spinal stenosis.     She does have increased fatigue, No evidence of GI or  bleeding. She has stopped the Ondansetron and Reglan for the nausea and vomiting, but has had no vomiting for 3 days. Is still on O2, gets SOB with exertion.   [de-identified] : Presents with her eldest daughter for follow up.  Patient continues to live on her own. She is self-sufficient.  Reports some fatigue, 2/2 to age. She notes some itchiness to the skin.   8/7/24: WBC 6.3 K, HGB 12.2 g, HCT 35.9 %,  K, ANC 2.3 K, EOS % 17.1

## 2024-08-07 NOTE — ADDENDUM
[FreeTextEntry1] : Documented by Renzo Dodson acting as scribe for Dr. Mejias on  08/07/2024.   All Medical record entries made by the Scribe were at my, Dr. Mejias's, direction and personally dictated by me on  08/07/2024. I have reviewed the chart and agree that the record accurately reflects my personal performance of the history, physical exam, assessment and plan. I have also personally directed, reviewed, and agreed with the discharge instructions.

## 2024-08-07 NOTE — REVIEW OF SYSTEMS
[Fatigue] : fatigue [SOB on Exertion] : shortness of breath during exertion [Diarrhea: Grade 0] : Diarrhea: Grade 0 [Negative] : Heme/Lymph [FreeTextEntry2] : SOB with exertion

## 2024-08-07 NOTE — HISTORY OF PRESENT ILLNESS
[de-identified] : Mrs. Laguerre is a 89 yo WF referred by  for anemia. She was recently in the hospital with SOB and fatigue and was found to have bilateral multifocal pneumonia. she was treated with antibiotics and placed on O2. CT scans done of abdomen and pelvis showed no PE, multifocal bilateral pneumonia reactive mediastinal and hilar lymphadenopathy, no specific pulmonary nodules.During that admission her HGb was 9 gms.She also has a history of almost daily nausea and vomiting and food getting stuck in her esophagus, She has been refusing an endoscopy, They have tried Ondansetron and Reglan, which she stopped because she said it did not help. Medical record states and patient confirms that she has a history of MDS, diagnosed 12 years ago.  We are attempting to obtain records from my former office where she was treated. Her other co morbidities include HTN, Gerd, Gastroporesis, osteoporosis, spinal stenosis.     She does have increased fatigue, No evidence of GI or  bleeding. She has stopped the Ondansetron and Reglan for the nausea and vomiting, but has had no vomiting for 3 days. Is still on O2, gets SOB with exertion.   [de-identified] : Presents with her eldest daughter for follow up.  Patient continues to live on her own. She is self-sufficient.  Reports some fatigue, 2/2 to age. She notes some itchiness to the skin.   8/7/24: WBC 6.3 K, HGB 12.2 g, HCT 35.9 %,  K, ANC 2.3 K, EOS % 17.1

## 2024-08-07 NOTE — PHYSICAL EXAM
[Restricted in physically strenuous activity but ambulatory and able to carry out work of a light or sedentary nature] : Status 1- Restricted in physically strenuous activity but ambulatory and able to carry out work of a light or sedentary nature, e.g., light house work, office work [Normal] : no peripheral adenopathy appreciated Detail Level: Zone Continue Regimen: Azelaic acid 15 % topical gel Apply to face BID.\\nRhofade 1 % topical cream Apply to face QAM PRN Continue Regimen: fluocinonide 0.05 % topical solution Apply to affected area in scalp BID prn. Initiate Treatment: desonide 0.05 % topical cream: Apply to affected areas on face BID PRN. Discontinue Regimen: hydrocortisone 2.5 % topical cream Apply to affected areas BID prn

## 2024-09-20 ENCOUNTER — OFFICE (OUTPATIENT)
Dept: URBAN - METROPOLITAN AREA CLINIC 63 | Facility: CLINIC | Age: 89
Setting detail: OPHTHALMOLOGY
End: 2024-09-20
Payer: MEDICARE

## 2024-09-20 DIAGNOSIS — H35.3221: ICD-10-CM

## 2024-09-20 DIAGNOSIS — H35.3112: ICD-10-CM

## 2024-09-20 PROCEDURE — 67028 INJECTION EYE DRUG: CPT | Mod: LT | Performed by: SPECIALIST

## 2024-09-20 PROCEDURE — 92134 CPTRZ OPH DX IMG PST SGM RTA: CPT | Performed by: SPECIALIST

## 2024-09-20 ASSESSMENT — CONFRONTATIONAL VISUAL FIELD TEST (CVF)
OD_FINDINGS: FULL
OS_FINDINGS: FULL

## 2024-09-20 ASSESSMENT — LID EXAM ASSESSMENTS
OD_BLEPHARITIS: RLL RUL 3+
OS_BLEPHARITIS: LLL LUL 3+

## 2024-10-04 ENCOUNTER — OFFICE (OUTPATIENT)
Dept: URBAN - METROPOLITAN AREA CLINIC 63 | Facility: CLINIC | Age: 89
Setting detail: OPHTHALMOLOGY
End: 2024-10-04
Payer: MEDICARE

## 2024-10-04 DIAGNOSIS — H35.3221: ICD-10-CM

## 2024-10-04 DIAGNOSIS — H35.3112: ICD-10-CM

## 2024-10-04 PROCEDURE — 92134 CPTRZ OPH DX IMG PST SGM RTA: CPT | Performed by: SPECIALIST

## 2024-10-04 PROCEDURE — 92012 INTRM OPH EXAM EST PATIENT: CPT | Performed by: SPECIALIST

## 2024-10-04 ASSESSMENT — KERATOMETRY
OS_K2POWER_DIOPTERS: 43.44
OS_AXISANGLE_DEGREES: 132
OS_K1POWER_DIOPTERS: 42.88
OD_K1POWER_DIOPTERS: 42.29
OD_AXISANGLE_DEGREES: 163
OD_K2POWER_DIOPTERS: 44.12

## 2024-10-04 ASSESSMENT — LID EXAM ASSESSMENTS
OS_BLEPHARITIS: LLL LUL 3+
OD_BLEPHARITIS: RLL RUL 3+

## 2024-10-04 ASSESSMENT — VISUAL ACUITY
OD_BCVA: 20/400
OS_BCVA: 20/40-

## 2024-10-04 ASSESSMENT — REFRACTION_AUTOREFRACTION
OS_CYLINDER: -1.00
OS_AXIS: 100
OD_AXIS: 079
OS_SPHERE: +0.50
OD_SPHERE: PLANO
OD_CYLINDER: -2.50

## 2024-10-04 ASSESSMENT — TONOMETRY
OD_IOP_MMHG: 15
OS_IOP_MMHG: 16

## 2024-10-04 ASSESSMENT — TEAR BREAK UP TIME (TBUT)
OS_TBUT: 2+
OD_TBUT: 2+

## 2024-10-04 ASSESSMENT — CONFRONTATIONAL VISUAL FIELD TEST (CVF)
OD_FINDINGS: FULL
OS_FINDINGS: FULL

## 2024-10-28 NOTE — PATIENT PROFILE ADULT - NSPRESCRALCSCORE_GEN_A_NUR_CAL
-Patient was supposed to have had repeat CT chest in 3 to 6 months from last CT which was in October 2019 (6 mm calcified granuloma in LLL)  -Patient would rather talk to her radiation oncologist regarding the need to repeat or not - she is not ready to go for CT at this time          4

## 2024-10-29 ENCOUNTER — RX ONLY (RX ONLY)
Age: 89
End: 2024-10-29

## 2024-10-29 ENCOUNTER — OFFICE (OUTPATIENT)
Dept: URBAN - METROPOLITAN AREA CLINIC 104 | Facility: CLINIC | Age: 89
Setting detail: OPHTHALMOLOGY
End: 2024-10-29
Payer: MEDICARE

## 2024-10-29 DIAGNOSIS — H01.001: ICD-10-CM

## 2024-10-29 DIAGNOSIS — H04.123: ICD-10-CM

## 2024-10-29 DIAGNOSIS — H43.813: ICD-10-CM

## 2024-10-29 DIAGNOSIS — H26.491: ICD-10-CM

## 2024-10-29 DIAGNOSIS — H35.033: ICD-10-CM

## 2024-10-29 PROBLEM — H26.49 POSTERIOR CAPSULAR OPACIFICATION: Status: ACTIVE | Noted: 2024-10-29

## 2024-10-29 PROCEDURE — 92014 COMPRE OPH EXAM EST PT 1/>: CPT | Mod: 57 | Performed by: SPECIALIST

## 2024-10-29 PROCEDURE — 66821 AFTER CATARACT LASER SURGERY: CPT | Mod: RT | Performed by: SPECIALIST

## 2024-10-29 ASSESSMENT — TONOMETRY: OD_IOP_MMHG: 16

## 2024-10-29 ASSESSMENT — LID EXAM ASSESSMENTS
OS_BLEPHARITIS: LLL LUL 3+
OD_BLEPHARITIS: RLL RUL 3+

## 2024-10-29 ASSESSMENT — VISUAL ACUITY
OS_BCVA: 20/50
OD_BCVA: 20/400

## 2024-10-29 ASSESSMENT — REFRACTION_MANIFEST
OD_SPHERE: +1.00
OD_AXIS: 85
OD_VA1: 20/50
OD_CYLINDER: -1.50

## 2024-10-29 ASSESSMENT — TEAR BREAK UP TIME (TBUT)
OS_TBUT: 2+
OD_TBUT: 2+

## 2024-10-29 ASSESSMENT — CONFRONTATIONAL VISUAL FIELD TEST (CVF)
OS_FINDINGS: FULL
OD_FINDINGS: FULL

## 2024-10-30 ENCOUNTER — OUTPATIENT (OUTPATIENT)
Dept: OUTPATIENT SERVICES | Facility: HOSPITAL | Age: 88
LOS: 1 days | Discharge: HOME | End: 2024-10-30

## 2024-10-30 DIAGNOSIS — Z87.81 PERSONAL HISTORY OF (HEALED) TRAUMATIC FRACTURE: Chronic | ICD-10-CM

## 2024-10-30 DIAGNOSIS — H26.9 UNSPECIFIED CATARACT: Chronic | ICD-10-CM

## 2024-10-30 DIAGNOSIS — Z98.890 OTHER SPECIFIED POSTPROCEDURAL STATES: Chronic | ICD-10-CM

## 2024-10-30 DIAGNOSIS — D46.9 MYELODYSPLASTIC SYNDROME, UNSPECIFIED: ICD-10-CM

## 2024-10-30 DIAGNOSIS — Z90.721 ACQUIRED ABSENCE OF OVARIES, UNILATERAL: Chronic | ICD-10-CM

## 2024-10-30 DIAGNOSIS — Z90.49 ACQUIRED ABSENCE OF OTHER SPECIFIED PARTS OF DIGESTIVE TRACT: Chronic | ICD-10-CM

## 2024-10-30 DIAGNOSIS — Z86.79 PERSONAL HISTORY OF OTHER DISEASES OF THE CIRCULATORY SYSTEM: Chronic | ICD-10-CM

## 2024-10-30 DIAGNOSIS — Z96.641 PRESENCE OF RIGHT ARTIFICIAL HIP JOINT: Chronic | ICD-10-CM

## 2024-11-06 ENCOUNTER — APPOINTMENT (OUTPATIENT)
Dept: HEMATOLOGY ONCOLOGY | Facility: CLINIC | Age: 89
End: 2024-11-06
Payer: MEDICARE

## 2024-11-06 ENCOUNTER — RESULT REVIEW (OUTPATIENT)
Age: 89
End: 2024-11-06

## 2024-11-06 ENCOUNTER — APPOINTMENT (OUTPATIENT)
Age: 89
End: 2024-11-06

## 2024-11-06 VITALS
DIASTOLIC BLOOD PRESSURE: 78 MMHG | RESPIRATION RATE: 16 BRPM | HEART RATE: 82 BPM | SYSTOLIC BLOOD PRESSURE: 129 MMHG | HEIGHT: 63 IN | BODY MASS INDEX: 24.88 KG/M2 | OXYGEN SATURATION: 93 % | WEIGHT: 140.43 LBS

## 2024-11-06 DIAGNOSIS — D46.9 MYELODYSPLASTIC SYNDROME, UNSPECIFIED: ICD-10-CM

## 2024-11-06 DIAGNOSIS — D64.9 ANEMIA, UNSPECIFIED: ICD-10-CM

## 2024-11-06 LAB
BASOPHILS # BLD AUTO: 0.1 K/UL — SIGNIFICANT CHANGE UP (ref 0–0.2)
BASOPHILS NFR BLD AUTO: 1.2 % — SIGNIFICANT CHANGE UP (ref 0–2)
EOSINOPHIL # BLD AUTO: 0.5 K/UL — SIGNIFICANT CHANGE UP (ref 0–0.5)
EOSINOPHIL NFR BLD AUTO: 9.2 % — HIGH (ref 0–6)
HCT VFR BLD CALC: 34.1 % — LOW (ref 34.5–45)
HGB BLD-MCNC: 11.4 G/DL — LOW (ref 11.5–15.5)
LYMPHOCYTES # BLD AUTO: 1.8 K/UL — SIGNIFICANT CHANGE UP (ref 1–3.3)
LYMPHOCYTES # BLD AUTO: 30.5 % — SIGNIFICANT CHANGE UP (ref 13–44)
MCHC RBC-ENTMCNC: 32.9 PG — SIGNIFICANT CHANGE UP (ref 27–34)
MCHC RBC-ENTMCNC: 33.4 G/DL — SIGNIFICANT CHANGE UP (ref 32–36)
MCV RBC AUTO: 98.6 FL — SIGNIFICANT CHANGE UP (ref 80–100)
MONOCYTES # BLD AUTO: 0.9 K/UL — SIGNIFICANT CHANGE UP (ref 0–0.9)
MONOCYTES NFR BLD AUTO: 14.6 % — HIGH (ref 2–14)
NEUTROPHILS # BLD AUTO: 2.6 K/UL — SIGNIFICANT CHANGE UP (ref 1.8–7.4)
NEUTROPHILS NFR BLD AUTO: 44.4 % — SIGNIFICANT CHANGE UP (ref 43–77)
PLATELET # BLD AUTO: 258 K/UL — SIGNIFICANT CHANGE UP (ref 150–400)
RBC # BLD: 3.46 M/UL — LOW (ref 3.8–5.2)
RBC # FLD: 13 % — SIGNIFICANT CHANGE UP (ref 10.3–14.5)
WBC # BLD: 5.8 K/UL — SIGNIFICANT CHANGE UP (ref 3.8–10.5)
WBC # FLD AUTO: 5.8 K/UL — SIGNIFICANT CHANGE UP (ref 3.8–10.5)

## 2024-11-06 PROCEDURE — 99214 OFFICE O/P EST MOD 30 MIN: CPT

## 2024-11-07 LAB
ALBUMIN SERPL ELPH-MCNC: 4 G/DL
ALP BLD-CCNC: 94 U/L
ALT SERPL-CCNC: 8 U/L
ANION GAP SERPL CALC-SCNC: 11 MMOL/L
AST SERPL-CCNC: 17 U/L
BILIRUB SERPL-MCNC: 0.4 MG/DL
BUN SERPL-MCNC: 23 MG/DL
CALCIUM SERPL-MCNC: 9 MG/DL
CHLORIDE SERPL-SCNC: 99 MMOL/L
CO2 SERPL-SCNC: 24 MMOL/L
CREAT SERPL-MCNC: 1.18 MG/DL
EGFR: 42 ML/MIN/1.73M2
GLUCOSE SERPL-MCNC: 98 MG/DL
POTASSIUM SERPL-SCNC: 4.5 MMOL/L
PROT SERPL-MCNC: 6.7 G/DL
SODIUM SERPL-SCNC: 134 MMOL/L

## 2024-11-12 ENCOUNTER — OFFICE (OUTPATIENT)
Dept: URBAN - METROPOLITAN AREA CLINIC 104 | Facility: CLINIC | Age: 89
Setting detail: OPHTHALMOLOGY
End: 2024-11-12
Payer: MEDICARE

## 2024-11-12 DIAGNOSIS — H35.3221: ICD-10-CM

## 2024-11-12 DIAGNOSIS — H35.033: ICD-10-CM

## 2024-11-12 DIAGNOSIS — H35.3112: ICD-10-CM

## 2024-11-12 DIAGNOSIS — H01.005: ICD-10-CM

## 2024-11-12 DIAGNOSIS — H01.004: ICD-10-CM

## 2024-11-12 DIAGNOSIS — Z96.1: ICD-10-CM

## 2024-11-12 DIAGNOSIS — H04.123: ICD-10-CM

## 2024-11-12 DIAGNOSIS — H01.001: ICD-10-CM

## 2024-11-12 DIAGNOSIS — H01.002: ICD-10-CM

## 2024-11-12 DIAGNOSIS — H43.813: ICD-10-CM

## 2024-11-12 PROCEDURE — 99024 POSTOP FOLLOW-UP VISIT: CPT | Performed by: SPECIALIST

## 2024-11-12 ASSESSMENT — KERATOMETRY
OD_K1POWER_DIOPTERS: 42.40
OS_K2POWER_DIOPTERS: 43.55
OD_K2POWER_DIOPTERS: 44.29
OD_AXISANGLE_DEGREES: 169
OS_AXISANGLE_DEGREES: 132
OS_K1POWER_DIOPTERS: 43.10

## 2024-11-12 ASSESSMENT — CONFRONTATIONAL VISUAL FIELD TEST (CVF)
OS_FINDINGS: FULL
OD_FINDINGS: FULL

## 2024-11-12 ASSESSMENT — REFRACTION_AUTOREFRACTION
OD_CYLINDER: -3.00
OS_AXIS: 121
OS_SPHERE: +0.25
OD_SPHERE: -0.75
OS_CYLINDER: -0.25
OD_AXIS: 082

## 2024-11-12 ASSESSMENT — TEAR BREAK UP TIME (TBUT)
OD_TBUT: 2+
OS_TBUT: 2+

## 2024-11-12 ASSESSMENT — REFRACTION_MANIFEST
OD_SPHERE: +1.00
OD_VA1: 20/50
OD_AXIS: 85
OD_CYLINDER: -1.50

## 2024-11-12 ASSESSMENT — VISUAL ACUITY
OD_BCVA: 20/>400
OS_BCVA: 20/30

## 2024-11-12 ASSESSMENT — TONOMETRY: OD_IOP_MMHG: 16

## 2024-11-12 ASSESSMENT — LID EXAM ASSESSMENTS
OS_BLEPHARITIS: LLL LUL 3+
OD_BLEPHARITIS: RLL RUL 3+

## 2024-11-15 ENCOUNTER — OFFICE (OUTPATIENT)
Dept: URBAN - METROPOLITAN AREA CLINIC 63 | Facility: CLINIC | Age: 89
Setting detail: OPHTHALMOLOGY
End: 2024-11-15
Payer: MEDICARE

## 2024-11-15 DIAGNOSIS — H35.3112: ICD-10-CM

## 2024-11-15 DIAGNOSIS — H35.033: ICD-10-CM

## 2024-11-15 DIAGNOSIS — H35.3221: ICD-10-CM

## 2024-11-15 DIAGNOSIS — H43.813: ICD-10-CM

## 2024-11-15 PROCEDURE — 92012 INTRM OPH EXAM EST PATIENT: CPT | Mod: 24 | Performed by: SPECIALIST

## 2024-11-15 PROCEDURE — 92134 CPTRZ OPH DX IMG PST SGM RTA: CPT | Performed by: SPECIALIST

## 2024-11-15 PROCEDURE — 92235 FLUORESCEIN ANGRPH MLTIFRAME: CPT | Performed by: SPECIALIST

## 2024-11-15 ASSESSMENT — LID EXAM ASSESSMENTS
OS_BLEPHARITIS: LLL LUL 3+
OD_BLEPHARITIS: RLL RUL 3+

## 2024-11-15 ASSESSMENT — REFRACTION_AUTOREFRACTION
OD_CYLINDER: -3.00
OD_SPHERE: -0.75
OD_AXIS: 082
OS_CYLINDER: -0.25
OS_AXIS: 121
OS_SPHERE: +0.25

## 2024-11-15 ASSESSMENT — KERATOMETRY
OS_K1POWER_DIOPTERS: 43.10
OD_AXISANGLE_DEGREES: 169
OD_K2POWER_DIOPTERS: 44.29
OS_K2POWER_DIOPTERS: 43.55
OD_K1POWER_DIOPTERS: 42.40
OS_AXISANGLE_DEGREES: 132

## 2024-11-15 ASSESSMENT — TONOMETRY
OS_IOP_MMHG: 15
OD_IOP_MMHG: 16

## 2024-11-15 ASSESSMENT — TEAR BREAK UP TIME (TBUT)
OD_TBUT: 2+
OS_TBUT: 2+

## 2024-11-15 ASSESSMENT — VISUAL ACUITY
OD_BCVA: 20/300
OS_BCVA: 20/30-1

## 2025-01-03 ENCOUNTER — OFFICE (OUTPATIENT)
Dept: URBAN - METROPOLITAN AREA CLINIC 63 | Facility: CLINIC | Age: OVER 89
Setting detail: OPHTHALMOLOGY
End: 2025-01-03
Payer: MEDICARE

## 2025-01-03 DIAGNOSIS — H35.3221: ICD-10-CM

## 2025-01-03 DIAGNOSIS — H35.033: ICD-10-CM

## 2025-01-03 PROCEDURE — 67028 INJECTION EYE DRUG: CPT | Mod: 79,LT | Performed by: SPECIALIST

## 2025-01-03 PROCEDURE — 92134 CPTRZ OPH DX IMG PST SGM RTA: CPT | Performed by: SPECIALIST

## 2025-01-03 ASSESSMENT — LID EXAM ASSESSMENTS
OD_BLEPHARITIS: RLL RUL 3+
OS_BLEPHARITIS: LLL LUL 3+

## 2025-01-03 ASSESSMENT — TEAR BREAK UP TIME (TBUT)
OS_TBUT: 2+
OD_TBUT: 2+

## 2025-01-03 ASSESSMENT — REFRACTION_AUTOREFRACTION
OS_CYLINDER: -0.25
OD_AXIS: 082
OS_SPHERE: +0.25
OD_SPHERE: -0.75
OS_AXIS: 121
OD_CYLINDER: -3.00

## 2025-01-03 ASSESSMENT — KERATOMETRY
OS_AXISANGLE_DEGREES: 132
OS_K2POWER_DIOPTERS: 43.55
OD_K2POWER_DIOPTERS: 44.29
OD_K1POWER_DIOPTERS: 42.40
OD_AXISANGLE_DEGREES: 169
OS_K1POWER_DIOPTERS: 43.10

## 2025-01-03 ASSESSMENT — TONOMETRY
OS_IOP_MMHG: 15
OD_IOP_MMHG: 14

## 2025-01-03 ASSESSMENT — CONFRONTATIONAL VISUAL FIELD TEST (CVF)
OS_FINDINGS: FULL
OD_FINDINGS: FULL

## 2025-01-03 ASSESSMENT — VISUAL ACUITY
OD_BCVA: 20/350
OS_BCVA: 20/30

## 2025-01-17 ENCOUNTER — OFFICE (OUTPATIENT)
Dept: URBAN - METROPOLITAN AREA CLINIC 63 | Facility: CLINIC | Age: OVER 89
Setting detail: OPHTHALMOLOGY
End: 2025-01-17
Payer: MEDICARE

## 2025-01-17 DIAGNOSIS — H43.813: ICD-10-CM

## 2025-01-17 DIAGNOSIS — H35.3112: ICD-10-CM

## 2025-01-17 DIAGNOSIS — H35.033: ICD-10-CM

## 2025-01-17 DIAGNOSIS — H35.3221: ICD-10-CM

## 2025-01-17 PROCEDURE — 92134 CPTRZ OPH DX IMG PST SGM RTA: CPT | Performed by: SPECIALIST

## 2025-01-17 PROCEDURE — 99024 POSTOP FOLLOW-UP VISIT: CPT | Performed by: SPECIALIST

## 2025-01-17 ASSESSMENT — TEAR BREAK UP TIME (TBUT)
OS_TBUT: 2+
OD_TBUT: 2+

## 2025-01-17 ASSESSMENT — REFRACTION_AUTOREFRACTION
OS_SPHERE: +0.25
OD_SPHERE: -0.75
OS_CYLINDER: -0.25
OD_AXIS: 082
OD_CYLINDER: -3.00
OS_AXIS: 121

## 2025-01-17 ASSESSMENT — KERATOMETRY
OD_AXISANGLE_DEGREES: 169
OS_AXISANGLE_DEGREES: 132
OS_K2POWER_DIOPTERS: 43.55
OS_K1POWER_DIOPTERS: 43.10
OD_K1POWER_DIOPTERS: 42.40
OD_K2POWER_DIOPTERS: 44.29

## 2025-01-17 ASSESSMENT — LID EXAM ASSESSMENTS
OS_BLEPHARITIS: LLL LUL 3+
OD_BLEPHARITIS: RLL RUL 3+

## 2025-01-17 ASSESSMENT — VISUAL ACUITY
OS_BCVA: 20/30
OD_BCVA: 20/CF

## 2025-01-31 NOTE — DISCHARGE NOTE PROVIDER - NSDCHHHOMEBOUND_GEN_ALL_CORE
[Fever] : fever [Negative] : Genitourinary Pain greater than 7 on scale of 10 on ambulation/Fall risk

## 2025-02-04 ENCOUNTER — OUTPATIENT (OUTPATIENT)
Dept: OUTPATIENT SERVICES | Facility: HOSPITAL | Age: 89
LOS: 1 days | Discharge: HOME | End: 2025-02-04

## 2025-02-04 DIAGNOSIS — Z96.641 PRESENCE OF RIGHT ARTIFICIAL HIP JOINT: Chronic | ICD-10-CM

## 2025-02-04 DIAGNOSIS — Z98.890 OTHER SPECIFIED POSTPROCEDURAL STATES: Chronic | ICD-10-CM

## 2025-02-04 DIAGNOSIS — Z90.49 ACQUIRED ABSENCE OF OTHER SPECIFIED PARTS OF DIGESTIVE TRACT: Chronic | ICD-10-CM

## 2025-02-04 DIAGNOSIS — D46.9 MYELODYSPLASTIC SYNDROME, UNSPECIFIED: ICD-10-CM

## 2025-02-04 DIAGNOSIS — Z87.81 PERSONAL HISTORY OF (HEALED) TRAUMATIC FRACTURE: Chronic | ICD-10-CM

## 2025-02-04 DIAGNOSIS — Z86.79 PERSONAL HISTORY OF OTHER DISEASES OF THE CIRCULATORY SYSTEM: Chronic | ICD-10-CM

## 2025-02-04 DIAGNOSIS — E53.8 DEFICIENCY OF OTHER SPECIFIED B GROUP VITAMINS: ICD-10-CM

## 2025-02-04 DIAGNOSIS — H26.9 UNSPECIFIED CATARACT: Chronic | ICD-10-CM

## 2025-02-04 DIAGNOSIS — Z90.721 ACQUIRED ABSENCE OF OVARIES, UNILATERAL: Chronic | ICD-10-CM

## 2025-02-04 DIAGNOSIS — D64.9 ANEMIA, UNSPECIFIED: ICD-10-CM

## 2025-02-05 ENCOUNTER — RESULT REVIEW (OUTPATIENT)
Age: 89
End: 2025-02-05

## 2025-02-05 ENCOUNTER — APPOINTMENT (OUTPATIENT)
Dept: HEMATOLOGY ONCOLOGY | Facility: CLINIC | Age: 89
End: 2025-02-05
Payer: MEDICARE

## 2025-02-05 VITALS
WEIGHT: 138 LBS | HEART RATE: 92 BPM | TEMPERATURE: 97.8 F | SYSTOLIC BLOOD PRESSURE: 168 MMHG | OXYGEN SATURATION: 93 % | DIASTOLIC BLOOD PRESSURE: 113 MMHG | BODY MASS INDEX: 24.45 KG/M2 | HEIGHT: 63 IN

## 2025-02-05 DIAGNOSIS — D46.9 MYELODYSPLASTIC SYNDROME, UNSPECIFIED: ICD-10-CM

## 2025-02-05 LAB
BASOPHILS # BLD AUTO: 0.1 K/UL — SIGNIFICANT CHANGE UP (ref 0–0.2)
BASOPHILS NFR BLD AUTO: 1 % — SIGNIFICANT CHANGE UP (ref 0–2)
EOSINOPHIL # BLD AUTO: 0.5 K/UL — SIGNIFICANT CHANGE UP (ref 0–0.5)
EOSINOPHIL NFR BLD AUTO: 6.3 % — HIGH (ref 0–6)
HCT VFR BLD CALC: 43 % — SIGNIFICANT CHANGE UP (ref 34.5–45)
HGB BLD-MCNC: 14 G/DL — SIGNIFICANT CHANGE UP (ref 11.5–15.5)
LYMPHOCYTES # BLD AUTO: 2.7 K/UL — SIGNIFICANT CHANGE UP (ref 1–3.3)
LYMPHOCYTES # BLD AUTO: 35.5 % — SIGNIFICANT CHANGE UP (ref 13–44)
MCHC RBC-ENTMCNC: 30 PG — SIGNIFICANT CHANGE UP (ref 27–34)
MCHC RBC-ENTMCNC: 32.6 G/DL — SIGNIFICANT CHANGE UP (ref 32–36)
MCV RBC AUTO: 92 FL — SIGNIFICANT CHANGE UP (ref 80–100)
MONOCYTES # BLD AUTO: 0.7 K/UL — SIGNIFICANT CHANGE UP (ref 0–0.9)
MONOCYTES NFR BLD AUTO: 9 % — SIGNIFICANT CHANGE UP (ref 2–14)
NEUTROPHILS # BLD AUTO: 3.6 K/UL — SIGNIFICANT CHANGE UP (ref 1.8–7.4)
NEUTROPHILS NFR BLD AUTO: 48.2 % — SIGNIFICANT CHANGE UP (ref 43–77)
PLATELET # BLD AUTO: 246 K/UL — SIGNIFICANT CHANGE UP (ref 150–400)
RBC # BLD: 4.67 M/UL — SIGNIFICANT CHANGE UP (ref 3.8–5.2)
RBC # FLD: 13.1 % — SIGNIFICANT CHANGE UP (ref 10.3–14.5)
WBC # BLD: 7.5 K/UL — SIGNIFICANT CHANGE UP (ref 3.8–10.5)
WBC # FLD AUTO: 7.5 K/UL — SIGNIFICANT CHANGE UP (ref 3.8–10.5)

## 2025-02-05 PROCEDURE — 99214 OFFICE O/P EST MOD 30 MIN: CPT

## 2025-02-06 LAB
ALBUMIN SERPL ELPH-MCNC: 4.2 G/DL
ALP BLD-CCNC: 109 U/L
ALT SERPL-CCNC: 9 U/L
ANION GAP SERPL CALC-SCNC: 11 MMOL/L
AST SERPL-CCNC: 19 U/L
BILIRUB SERPL-MCNC: 0.4 MG/DL
BUN SERPL-MCNC: 24 MG/DL
CALCIUM SERPL-MCNC: 9.9 MG/DL
CHLORIDE SERPL-SCNC: 101 MMOL/L
CO2 SERPL-SCNC: 26 MMOL/L
CREAT SERPL-MCNC: 0.91 MG/DL
EGFR: 58 ML/MIN/1.73M2
GLUCOSE SERPL-MCNC: 89 MG/DL
POTASSIUM SERPL-SCNC: 5.3 MMOL/L
PROT SERPL-MCNC: 6.8 G/DL
SODIUM SERPL-SCNC: 138 MMOL/L

## 2025-02-28 NOTE — ED STATDOCS - CARE PLAN
Quality 130: Documentation Of Current Medications In The Medical Record: Current Medications Documented
Quality 431: Preventive Care And Screening: Unhealthy Alcohol Use - Screening: Patient not identified as an unhealthy alcohol user when screened for unhealthy alcohol use using a systematic screening method
Quality 47: Advance Care Plan: Advance Care Planning discussed and documented; advance care plan or surrogate decision maker documented in the medical record.
Detail Level: Detailed
Quality 226: Preventive Care And Screening: Tobacco Use: Screening And Cessation Intervention: Patient screened for tobacco use and is an ex/non-smoker
Principal Discharge DX:	Closed fracture of left hip, initial encounter

## 2025-03-07 ENCOUNTER — OFFICE (OUTPATIENT)
Dept: URBAN - METROPOLITAN AREA CLINIC 63 | Facility: CLINIC | Age: OVER 89
Setting detail: OPHTHALMOLOGY
End: 2025-03-07
Payer: MEDICARE

## 2025-03-07 DIAGNOSIS — H35.3112: ICD-10-CM

## 2025-03-07 DIAGNOSIS — H35.033: ICD-10-CM

## 2025-03-07 DIAGNOSIS — H35.3221: ICD-10-CM

## 2025-03-07 DIAGNOSIS — H43.813: ICD-10-CM

## 2025-03-07 PROCEDURE — 92134 CPTRZ OPH DX IMG PST SGM RTA: CPT | Performed by: SPECIALIST

## 2025-03-07 PROCEDURE — 92235 FLUORESCEIN ANGRPH MLTIFRAME: CPT | Performed by: SPECIALIST

## 2025-03-07 PROCEDURE — 92014 COMPRE OPH EXAM EST PT 1/>: CPT | Performed by: SPECIALIST

## 2025-03-07 ASSESSMENT — REFRACTION_AUTOREFRACTION
OD_AXIS: 082
OS_SPHERE: +0.25
OD_CYLINDER: -3.00
OS_AXIS: 121
OD_SPHERE: -0.75
OS_CYLINDER: -0.25

## 2025-03-07 ASSESSMENT — KERATOMETRY
OS_K1POWER_DIOPTERS: 43.10
OD_K1POWER_DIOPTERS: 42.40
OS_AXISANGLE_DEGREES: 132
OS_K2POWER_DIOPTERS: 43.55
OD_K1POWER_DIOPTERS: 42.40
OD_K2POWER_DIOPTERS: 44.29
OS_K2POWER_DIOPTERS: 43.55
OD_AXISANGLE_DEGREES: 169
OD_AXISANGLE_DEGREES: 169
OD_K2POWER_DIOPTERS: 44.29
OS_AXISANGLE_DEGREES: 132
OS_K1POWER_DIOPTERS: 43.10

## 2025-03-07 ASSESSMENT — VISUAL ACUITY
OS_BCVA: 20/30-
OD_BCVA: FC@3FT

## 2025-03-07 ASSESSMENT — TONOMETRY
OS_IOP_MMHG: 15
OD_IOP_MMHG: 15

## 2025-03-07 ASSESSMENT — CONFRONTATIONAL VISUAL FIELD TEST (CVF)
OD_FINDINGS: FULL
OS_FINDINGS: FULL

## 2025-03-07 ASSESSMENT — LID EXAM ASSESSMENTS
OS_BLEPHARITIS: LLL LUL 3+
OD_BLEPHARITIS: RLL RUL 3+

## 2025-03-07 ASSESSMENT — TEAR BREAK UP TIME (TBUT)
OD_TBUT: 2+
OS_TBUT: 2+

## 2025-03-19 NOTE — ED PROVIDER NOTE - CROS ED RESP ALL NEG
Received call from pt. Pt wanted update when surgery will be scheduled tomorrow. Pt informed that surgery is scheduled for 12:30pm tomorrow and the hospital will be contacting her in regards to specific directions. Pt verbalized understanding information given by RN.    - - -

## 2025-03-26 ENCOUNTER — APPOINTMENT (OUTPATIENT)
Dept: INTERNAL MEDICINE | Facility: CLINIC | Age: 89
End: 2025-03-26
Payer: MEDICARE

## 2025-03-26 ENCOUNTER — NON-APPOINTMENT (OUTPATIENT)
Age: 89
End: 2025-03-26

## 2025-03-26 VITALS
OXYGEN SATURATION: 100 % | SYSTOLIC BLOOD PRESSURE: 130 MMHG | HEART RATE: 77 BPM | BODY MASS INDEX: 25.16 KG/M2 | HEIGHT: 63 IN | DIASTOLIC BLOOD PRESSURE: 80 MMHG | TEMPERATURE: 93 F | WEIGHT: 142 LBS | RESPIRATION RATE: 16 BRPM

## 2025-03-26 DIAGNOSIS — R06.09 OTHER FORMS OF DYSPNEA: ICD-10-CM

## 2025-03-26 DIAGNOSIS — R39.15 URGENCY OF URINATION: ICD-10-CM

## 2025-03-26 DIAGNOSIS — Z00.00 ENCOUNTER FOR GENERAL ADULT MEDICAL EXAMINATION W/OUT ABNORMAL FINDINGS: ICD-10-CM

## 2025-03-26 DIAGNOSIS — N39.0 URINARY TRACT INFECTION, SITE NOT SPECIFIED: ICD-10-CM

## 2025-03-26 PROCEDURE — G0439: CPT

## 2025-03-26 PROCEDURE — 36415 COLL VENOUS BLD VENIPUNCTURE: CPT | Mod: GC

## 2025-03-26 PROCEDURE — 93000 ELECTROCARDIOGRAM COMPLETE: CPT | Mod: GC

## 2025-03-27 LAB
ALBUMIN SERPL ELPH-MCNC: 4.2 G/DL
ALP BLD-CCNC: 109 U/L
ALT SERPL-CCNC: 10 U/L
ANION GAP SERPL CALC-SCNC: 12 MMOL/L
APPEARANCE: CLEAR
AST SERPL-CCNC: 17 U/L
BACTERIA: ABNORMAL /HPF
BILIRUB SERPL-MCNC: 2 MG/DL
BILIRUBIN URINE: NEGATIVE
BLOOD URINE: NEGATIVE
BUN SERPL-MCNC: 39 MG/DL
CALCIUM SERPL-MCNC: 8.9 MG/DL
CAST: 0 /LPF
CHLORIDE SERPL-SCNC: 102 MMOL/L
CHOLEST SERPL-MCNC: 156 MG/DL
CO2 SERPL-SCNC: 23 MMOL/L
COLOR: YELLOW
CREAT SERPL-MCNC: 1.09 MG/DL
EGFRCR SERPLBLD CKD-EPI 2021: 46 ML/MIN/1.73M2
EPITHELIAL CELLS: 2 /HPF
ESTIMATED AVERAGE GLUCOSE: 94 MG/DL
GLUCOSE QUALITATIVE U: NEGATIVE MG/DL
GLUCOSE SERPL-MCNC: 92 MG/DL
HBA1C MFR BLD HPLC: 4.9 %
HCT VFR BLD CALC: 34.3 %
HDLC SERPL-MCNC: 79 MG/DL
HGB BLD-MCNC: 11.2 G/DL
KETONES URINE: NEGATIVE MG/DL
LDLC SERPL-MCNC: 52 MG/DL
LEUKOCYTE ESTERASE URINE: ABNORMAL
MCHC RBC-ENTMCNC: 31.3 PG
MCHC RBC-ENTMCNC: 32.7 G/DL
MCV RBC AUTO: 95.8 FL
MICROSCOPIC-UA: NORMAL
NITRITE URINE: POSITIVE
NONHDLC SERPL-MCNC: 78 MG/DL
PH URINE: 6.5
PLATELET # BLD AUTO: 247 K/UL
POTASSIUM SERPL-SCNC: 4.6 MMOL/L
PROT SERPL-MCNC: 6.7 G/DL
PROTEIN URINE: NEGATIVE MG/DL
RBC # BLD: 3.58 M/UL
RBC # FLD: 15.5 %
RED BLOOD CELLS URINE: 0 /HPF
SODIUM SERPL-SCNC: 136 MMOL/L
SPECIFIC GRAVITY URINE: 1.01
TRIGL SERPL-MCNC: 155 MG/DL
TSH SERPL-ACNC: 2.59 UIU/ML
UROBILINOGEN URINE: 1 MG/DL
WBC # FLD AUTO: 8.16 K/UL
WHITE BLOOD CELLS URINE: 3 /HPF

## 2025-03-27 RX ORDER — ALBUTEROL SULFATE 90 UG/1
108 (90 BASE) POWDER, METERED RESPIRATORY (INHALATION) EVERY 4 HOURS
Qty: 1 | Refills: 2 | Status: ACTIVE | COMMUNITY
Start: 2025-03-27 | End: 1900-01-01

## 2025-03-28 PROBLEM — N39.0 UTI (URINARY TRACT INFECTION): Status: ACTIVE | Noted: 2025-03-28 | Resolved: 2025-04-27

## 2025-03-28 RX ORDER — CEPHALEXIN 500 MG/1
500 CAPSULE ORAL
Qty: 10 | Refills: 0 | Status: DISCONTINUED | COMMUNITY
Start: 2025-03-28 | End: 2025-03-28

## 2025-03-29 RX ORDER — NITROFURANTOIN (MONOHYDRATE/MACROCRYSTALS) 25; 75 MG/1; MG/1
100 CAPSULE ORAL
Qty: 14 | Refills: 1 | Status: ACTIVE | COMMUNITY
Start: 2025-03-29 | End: 1900-01-01

## 2025-03-29 RX ORDER — AMOXICILLIN 875 MG/1
875 TABLET, FILM COATED ORAL
Qty: 14 | Refills: 0 | Status: DISCONTINUED | COMMUNITY
Start: 2025-03-28 | End: 2025-03-29

## 2025-04-07 RX ORDER — AMPICILLIN 500 MG/1
500 CAPSULE ORAL
Qty: 21 | Refills: 0 | Status: ACTIVE | COMMUNITY
Start: 2025-04-07 | End: 1900-01-01

## 2025-05-06 ENCOUNTER — OUTPATIENT (OUTPATIENT)
Dept: OUTPATIENT SERVICES | Facility: HOSPITAL | Age: 89
LOS: 1 days | Discharge: HOME | End: 2025-05-06

## 2025-05-06 DIAGNOSIS — Z98.890 OTHER SPECIFIED POSTPROCEDURAL STATES: Chronic | ICD-10-CM

## 2025-05-06 DIAGNOSIS — Z96.641 PRESENCE OF RIGHT ARTIFICIAL HIP JOINT: Chronic | ICD-10-CM

## 2025-05-06 DIAGNOSIS — Z90.721 ACQUIRED ABSENCE OF OVARIES, UNILATERAL: Chronic | ICD-10-CM

## 2025-05-06 DIAGNOSIS — H26.9 UNSPECIFIED CATARACT: Chronic | ICD-10-CM

## 2025-05-06 DIAGNOSIS — Z87.81 PERSONAL HISTORY OF (HEALED) TRAUMATIC FRACTURE: Chronic | ICD-10-CM

## 2025-05-06 DIAGNOSIS — Z90.49 ACQUIRED ABSENCE OF OTHER SPECIFIED PARTS OF DIGESTIVE TRACT: Chronic | ICD-10-CM

## 2025-05-06 DIAGNOSIS — D46.9 MYELODYSPLASTIC SYNDROME, UNSPECIFIED: ICD-10-CM

## 2025-05-06 DIAGNOSIS — Z86.79 PERSONAL HISTORY OF OTHER DISEASES OF THE CIRCULATORY SYSTEM: Chronic | ICD-10-CM

## 2025-05-14 ENCOUNTER — APPOINTMENT (OUTPATIENT)
Dept: HEMATOLOGY ONCOLOGY | Facility: CLINIC | Age: 89
End: 2025-05-14
Payer: MEDICARE

## 2025-05-14 ENCOUNTER — RESULT REVIEW (OUTPATIENT)
Age: 89
End: 2025-05-14

## 2025-05-14 VITALS
BODY MASS INDEX: 25.16 KG/M2 | HEIGHT: 63 IN | WEIGHT: 141.98 LBS | SYSTOLIC BLOOD PRESSURE: 186 MMHG | DIASTOLIC BLOOD PRESSURE: 99 MMHG | OXYGEN SATURATION: 93 % | HEART RATE: 74 BPM

## 2025-05-14 DIAGNOSIS — D46.9 MYELODYSPLASTIC SYNDROME, UNSPECIFIED: ICD-10-CM

## 2025-05-14 LAB
BASOPHILS # BLD AUTO: 0.03 K/UL — SIGNIFICANT CHANGE UP (ref 0–0.2)
BASOPHILS NFR BLD AUTO: 0.5 % — SIGNIFICANT CHANGE UP (ref 0–2)
EOSINOPHIL # BLD AUTO: 0.87 K/UL — HIGH (ref 0–0.5)
EOSINOPHIL NFR BLD AUTO: 14 % — HIGH (ref 0–6)
HCT VFR BLD CALC: 37.3 % — SIGNIFICANT CHANGE UP (ref 34.5–45)
HGB BLD-MCNC: 12.4 G/DL — SIGNIFICANT CHANGE UP (ref 11.5–15.5)
IMM GRANULOCYTES # BLD AUTO: 0.02 K/UL — SIGNIFICANT CHANGE UP (ref 0–0.07)
IMM GRANULOCYTES NFR BLD AUTO: 0.3 % — SIGNIFICANT CHANGE UP (ref 0–0.9)
LYMPHOCYTES # BLD AUTO: 1.89 K/UL — SIGNIFICANT CHANGE UP (ref 1–3.3)
LYMPHOCYTES NFR BLD AUTO: 30.5 % — SIGNIFICANT CHANGE UP (ref 13–44)
MCHC RBC-ENTMCNC: 31.2 PG — SIGNIFICANT CHANGE UP (ref 27–34)
MCHC RBC-ENTMCNC: 33.2 G/DL — SIGNIFICANT CHANGE UP (ref 32–36)
MCV RBC AUTO: 93.7 FL — SIGNIFICANT CHANGE UP (ref 80–100)
MONOCYTES # BLD AUTO: 0.53 K/UL — SIGNIFICANT CHANGE UP (ref 0–0.9)
MONOCYTES NFR BLD AUTO: 8.5 % — SIGNIFICANT CHANGE UP (ref 2–14)
NEUTROPHILS # BLD AUTO: 2.86 K/UL — SIGNIFICANT CHANGE UP (ref 1.8–7.4)
NEUTROPHILS NFR BLD AUTO: 46.2 % — SIGNIFICANT CHANGE UP (ref 43–77)
NRBC # BLD AUTO: 0 K/UL — SIGNIFICANT CHANGE UP (ref 0–0)
NRBC # FLD: 0 K/UL — SIGNIFICANT CHANGE UP (ref 0–0)
NRBC BLD AUTO-RTO: 0 /100 WBCS — SIGNIFICANT CHANGE UP (ref 0–0)
PLATELET # BLD AUTO: 222 K/UL — SIGNIFICANT CHANGE UP (ref 150–400)
PMV BLD: 9 FL — SIGNIFICANT CHANGE UP (ref 7–13)
RBC # BLD: 3.98 M/UL — SIGNIFICANT CHANGE UP (ref 3.8–5.2)
RBC # FLD: 12.6 % — SIGNIFICANT CHANGE UP (ref 10.3–14.5)
WBC # BLD: 6.2 K/UL — SIGNIFICANT CHANGE UP (ref 3.8–10.5)
WBC # FLD AUTO: 6.2 K/UL — SIGNIFICANT CHANGE UP (ref 3.8–10.5)

## 2025-05-14 PROCEDURE — 99214 OFFICE O/P EST MOD 30 MIN: CPT

## 2025-06-02 ENCOUNTER — OFFICE (OUTPATIENT)
Dept: URBAN - METROPOLITAN AREA CLINIC 115 | Facility: CLINIC | Age: OVER 89
Setting detail: OPHTHALMOLOGY
End: 2025-06-02
Payer: MEDICARE

## 2025-06-02 DIAGNOSIS — H35.3112: ICD-10-CM

## 2025-06-02 DIAGNOSIS — H35.3221: ICD-10-CM

## 2025-06-02 DIAGNOSIS — H35.033: ICD-10-CM

## 2025-06-02 DIAGNOSIS — H43.813: ICD-10-CM

## 2025-06-02 PROCEDURE — 92134 CPTRZ OPH DX IMG PST SGM RTA: CPT | Performed by: OPHTHALMOLOGY

## 2025-06-02 PROCEDURE — 92012 INTRM OPH EXAM EST PATIENT: CPT | Performed by: OPHTHALMOLOGY

## 2025-06-02 ASSESSMENT — LID EXAM ASSESSMENTS
OS_BLEPHARITIS: LLL LUL 3+
OD_BLEPHARITIS: RLL RUL 3+

## 2025-06-02 ASSESSMENT — REFRACTION_AUTOREFRACTION
OD_CYLINDER: -3.00
OD_AXIS: 082
OD_SPHERE: -0.75
OS_CYLINDER: -0.25
OS_AXIS: 121
OS_SPHERE: +0.25

## 2025-06-02 ASSESSMENT — KERATOMETRY
OS_K1POWER_DIOPTERS: 43.10
OS_AXISANGLE_DEGREES: 132
OD_AXISANGLE_DEGREES: 169
OD_K1POWER_DIOPTERS: 42.40
OS_K2POWER_DIOPTERS: 43.55
OD_K2POWER_DIOPTERS: 44.29

## 2025-06-02 ASSESSMENT — TONOMETRY
OD_IOP_MMHG: 14
OS_IOP_MMHG: 11

## 2025-06-02 ASSESSMENT — TEAR BREAK UP TIME (TBUT)
OD_TBUT: 2+
OS_TBUT: 2+

## 2025-06-02 ASSESSMENT — CONFRONTATIONAL VISUAL FIELD TEST (CVF)
OD_FINDINGS: FULL
OS_FINDINGS: FULL

## 2025-06-02 ASSESSMENT — VISUAL ACUITY
OD_BCVA: FC@3FT
OS_BCVA: 20/50

## 2025-07-18 ENCOUNTER — OFFICE (OUTPATIENT)
Dept: URBAN - METROPOLITAN AREA CLINIC 63 | Facility: CLINIC | Age: OVER 89
Setting detail: OPHTHALMOLOGY
End: 2025-07-18
Payer: MEDICARE

## 2025-07-18 DIAGNOSIS — H35.3221: ICD-10-CM

## 2025-07-18 DIAGNOSIS — H35.033: ICD-10-CM

## 2025-07-18 PROCEDURE — 67028 INJECTION EYE DRUG: CPT | Mod: LT | Performed by: SPECIALIST

## 2025-07-18 PROCEDURE — 92134 CPTRZ OPH DX IMG PST SGM RTA: CPT | Performed by: SPECIALIST

## 2025-07-18 ASSESSMENT — TONOMETRY
OS_IOP_MMHG: 16
OD_IOP_MMHG: 15

## 2025-07-18 ASSESSMENT — KERATOMETRY
OS_AXISANGLE_DEGREES: 132
OD_AXISANGLE_DEGREES: 169
OD_K2POWER_DIOPTERS: 44.29
OS_K2POWER_DIOPTERS: 43.55
OS_K1POWER_DIOPTERS: 43.10
OD_K1POWER_DIOPTERS: 42.40

## 2025-07-18 ASSESSMENT — REFRACTION_AUTOREFRACTION
OS_AXIS: 121
OD_AXIS: 082
OD_SPHERE: -0.75
OS_SPHERE: +0.25
OD_CYLINDER: -3.00
OS_CYLINDER: -0.25

## 2025-07-18 ASSESSMENT — LID EXAM ASSESSMENTS
OD_BLEPHARITIS: RLL RUL 3+
OS_BLEPHARITIS: LLL LUL 3+

## 2025-07-18 ASSESSMENT — CONFRONTATIONAL VISUAL FIELD TEST (CVF)
OS_FINDINGS: FULL
OD_FINDINGS: FULL

## 2025-07-18 ASSESSMENT — VISUAL ACUITY
OS_BCVA: 20/40
OD_BCVA: CF 2FT

## 2025-07-18 ASSESSMENT — TEAR BREAK UP TIME (TBUT)
OS_TBUT: 2+
OD_TBUT: 2+

## 2025-07-24 ENCOUNTER — APPOINTMENT (OUTPATIENT)
Dept: INTERNAL MEDICINE | Facility: CLINIC | Age: 89
End: 2025-07-24

## 2025-07-24 PROCEDURE — ZZZZZ: CPT

## 2025-08-06 ENCOUNTER — RESULT REVIEW (OUTPATIENT)
Age: 89
End: 2025-08-06

## 2025-08-06 ENCOUNTER — APPOINTMENT (OUTPATIENT)
Dept: HEMATOLOGY ONCOLOGY | Facility: CLINIC | Age: 89
End: 2025-08-06
Payer: MEDICARE

## 2025-08-06 ENCOUNTER — APPOINTMENT (OUTPATIENT)
Dept: INTERNAL MEDICINE | Facility: CLINIC | Age: 89
End: 2025-08-06
Payer: MEDICARE

## 2025-08-06 VITALS
OXYGEN SATURATION: 97 % | HEART RATE: 80 BPM | BODY MASS INDEX: 24.8 KG/M2 | HEIGHT: 63 IN | WEIGHT: 140 LBS | DIASTOLIC BLOOD PRESSURE: 80 MMHG | SYSTOLIC BLOOD PRESSURE: 125 MMHG

## 2025-08-06 DIAGNOSIS — D46.9 MYELODYSPLASTIC SYNDROME, UNSPECIFIED: ICD-10-CM

## 2025-08-06 PROCEDURE — ZZZZZ: CPT

## 2025-08-06 PROCEDURE — 99214 OFFICE O/P EST MOD 30 MIN: CPT

## 2025-08-07 LAB
ALBUMIN SERPL ELPH-MCNC: 4.2 G/DL
ALP BLD-CCNC: 121 U/L
ALT SERPL-CCNC: 14 U/L
ANION GAP SERPL CALC-SCNC: 11 MMOL/L
APPEARANCE: CLEAR
AST SERPL-CCNC: 22 U/L
BILIRUB SERPL-MCNC: 0.5 MG/DL
BILIRUBIN URINE: NEGATIVE
BLOOD URINE: NEGATIVE
BUN SERPL-MCNC: 23 MG/DL
CALCIUM SERPL-MCNC: 9.7 MG/DL
CHLORIDE SERPL-SCNC: 98 MMOL/L
CO2 SERPL-SCNC: 25 MMOL/L
COLOR: YELLOW
CREAT SERPL-MCNC: 1.1 MG/DL
EGFRCR SERPLBLD CKD-EPI 2021: 46 ML/MIN/1.73M2
GLUCOSE QUALITATIVE U: NEGATIVE MG/DL
GLUCOSE SERPL-MCNC: 93 MG/DL
KETONES URINE: NEGATIVE MG/DL
LEUKOCYTE ESTERASE URINE: NEGATIVE
NITRITE URINE: NEGATIVE
PH URINE: 7
POTASSIUM SERPL-SCNC: 4.6 MMOL/L
PROT SERPL-MCNC: 7 G/DL
PROTEIN URINE: NEGATIVE MG/DL
SODIUM SERPL-SCNC: 135 MMOL/L
SPECIFIC GRAVITY URINE: 1.01
UROBILINOGEN URINE: 0.2 MG/DL

## 2025-08-15 ENCOUNTER — OFFICE (OUTPATIENT)
Dept: URBAN - METROPOLITAN AREA CLINIC 63 | Facility: CLINIC | Age: OVER 89
Setting detail: OPHTHALMOLOGY
End: 2025-08-15
Payer: MEDICARE

## 2025-08-15 DIAGNOSIS — H35.033: ICD-10-CM

## 2025-08-15 DIAGNOSIS — H43.813: ICD-10-CM

## 2025-08-15 DIAGNOSIS — H35.3112: ICD-10-CM

## 2025-08-15 DIAGNOSIS — H35.3221: ICD-10-CM

## 2025-08-15 PROCEDURE — 92134 CPTRZ OPH DX IMG PST SGM RTA: CPT | Performed by: SPECIALIST

## 2025-08-15 PROCEDURE — 92012 INTRM OPH EXAM EST PATIENT: CPT | Performed by: SPECIALIST

## 2025-08-15 ASSESSMENT — CONFRONTATIONAL VISUAL FIELD TEST (CVF)
OD_FINDINGS: FULL
OS_FINDINGS: FULL

## 2025-08-15 ASSESSMENT — VISUAL ACUITY
OD_BCVA: CF 2FT
OS_BCVA: 20/40

## 2025-08-15 ASSESSMENT — KERATOMETRY
OS_K2POWER_DIOPTERS: 43.55
OS_AXISANGLE_DEGREES: 132
OD_AXISANGLE_DEGREES: 169
OD_K1POWER_DIOPTERS: 42.40
OS_K1POWER_DIOPTERS: 43.10
OD_K2POWER_DIOPTERS: 44.29

## 2025-08-15 ASSESSMENT — TONOMETRY
OD_IOP_MMHG: 17
OS_IOP_MMHG: 16

## 2025-08-15 ASSESSMENT — REFRACTION_AUTOREFRACTION
OS_AXIS: 121
OS_SPHERE: +0.25
OS_CYLINDER: -0.25
OD_SPHERE: -0.75
OD_AXIS: 082
OD_CYLINDER: -3.00

## 2025-08-15 ASSESSMENT — LID EXAM ASSESSMENTS
OS_BLEPHARITIS: LLL LUL 3+
OD_BLEPHARITIS: RLL RUL 3+

## 2025-08-15 ASSESSMENT — TEAR BREAK UP TIME (TBUT)
OS_TBUT: 2+
OD_TBUT: 2+